# Patient Record
Sex: FEMALE | Race: BLACK OR AFRICAN AMERICAN | NOT HISPANIC OR LATINO | Employment: FULL TIME | URBAN - METROPOLITAN AREA
[De-identification: names, ages, dates, MRNs, and addresses within clinical notes are randomized per-mention and may not be internally consistent; named-entity substitution may affect disease eponyms.]

---
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
## 2019-01-04 ENCOUNTER — TELEPHONE (OUTPATIENT)
Dept: PSYCHIATRY | Facility: CLINIC | Age: 19
End: 2019-01-04

## 2019-01-04 NOTE — TELEPHONE ENCOUNTER
PSYCHIATRY CLINIC PHONE INTAKE     SERVICES REQUESTED / INTERESTED IN          Med Management    Presenting Problem and Brief History                              What would you like to be seen for? (brief description):  Pt was diagnosed with depression and anxiety since she was 13. She has had an eating disorder since being a young teenager, but was diagnosed with it at 16 and ASD was diagnosed a few years. Currently taking Gabapentin 900 twice a day and Lithium 450 regular twice a day and 450 XR twice a day. She doesn't feel like the medications are working. She's been on it since she was 15. Her psychiatrist in [place] is prescribing the meds up until this point. She has low energy and irritable. (Mom completed the intake from this point) She is also having black-out episodes that last 15-30 minutes. She can hear whats going on around her but can't move. She suppose to go back to college but she feels like she can't focus on her work. She is having panic attacks, but mom doesn't know how frequently, but believes its daily. She hasn't reported physical symptoms of anxiety to her mother. Sleep has always been a problem for her. She sleeps in late since she's not in school right now. Mom isn't aware of a history of trauma. Her eating disorder is related to a restrictive food intake. She won't eat unless she exercises because she wasnt her food intake to equal her exercise impact. She has had SI and was seeing a therapist over the winter break and she scored high on the SI scale with the therapist. Mom's not sure if she has an action plan to harm herself. She has a history of cutting sine she was in a teenager in highschool. Mom's not sure if she's still engaging in the cutting, when she asked the pt did not report it.     Have you received a mental health diagnosis? Yes   Which one (s): Depression, anxiety, eating disorder and ASD.   Is there any history of developmental delay?  No   Are you currently seeing a mental  health provider?  Yes            Who / month last seen:  [facility]  on [address] in [place], last seen on 12/31/18.  Do you have mental health records elsewhere?  Yes  Will you sign a release so we can obtain them?  Yes    Have you ever been hospitalized for psychiatric reasons?  Yes  Describe:  Suicide attempts and depression at  .     Do you have current thoughts of self-harm?  Yes  - See above  Do you currently have thoughts of harming others?  No       Substance Use History     Do you have any history of alcohol / illicit drug use?  ?  Describe:  ?  Have you ever received treatment for this?  No    Describe:  NA     Social History     Does the patient have a guardian?  No    Name / number: NA  Have you had an ACT team in last 12 months?  No  Describe: NA   Do you have any current or past legal issues?  No  Describe: NA   OK to leave a detailed voicemail?  Yes    Medical/ Surgical History                                   Patient Active Problem List   Diagnosis     Major depression     Generalized anxiety disorder     Social anxiety disorder     Disorder of thyroid     Self-injurious behavior     Deliberate self-cutting     Suicide attempt (H)     Mild intermittent asthma     Iron deficiency anemia     BAILEY (generalized anxiety disorder)     Borderline personality disorder (H)     Social phobia     Panic disorder with agoraphobia     Elevated TSH     OCD (obsessive compulsive disorder)     Nocturnal enuresis     Overdose          Medications             Current Outpatient Medications   Medication Sig Dispense Refill     albuterol (PROAIR HFA, PROVENTIL HFA, VENTOLIN HFA) 108 (90 BASE) MCG/ACT inhaler Inhale 2 puffs into the lungs every 4 hours as needed for shortness of breath / dyspnea or wheezing (Patient not taking: Reported on 6/1/2017) 1 Inhaler 0     DESMOPRESSIN ACETATE PO Take 0.6 mg by mouth At Bedtime Reported on 5/21/2017       diclofenac (VOLTAREN) 75 MG EC tablet Take 1 tablet (75 mg)  by mouth 2 times daily as needed for moderate pain 30 tablet 1     gabapentin (NEURONTIN) 300 MG capsule Take 2 capsules (600 mg) by mouth 2 times daily 120 capsule 0     Lactase (LACTAID PO) Take 9,000 Units by mouth 3 times daily as needed for indigestion (with meals)        LAMOTRIGINE PO Take 100 mg by mouth daily       lithium (ESKALITH) 450 MG CR tablet Take 1 tablet (450 mg) by mouth 2 times daily 60 tablet 0     lithium 150 MG capsule Take 1 capsule (150 mg) by mouth At Bedtime (Patient taking differently: Take 150 mg by mouth daily ) 30 capsule 0     Multiple Vitamins-Minerals (MULTIVITAMIN ADULT PO) Reported on 5/21/2017       polyethylene glycol (MIRALAX/GLYCOLAX) powder Take 1 capful by mouth daily as needed Reported on 5/21/2017       Vitamin D, Cholecalciferol, 1000 UNITS TABS Take 2,000 Units by mouth daily (Patient not taking: Reported on 6/20/2017) 60 tablet 0         DISPOSITION      1/4/19 Intake completed. Ok to schedule AGE. Scheduled evaristo/ Angie Gregory on 3/11/19 at 12pm.   Amanda Morales,

## 2019-01-07 ENCOUNTER — TELEPHONE (OUTPATIENT)
Dept: PEDIATRICS | Facility: CLINIC | Age: 19
End: 2019-01-07

## 2019-01-07 NOTE — TELEPHONE ENCOUNTER
Called mom and left message letting her know I am sending this to Dr. Rodríguez who is scheduled to be back in the clinic tomorrow.  Niki Rodgers RN

## 2019-01-07 NOTE — TELEPHONE ENCOUNTER
Reason for Call:  Other - Patient Request    Detailed comments: Mom called and stated the reason for patient's appointment on Wednesday is for reoccurring depression. Mom asked if it would be possible for her and Dr. Rodríguez to talk on the phone before the appointment so she can discuss this further.    Phone Number Patient can be reached at: Mom: [phone]    Best Time: Anytime    Can we leave a detailed message on this number? YES    Call taken on 1/7/2019 at 11:47 AM by Nisha Decker

## 2019-01-08 NOTE — TELEPHONE ENCOUNTER
I called mother who shared that Stephanie has been struggling with depression.  I will see them tomorrow.

## 2019-01-09 ENCOUNTER — OFFICE VISIT (OUTPATIENT)
Dept: PEDIATRICS | Facility: CLINIC | Age: 19
End: 2019-01-09
Payer: COMMERCIAL

## 2019-01-09 VITALS
WEIGHT: 135.25 LBS | TEMPERATURE: 97.7 F | SYSTOLIC BLOOD PRESSURE: 104 MMHG | HEIGHT: 71 IN | DIASTOLIC BLOOD PRESSURE: 59 MMHG | BODY MASS INDEX: 18.94 KG/M2 | HEART RATE: 70 BPM

## 2019-01-09 DIAGNOSIS — F32.2 CURRENT SEVERE EPISODE OF MAJOR DEPRESSIVE DISORDER WITHOUT PSYCHOTIC FEATURES WITHOUT PRIOR EPISODE (H): Primary | Chronic | ICD-10-CM

## 2019-01-09 PROCEDURE — 99213 OFFICE O/P EST LOW 20 MIN: CPT | Performed by: PEDIATRICS

## 2019-01-09 ASSESSMENT — ANXIETY QUESTIONNAIRES
GAD7 TOTAL SCORE: 21
IF YOU CHECKED OFF ANY PROBLEMS ON THIS QUESTIONNAIRE, HOW DIFFICULT HAVE THESE PROBLEMS MADE IT FOR YOU TO DO YOUR WORK, TAKE CARE OF THINGS AT HOME, OR GET ALONG WITH OTHER PEOPLE: EXTREMELY DIFFICULT
1. FEELING NERVOUS, ANXIOUS, OR ON EDGE: NEARLY EVERY DAY
2. NOT BEING ABLE TO STOP OR CONTROL WORRYING: NEARLY EVERY DAY
5. BEING SO RESTLESS THAT IT IS HARD TO SIT STILL: NEARLY EVERY DAY
3. WORRYING TOO MUCH ABOUT DIFFERENT THINGS: NEARLY EVERY DAY
7. FEELING AFRAID AS IF SOMETHING AWFUL MIGHT HAPPEN: NEARLY EVERY DAY
6. BECOMING EASILY ANNOYED OR IRRITABLE: NEARLY EVERY DAY

## 2019-01-09 ASSESSMENT — PATIENT HEALTH QUESTIONNAIRE - PHQ9
SUM OF ALL RESPONSES TO PHQ QUESTIONS 1-9: 25
5. POOR APPETITE OR OVEREATING: NEARLY EVERY DAY

## 2019-01-09 ASSESSMENT — MIFFLIN-ST. JEOR: SCORE: 1480

## 2019-01-09 NOTE — LETTER
[facility]  [address] [facility] [address]  Phone: [phone]    01/09/19    Stephanie Elate  [address] [facility] [address]      To whom it may concern:     Stephanie is my patient.  She is requiring a medical leave of absence from college this coming semester, as she is requiring treatment for a chronic medical condition.    If you have any questions or concerns, please do not hesitate to contact me.    Sincerely,            Jose Rodríguez MD

## 2019-01-09 NOTE — PROGRESS NOTES
SUBJECTIVE:   Stephanie Villanueva is a 18 year old female who presents to clinic today with mother because of:    Chief Complaint   Patient presents with     Depression        HPI  Concerns: follow up depression and anxiety. Here with mother.  Pt requesting medical leave letter.     Stephanie has an extensive psychiatric history including severe depression, self-injurious behavior, anxiety, OCD, who was nonetheless able to be very successful her first year away at [employer], getting top grade.  At the end this fall semester, however, she came home severely depressed and exhibiting psychomotor retardation, as described by her mother.    Mother has an appointment for her to see a psychiatric PA next week, and a therapist later today. Stephanie is requesting a medical leave of absence letter from me today.       ROS  GENERAL:  NEGATIVE for fever, poor appetite, and sleep disruption.  SKIN:  NEGATIVE for rash, hives, and eczema.  EYE:  NEGATIVE for pain, discharge, redness, itching and vision problems.  ENT:  NEGATIVE for ear pain, runny nose, congestion and sore throat.  RESP:  NEGATIVE for cough, wheezing, and difficulty breathing.  CARDIAC:  NEGATIVE for chest pain and cyanosis.   GI:  NEGATIVE for vomiting, diarrhea, abdominal pain and constipation.  :  NEGATIVE for urinary problems.  NEURO:  NEGATIVE for headache and weakness.  ALLERGY:  As in Allergy History  MSK:  NEGATIVE for muscle problems and joint problems.    PROBLEM LIST  Patient Active Problem List    Diagnosis Date Noted     Overdose 12/29/2016     Priority: Medium     OCD (obsessive compulsive disorder) 06/08/2016     Priority: Medium     Borderline personality disorder (H) 01/06/2016     Priority: Medium     Social phobia 01/06/2016     Priority: Medium     Panic disorder with agoraphobia 01/06/2016     Priority: Medium     Elevated TSH 01/06/2016     Priority: Medium     BAILEY (generalized anxiety disorder) 12/09/2015     Priority: Medium     Mild  intermittent asthma 05/08/2015     Priority: Medium     Exercise induced       Iron deficiency anemia 05/08/2015     Priority: Medium     Suicide attempt (H) 01/27/2015     Priority: Medium     If patient is admitted:  1. Must go to [facility] [place] only - she does not want teaching team (too overwhelming)  2. Needs to start on 1:1, in scrubs only, with no personal/home items until cleared by team  3. Patient has history of sneaking razors on the unit, including in books, stuffed animals, shoes - all items must be thoroughly searched  4. Becomes dysregulated in group settings and after contact with family  5. What works? Sarcasm, allowing her to have some control in who her assigned staff is, sensory room       Deliberate self-cutting 09/05/2014     Priority: Medium     Self-injurious behavior 09/04/2014     Priority: Medium     Disorder of thyroid 01/12/2014     Priority: Medium     Problem list name updated by automated process. Provider to review       Major depression 11/15/2013     Priority: Medium     Generalized anxiety disorder 11/15/2013     Priority: Medium     Social anxiety disorder 11/15/2013     Priority: Medium     Nocturnal enuresis 10/04/2007     Priority: Medium     Currently resolved.        MEDICATIONS  Current Outpatient Medications   Medication Sig Dispense Refill     albuterol (PROAIR HFA, PROVENTIL HFA, VENTOLIN HFA) 108 (90 BASE) MCG/ACT inhaler Inhale 2 puffs into the lungs every 4 hours as needed for shortness of breath / dyspnea or wheezing (Patient not taking: Reported on 6/1/2017) 1 Inhaler 0     DESMOPRESSIN ACETATE PO Take 0.6 mg by mouth At Bedtime Reported on 5/21/2017       diclofenac (VOLTAREN) 75 MG EC tablet Take 1 tablet (75 mg) by mouth 2 times daily as needed for moderate pain 30 tablet 1     gabapentin (NEURONTIN) 300 MG capsule Take 2 capsules (600 mg) by mouth 2 times daily 120 capsule 0     Lactase (LACTAID PO) Take 9,000 Units by mouth 3 times daily as needed  "for indigestion (with meals)        LAMOTRIGINE PO Take 100 mg by mouth daily       lithium (ESKALITH) 450 MG CR tablet Take 1 tablet (450 mg) by mouth 2 times daily 60 tablet 0     lithium 150 MG capsule Take 1 capsule (150 mg) by mouth At Bedtime (Patient taking differently: Take 150 mg by mouth daily ) 30 capsule 0     Multiple Vitamins-Minerals (MULTIVITAMIN ADULT PO) Reported on 5/21/2017       polyethylene glycol (MIRALAX/GLYCOLAX) powder Take 1 capful by mouth daily as needed Reported on 5/21/2017       Vitamin D, Cholecalciferol, 1000 UNITS TABS Take 2,000 Units by mouth daily (Patient not taking: Reported on 6/20/2017) 60 tablet 0      ALLERGIES  Allergies   Allergen Reactions     No Known Drug Allergies        Reviewed and updated as needed this visit by clinical staff         Reviewed and updated as needed this visit by Provider       OBJECTIVE:     /59 (BP Location: Right arm, Patient Position: Chair)   Pulse 70   Temp 97.7  F (36.5  C) (Oral)   Ht 5' 10.71\" (1.796 m)   Wt 135 lb 4 oz (61.3 kg)   BMI 19.02 kg/m      GENERAL: Alert, well-groomed, but making minimal eye contact, and speaking very sparingly.    DIAGNOSTICS: None    ASSESSMENT/PLAN:   1. Current severe episode of major depressive disorder without psychotic features without prior episode (H)  Letter written for medical leave from [employer].  Follow up with me in 3 weeks, after seeing psychiatrist for possible change in medication, and weekly therapy with new therapist.  Mother and Ndolo agree with plan.      Jose Rodríguez MD       "

## 2019-01-10 ASSESSMENT — ANXIETY QUESTIONNAIRES: GAD7 TOTAL SCORE: 21

## 2019-01-10 ASSESSMENT — ASTHMA QUESTIONNAIRES: ACT_TOTALSCORE: 24

## 2019-01-19 ENCOUNTER — HOSPITAL ENCOUNTER (INPATIENT)
Facility: CLINIC | Age: 19
LOS: 10 days | Discharge: HOME | DRG: 885 | End: 2019-01-29
Attending: EMERGENCY MEDICINE | Admitting: PSYCHIATRY & NEUROLOGY
Payer: COMMERCIAL

## 2019-01-19 DIAGNOSIS — T42.6X2A: ICD-10-CM

## 2019-01-19 DIAGNOSIS — F41.9 ANXIETY: ICD-10-CM

## 2019-01-19 DIAGNOSIS — F33.2 SEVERE RECURRENT MAJOR DEPRESSION WITHOUT PSYCHOTIC FEATURES (H): ICD-10-CM

## 2019-01-19 DIAGNOSIS — S51.811A LACERATION OF RIGHT FOREARM: ICD-10-CM

## 2019-01-19 DIAGNOSIS — R45.851 SUICIDAL IDEATION: Primary | ICD-10-CM

## 2019-01-19 PROBLEM — F32.A DEPRESSION: Status: ACTIVE | Noted: 2019-01-19

## 2019-01-19 LAB
ALBUMIN SERPL-MCNC: 4 G/DL (ref 3.4–5)
ALP SERPL-CCNC: 75 U/L (ref 40–150)
ALT SERPL W P-5'-P-CCNC: 17 U/L (ref 0–50)
AMPHETAMINES UR QL SCN: NEGATIVE
ANION GAP SERPL CALCULATED.3IONS-SCNC: 7 MMOL/L (ref 3–14)
APAP SERPL-MCNC: <2 MG/L (ref 10–20)
AST SERPL W P-5'-P-CCNC: 23 U/L (ref 0–35)
BARBITURATES UR QL: NEGATIVE
BASOPHILS # BLD AUTO: 0 10E9/L (ref 0–0.2)
BASOPHILS NFR BLD AUTO: 0.3 %
BENZODIAZ UR QL: NEGATIVE
BILIRUB SERPL-MCNC: 0.5 MG/DL (ref 0.2–1.3)
BUN SERPL-MCNC: 9 MG/DL (ref 7–30)
CALCIUM SERPL-MCNC: 8.8 MG/DL (ref 8.5–10.1)
CANNABINOIDS UR QL SCN: NEGATIVE
CHLORIDE SERPL-SCNC: 103 MMOL/L (ref 96–110)
CO2 SERPL-SCNC: 28 MMOL/L (ref 20–32)
COCAINE UR QL: NEGATIVE
CREAT SERPL-MCNC: 0.69 MG/DL (ref 0.5–1)
DIFFERENTIAL METHOD BLD: ABNORMAL
EOSINOPHIL # BLD AUTO: 0.1 10E9/L (ref 0–0.7)
EOSINOPHIL NFR BLD AUTO: 2.2 %
ERYTHROCYTE [DISTWIDTH] IN BLOOD BY AUTOMATED COUNT: 12.3 % (ref 10–15)
ETHANOL UR QL SCN: NEGATIVE
GFR SERPL CREATININE-BSD FRML MDRD: >90 ML/MIN/{1.73_M2}
GLUCOSE SERPL-MCNC: 70 MG/DL (ref 70–99)
HCG UR QL: NEGATIVE
HCT VFR BLD AUTO: 35.5 % (ref 35–47)
HGB BLD-MCNC: 11.4 G/DL (ref 11.7–15.7)
IMM GRANULOCYTES # BLD: 0 10E9/L (ref 0–0.4)
IMM GRANULOCYTES NFR BLD: 0.3 %
LITHIUM SERPL-SCNC: 2.29 MMOL/L (ref 0.6–1.2)
LYMPHOCYTES # BLD AUTO: 1.5 10E9/L (ref 0.8–5.3)
LYMPHOCYTES NFR BLD AUTO: 26.3 %
MCH RBC QN AUTO: 30.2 PG (ref 26.5–33)
MCHC RBC AUTO-ENTMCNC: 32.1 G/DL (ref 31.5–36.5)
MCV RBC AUTO: 94 FL (ref 78–100)
MONOCYTES # BLD AUTO: 0.5 10E9/L (ref 0–1.3)
MONOCYTES NFR BLD AUTO: 8.1 %
NEUTROPHILS # BLD AUTO: 3.6 10E9/L (ref 1.6–8.3)
NEUTROPHILS NFR BLD AUTO: 62.8 %
NRBC # BLD AUTO: 0 10*3/UL
NRBC BLD AUTO-RTO: 0 /100
OPIATES UR QL SCN: NEGATIVE
PLATELET # BLD AUTO: 293 10E9/L (ref 150–450)
POTASSIUM SERPL-SCNC: 3.7 MMOL/L (ref 3.4–5.3)
PROT SERPL-MCNC: 6.9 G/DL (ref 6.8–8.8)
RBC # BLD AUTO: 3.77 10E12/L (ref 3.8–5.2)
SALICYLATES SERPL-MCNC: <2 MG/DL
SODIUM SERPL-SCNC: 138 MMOL/L (ref 133–144)
T4 FREE SERPL-MCNC: 0.96 NG/DL (ref 0.76–1.46)
TSH SERPL DL<=0.005 MIU/L-ACNC: 11.97 MU/L (ref 0.4–4)
WBC # BLD AUTO: 5.8 10E9/L (ref 4–11)

## 2019-01-19 PROCEDURE — 25000132 ZZH RX MED GY IP 250 OP 250 PS 637: Performed by: FAMILY MEDICINE

## 2019-01-19 PROCEDURE — 27210282 ZZH ADHESIVE DERMABOND SKIN

## 2019-01-19 PROCEDURE — 80178 ASSAY OF LITHIUM: CPT | Performed by: EMERGENCY MEDICINE

## 2019-01-19 PROCEDURE — 25000131 ZZH RX MED GY IP 250 OP 636 PS 637: Performed by: PSYCHIATRY & NEUROLOGY

## 2019-01-19 PROCEDURE — 80307 DRUG TEST PRSMV CHEM ANLYZR: CPT | Performed by: FAMILY MEDICINE

## 2019-01-19 PROCEDURE — H2032 ACTIVITY THERAPY, PER 15 MIN: HCPCS

## 2019-01-19 PROCEDURE — 99285 EMERGENCY DEPT VISIT HI MDM: CPT | Mod: 25

## 2019-01-19 PROCEDURE — 25000132 ZZH RX MED GY IP 250 OP 250 PS 637: Performed by: PSYCHIATRY & NEUROLOGY

## 2019-01-19 PROCEDURE — 0HQDXZZ REPAIR RIGHT LOWER ARM SKIN, EXTERNAL APPROACH: ICD-10-PCS | Performed by: EMERGENCY MEDICINE

## 2019-01-19 PROCEDURE — 80320 DRUG SCREEN QUANTALCOHOLS: CPT | Performed by: FAMILY MEDICINE

## 2019-01-19 PROCEDURE — 25000125 ZZHC RX 250: Performed by: EMERGENCY MEDICINE

## 2019-01-19 PROCEDURE — 80329 ANALGESICS NON-OPIOID 1 OR 2: CPT | Performed by: EMERGENCY MEDICINE

## 2019-01-19 PROCEDURE — 80053 COMPREHEN METABOLIC PANEL: CPT | Performed by: EMERGENCY MEDICINE

## 2019-01-19 PROCEDURE — 12005 RPR S/N/A/GEN/TRK12.6-20.0CM: CPT

## 2019-01-19 PROCEDURE — 84439 ASSAY OF FREE THYROXINE: CPT | Performed by: EMERGENCY MEDICINE

## 2019-01-19 PROCEDURE — 12005 RPR S/N/A/GEN/TRK12.6-20.0CM: CPT | Mod: Z6 | Performed by: EMERGENCY MEDICINE

## 2019-01-19 PROCEDURE — 99285 EMERGENCY DEPT VISIT HI MDM: CPT | Mod: 25 | Performed by: EMERGENCY MEDICINE

## 2019-01-19 PROCEDURE — 93005 ELECTROCARDIOGRAM TRACING: CPT

## 2019-01-19 PROCEDURE — 25000131 ZZH RX MED GY IP 250 OP 636 PS 637: Performed by: FAMILY MEDICINE

## 2019-01-19 PROCEDURE — 81025 URINE PREGNANCY TEST: CPT | Performed by: FAMILY MEDICINE

## 2019-01-19 PROCEDURE — 84443 ASSAY THYROID STIM HORMONE: CPT | Performed by: EMERGENCY MEDICINE

## 2019-01-19 PROCEDURE — 85025 COMPLETE CBC W/AUTO DIFF WBC: CPT | Performed by: EMERGENCY MEDICINE

## 2019-01-19 PROCEDURE — 12400001 ZZH R&B MH UMMC

## 2019-01-19 RX ORDER — HYDROXYZINE HYDROCHLORIDE 25 MG/1
25 TABLET, FILM COATED ORAL EVERY 4 HOURS PRN
Status: DISCONTINUED | OUTPATIENT
Start: 2019-01-19 | End: 2019-01-29 | Stop reason: HOSPADM

## 2019-01-19 RX ORDER — OLANZAPINE 10 MG/1
10 TABLET ORAL
Status: DISCONTINUED | OUTPATIENT
Start: 2019-01-19 | End: 2019-01-29 | Stop reason: HOSPADM

## 2019-01-19 RX ORDER — DESMOPRESSIN ACETATE 0.2 MG/1
0.6 TABLET ORAL AT BEDTIME
COMMUNITY
End: 2019-09-16

## 2019-01-19 RX ORDER — LITHIUM CARBONATE 300 MG
300 TABLET ORAL 2 TIMES DAILY
Status: ON HOLD | COMMUNITY
End: 2019-01-29

## 2019-01-19 RX ORDER — OLANZAPINE 10 MG/2ML
10 INJECTION, POWDER, FOR SOLUTION INTRAMUSCULAR
Status: DISCONTINUED | OUTPATIENT
Start: 2019-01-19 | End: 2019-01-29 | Stop reason: HOSPADM

## 2019-01-19 RX ORDER — ONDANSETRON 8 MG/1
8 TABLET, ORALLY DISINTEGRATING ORAL ONCE
Status: COMPLETED | OUTPATIENT
Start: 2019-01-19 | End: 2019-01-19

## 2019-01-19 RX ORDER — IBUPROFEN 400 MG/1
400 TABLET, FILM COATED ORAL EVERY 6 HOURS PRN
Status: DISCONTINUED | OUTPATIENT
Start: 2019-01-19 | End: 2019-01-19

## 2019-01-19 RX ORDER — IBUPROFEN 600 MG/1
600 TABLET, FILM COATED ORAL ONCE
Status: COMPLETED | OUTPATIENT
Start: 2019-01-19 | End: 2019-01-19

## 2019-01-19 RX ORDER — LIDOCAINE HYDROCHLORIDE AND EPINEPHRINE 10; 10 MG/ML; UG/ML
1 INJECTION, SOLUTION INFILTRATION; PERINEURAL ONCE
Status: COMPLETED | OUTPATIENT
Start: 2019-01-19 | End: 2019-01-19

## 2019-01-19 RX ORDER — GABAPENTIN 300 MG/1
900 CAPSULE ORAL 2 TIMES DAILY
Status: ON HOLD | COMMUNITY
End: 2019-01-29

## 2019-01-19 RX ORDER — ONDANSETRON 4 MG/1
4 TABLET, ORALLY DISINTEGRATING ORAL 3 TIMES DAILY PRN
Status: DISCONTINUED | OUTPATIENT
Start: 2019-01-19 | End: 2019-01-29 | Stop reason: HOSPADM

## 2019-01-19 RX ORDER — ACETAMINOPHEN 325 MG/1
650 TABLET ORAL 3 TIMES DAILY PRN
Status: DISCONTINUED | OUTPATIENT
Start: 2019-01-19 | End: 2019-01-29 | Stop reason: HOSPADM

## 2019-01-19 RX ORDER — DESMOPRESSIN ACETATE 0.2 MG/1
600 TABLET ORAL AT BEDTIME
Status: DISCONTINUED | OUTPATIENT
Start: 2019-01-19 | End: 2019-01-29 | Stop reason: HOSPADM

## 2019-01-19 RX ORDER — TRAZODONE HYDROCHLORIDE 50 MG/1
50 TABLET, FILM COATED ORAL
Status: DISCONTINUED | OUTPATIENT
Start: 2019-01-19 | End: 2019-01-25

## 2019-01-19 RX ORDER — BISACODYL 10 MG
10 SUPPOSITORY, RECTAL RECTAL DAILY PRN
Status: DISCONTINUED | OUTPATIENT
Start: 2019-01-19 | End: 2019-01-29 | Stop reason: HOSPADM

## 2019-01-19 RX ORDER — ALUMINA, MAGNESIA, AND SIMETHICONE 2400; 2400; 240 MG/30ML; MG/30ML; MG/30ML
30 SUSPENSION ORAL EVERY 4 HOURS PRN
Status: DISCONTINUED | OUTPATIENT
Start: 2019-01-19 | End: 2019-01-29 | Stop reason: HOSPADM

## 2019-01-19 RX ADMIN — LIDOCAINE HYDROCHLORIDE AND EPINEPHRINE 1 ML: 10; 10 INJECTION, SOLUTION INFILTRATION; PERINEURAL at 05:22

## 2019-01-19 RX ADMIN — ONDANSETRON 4 MG: 4 TABLET, ORALLY DISINTEGRATING ORAL at 21:27

## 2019-01-19 RX ADMIN — IBUPROFEN 600 MG: 600 TABLET ORAL at 16:18

## 2019-01-19 RX ADMIN — DESMOPRESSIN ACETATE 600 MCG: 0.2 TABLET ORAL at 21:26

## 2019-01-19 RX ADMIN — ONDANSETRON 8 MG: 8 TABLET, ORALLY DISINTEGRATING ORAL at 10:50

## 2019-01-19 ASSESSMENT — ACTIVITIES OF DAILY LIVING (ADL)
FALL_HISTORY_WITHIN_LAST_SIX_MONTHS: NO
HYGIENE/GROOMING: INDEPENDENT
DRESS: 0-->INDEPENDENT
DRESS: INDEPENDENT
AMBULATION: 0-->INDEPENDENT
SWALLOWING: 0-->SWALLOWS FOODS/LIQUIDS WITHOUT DIFFICULTY
ORAL_HYGIENE: INDEPENDENT
RETIRED_EATING: 0-->INDEPENDENT
BATHING: 0-->INDEPENDENT
RETIRED_COMMUNICATION: 0-->UNDERSTANDS/COMMUNICATES WITHOUT DIFFICULTY
WHICH_OF_THE_ABOVE_FUNCTIONAL_RISKS_HAD_A_RECENT_ONSET_OR_CHANGE?: COGNITION
TRANSFERRING: 0-->INDEPENDENT
TOILETING: 0-->INDEPENDENT
COGNITION: 1 - ATTENTION OR MEMORY DEFICITS

## 2019-01-19 ASSESSMENT — ENCOUNTER SYMPTOMS
BACK PAIN: 0
NECK STIFFNESS: 0
VOMITING: 0
NECK PAIN: 0
CHILLS: 0
DIZZINESS: 0
LIGHT-HEADEDNESS: 0
SORE THROAT: 0
MYALGIAS: 0
COUGH: 0
ABDOMINAL PAIN: 0
SHORTNESS OF BREATH: 0
NAUSEA: 0
AGITATION: 0
HALLUCINATIONS: 0
APPETITE CHANGE: 0
RHINORRHEA: 0
BRUISES/BLEEDS EASILY: 0
CHEST TIGHTNESS: 0
DECREASED CONCENTRATION: 1
WEAKNESS: 0
FATIGUE: 1
WOUND: 1
NERVOUS/ANXIOUS: 1
FEVER: 0
ARTHRALGIAS: 0
DIARRHEA: 0
DYSPHORIC MOOD: 1

## 2019-01-19 ASSESSMENT — MIFFLIN-ST. JEOR: SCORE: 1460.35

## 2019-01-19 ASSESSMENT — COLUMBIA-SUICIDE SEVERITY RATING SCALE - C-SSRS
1. IN THE PAST MONTH, HAVE YOU WISHED YOU WERE DEAD OR WISHED YOU COULD GO TO SLEEP AND NOT WAKE UP?: YES
SUICIDAL/SLEF-INJURIOUS BEHAVIOR: ACTUAL SUICIDE ATTEMPT

## 2019-01-19 NOTE — ED PROVIDER NOTES
History     Chief Complaint   Patient presents with     Drug Overdose     pt c/o taking 3600mg of gabapentin around 0300     Laceration     Pt has multiple large lacerations on R forearm.         Stephanie Villanueva is a 18 year old female with history of depression and anxiety and history of self injuring by cutting who presents with suicidal ideation and attempt by intentional overdose of gabapentin 3600 mg. Denies other co-ingestants. She used a razor blade and cut her right forearm multiple times. Denies alcohol or drug use. Denies recent illness.    I have reviewed the Medications, Allergies, Past Medical and Surgical History, and Social History in the [employer] system.  Past Medical History:   Diagnosis Date     Anxiety october 2013     CONTUSION OF left iliac crest 9/3/2006     Major depression october 2013     Nocturnal enuresis 10/4/2007    Currently resolved.     Salter-Perdomo Type I fracture of distal fibula with routine healing 1/25/2013     Uncomplicated asthma     sports induced       Review of Systems   Constitutional: Positive for fatigue. Negative for appetite change, chills and fever.   HENT: Negative for congestion, rhinorrhea and sore throat.    Eyes: Negative for visual disturbance.   Respiratory: Negative for cough, chest tightness and shortness of breath.    Cardiovascular: Negative for chest pain.   Gastrointestinal: Negative for abdominal pain, diarrhea, nausea and vomiting.   Musculoskeletal: Negative for arthralgias, back pain, myalgias, neck pain and neck stiffness.   Skin: Positive for wound. Negative for rash.   Allergic/Immunologic: Negative for immunocompromised state.   Neurological: Negative for dizziness, syncope, weakness and light-headedness.   Hematological: Does not bruise/bleed easily.   Psychiatric/Behavioral: Positive for decreased concentration, dysphoric mood, self-injury and suicidal ideas. Negative for agitation and hallucinations. The patient is nervous/anxious.    All other  systems reviewed and are negative.      Physical Exam   BP: 142/88  Pulse: 94  Temp: 98.5  F (36.9  C)  Resp: 16  SpO2: 100 %     Physical Exam   Constitutional: She is oriented to person, place, and time. She appears well-developed and well-nourished. No distress.   HENT:   Head: Normocephalic and atraumatic.   Nose: Nose normal.   Mouth/Throat: Oropharynx is clear and moist. No oropharyngeal exudate.   Eyes: Conjunctivae and EOM are normal. Pupils are equal, round, and reactive to light. No scleral icterus.   Neck: Normal range of motion. Neck supple.   Cardiovascular: Normal rate, regular rhythm, normal heart sounds and intact distal pulses.   Pulmonary/Chest: Effort normal and breath sounds normal. No respiratory distress.   Abdominal: Soft. Bowel sounds are normal. There is no tenderness.   Musculoskeletal: She exhibits no edema or tenderness.   Neurological: She is alert and oriented to person, place, and time.   Skin: Skin is warm and dry. No rash noted. She is not diaphoretic.   Psychiatric: Her mood appears anxious. She is withdrawn. She is not agitated and not actively hallucinating. Thought content is not paranoid. She expresses inappropriate judgment. She expresses suicidal ideation. She expresses no homicidal ideation. She expresses suicidal plans. She is noncommunicative.   Nursing note and vitals reviewed.        ED Course        Procedures             Critical Care time:  [facility] Procedure Note        Laceration Repair:    Performed by: Tyler Mcgovern  Authorized by: Tyler Mcgovern  Consent given by: Patient who states understanding of the procedure being performed after discussing the risks, benefits and alternatives.    Preparation: Patient was prepped and draped in usual sterile fashion.  Irrigation solution: saline    Body area:right forearm  Laceration length: 5cm  Contamination: The wound is not contaminated.  Foreign bodies:none  Tendon involvement: none  Anesthesia:  Local  Local anesthetic: Lidocaine     1%, with epinephrine  Anesthetic total: 4ml    Debridement: none  Skin closure: Closed with 10 x 4.0 Ethilon  Technique: interrupted  Approximation: close  Approximation difficulty: simple    Patient tolerance: Patient tolerated the procedure well with no immediate complications.     [facility] Procedure Note        Laceration Repair:    Performed by: Tyler Mcgovern  Authorized by: Tyler Mcgovern  Consent given by: Patient who states understanding of the procedure being performed after discussing the risks, benefits and alternatives.    Preparation: Patient was prepped and draped in usual sterile fashion.  Irrigation solution: saline    Body area:right forearm  Laceration length: 2.5cm  Contamination: The wound is not contaminated.  Foreign bodies:none  Tendon involvement: none  Anesthesia: Local  Local anesthetic: Lidocaine     1%, with epinephrine  Anesthetic total: 3ml    Debridement: none  Skin closure: Closed with 5 x 4.0 Ethilon  Technique: interrupted  Approximation: close  Approximation difficulty: simple    Patient tolerance: Patient tolerated the procedure well with no immediate complications.    [facility] Procedure Note        Laceration Repair:    Performed by: Tyler Mcgovern  Authorized by: Tyler Mcgovern  Consent given by: Patient who states understanding of the procedure being performed after discussing the risks, benefits and alternatives.    Preparation: Patient was prepped and draped in usual sterile fashion.  Irrigation solution: saline    Body area:right forearm  Laceration length: 3.5cm  Contamination: The wound is not contaminated.  Foreign bodies:none  Tendon involvement: none  Anesthesia: Local  Local anesthetic: Lidocaine     1%, with epinephrine  Anesthetic total: 3ml    Debridement: none  Skin closure: Closed with 6 x 4.0 Ethilon  Technique: interrupted  Approximation: close  Approximation difficulty: simple    Patient tolerance:  Patient tolerated the procedure well with no immediate complications.  [facility] Procedure Note        Laceration Repair:    Performed by: Tyelr Mcgovern  Authorized by: Tyler Mcgovern  Consent given by: Patient who states understanding of the procedure being performed after discussing the risks, benefits and alternatives.    Preparation: Patient was prepped and draped in usual sterile fashion.  Irrigation solution: saline    Body area:right forearm  Laceration length: 3cm  Contamination: The wound is not contaminated.  Foreign bodies:none  Tendon involvement: none  Anesthesia: Local  Local anesthetic: Lidocaine     1%, with epinephrine  Anesthetic total: 3ml    Debridement: none  Skin closure: Closed with 4 x 4.0 Ethilon  Technique: interrupted  Approximation: close  Approximation difficulty: simple    Patient tolerance: Patient tolerated the procedure well with no immediate complications.  [facility] Procedure Note        Laceration Repair:    Performed by: Tyler Mcgovern  Authorized by: Tyler Mcgovern  Consent given by: Patient who states understanding of the procedure being performed after discussing the risks, benefits and alternatives.    Preparation: Patient was prepped and draped in usual sterile fashion.  Irrigation solution: saline    Body area:right forearm  Laceration length: 2.5cm  Contamination: The wound is not contaminated.  Foreign bodies:none  Tendon involvement: none  Anesthesia: Local  Local anesthetic: Lidocaine     1%, with epinephrine  Anesthetic total: 3ml    Debridement: none  Skin closure: Closed with 3 x 4.0 Ethilon  Technique: interrupted  Approximation: close  Approximation difficulty: simple    Patient tolerance: Patient tolerated the procedure well with no immediate complications.  Labs Ordered and Resulted from Time of ED Arrival Up to the Time of Departure from the ED   ACETAMINOPHEN LEVEL   SALICYLATE LEVEL     [facility] Procedure Note        Laceration  Repair:    Performed by: Tyler Han  Authorized by: Tyler Han  Consent given by: Patient who states understanding of the procedure being performed after discussing the risks, benefits and alternatives.    Preparation: Patient was prepped and draped in usual sterile fashion.  Irrigation solution: saline    Body area:right forearm  Laceration length: 2cm  Contamination: The wound is not contaminated.  Foreign bodies:none  Tendon involvement: none  Anesthesia: Local  Local anesthetic: Lidocaine     1%, with epinephrine  Anesthetic total: 2ml    Debridement: none  Skin closure: Closed with Wound adhesive  Technique: Wound adhesive  Approximation: close  Approximation difficulty: simple    Patient tolerance: Patient tolerated the procedure well with no immediate complications.         Assessments & Plan (with Medical Decision Making)   Wound repairs as noted. Admit to psychiatry on an emergency basis with suicidal intent. Discussed gabapentin overdose with poison center. No specific therapy needed.    I have reviewed the nursing notes.    I have reviewed the findings, diagnosis, plan and need for follow up with the patient.       Medication List      There are no discharge medications for this visit.         Final diagnoses:   Suicidal ideation   Self-inflicted injury       1/19/2019   [facility], [place], EMERGENCY DEPARTMENT     Tyler Han MD  01/19/19 0741

## 2019-01-19 NOTE — ED NOTES
Sign out Provider: Luke      Sign out Plan: Patient seen and evaluated on the prior shift.  She has been deemed in need of psychiatric admission.      Reassessment: No events on my shift.  Patient has mild nausea nurse requests an order for Zofran.      Disposition: P.o. Zofran.  Plan for psychiatric admission as passed to me at sign out.       Bradley Frazier MD  01/19/19 1042

## 2019-01-19 NOTE — PLAN OF CARE
ADMIT: This young lady arrived from the ED on 4a around 1414. Pt affect is very flat, behavior very stoic, sad slow to respond to questions. The ED stated she was slow to respond d/t the amount of neurontin she over dosed on. Pt states she has been on sta 7 adol psychiatry nine times? Pt has multiple keloid scars on each arm from years of SIB and also has stitches in her right arm from the suicide attempt. Pt is very quiet and has agreed to remain safe? No drug use.

## 2019-01-19 NOTE — PROGRESS NOTES
Attended afternoon music therapy group. Was quiet and minimally interacted with peers. Soft spoken. Flat affect. Did not contribute ideas to group discussion.

## 2019-01-19 NOTE — PROGRESS NOTES
01/19/19 1413   Patient Belongings   Did you bring any home meds/supplements to the hospital?  No   Patient Belongings locker   Patient Belongings Remaining with Patient clothing;cell phone/electronics;necklace;other (see comments);glasses   Patient Belongings Put in Hospital Secure Location (Security or Locker, etc.) body jewelry;earrings;other (see comments)   Belongings Search Yes   Clothing Search Yes   Second Staff Yanal MICHAEL     Pt belonging in Bin: Purple sweatshirt, library book, phone with black case, noise cancelling headphones, necklace  Pt is wearing her personal eyeglasses and had body jewelry and earrings in.    Added on 1/20/19- black long sleeve shirt, adidas track pants, three curology facial products (locked in hygiene bin)     Items brought for patient on 01/20/19:  14 pieces of sanitary pad, 1 pair of  Sport Bra, 1 pair of black trouser, 2 pair of  socks, 5 pairs of underwear,and  1 pair of long sleeves shirt.    Brought 1/22/19:  book    A               Admission:  I am responsible for any personal items that are not sent to the safe or pharmacy.  [facility] is not responsible for loss, theft or damage of any property in my possession.    Signature:  _________________________________ Date: _______  Time: _____                                              Staff Signature:  ____________________________ Date: ________  Time: _____      2nd Staff person, if patient is unable/unwilling to sign:    Signature: ________________________________ Date: ________  Time: _____     Discharge:  [facility] has returned all of my personal belongings:    Signature: _________________________________ Date: ________  Time: _____                                          Staff Signature:  ____________________________ Date: ________  Time: _____

## 2019-01-19 NOTE — ED NOTES
I have performed an in person assessment of the patient. Based on this assessment the patient no longer requires a one on one attendant at this point in time.    Tyler Han MD  4:04 AM  January 19, 2019           Tyler Han MD  01/19/19 0400

## 2019-01-19 NOTE — ED NOTES
Spoke with mom, who will call floor to sign patient in. Float nurse placing dressing on right inner forearm at this time, at request of patient.

## 2019-01-19 NOTE — PHARMACY-ADMISSION MEDICATION HISTORY
Admission Medication History status for the 1/19/2019 admission is complete.  See EPIC admission navigator for Prior to Admission medications.    Medication history sources:  Patient, [facility], [facility] fill history    Medication history source reliability: Good - patient report matches [facility] fill history, she reports only filling at [facility]    Medication adherence:  Moderate - patient reports missing occasional doses     Changes made to PTA medication list (reason)  Added:   Lithium 300 mg capsule BID   Per patient report and [facility] fill history  Deleted:   Albuterol Inhaler: Inhale 2 puffs into the lungs every 6 hours as needed   Patient has never used  Diclofenac 75 mg EC tablet: Take 1 tablet by mouth twice daily PRN pain   Old prescription (May 2018)  Lactase: Take 9,000 units by mouth three times daily with meals as needed for indigestion   Patient not taking  Lamotrigine: 100 mg daily   Patient reports this was stopped by her provider ~1 year prior  Multivitamin   Vitamin D 2000 unit(s) daily   Patient not taking either supplement  Miralax: 1 capful by mouth daily as needed   Patient reports not usint  Changed:   Gabapentin 300 mg capsule (s): Take twice daily   PREVIOUSLY: 600 mg   CURRENTLY: 900 mg   Per [facility] fill history and patient report     Additional medication history information (including reliability of information, actions taken by pharmacist):   - Patient was a good historian, she knew her medications and denied any other Rx/OTC meds    Time spent in this activity: 25 min    Medication history completed by: Becca Stevenson    Prior to Admission medications    Medication Sig Last Dose Taking? Auth Provider   desmopressin (DDAVP) 0.2 MG tablet Take 0.6 mg by mouth At Bedtime  1/18/2019 at PM Yes Unknown, Entered By History   gabapentin (NEURONTIN) 300 MG capsule Take 900 mg by mouth 2 times daily 1/19/2019 at Unknown time Yes Unknown, Entered By  History   lithium (ESKALITH) 300 MG tablet Take 300 mg by mouth 2 times daily 1/18/2019 at PM Yes Unknown, Entered By History   lithium (ESKALITH) 450 MG CR tablet Take 1 tablet (450 mg) by mouth 2 times daily 1/18/2019 at PM Yes Samantha Varner, DO

## 2019-01-20 LAB
ANION GAP SERPL CALCULATED.3IONS-SCNC: 4 MMOL/L (ref 3–14)
BUN SERPL-MCNC: 7 MG/DL (ref 7–30)
CALCIUM SERPL-MCNC: 9.2 MG/DL (ref 8.5–10.1)
CHLORIDE SERPL-SCNC: 107 MMOL/L (ref 96–110)
CHOLEST SERPL-MCNC: 121 MG/DL
CO2 SERPL-SCNC: 28 MMOL/L (ref 20–32)
CREAT SERPL-MCNC: 0.82 MG/DL (ref 0.5–1)
GFR SERPL CREATININE-BSD FRML MDRD: >90 ML/MIN/{1.73_M2}
GLUCOSE SERPL-MCNC: 88 MG/DL (ref 70–99)
HDLC SERPL-MCNC: 47 MG/DL
LDLC SERPL CALC-MCNC: 61 MG/DL
LITHIUM SERPL-SCNC: 2.05 MMOL/L (ref 0.6–1.2)
NONHDLC SERPL-MCNC: 74 MG/DL
POTASSIUM SERPL-SCNC: 4 MMOL/L (ref 3.4–5.3)
SODIUM SERPL-SCNC: 139 MMOL/L (ref 133–144)
TRIGL SERPL-MCNC: 64 MG/DL
TSH SERPL DL<=0.005 MIU/L-ACNC: 3.45 MU/L (ref 0.4–4)

## 2019-01-20 PROCEDURE — 36415 COLL VENOUS BLD VENIPUNCTURE: CPT | Performed by: PSYCHIATRY & NEUROLOGY

## 2019-01-20 PROCEDURE — 12400001 ZZH R&B MH UMMC

## 2019-01-20 PROCEDURE — 99222 1ST HOSP IP/OBS MODERATE 55: CPT | Performed by: PHYSICIAN ASSISTANT

## 2019-01-20 PROCEDURE — 99207 ZZC CONSULT E&M CHANGED TO INITIAL LEVEL: CPT | Performed by: PHYSICIAN ASSISTANT

## 2019-01-20 PROCEDURE — H2032 ACTIVITY THERAPY, PER 15 MIN: HCPCS

## 2019-01-20 PROCEDURE — 80061 LIPID PANEL: CPT | Performed by: PSYCHIATRY & NEUROLOGY

## 2019-01-20 PROCEDURE — 25000132 ZZH RX MED GY IP 250 OP 250 PS 637: Performed by: PSYCHIATRY & NEUROLOGY

## 2019-01-20 PROCEDURE — 80178 ASSAY OF LITHIUM: CPT | Performed by: PSYCHIATRY & NEUROLOGY

## 2019-01-20 PROCEDURE — 84443 ASSAY THYROID STIM HORMONE: CPT | Performed by: PSYCHIATRY & NEUROLOGY

## 2019-01-20 PROCEDURE — 80048 BASIC METABOLIC PNL TOTAL CA: CPT | Performed by: PSYCHIATRY & NEUROLOGY

## 2019-01-20 PROCEDURE — 99223 1ST HOSP IP/OBS HIGH 75: CPT | Mod: AI | Performed by: PSYCHIATRY & NEUROLOGY

## 2019-01-20 RX ORDER — LORAZEPAM 0.5 MG/1
0.5 TABLET ORAL EVERY 4 HOURS PRN
Status: DISCONTINUED | OUTPATIENT
Start: 2019-01-20 | End: 2019-01-29 | Stop reason: HOSPADM

## 2019-01-20 RX ADMIN — ACETAMINOPHEN 650 MG: 325 TABLET, FILM COATED ORAL at 19:59

## 2019-01-20 RX ADMIN — DESMOPRESSIN ACETATE 600 MCG: 0.2 TABLET ORAL at 22:52

## 2019-01-20 ASSESSMENT — ACTIVITIES OF DAILY LIVING (ADL)
HYGIENE/GROOMING: INDEPENDENT
HYGIENE/GROOMING: INDEPENDENT
DRESS: INDEPENDENT
ORAL_HYGIENE: INDEPENDENT
ORAL_HYGIENE: INDEPENDENT
DRESS: INDEPENDENT

## 2019-01-20 ASSESSMENT — MIFFLIN-ST. JEOR: SCORE: 1471.25

## 2019-01-20 NOTE — CONSULTS
- Called for elevated TSH  - freeT4 NORMAL  -repeating TSH today  -likely subclinical hypothyroidism and also due to elevated lithium  -recommend that we try to get lithium levels in normal range and if TSH still >10 might be a candidate for thyroid replacement to reduce CV mortality  -no signs and symptoms of hypothyroidism- prior TSH normal except one reading in 6's    Marcello Cai MD  Internal Medicine  [phone]

## 2019-01-20 NOTE — PLAN OF CARE
Spoke with Dr Springer re: elevated lithium and TSH levels. EKG ordered. Pt ate most of her supper. Not ataxic and no tremulousness noted. Pt subdued but responds appropriately. EKG being done currently. I spoke with house Dr earlier and did notify her of pt's situation. House Dr asked to check EKG and she said it was normal. Pt not very communicative. Note: Lithium level was an add on and done from blood drawn in the morning.

## 2019-01-20 NOTE — H&P
"[facility]   Psychiatric History & Physical  Admission date: 1/19/2019        Chief Complaint:   \"Not doing well....\"         HPI:     Stephanie is a 18 year old female with history of Major Depressive Disorder (vs. Bipolar Disorder), Generalized Anxiety Disorder, Obsessive Compulsive Disorder who is admitted on a voluntary basis for worsening depression, with recent suicidal and self injurious behavior involving Gabapentin overdose and also lacerating her forearm. The patient has had multiple past suicide attempts (through overdose), many past hospitalizations, along with multiple medications trials in the past. She seems to have pervasive depressive symptoms, with a low threshold for self harm/suicidal attempts. During interview, she is very blunted, guarded, and disengaged from the conversation. She mentioned that she felt the Lithium and Gabapentin had overall helped her depression and suicidal thoughts in the past. She mentions that her mood has been worsening in the past month or so, with heavy depressive thinking/recurernt suicidal thinking, along with poor self care, dysfunction in daily routine. She still harbors some continued suicidal ideation. She mentioned she took a large dose of Gabapentin (around 3600 mg all at once) not as a suicide attempt, but \"to escape my mind and thoughts.\" She denies taking excessive Lithium (but seems to be on a rather large dose of 1500 mg), or any illicit substance (utox was negative). She presents as rather isolative, withdrawn, sullen, blunted in affect, distracted and disengaged. She answers in brief, slow one-two word responses and does not elaborate. Nevertheless, she is open to getting help while she is hospitalized.         Past Psychiatric History:   Past inpatient treatment: Multiple past hospitalizations. >6 at [facility] since 2013, usually for SI, intent, plan or suicide attempt by overdose and/or cutting.   Past Medication: " "multiple -- see forthcoming pharmacy med rec, but in \"brief,\" fluoxetine, bupropion, duloxetine, venlefaxine, clomipramine, aripiprazole, ziprasidone, quetiapine, amphetamine salts, hydroxyzine, clonazepam  Current outpatient psychiatrist/therapist: Patient has an outpatient psychiatrist and therapist.   Other (ACT team etc): None   Past suicide attempts: Multiple past SA (>6 times), usually by drug overdose.   History of commitments: None   History of ECT: None        Substance Use and History:     Has had some cannabis use in the past, but denies habitual use. Utox was negative on admission.         Past Medical History:   PAST MEDICAL HISTORY:   Past Medical History:   Diagnosis Date     Anxiety october 2013     CONTUSION OF left iliac crest 9/3/2006     Major depression october 2013     Nocturnal enuresis 10/4/2007    Currently resolved.     Salter-Perdomo Type I fracture of distal fibula with routine healing 1/25/2013     Uncomplicated asthma     sports induced       PAST SURGICAL HISTORY: History reviewed. No pertinent surgical history.          Family History:   FAMILY HISTORY:   Family History   Problem Relation Age of Onset     Substance Abuse Maternal Grandfather         alcoholism     Depression Maternal Aunt      Substance Abuse Maternal Aunt         alcoholism     Hypothyroidism Maternal Aunt      Anxiety Disorder Brother      Hypothyroidism Maternal Grandmother            Social History:   SOCIAL HISTORY:   Social History     Tobacco Use     Smoking status: Never Smoker     Smokeless tobacco: Never Used   Substance Use Topics     Alcohol use: Yes     Comment: sometimes            Physical ROS:   The patient endorsed some nausea. The remainder of 10-point review of systems was negative except as noted in HPI.         PTA Medications:     Medications Prior to Admission   Medication Sig Dispense Refill Last Dose     desmopressin (DDAVP) 0.2 MG tablet Take 0.6 mg by mouth At Bedtime    1/18/2019 at PM     " gabapentin (NEURONTIN) 300 MG capsule Take 900 mg by mouth 2 times daily   1/19/2019 at Unknown time     lithium (ESKALITH) 300 MG tablet Take 300 mg by mouth 2 times daily   1/18/2019 at PM     lithium (ESKALITH) 450 MG CR tablet Take 1 tablet (450 mg) by mouth 2 times daily 60 tablet 0 1/18/2019 at PM          Allergies:     Allergies   Allergen Reactions     No Known Drug Allergies           Labs:     Recent Results (from the past 48 hour(s))   Acetaminophen level    Collection Time: 01/19/19  4:39 AM   Result Value Ref Range    Acetaminophen Level <2 mg/L   Salicylate level    Collection Time: 01/19/19  4:39 AM   Result Value Ref Range    Salicylate Level <2 mg/dL   CBC with platelets differential    Collection Time: 01/19/19  4:39 AM   Result Value Ref Range    WBC 5.8 4.0 - 11.0 10e9/L    RBC Count 3.77 (L) 3.8 - 5.2 10e12/L    Hemoglobin 11.4 (L) 11.7 - 15.7 g/dL    Hematocrit 35.5 35.0 - 47.0 %    MCV 94 78 - 100 fl    MCH 30.2 26.5 - 33.0 pg    MCHC 32.1 31.5 - 36.5 g/dL    RDW 12.3 10.0 - 15.0 %    Platelet Count 293 150 - 450 10e9/L    Diff Method Automated Method     % Neutrophils 62.8 %    % Lymphocytes 26.3 %    % Monocytes 8.1 %    % Eosinophils 2.2 %    % Basophils 0.3 %    % Immature Granulocytes 0.3 %    Nucleated RBCs 0 0 /100    Absolute Neutrophil 3.6 1.6 - 8.3 10e9/L    Absolute Lymphocytes 1.5 0.8 - 5.3 10e9/L    Absolute Monocytes 0.5 0.0 - 1.3 10e9/L    Absolute Eosinophils 0.1 0.0 - 0.7 10e9/L    Absolute Basophils 0.0 0.0 - 0.2 10e9/L    Abs Immature Granulocytes 0.0 0 - 0.4 10e9/L    Absolute Nucleated RBC 0.0    Comprehensive metabolic panel    Collection Time: 01/19/19  4:39 AM   Result Value Ref Range    Sodium 138 133 - 144 mmol/L    Potassium 3.7 3.4 - 5.3 mmol/L    Chloride 103 96 - 110 mmol/L    Carbon Dioxide 28 20 - 32 mmol/L    Anion Gap 7 3 - 14 mmol/L    Glucose 70 70 - 99 mg/dL    Urea Nitrogen 9 7 - 30 mg/dL    Creatinine 0.69 0.50 - 1.00 mg/dL    GFR Estimate >90 >60  mL/min/[1.73_m2]    GFR Estimate If Black >90 >60 mL/min/[1.73_m2]    Calcium 8.8 8.5 - 10.1 mg/dL    Bilirubin Total 0.5 0.2 - 1.3 mg/dL    Albumin 4.0 3.4 - 5.0 g/dL    Protein Total 6.9 6.8 - 8.8 g/dL    Alkaline Phosphatase 75 40 - 150 U/L    ALT 17 0 - 50 U/L    AST 23 0 - 35 U/L   Lithium level    Collection Time: 01/19/19  4:39 AM   Result Value Ref Range    Lithium Level 2.29 (HH) 0.60 - 1.20 mmol/L   TSH with free T4 reflex    Collection Time: 01/19/19  4:39 AM   Result Value Ref Range    TSH 11.97 (H) 0.40 - 4.00 mU/L   T4 free    Collection Time: 01/19/19  4:39 AM   Result Value Ref Range    T4 Free 0.96 0.76 - 1.46 ng/dL   Drug abuse screen 6 urine (tox)    Collection Time: 01/19/19  6:53 AM   Result Value Ref Range    Amphetamine Qual Urine Negative NEG^Negative    Barbiturates Qual Urine Negative NEG^Negative    Benzodiazepine Qual Urine Negative NEG^Negative    Cannabinoids Qual Urine Negative NEG^Negative    Cocaine Qual Urine Negative NEG^Negative    Ethanol Qual Urine Negative NEG^Negative    Opiates Qualitative Urine Negative NEG^Negative   HCG qualitative urine    Collection Time: 01/19/19  6:53 AM   Result Value Ref Range    HCG Qual Urine Negative NEG^Negative   EKG 12-lead, complete    Collection Time: 01/19/19  6:50 PM   Result Value Ref Range    Interpretation ECG Click View Image link to view waveform and result    Lipid panel    Collection Time: 01/20/19  8:24 AM   Result Value Ref Range    Cholesterol 121 <170 mg/dL    Triglycerides 64 <90 mg/dL    HDL Cholesterol 47 >45 mg/dL    LDL Cholesterol Calculated 61 <110 mg/dL    Non HDL Cholesterol 74 <120 mg/dL   Lithium level    Collection Time: 01/20/19  8:24 AM   Result Value Ref Range    Lithium Level 2.05 (HH) 0.60 - 1.20 mmol/L   TSH with free T4 reflex    Collection Time: 01/20/19  8:24 AM   Result Value Ref Range    TSH 3.45 0.40 - 4.00 mU/L   Basic metabolic panel    Collection Time: 01/20/19  8:24 AM   Result Value Ref Range     "Sodium 139 133 - 144 mmol/L    Potassium 4.0 3.4 - 5.3 mmol/L    Chloride 107 96 - 110 mmol/L    Carbon Dioxide 28 20 - 32 mmol/L    Anion Gap 4 3 - 14 mmol/L    Glucose 88 70 - 99 mg/dL    Urea Nitrogen 7 7 - 30 mg/dL    Creatinine 0.82 0.50 - 1.00 mg/dL    GFR Estimate >90 >60 mL/min/[1.73_m2]    GFR Estimate If Black >90 >60 mL/min/[1.73_m2]    Calcium 9.2 8.5 - 10.1 mg/dL          Physical and Psychiatric Examination:     BP 95/46   Pulse 77   Temp 96.5  F (35.8  C) (Oral)   Resp 16   Ht 1.778 m (5' 10\")   Wt 61.6 kg (135 lb 12.9 oz)   SpO2 100%   BMI 19.49 kg/m    Weight is 135 lbs 12.85 oz  Body mass index is 19.49 kg/m .    Physical Exam:  I have reviewed the physical exam as documented by ED provider and agree with findings and assessment and have no additional findings to add at this time.    Mental Status Exam:  Appearance: Tall AA female. Mild self care/grooming. Wears sweat pants and a hoodie with the sherman pulled to her head. Wears glasses. Sits in the corner, in a rather withdrawn way.   Attitude:  Guarded, tense, disengaged with interview. Somewhat dismissive.   Eye Contact:  Poor   Mood:  Depressive, tense, sullen  Affect:  intensity is flat  Speech:  increased speech latency, decreased prosody and paucity of speech  Language: fluent and intact in English  Psychomotor, Gait, Musculoskeletal:  no evidence of tardive dyskinesia, dystonia, or tics  Throught Process:  Unable to assess properly, was simplistic for the time being  Associations:  Unable to assess   Thought Content:  Suicidal ideations present. Unclear if intent or plan, but denied any clear thoughts to harm herself at the moment. Seems distracted, possibly with internal stimuli. No apparent HI.   Insight:  Poor-Fair   Judgement:  Poor-Fair   Oriented to:  time, person, and place  Attention Span and Concentration:  fair  Recent and Remote Memory:  limited  Fund of Knowledge:  appropriate         Admission Diagnoses:      Major " Depressive Disorder, recurrent, severe, with psychotic symptoms   Traits of Borderline Personality Disorder  Generalized Anxiety Disorder  Obsessive Compulsive Disorder   Hx of Cannabis Use Disorder          Assessment & Plan:     Assessment:  The patient appears to have a recurrent history of depressive moods, self injurious behavior and suicidal behavior that has lead to multiple hospitalizations and medication trials in the past. She appears to be quite withdrawn, isolative, distracted and exhibits rather poor eye contact and flattened affect. Prior notes had pondered if she had possible thought disorder/oddities of thinking and behavior that were in line with psychosis. This could be the possibility, given the large functional decline, poor speech prosody/with extended latency along with seemingly distracted thoughts (possible for internal stimuli?) could point to psychosis, but further continued evaluation will help. Importantly, she arrived with a high Li level (2.29) and high TSH (11.97); the hypothyroid conditions resulting from high Li levels (of note, she was on a rather high dose at 1500 mg total, but unclear if she took an overdose of this). Lithium level was stopped, and repeat Li showed trending downward to 2.05. Her TSH had normalized. Her BUN/Cr remains normal range. Her Gabapentin was stopped due it being cleared by the kidney, and given her high Li level, I don't anything interfearing with its clearance at this time, but will consider adding it in the future. Her EKG was unremarkable. I had a discussion about ECT, which she was halfheartedly receptive to, in that she was willing to hear more about it, and possibly start it.     Plan:  1.) Medication Plan:  - ECT IP Consult  - Possible IM medicine follow up for ECT clearance tomorrow  - PRN Ativan and Hydroxyzine, and Trazodone    --Will repeat Li level tomorrow or day after.     2.) Psychosocial Plan:  - Discuss any more outpatient services that  may benefit her, including partial/day program.    Legal:  Voluntary     Disposition:Unclear, pending stabilization        Yamil Springer MD  [facility]

## 2019-01-20 NOTE — PROGRESS NOTES
Attended 1.5 of 3 music therapy groups. Interventions focused on improving insight and mood. Completed checklist of healthy and unhealthy listening habits, but did not share with group. Was withdrawn and kept to self. Briefly interacted with peer when asked if she was cold. Flat affect.     Pt joined last group assisted through. She quiet but attentive during discussion about stigma and mental health. Minimal interactions with peers. Appeared irritable when peer was needing redirection.

## 2019-01-20 NOTE — PROVIDER NOTIFICATION
Dr Alegria called for order for antinausea med. Pt feeling queezy and would like zofran that she had in ER. Order obtained. No emesis or diarrhea. Pt ate most of her supper. No ataxia noted, no tremor noted.

## 2019-01-20 NOTE — PROGRESS NOTES
LAB called with critical lab lithium now at 2.05 but the prior day was 2.29. This has been discussed with Rachel MCCAULEY from medicine. TSH is also high, 11.97, also discussed.

## 2019-01-20 NOTE — PROGRESS NOTES
01/20/19 1400   Behavioral Health   Hallucinations denies / not responding to hallucinations   Thinking distractable   Orientation person: oriented;place: oriented   Memory baseline memory   Insight admits / accepts   Judgement impaired   Eye Contact at examiner   Affect blunted, flat   Mood mood is calm   Physical Appearance/Attire attire appropriate to age and situation   Hygiene well groomed   Suicidality other (see comments)  (denies)   1. Wish to be Dead No   2. Non-Specific Active Suicidal Thoughts  No   Self Injury other (see comment)  (denies)   Elopement (none stated or observed)   Activity other (see comment)  (present)   Speech clear;coherent   Medication Sensitivity no stated side effects;no observed side effects   Psychomotor / Gait balanced;steady   Activities of Daily Living   Hygiene/Grooming independent   Oral Hygiene independent   Dress independent   Room Organization independent     Pt was calm and cooperative. Pt appeared reserved, quiet, soft spoken. Pt appeared to have a flat affect throughout the shift. Pt brightened during visiting hours when friend came. Pt attended and participated in groups. Pt completed ADL's including shower and room organization. Pt responded with few words, 2-4 at a time. Pt denied SI and SIB. Pt denied hallucinations/psychotic symptoms.

## 2019-01-20 NOTE — PROGRESS NOTES
Initial Psychosocial Assessment    I have reviewed the chart, met with the patient, and developed Care Plan.  Information for assessment was obtained from:     Chart Review and Patient Interview.    Presenting Problem: Voluntary  Pt is admitted to [facility] under the direct care of Debra Naegele, APRN after presenting to the ED following an attempted overdose with Gabapentin and SIBs which needed medical attention.    Per ED Note:  Stephanie Villanueva is a 18 year old female with history of depression and anxiety and history of self injuring by cutting who presents with suicidal ideation and attempt by intentional overdose of gabapentin 3600 mg. Denies other co-ingestants. She used a razor blade and cut her right forearm multiple times. Denies alcohol or drug use. Denies recent illness.        History of Mental Health and Chemical Dependency:  Pt has had numerous previous psychiatric admission to [facility] going back to 2013.  Her most recent admission wa December 2016.    Previous diagnoses are:  BPD, BAILEY, MDD, and OCD      Family Description (Constellation, Family Psychiatric History):  Pt lives with her parents and a 17 year ols brother and a 12 year old sister.  She reports no family history of substance abuse problems or mental health problems.    Significant Life Events (Illness, Abuse, Trauma, Death):  Pt reports no history of trauma, losses, or abuse.    Living Situation:  Pt lives with her Parents and 2 siblings.    Educational Background:  High School Graduate, [employer].  Pt started college at [employer] in Fall 2018.  She has recently come home and is not sure if she will be returning.    Occupational History:  Pt is presently not employed, last Summer she had a job as a .    Financial Status:  Parents support.    Legal Issues:  None    Ethnic/Cultural Issues:      Spiritual Orientation:  None     Service History:  No    Social Functioning (organization,  interests):  Likes rock climbing and Running.    Current Treatment Providers are:  Therapist- Ranjana Sun- [phone]  PCP- Marcos Thompson    Social Service Assessment/Plan:  Pt is in need of psychiatric evaluation and stabilization.  Her medicine will be reviewed and adjusted if indicated.  Pt will participate in therapeuitic milieu, attend group therapy, and join in other unit activities.  [facility] will work with Pt, Team, and family if indicated to establish an appropriate aftercare plan.  Pt reports she has no psychiatrist and will need a referral for medication management.

## 2019-01-20 NOTE — CONSULTS
[facility]  Internal Medicine Consult     Stephanie Villanueva MRN# [id number]   Age: 18 year old YOB: 2000     Date of Admission: 1/19/2019  Date of Consult:  1/20/2019    Primary Care Provider: Jose Rodríguez    Requesting Service: Psychiatry  Reason for Consult: elevated TSH, lithium toxicity      SUBJECTIVE   CC:   Elevated TSH   HPI:   Stephanie Villanueva is a 17yo female with a signifcant psychiatric history of anxiety and major depression with self injurious behavior who was admitted to [facility] for psych evaluation due to SA by right eye on gabapentin and cutting forearm. Medicine was consulted as patients lithium and TSH returned elevated. Met with patient today. She denies any chest pain, sob, or dyspnea. She reports she is a little dizzy with standing but eating well without nausea or emesis. She is voiding without issue. Denies abdominal pain. Has no diarrhea. Has no headache. She denies      Past Medical History:     Past Medical History:   Diagnosis Date     Anxiety october 2013     CONTUSION OF left iliac crest 9/3/2006     Major depression october 2013     Nocturnal enuresis 10/4/2007    Currently resolved.     Salter-Perdomo Type I fracture of distal fibula with routine healing 1/25/2013     Uncomplicated asthma     sports induced         Past Surgical History:    History reviewed. No pertinent surgical history.      Social History:     Social History     Tobacco Use     Smoking status: Never Smoker     Smokeless tobacco: Never Used   Substance Use Topics     Alcohol use: Yes     Comment: sometimes         Family History:     Family History   Problem Relation Age of Onset     Substance Abuse Maternal Grandfather         alcoholism     Depression Maternal Aunt      Substance Abuse Maternal Aunt         alcoholism     Hypothyroidism Maternal Aunt      Anxiety Disorder Brother      Hypothyroidism Maternal Grandmother          Allergies:     Allergies   Allergen Reactions     No Known  "Drug Allergies          Medications:   Reviewed. Please see MAR     Review of Systems:   10 point ROS of systems including Constitutional, Eyes, Respiratory, Cardiovascular, Gastroenterology, Genitourinary, Integumentary, Muscularskeletal, Psychiatric were all negative except for pertinent positives noted in my HPI.      OBJECTIVE   Physical Exam:   Vitals were reviewed  Blood pressure 95/46, pulse 77, temperature 96.5  F (35.8  C), temperature source Oral, resp. rate 16, height 1.778 m (5' 10\"), weight 61.6 kg (135 lb 12.9 oz), SpO2 100 %, unknown if currently breastfeeding.  General:alert, NAD, flat affect, walking around unit in NAD, responds appropriately to questions  HEENT: MMM  Cardiovascular: RRR, S1S2, no m/r/g  Lungs:CTAB throughout  Abdomen: + BS, soft with no distention and no tenderness   Vascular: no peripheral edema, distal pulses palpable  Neurologic: AAO X 3, no focal deficits, gait normal, strength 5/5 in upper and lower extremities  Skin: R forearm with 5 lacerations with sutures intact. No warmth, redness, tenderness.         Data:        Lab Results   Component Value Date     01/20/2019    Lab Results   Component Value Date    CHLORIDE 107 01/20/2019    Lab Results   Component Value Date    BUN 7 01/20/2019      Lab Results   Component Value Date    POTASSIUM 4.0 01/20/2019    Lab Results   Component Value Date    CO2 28 01/20/2019    Lab Results   Component Value Date    CR 0.82 01/20/2019        Lab Results   Component Value Date    WBC 5.8 01/19/2019    HGB 11.4 (L) 01/19/2019    HCT 35.5 01/19/2019    MCV 94 01/19/2019     01/19/2019     Lab Results   Component Value Date    WBC 5.8 01/19/2019         Assessment and Plan/Recommendations:   Stephanie Villanueva is a 19yo female with a signifcant psychiatric history of anxiety and major depression with self injurious behavior who was admitted to [facility] for psych evaluation due to SA by right eye on gabapentin and cutting forearm. " Medicine was consulted as patients lithium and TSH returned elevated.    Lithium toxicity. Lithium level 2.29 on admission, improved to 2.05 today. Pt has no GI symptoms (n/v/d), no cardiac symptoms (denies cp/palpitatoins, HR 6070s, RRR on exam), no neuro symptoms (sluggishness, ataxia, tremors, confusion or agitation).  Cont to hold off on lithium and trend levels at discretion of psych. If patient develops any of the above symptoms, please contact medicine. Monitor BMP daily again tomorrow.     Subclinical hypothyroidism. TSH upto 11.97 on 1/19 with normal T4. In setting of elevated lithium levels. All likely 2/2 lithium toxicity. Repeat TSH 3.45 today. Trend TSH again tomorrow to ensure stability.     Self inflicted lacerations to R forearm. Repaired with sutures in the ED. Well approximated and without s/sx of infection on exam. Last received tetanus in 2016. Order to remove sutures in 7d (1/25).     Kaila Ramirez [place]  Internal Medicine [facility] Hospitalist  [phone]  January 20, 2019

## 2019-01-20 NOTE — PROGRESS NOTES
Patient participated in group activities, and reported she was doing very well at school before she began to have some mental health challenges. With encouragement, patient took part in Focus Group, and other activities during the shift. She appears calm, pleasant, related well with other patients, displays blunted affect, visited with her parents, reported she was not feeling well, and went to bed early. Will continue to monitor patient.     01/19/19 2038   Behavioral Health   Hallucinations denies / not responding to hallucinations   Thinking distractable   Orientation person: oriented;place: oriented   Memory baseline memory   Insight admits / accepts   Judgement (Good)   Eye Contact at examiner   Affect blunted, flat   Mood mood is calm   Physical Appearance/Attire appears stated age;attire appropriate to age and situation   Hygiene well groomed   Suicidality other (see comments)  (None reported by Pt)   1. Wish to be Dead No   2. Non-Specific Active Suicidal Thoughts  No   Self Injury other (see comment)  (None reported  or observed)   Elopement (No concerning behaviors)   Activity other (see comment)  (Participative)   Speech clear;coherent   Medication Sensitivity no stated side effects;no observed side effects   Psychomotor / Gait steady;balanced   Coping/Psychosocial   Verbalized Emotional State acceptance;depression   Safety   Suicidality Status 15   Elopement status 15   Activities of Daily Living   Hygiene/Grooming independent   Oral Hygiene independent   Dress independent   Room Organization independent   Activity   Activity Assistance Provided independent   Hygiene Care Assistance   Hygiene Assistance independent

## 2019-01-21 PROBLEM — F33.2 SEVERE RECURRENT MAJOR DEPRESSION WITHOUT PSYCHOTIC FEATURES (H): Status: ACTIVE | Noted: 2019-01-21

## 2019-01-21 LAB
ANION GAP SERPL CALCULATED.3IONS-SCNC: 5 MMOL/L (ref 3–14)
BUN SERPL-MCNC: 9 MG/DL (ref 7–30)
CALCIUM SERPL-MCNC: 8.9 MG/DL (ref 8.5–10.1)
CHLORIDE SERPL-SCNC: 105 MMOL/L (ref 96–110)
CO2 SERPL-SCNC: 27 MMOL/L (ref 20–32)
CREAT SERPL-MCNC: 0.7 MG/DL (ref 0.5–1)
GFR SERPL CREATININE-BSD FRML MDRD: >90 ML/MIN/{1.73_M2}
GLUCOSE SERPL-MCNC: 89 MG/DL (ref 70–99)
LITHIUM SERPL-SCNC: 1.19 MMOL/L (ref 0.6–1.2)
POTASSIUM SERPL-SCNC: 3.7 MMOL/L (ref 3.4–5.3)
SODIUM SERPL-SCNC: 137 MMOL/L (ref 133–144)
T4 FREE SERPL-MCNC: 0.83 NG/DL (ref 0.76–1.46)
TSH SERPL DL<=0.005 MIU/L-ACNC: 5.57 MU/L (ref 0.4–4)

## 2019-01-21 PROCEDURE — 84443 ASSAY THYROID STIM HORMONE: CPT | Performed by: PHYSICIAN ASSISTANT

## 2019-01-21 PROCEDURE — 80048 BASIC METABOLIC PNL TOTAL CA: CPT | Performed by: PHYSICIAN ASSISTANT

## 2019-01-21 PROCEDURE — 99232 SBSQ HOSP IP/OBS MODERATE 35: CPT | Performed by: CLINICAL NURSE SPECIALIST

## 2019-01-21 PROCEDURE — 80178 ASSAY OF LITHIUM: CPT | Performed by: CLINICAL NURSE SPECIALIST

## 2019-01-21 PROCEDURE — 36415 COLL VENOUS BLD VENIPUNCTURE: CPT | Performed by: PHYSICIAN ASSISTANT

## 2019-01-21 PROCEDURE — 84439 ASSAY OF FREE THYROXINE: CPT | Performed by: PHYSICIAN ASSISTANT

## 2019-01-21 PROCEDURE — 12400001 ZZH R&B MH UMMC

## 2019-01-21 PROCEDURE — 25000132 ZZH RX MED GY IP 250 OP 250 PS 637: Performed by: PSYCHIATRY & NEUROLOGY

## 2019-01-21 RX ADMIN — DESMOPRESSIN ACETATE 600 MCG: 0.2 TABLET ORAL at 22:42

## 2019-01-21 ASSESSMENT — ACTIVITIES OF DAILY LIVING (ADL)
ORAL_HYGIENE: INDEPENDENT
DRESS: INDEPENDENT
HYGIENE/GROOMING: INDEPENDENT
ORAL_HYGIENE: INDEPENDENT
HYGIENE/GROOMING: INDEPENDENT
DRESS: INDEPENDENT

## 2019-01-21 NOTE — PROGRESS NOTES
Pt denies nausea, diarrhea. No tremors noted. Ambulating without ataxia. Pt states she feels okay. VSS. Pt is minimally conversant so difficult to assess mentally. Pt visited with parents which went well. Parents stated pt has had paucity of speech for several weeks. She has been having trouble with her memory recently in school which continues. Mom also stated pt doesn't talk much to strangers. Pt filled out MICHAEL for parents.

## 2019-01-21 NOTE — PROGRESS NOTES
"[facility], [place]   Psychiatric Progress Note        Interim History:   The patient's care was discussed with the treatment team during the daily team meeting and/or staff's chart notes were reviewed.  Staff report patient has been in bed sleeping most of the day.     Psychiatric symptoms and interventions:   Will not start medications due to high lithium level and possible initiation of ECT. Patient reports poor response to antidepressant medication. Increasing depression when on lithium and gabapentin. Patient has scarring on both forearms.     Patient presented with a blunted affect, paucity of speech, reports both long and short term memory problems. Patient was very guarded with her answers. Patient's gait was normal, no issues with speaking, vitals stable, right hand tremor.     Medical issues; Right hand termor, lacerations on right forearm bandage covering, Lithium level as of 1/20 2.05 trending down.  Vital are stable 112/73 and pulse is 60 on 1/21/2019  lithium level for 1/21 is 1.19.  1/21 Will monitor TSH 5.57 (down from 1/19- 11.97), free T4 is 0.83. Creatinine 0.70    Behavioral/psychology/social:   Encouraged patient to attend therapeutic programming as tolerated  Patient has not required restraint or seclusion within the last 24 hours.        Medications:       desmopressin  600 mcg Oral At Bedtime          Allergies:     Allergies   Allergen Reactions     No Known Drug Allergies           Labs:   No results found for this or any previous visit (from the past 24 hour(s)).       Psychiatric Examination:     /61   Pulse 70   Temp 99.2  F (37.3  C) (Tympanic)   Resp 16   Ht 1.778 m (5' 10\")   Wt 61.6 kg (135 lb 12.9 oz)   SpO2 100%   BMI 19.49 kg/m    Weight is 135 lbs 12.85 oz  Body mass index is 19.49 kg/m .  Orthostatic Vitals       Most Recent      Sitting Orthostatic /61 01/20 2100    Sitting Orthostatic Pulse (bpm) 70 01/20 2100    Standing " Orthostatic /76 01/20 0900    Standing Orthostatic Pulse (bpm) 84 01/20 0900            Appearance: awake, alert, adequately groomed and dressed in hospital scrubs  Attitude:  evasive and guarded  Eye Contact:  blank stare  Mood:  depressed  Affect:  intensity is blunted  Speech:  increased speech latency and paucity of speech  Psychomotor Behavior:  no evidence of tardive dyskinesia, dystonia, or tics and tremor observed in right hand.   Throught Process:  logical, linear and goal oriented  Associations:  no loose associations  Thought Content:  passive suicidal ideation present  Insight:  limited  Judgement:  limited  Oriented to:  time, person, and place  Attention Span and Concentration:  fair  Recent and Remote Memory:  limited per patient report    Clinical Global Impressions  First:     Most recent:            Precautions:     Behavioral Orders   Procedures     Code 1 - Restrict to Unit     Routine Programming     As clinically indicated     Self Injury Precaution     Status 15     Every 15 minutes.     Suicide precautions     Patients on Suicide Precautions should have a Combination Diet ordered that includes a Diet selection(s) AND a Behavioral Tray selection for Safe Tray - with utensils, or Safe Tray - NO utensils            DIagnoses:   Major Depressive Disorder, recurrent, severe, with psychotic symptoms   Traits of Borderline Personality Disorder  Generalized Anxiety Disorder  Obsessive Compulsive Disorder   Hx of Cannabis Use Disorder          Plan:     Legal status: Voluntary     Medication management: Holding medications at this time. ECT consult , high lithium level as of 1/20 2.05 , 1/21 Li level 1.19, creatinine 0.70    Disposition plan: Stabilize with ECT. Patient states she is in agreement with ECT.

## 2019-01-21 NOTE — PROGRESS NOTES
"   01/20/19 2200   Behavioral Health   Hallucinations denies / not responding to hallucinations   Thinking intact   Orientation person: oriented;place: oriented;date: oriented;time: oriented   Memory baseline memory   Insight poor   Judgement impaired   Eye Contact at examiner   Affect blunted, flat;tense;irritable   Mood mood is calm;irritable;depressed   Physical Appearance/Attire attire appropriate to age and situation   Hygiene well groomed   Suicidality other (see comments)  (pt denied SI)   1. Wish to be Dead No   2. Non-Specific Active Suicidal Thoughts  No   Self Injury other (see comment)  (pt denied SIB)   Elopement (no concerns)   Activity other (see comment)  (pt was social with peers)   Speech coherent;clear   Medication Sensitivity no stated side effects;no observed side effects   Psychomotor / Gait balanced;steady   Activities of Daily Living   Hygiene/Grooming independent   Oral Hygiene independent   Dress independent   Room Organization independent     Pt reported she feels \"fine.\" Pt reported anxiety at 6/10 and 7/10 with 10 being the worst. Pt reported these scores are the same as when she was admitted. Pt denied SI, HI, SIB and AH. Pt reported a history of AH. Pt affect appeared blunted, flat and irritable. When asked if staff could do anything to help lower her depression/anxiety scores, pt reported \"discharge the patient with the nut allergy,\" put a clock in her room, and \"tell them (other patients) to be quiet.\" Pt reported her sleep was terrible and she kept waking up during the night. Pt was asked if staff could get her any thing to improve her bedtime routine such as tea or weighted blankets and pt reported \"no.\" Pt stated she would not come to staff if she needed anything. Pt was social with peers later in the evening. Earlier in the evening the pt appeared withdrawn in the milieu.  "

## 2019-01-21 NOTE — PROGRESS NOTES
Pt attended groups today and was social with peers. Pt declined a check in this shift. Pt reported no concerns. Pt reported no hallucinations, SI or SIB.        01/21/19 1400   Behavioral Health   Hallucinations denies / not responding to hallucinations   Thinking distractable;poor concentration   Orientation time: oriented;date: oriented;place: oriented;person: oriented   Memory baseline memory   Insight poor   Judgement impaired   Eye Contact at examiner   Affect blunted, flat;irritable;tense   Mood irritable;mood is calm   Physical Appearance/Attire attire appropriate to age and situation   Hygiene (adequate)   Suicidality (none stated)   1. Wish to be Dead No   2. Non-Specific Active Suicidal Thoughts  No   Self Injury (none stated)   Elopement (none observed)   Activity (attended groups, social with peers)   Speech pressured  (quiet)   Medication Sensitivity no stated side effects   Psychomotor / Gait steady;balanced   Activities of Daily Living   Hygiene/Grooming independent   Oral Hygiene independent   Dress independent   Room Organization independent

## 2019-01-21 NOTE — PLAN OF CARE
BEHAVIORAL TEAM DISCUSSION    Participants: 4A Provider: Debra Naegele, APRN, CNS; 4A RN's: Miranda Hansen, RN; 4A CTC's: Josias Velasquez (CTC).  Progress: No Change.  Continued Stay Criteria/Rationale: suicide attempt    Medical/Physical: Deferred (see medical notes).  Precautions:    Behavioral Orders   Procedures     Code 1 - Restrict to Unit     Routine Programming     As clinically indicated     Self Injury Precaution     Status 15     Every 15 minutes.     Suicide precautions     Patients on Suicide Precautions should have a Combination Diet ordered that includes a Diet selection(s) AND a Behavioral Tray selection for Safe Tray - with utensils, or Safe Tray - NO utensils       Plan:  The following services will be provided to the patient; psychiatric assessment, medication management, therapeutic milieu, individual and group support, art therapy, and skills/OT groups.   Rationale for change in precautions or plan: N/A

## 2019-01-22 LAB
INTERPRETATION ECG - MUSE: NORMAL
T3FREE SERPL-MCNC: 2.6 PG/ML (ref 2.3–4.2)
THYROPEROXIDASE AB SERPL-ACNC: <10 IU/ML

## 2019-01-22 PROCEDURE — G0177 OPPS/PHP; TRAIN & EDUC SERV: HCPCS

## 2019-01-22 PROCEDURE — 25000132 ZZH RX MED GY IP 250 OP 250 PS 637: Performed by: PSYCHIATRY & NEUROLOGY

## 2019-01-22 PROCEDURE — 36415 COLL VENOUS BLD VENIPUNCTURE: CPT | Performed by: NURSE PRACTITIONER

## 2019-01-22 PROCEDURE — 12400001 ZZH R&B MH UMMC

## 2019-01-22 PROCEDURE — 86376 MICROSOMAL ANTIBODY EACH: CPT | Performed by: NURSE PRACTITIONER

## 2019-01-22 PROCEDURE — 84481 FREE ASSAY (FT-3): CPT | Performed by: NURSE PRACTITIONER

## 2019-01-22 PROCEDURE — 99232 SBSQ HOSP IP/OBS MODERATE 35: CPT | Performed by: CLINICAL NURSE SPECIALIST

## 2019-01-22 PROCEDURE — H2032 ACTIVITY THERAPY, PER 15 MIN: HCPCS

## 2019-01-22 RX ADMIN — DESMOPRESSIN ACETATE 600 MCG: 0.2 TABLET ORAL at 21:57

## 2019-01-22 ASSESSMENT — ACTIVITIES OF DAILY LIVING (ADL)
DRESS: STREET CLOTHES
LAUNDRY: UNABLE TO COMPLETE
ORAL_HYGIENE: INDEPENDENT
HYGIENE/GROOMING: INDEPENDENT
DRESS: INDEPENDENT
ORAL_HYGIENE: INDEPENDENT
HYGIENE/GROOMING: INDEPENDENT
LAUNDRY: UNABLE TO COMPLETE

## 2019-01-22 ASSESSMENT — MIFFLIN-ST. JEOR: SCORE: 1482.25

## 2019-01-22 NOTE — PROGRESS NOTES
"Pt appeared upset and dysregulated due to her having a roommate.  Pt also endorsed thoughts of SI/SIB but contracted for safety.  After 10 minutes pt became dysregulated again and stated \"I want to go to seclusion because I want peace and quiet\".  Pt's new roommate was moved from her room and pt appeared to be accepting of this.  Pt reports \"I do not really want people in my space\".  New orders obtained for \"no roommate\".  Will continue to monitor and offer support as needed.  "

## 2019-01-22 NOTE — CONSULTS
"  [facility]  Internal Medicine Consult    Stephanie Villanueva MRN# [id number]   Age: 18 year old YOB: 2000     Date of Admission: 1/19/2019  Date of Consult:  1/22/2019    Requesting Service: Behavioral Health - Yamil Springer MD  Reason for Consult: Pre ECT Medicine Evaluation   Indication for ECT: Major depressive disorder, refractory to lithium     Chief Complaint: Depression   HPI: Stephanie Villanueva is a 18 year old female with a signifcant psychiatric history of anxiety and major depression with self injurious behavior who was admitted to [facility] for psych evaluation due to SA with gabapentin overdose and SIB with cutting forearm.    Review of Systems:   Cardiovascular: negative  Pulmonary: No shortness of breath, dyspnea on exertion, cough, or hemoptysis  Neurological: negative and memory problems    Past Medical History:   Prior Anesthesia: Yes  If yes, any complications: No    Prior ECT: No    Cardiovascular: CAD No, MI No, HTN No, CHF No, pacemaker or ICD No  Pulmonary: Asthma/COPD Yes (exercise induced, no longer on medication), Prior respiratory failure or need for emergent intubation No, On theophylline No (note that theophylline use is a contraindication to proceeding with ECT, needs to be tapered off prior)  Neurological: Brain tumor No, TIA/CVA No, Dementia No, Neuromuscular Disease (including post polio syndrome) No, Seizures and/or Epilepsy No, Head Injury No, Intracranial Hemorrhage No    Past Surgical History:   History reviewed. No pertinent surgical history.    Allergies:      Allergies   Allergen Reactions     No Known Drug Allergies        Medication list reviewed.    Physical Exam:  /53   Pulse 79   Temp 97  F (36.1  C) (Tympanic)   Resp 16   Ht 1.778 m (5' 10\")   Wt 61.6 kg (135 lb 12.9 oz)   SpO2 100%   BMI 19.49 kg/m     Cardiovascular: RRR. S1, S2. No murmurs, rubs, gallops.   Pulmonary: Effort normal. Lungs CTAB with no wheezing, rales, rhonchi. "   Neurological: A&Ox3. No focal deficits. PERRLA.    Data:  EKG: NSR, HR 71    Head Imaging: None recent     Recent Labs   Lab Test 01/19/19  0439   WBC 5.8   RBC 3.77*   HGB 11.4*   HCT 35.5   MCV 94   MCH 30.2   MCHC 32.1   RDW 12.3        CMP:  Recent Labs   Lab Test 01/21/19  0850  01/19/19  0439      < > 138   POTASSIUM 3.7   < > 3.7   CHLORIDE 105   < > 103   BRITTANIE 8.9   < > 8.8   CO2 27   < > 28   BUN 9   < > 9   CR 0.70   < > 0.69   GLC 89   < > 70   AST  --   --  23   ALT  --   --  17   BILITOTAL  --   --  0.5   ALBUMIN  --   --  4.0   PROTTOTAL  --   --  6.9   ALKPHOS  --   --  75    < > = values in this interval not displayed.     HCG:   Negative     Recommendations:   Diuretics and oral hypoglycemics should be held the morning of ECT.   All other antihypertensive medications can be continued.     Patient does not have absolute medical contraindications to proceeding with ECT at this time. Treatment plan per psychiatry.       BAILEY Ceja, CNP   [place] [facility]  Pager: [phone]

## 2019-01-22 NOTE — PROGRESS NOTES
This note is strictly observational. Pt was seen out in the milieu and participating in groups. Pt showered this morning and appears clean/tidy/organized. Pt continues to display latent reactions and response times in addition to her unapproachable and blunted/flat affect. Pt does not appear to be responding to internal stimuli, nor does she display any SI or SIB at this time.      01/22/19 1454   Behavioral Health   Hallucinations denies / not responding to hallucinations   Thinking poor concentration   Orientation person: oriented;place: oriented;date: oriented;time: oriented   Memory baseline memory   Insight poor   Judgement (Cannot assess)   Eye Contact into space;at examiner   Affect blunted, flat   Mood mood is calm;irritable   Physical Appearance/Attire attire appropriate to age and situation   Hygiene well groomed   Suicidality other (see comments)  (None stated or observed)   Self Injury other (see comment)  (None stated or observed)   Activity withdrawn;other (see comment)  (Pt participates in groups)   Speech clear;coherent   Medication Sensitivity no stated side effects;no observed side effects   Psychomotor / Gait balanced;steady   Activities of Daily Living   Hygiene/Grooming independent   Oral Hygiene independent   Dress independent   Laundry unable to complete   Room Organization independent

## 2019-01-22 NOTE — PROGRESS NOTES
Behavioral Health  Note    Behavioral Health  Spirituality Group Note    UNIT [address]    Name: Stephanie Villanueva YOB: 2000   MRN: [id number] Age: 18 year old      Patient attended -led group, which included discussion of spirituality, coping with illness and building resilience.    Patient attended group for 1.0 hrs.    patient minimally participated, but was respectful to the group process.    Carolina Roe  Chaplain Resident  Pager [phone]

## 2019-01-22 NOTE — PROGRESS NOTES
01/21/19 2000   Art Therapy   Type of Intervention structured groups   Response participates, initiates socially appropriate   Hours 1   Treatment Detail Trauma Containment   AT directive is to create a safe place drawing and to identify five items within the safe place to represent each of the five senses. Pt was a positive participant, focused on task for the full duration of group. Pt micheal an underwater scene and shared a personal experience of participating in underwater photography at a past treatment center. Pt described the five senses within safe place. Pts mood was calm.

## 2019-01-22 NOTE — PROGRESS NOTES
Writer met with pt and introduced myself. Pt didn't speak much and answered with one word answers.

## 2019-01-22 NOTE — PROGRESS NOTES
01/22/19 1500   Art Therapy   Type of Intervention structured groups   Response participates with encouragement   Hours 2   Treatment Detail  coloring/ mindfulness/ DBT please schedule   Problem-Major Depressive Disorder, recurrent, severe, with psychotic symptoms   Traits of Borderline Personality Disorder  Generalized Anxiety Disorder  Obsessive Compulsive Disorder   Hx of Cannabis Use Disorder     Goal-Orient, regulate and express through Art Therapy  Outcome- Pt attended two of three groups today.She chose not to do yoga. She enjoys coloring mandalas and advocated for herself for more materials with writer. Pt shows expression on her face when she is displeased with interruptions by other pts.

## 2019-01-22 NOTE — PROGRESS NOTES
"[facility], [place]   Psychiatric Progress Note        Interim History:   The patient's care was discussed with the treatment team during the daily team meeting and/or staff's chart notes were reviewed.  Staff report patient has been visible in the mileu. She has attended some groups.     Psychiatric symptoms and interventions:   Will not start medications due to high lithium level and possible initiation of ECT. Patient reports poor response to antidepressant medication. Increasing depression when on lithium and gabapentin. Patient has scarring on both forearms.      Patient presented with a blunted affect, paucity of speech, reports both long and short term memory problems. Patient was very guarded with her answers. Patient's gait was normal, no issues with speaking, vitals stable, right hand tremor.     1/22 Patient stated she was miserable. \"Why can't I go home and be miserable.\" What are you guys doing for me. Nothing works. I have been to everything and nothing works. Explained to patient she would be evaluated for ECT. Patient made sarcastic statements about completing suicide. Patient started to yell  And was very irritable. Patient said. \"I hate it when I get this way\".      Medical issues; Right hand termor, lacerations on right forearm bandage covering, Lithium level as of 1/20 2.05 trending down.  Vital are stable 112/73 and pulse is 60 on 1/21/2019  lithium level for 1/21 is 1.19.  1/21 Will monitor TSH 5.57 (down from 1/19- 11.97), free T4 is 0.83. Creatinine 0.70     Behavioral/psychology/social:   Encouraged patient to attend therapeutic programming as tolerated  Patient has not required restraint or seclusion within the last 24 hours.   No room mate order. Patient reported room mates triggered her and she felt unsafe.          Medications:       desmopressin  600 mcg Oral At Bedtime          Allergies:     Allergies   Allergen Reactions     No Known Drug Allergies        " "   Labs:     Recent Results (from the past 24 hour(s))   T3 Free    Collection Time: 01/22/19  9:28 AM   Result Value Ref Range    Free T3 2.6 2.3 - 4.2 pg/mL   Thyroid peroxidase antibody    Collection Time: 01/22/19  9:28 AM   Result Value Ref Range    Thyroid Peroxidase Antibody <10 <35 IU/mL          Psychiatric Examination:     /70   Pulse 62   Temp 97.6  F (36.4  C) (Tympanic)   Resp 16   Ht 1.778 m (5' 10\")   Wt 62.7 kg (138 lb 3.7 oz)   SpO2 100%   BMI 19.83 kg/m    Weight is 138 lbs 3.65 oz  Body mass index is 19.83 kg/m .  Orthostatic Vitals       Most Recent      Sitting Orthostatic /70 01/22 0924    Sitting Orthostatic Pulse (bpm) 62 01/22 0924    Standing Orthostatic /70 01/22 0924    Standing Orthostatic Pulse (bpm) 90 01/22 0924        Appearance: awake, alert, adequately groomed and dressed in hospital scrubs  Attitude:  evasive and guarded  Eye Contact:  blank stare  Mood:  depressed  Affect:  intensity is blunted  Speech:  increased speech latency and paucity of speech  Psychomotor Behavior:  no evidence of tardive dyskinesia, dystonia, or tics and tremor observed in right hand.   Throught Process:  logical, linear and goal oriented  Associations:  no loose associations  Thought Content:  passive suicidal ideation present  Insight:  limited  Judgement:  limited  Oriented to:  time, person, and place  Attention Span and Concentration:  fair  Recent and Remote Memory:  limited per patient report   Clinical Global Impressions  First:  Considering your total clinical experience with this particular patient population, how severe are the patient's symptoms at this time?: 6 (01/21/19 1256)  Compared to the patient's condition at the START of treatment, this patient's condition is:: 6 (01/21/19 1256)  Most recent:  Considering your total clinical experience with this particular patient population, how severe are the patient's symptoms at this time?: 6 (01/21/19 1256)  Compared to " the patient's condition at the START of treatment, this patient's condition is:: 6 (01/21/19 1256)         Precautions:     Behavioral Orders   Procedures     Code 1 - Restrict to Unit     Electroconvulsive therapy     ECT every Monday, Wednesday, and Friday     Routine Programming     As clinically indicated     Self Injury Precaution     Status 15     Every 15 minutes.     Suicide precautions     Patients on Suicide Precautions should have a Combination Diet ordered that includes a Diet selection(s) AND a Behavioral Tray selection for Safe Tray - with utensils, or Safe Tray - NO utensils            DIagnoses:   Major Depressive Disorder, recurrent, severe, with psychotic symptoms   Traits of Borderline Personality Disorder  Generalized Anxiety Disorder  Obsessive Compulsive Disorder   Hx of Cannabis Use Disorder          Plan:     Legal status: Voluntary      Medication management: Holding medications at this time. ECT consult , high lithium level as of 1/20 2.05 , 1/21 Li level 1.19, creatinine 0.70     Disposition plan: Stabilize with ECT. Patient states she is in agreement with ECT.

## 2019-01-22 NOTE — PLAN OF CARE
"48 hour: Pt continues to be assessed. She has been out in the milieu much of the shift.  Participated in art group this evening. Had visit from parents that went well. She has a blunted affect. Pt wanted the dressing off her wound. Incisions seem to be healing well without redness or drainage. It is well approximated.  During check in pt seems somewhat reluctant to answer questions. States having suicidal and self injury thoughts. Became emotionally disregulated when she was going to get a roommate so order obtained for her not to have a roommate. Pt contracted for safety when she found out she would not end up getting a roommate. Pt nodded when I asked if she could be safe on the unit then said \" no thanks to you guys\". Stated did not eat a lot of dinner but was snacking on chips during the check in. Has some issues sleeping but refuses any medication other than the DDAVP. She seemed irritated that ECT would not start for a couple days. I informed her the medical Dr needs to clear her first medically before it can start. Pt wants to know when her gabapentin will be ordered.    "

## 2019-01-23 ENCOUNTER — ANESTHESIA (OUTPATIENT)
Dept: BEHAVIORAL HEALTH | Facility: CLINIC | Age: 19
End: 2019-01-23

## 2019-01-23 ENCOUNTER — ANESTHESIA EVENT (OUTPATIENT)
Dept: BEHAVIORAL HEALTH | Facility: CLINIC | Age: 19
End: 2019-01-23

## 2019-01-23 PROCEDURE — GZB2ZZZ ELECTROCONVULSIVE THERAPY, BILATERAL-SINGLE SEIZURE: ICD-10-PCS | Performed by: PSYCHIATRY & NEUROLOGY

## 2019-01-23 PROCEDURE — 25000128 H RX IP 250 OP 636: Performed by: ANESTHESIOLOGY

## 2019-01-23 PROCEDURE — 25000132 ZZH RX MED GY IP 250 OP 250 PS 637: Performed by: PSYCHIATRY & NEUROLOGY

## 2019-01-23 PROCEDURE — 99232 SBSQ HOSP IP/OBS MODERATE 35: CPT | Mod: 25 | Performed by: CLINICAL NURSE SPECIALIST

## 2019-01-23 PROCEDURE — 25000125 ZZHC RX 250: Performed by: ANESTHESIOLOGY

## 2019-01-23 PROCEDURE — 25000128 H RX IP 250 OP 636: Performed by: PSYCHIATRY & NEUROLOGY

## 2019-01-23 PROCEDURE — 25000125 ZZHC RX 250: Performed by: PSYCHIATRY & NEUROLOGY

## 2019-01-23 PROCEDURE — 25000131 ZZH RX MED GY IP 250 OP 636 PS 637: Performed by: PSYCHIATRY & NEUROLOGY

## 2019-01-23 PROCEDURE — 90870 ELECTROCONVULSIVE THERAPY: CPT

## 2019-01-23 PROCEDURE — 12400001 ZZH R&B MH UMMC

## 2019-01-23 RX ORDER — ETOMIDATE 2 MG/ML
INJECTION INTRAVENOUS PRN
Status: DISCONTINUED | OUTPATIENT
Start: 2019-01-23 | End: 2019-01-23

## 2019-01-23 RX ORDER — LIDOCAINE HYDROCHLORIDE 20 MG/ML
40 INJECTION, SOLUTION EPIDURAL; INFILTRATION; INTRACAUDAL; PERINEURAL
Status: COMPLETED | OUTPATIENT
Start: 2019-01-23 | End: 2019-01-23

## 2019-01-23 RX ORDER — LIDOCAINE HYDROCHLORIDE 20 MG/ML
40 INJECTION, SOLUTION EPIDURAL; INFILTRATION; INTRACAUDAL; PERINEURAL
Status: CANCELLED
Start: 2019-01-23 | End: 2019-01-23

## 2019-01-23 RX ORDER — KETOROLAC TROMETHAMINE 30 MG/ML
30 INJECTION, SOLUTION INTRAMUSCULAR; INTRAVENOUS EVERY 6 HOURS PRN
Status: ACTIVE | OUTPATIENT
Start: 2019-01-23 | End: 2019-01-28

## 2019-01-23 RX ADMIN — TRAZODONE HYDROCHLORIDE 50 MG: 50 TABLET ORAL at 02:16

## 2019-01-23 RX ADMIN — LIDOCAINE HYDROCHLORIDE 40 MG: 20 INJECTION, SOLUTION EPIDURAL; INFILTRATION; INTRACAUDAL at 11:46

## 2019-01-23 RX ADMIN — LORAZEPAM 0.5 MG: 0.5 TABLET ORAL at 16:42

## 2019-01-23 RX ADMIN — Medication 80 MG: at 11:45

## 2019-01-23 RX ADMIN — TRAZODONE HYDROCHLORIDE 50 MG: 50 TABLET ORAL at 23:30

## 2019-01-23 RX ADMIN — ETOMIDATE 14 MG: 2 INJECTION INTRAVENOUS at 11:45

## 2019-01-23 RX ADMIN — KETOROLAC TROMETHAMINE 30 MG: 30 INJECTION, SOLUTION INTRAMUSCULAR at 12:32

## 2019-01-23 RX ADMIN — SODIUM CHLORIDE, POTASSIUM CHLORIDE, SODIUM LACTATE AND CALCIUM CHLORIDE 500 ML: 600; 310; 30; 20 INJECTION, SOLUTION INTRAVENOUS at 12:40

## 2019-01-23 RX ADMIN — ACETAMINOPHEN 650 MG: 325 TABLET, FILM COATED ORAL at 16:42

## 2019-01-23 RX ADMIN — DESMOPRESSIN ACETATE 600 MCG: 0.2 TABLET ORAL at 21:04

## 2019-01-23 RX ADMIN — TRAZODONE HYDROCHLORIDE 50 MG: 50 TABLET ORAL at 22:30

## 2019-01-23 RX ADMIN — ONDANSETRON 4 MG: 4 TABLET, ORALLY DISINTEGRATING ORAL at 13:07

## 2019-01-23 RX ADMIN — MIDAZOLAM HYDROCHLORIDE 1 MG: 1 INJECTION, SOLUTION INTRAMUSCULAR; INTRAVENOUS at 11:51

## 2019-01-23 RX ADMIN — ACETAMINOPHEN 650 MG: 325 TABLET, FILM COATED ORAL at 12:49

## 2019-01-23 ASSESSMENT — ACTIVITIES OF DAILY LIVING (ADL)
HYGIENE/GROOMING: INDEPENDENT
DRESS: INDEPENDENT
DRESS: STREET CLOTHES;INDEPENDENT
ORAL_HYGIENE: INDEPENDENT
LAUNDRY: UNABLE TO COMPLETE
LAUNDRY: WITH SUPERVISION
HYGIENE/GROOMING: INDEPENDENT
ORAL_HYGIENE: INDEPENDENT

## 2019-01-23 NOTE — PROGRESS NOTES
"02:15 -- Pt requested lavender patch, the returned patch stating \"I hate lavender\"; Pt requested trazodone stating she has been lying awake for 4 hours; trazodone administered  "

## 2019-01-23 NOTE — PROGRESS NOTES
"HEADACHE: Pt has c/o a HA since ECT. She received tylenol 650 at 1230 and again at 1645 along with an ativan for her anxiety. Her tylenol is ordered TID   She stated her body \"ached\". We discussed the affects of ECT. This RN gave pt a RITU printout on ECT and we discussed it thoroughly.  "

## 2019-01-23 NOTE — PROGRESS NOTES
Patients parents visited during visiting hours and mom requested an update from writer.   She expressed concern that she has not been contacted during the pt's admission. Mom reports patient is autistic and had a hard time opening up to others. Noise cancelling headphones and wave sounds work well for the patient. Mom would like a call from the provider tomorrow.

## 2019-01-23 NOTE — ANESTHESIA PREPROCEDURE EVALUATION
Anesthesia Pre-Procedure Evaluation    Patient: Stephanie Villanueva   MRN:     [id number] Gender:   female   Age:    18 year old :      2000        Preoperative Diagnosis: * No pre-op diagnosis entered *   * No procedures listed *     Past Medical History:   Diagnosis Date     Anxiety 2013     CONTUSION OF left iliac crest 9/3/2006     Major depression 2013     Nocturnal enuresis 10/4/2007    Currently resolved.     Salter-Perdomo Type I fracture of distal fibula with routine healing 2013     Uncomplicated asthma     sports induced      History reviewed. No pertinent surgical history.       No current facility-administered medications on file prior to encounter.   Current Outpatient Medications on File Prior to Encounter:  desmopressin (DDAVP) 0.2 MG tablet Take 0.6 mg by mouth At Bedtime    gabapentin (NEURONTIN) 300 MG capsule Take 900 mg by mouth 2 times daily   lithium (ESKALITH) 300 MG tablet Take 300 mg by mouth 2 times daily   lithium (ESKALITH) 450 MG CR tablet Take 1 tablet (450 mg) by mouth 2 times daily       Anesthesia Evaluation     . Pt has had prior anesthetic.     No history of anesthetic complications          ROS/MED HX    ENT/Pulmonary:     (+)Intermittent asthma , . .    Neurologic:       Cardiovascular:  - neg cardiovascular ROS   (+) ----. : . . . :. . No previous cardiac testing       METS/Exercise Tolerance:  4 - Raking leaves, gardening   Hematologic:  - neg hematologic  ROS       Musculoskeletal:  - neg musculoskeletal ROS       GI/Hepatic:  - neg GI/hepatic ROS       Renal/Genitourinary:  - ROS Renal section negative       Endo:     (+) thyroid problem hypothyroidism, .      Psychiatric:     (+) psychiatric history anxiety and depression      Infectious Disease:  - neg infectious disease ROS       Malignancy:         Other:                         PHYSICAL EXAM:   Mental Status/Neuro: A/A/O   Airway: Facies: Feasible  Mallampati: II  Mouth/Opening: Full  TM distance: >  "6 cm  Neck ROM: Full   Respiratory:    CV: Rhythm: Regular  Rate: Age appropriate  Heart: Normal Sounds   Comments:      Dental: Normal                  Lab Results   Component Value Date    WBC 5.8 01/19/2019    HGB 11.4 (L) 01/19/2019    HCT 35.5 01/19/2019     01/19/2019    SED 10 11/05/2008     01/21/2019    POTASSIUM 3.7 01/21/2019    CHLORIDE 105 01/21/2019    CO2 27 01/21/2019    BUN 9 01/21/2019    CR 0.70 01/21/2019    GLC 89 01/21/2019    BRITTANIE 8.9 01/21/2019    ALBUMIN 4.0 01/19/2019    PROTTOTAL 6.9 01/19/2019    ALT 17 01/19/2019    AST 23 01/19/2019    ALKPHOS 75 01/19/2019    BILITOTAL 0.5 01/19/2019    TSH 5.57 (H) 01/21/2019    T4 0.83 01/21/2019    HCG Negative 01/19/2019       Preop Vitals  BP Readings from Last 3 Encounters:   01/23/19 104/68 (7 %/ 56 %)*   01/09/19 104/59 (7 %/ 10 %)*   06/07/18 106/67 (17 %/ 53 %)*     *BP percentiles are based on the August 2017 AAP Clinical Practice Guideline for girls    Pulse Readings from Last 3 Encounters:   01/23/19 65   01/09/19 70   06/07/18 61      Resp Readings from Last 3 Encounters:   01/23/19 16   12/29/16 14   08/24/16 16    SpO2 Readings from Last 3 Encounters:   01/23/19 99%   06/01/17 97%   05/21/17 99%      Temp Readings from Last 1 Encounters:   01/23/19 36.7  C (98.1  F) (Tympanic)    Ht Readings from Last 1 Encounters:   01/19/19 1.778 m (5' 10\") (99 %)*     * Growth percentiles are based on CDC (Girls, 2-20 Years) data.      Wt Readings from Last 1 Encounters:   01/22/19 62.7 kg (138 lb 3.7 oz) (66 %)*     * Growth percentiles are based on CDC (Girls, 2-20 Years) data.    Estimated body mass index is 19.83 kg/m  as calculated from the following:    Height as of this encounter: 1.778 m (5' 10\").    Weight as of this encounter: 62.7 kg (138 lb 3.7 oz).     LDA:  Peripheral IV 01/23/19 Left Upper arm (Active)   Number of days: 0            Assessment:   ASA SCORE: 2       Documentation: H&P complete; Preop Testing complete; " Consents complete   Proceeding: Proceed without further delay  Tobacco Use:  Active user of Tobacco     Plan:   Anes. Type:  General   Pre-Induction: Midazolam IV; Acetaminophen PO   Induction:  IV (Standard)   Airway: Native Airway   Access/Monitoring: PIV   Maintenance: IV   Emergence: Procedure Site   Logistics: Same Day Surgery     PONV Management:Adult Risk Factors: Female     CONSENT: Direct conversation   Plan and risks discussed with: Patient                            Marilee Hernandez MD

## 2019-01-23 NOTE — ANESTHESIA CARE TRANSFER NOTE
Patient: Stephanie Villanueva    * No procedures listed *    Diagnosis: * No pre-op diagnosis entered *  Diagnosis Additional Information: No value filed.    Anesthesia Type:   No value filed.     Note:  Airway :Room Air  Patient transferred to:PACU  Comments: .Anesthesia Care Transfer Note    Patient: Stephanie Villanueva    Transferred to: PACU    Patient vital signs: stable    Airway: none    Monitors applied, VSS.  Patient awake and comfortable, breathing spontaneously.  Report given to RN with transfer of care.        Padmini Shaver CRNA  1/23/2019  12:00 PM  Handoff Report: Identifed the Patient, Identified the Reponsible Provider, Reviewed the pertinent medical history, Discussed the surgical course, Reviewed Intra-OP anesthesia mangement and issues during anesthesia, Set expectations for post-procedure period and Allowed opportunity for questions and acknowledgement of understanding      Vitals: (Last set prior to Anesthesia Care Transfer)    CRNA VITALS  1/23/2019 1130 - 1/23/2019 1200      1/23/2019             Resp Rate (set):  8                Electronically Signed By: BAILEY Ross CRNA  January 23, 2019  12:00 PM

## 2019-01-23 NOTE — PROCEDURES
Procedure/Surgery Information   [facility], [place]    Bedside Procedure Note  Date of Service (when I performed the procedure): 01/23/2019    Stephanie Villanueva is a 18 year old female patient.  1. Suicidal ideation    2. Self-inflicted injury    3. Intentional chlormethiazole overdose (H)    4. Laceration of right forearm      Past Medical History:   Diagnosis Date     Anxiety october 2013     CONTUSION OF left iliac crest 9/3/2006     Major depression october 2013     Nocturnal enuresis 10/4/2007    Currently resolved.     Salter-Perdomo Type I fracture of distal fibula with routine healing 1/25/2013     Uncomplicated asthma     sports induced     Temp: 97.6  F (36.4  C) Temp src: Tympanic BP: 111/70 Pulse: 62   Resp: 16 SpO2: 100 % O2 Device: None (Room air)      Procedures     Yaron El     [facility], [place]   ECT Procedure Note  01/23/2019    Stephanie Villanueva [id number]   18 year old 2000     Patient Status: Inpatient    Is this the first in a series of 12 treatments?  Yes    History and Physical: Reviewed in medical record    Consent: Informed consent was signed by: patient    Date Consent Signed: 1/23/19      Allergies   Allergen Reactions     No Known Drug Allergies        Weight:  138 lbs 3.65 oz              Indications for ECT:   Medications ineffective         Clinical Narrative:   Patient was admitted with worsening depression and suicidal ideation.  She has treatment resistant depression and is starting ECT for depression.  NPO after 2400.  Alert and Oriented x 3.  Mood remains depressed.            Diagnosis:   Major depression         Pause for the Cause:     Right patient Yes   Right procedure/laterality settings: Yes          Intra-Procedure Documentation:     ECT #: 1   Treatment number this series: 1   Total treatment number: 1     Type of ECT:  Right, unilateral ultrabrief    ECT Medications:    Lidocaine:  40mg  Etomidate: 14mg  Succinyl  Choline: 80mg   Versed:  1mg given at 60 seconds    ECT Strip Summary:   Energy Level: 30 percent  Motor Seizure Duration: 104 seconds  EEG Seizure Duration: 104 seconds    Complications: No    Plan:   Next ECT 1/25/19    Yaron El MD

## 2019-01-23 NOTE — PROGRESS NOTES
Pt discharged from the recovery room via wheelchair back to station 4AW with staff at 1320.  Report given to station 4AW RN,  Rigo.  Pt reported nausea and a headache post ECT.  Pt was given 30 mg IV of toradol and 650 mg of tylenol for headache pain.  Pt also had complaints of nausea.  4 mg of ODT of Zofran given.  All medications were given in the recovery room post ECT tx.                  .

## 2019-01-23 NOTE — PROGRESS NOTES
"When this writer asked to check in with pt before her ECT appointment, pt said \"I really don't want to talk with you, I'm very irritable\". This writer took pt down to ECT around 1115 and pt returned to the unit around 1315. Pt spent the majority of this shift in her room resting/sleeping. When pt returned to the unit from ECT pt went directly to bed after taking vitals. Pt stated she is experiencing a headache and nausea due to ECT. This writer did not observe pt eat any meals today nor did pt shower this shift. Pt presents with a flat/blunted affect and pt stated that she is irritable today. Pt does not appear to be responding to internal stimuli, nor does pt display any indication of SI or SIB. Pt was withdrawn and isolative this shift and she was rude to staff.        01/23/19 1400   Behavioral Health   Hallucinations denies / not responding to hallucinations   Thinking poor concentration   Orientation person: oriented;place: oriented;date: oriented;time: oriented   Memory baseline memory   Insight poor   Judgement impaired   Eye Contact at examiner   Affect blunted, flat;irritable   Mood mood is calm;irritable   Physical Appearance/Attire attire appropriate to age and situation   Hygiene neglected grooming - unclean body, hair, teeth   Suicidality other (see comments)  (No SI stated or observed)   1. Wish to be Dead No   Self Injury other (see comment)  (No SIB stated or observed)   Activity isolative;withdrawn   Speech clear;coherent   Medication Sensitivity no stated side effects;no observed side effects   Psychomotor / Gait balanced;steady   Activities of Daily Living   Hygiene/Grooming independent   Oral Hygiene independent   Dress street clothes;independent   Laundry unable to complete   Room Organization independent     "

## 2019-01-23 NOTE — PLAN OF CARE
"48 HOUR ASSESSMENT   Patient's mood, anxiety, thoughts and behavior continue to be assessed.   Pt is progressing toward goals.   Encourage participation in groups and developing healthy coping skills. She denies concerns regarding sleep, appetite, and medication side effects.    Patient was present in the milieu this evening. She played a car game with a peer. Her parents came to visit and they had a good visit. Patient's mom expressed concern about lack of information about patients treatment plan. (see writers prior note)   Patient was short and guarded during check-in. She stated she is frustrated she has to stay here and asked about the process of \"checking out\"   She endorses SIB thoughts and depression 8/10. Anxiety 6/10.  She is hoping to start ECT treatment tomorrow.     SI/HI: denies    Auditory or visual hallucinations: denies   Patient continues to be medication compliant. Will continue to monitor.     "

## 2019-01-23 NOTE — ANESTHESIA POSTPROCEDURE EVALUATION
Anesthesia POST Procedure Evaluation    Patient: Stephanie Villanueva   MRN:     [id number] Gender:   female   Age:    18 year old :      2000        Preoperative Diagnosis: * No pre-op diagnosis entered *   * No procedures listed *   Postop Comments: No value filed.       Anesthesia Type:  General    Reportable Event: NO     PAIN: Uncomplicated   Sign Out status: Comfortable, Well controlled pain     PONV: No PONV   Sign Out status:  No Nausea or Vomiting     Neuro/Psych: Uneventful perioperative course   Sign Out Status: Preoperative baseline; Age appropriate mentation     Airway/Resp.: Uneventful perioperative course   Sign Out Status: Non labored breathing, age appropriate RR; Resp. Status within EXPECTED Parameters     CV: Uneventful perioperative course   Sign Out status: Appropriate BP and perfusion indices; Appropriate HR/Rhythm     Disposition:   Sign Out in:  PACU  Disposition:  Phase II; Home  Recovery Course: Uneventful  Follow-Up: Not required           Last Anesthesia Record Vitals:  CRNA VITALS  2019 1130 - 2019 1230      2019             Resp Rate (set):  8          Last PACU/Preop Vitals:  Vitals:    19 1158 19 1210 19 1223   BP: 126/81 106/78 110/75   Pulse: 114 113 96   Resp: 16 21 18   Temp: 37.5  C (99.5  F)     SpO2: 98% 96% 96%         Electronically Signed By: Marilee Hernandez MD, 2019, 12:38 PM

## 2019-01-23 NOTE — PROGRESS NOTES
"[facility], [place]   Psychiatric Progress Note        Interim History:   The patient's care was discussed with the treatment team during the daily team meeting and/or staff's chart notes were reviewed.  Staff report patient was isolative to her room.     Psychiatric symptoms and interventions:   Will not start medications due to high lithium level and possible initiation of ECT. Patient reports poor response to antidepressant medication. Increasing depression when on lithium and gabapentin. Patient has scarring on both forearms.      Patient presented with a blunted affect, paucity of speech, reports both long and short term memory problems. Patient was very guarded with her answers. Patient's gait was normal, no issues with speaking, vitals stable, right hand tremor.      1/22 Patient stated she was miserable. \"Why can't I go home and be miserable.\" What are you guys doing for me. Nothing works. I have been to everything and nothing works. Explained to patient she would be evaluated for ECT. Patient made sarcastic statements about completing suicide. Patient started to yell  And was very irritable. Patient said. \"I hate it when I get this way\".      1/23 Patient had first ECT treatment . Patient was isolative to her room. She would only nod her head to question. She did not speak. Patient had been irritable prior to her treatment.  Patient nodded to having a headache and nausea. She did not appear to be in any discomfort. When I asked if she wanted her nurse to bring her medication for a headache she nodded \"no\". Patient has isolated to her room all day.     Medical issues; Right hand termor, lacerations on right forearm bandage covering, Lithium level as of 1/20 2.05 trending down.  Vital are stable 112/73 and pulse is 60 on 1/21/2019  lithium level for 1/21 is 1.19.  1/21 Will monitor TSH 5.57 (down from 1/19- 11.97), free T4 is 0.83. Creatinine 0.70     Behavioral/psychology/social: " "  Encouraged patient to attend therapeutic programming as tolerated  Patient has not required restraint or seclusion within the last 24 hours.   No room mate order. Patient reported room mates triggered her and she felt unsafe.          Medications:       desmopressin  600 mcg Oral At Bedtime          Allergies:     Allergies   Allergen Reactions     No Known Drug Allergies           Labs:   No results found for this or any previous visit (from the past 24 hour(s)).       Psychiatric Examination:     /70 (BP Location: Left arm)   Pulse 71   Temp 96.3  F (35.7  C) (Tympanic)   Resp 18   Ht 1.778 m (5' 10\")   Wt 62.7 kg (138 lb 3.7 oz)   SpO2 99%   BMI 19.83 kg/m    Weight is 138 lbs 3.65 oz  Body mass index is 19.83 kg/m .  Orthostatic Vitals       Most Recent      Sitting Orthostatic /68 01/23 0700    Sitting Orthostatic Pulse (bpm) 65 01/23 0700    Standing Orthostatic /69 01/23 0700    Standing Orthostatic Pulse (bpm) 102 01/23 0700          Appearance: awake, alert, adequately groomed and dressed in hospital scrubs  Attitude:  evasive and guarded  Eye Contact:  blank stare  Mood:  depressed  Affect:  intensity is blunted  Speech:  increased speech latency and paucity of speech  Psychomotor Behavior:  no evidence of tardive dyskinesia, dystonia, or tics and tremor observed in right hand.   Throught Process:  logical, linear and goal oriented  Associations:  no loose associations  Thought Content:  passive suicidal ideation present  Insight:  limited  Judgement:  limited  Oriented to:  time, person, and place  Attention Span and Concentration:  fair  Recent and Remote Memory:  limited per patient report    Clinical Global Impressions  First:  Considering your total clinical experience with this particular patient population, how severe are the patient's symptoms at this time?: 6 (01/21/19 6873)  Compared to the patient's condition at the START of treatment, this patient's condition is:: 6 " (01/21/19 1256)  Most recent:  Considering your total clinical experience with this particular patient population, how severe are the patient's symptoms at this time?: 6 (01/21/19 1256)  Compared to the patient's condition at the START of treatment, this patient's condition is:: 6 (01/21/19 1256)         Precautions:     Behavioral Orders   Procedures     Code 2     Electroconvulsive therapy     ECT every Monday, Wednesday, and Friday     Electroconvulsive therapy     Series of up to 12 treatments. Begin Date: 1/23/19     Treating Psychiatrist providing ECT:  Nikko     Notified on:  1/21/19     Routine Programming     As clinically indicated     Self Injury Precaution     Status 15     Every 15 minutes.     Suicide precautions     Patients on Suicide Precautions should have a Combination Diet ordered that includes a Diet selection(s) AND a Behavioral Tray selection for Safe Tray - with utensils, or Safe Tray - NO utensils            DIagnoses:   Major Depressive Disorder, recurrent, severe, with psychotic symptoms   Traits of Borderline Personality Disorder  Generalized Anxiety Disorder  Obsessive Compulsive Disorder   Hx of Cannabis Use Disorder          Plan:     Legal status: Voluntary      Medication management: Holding medications at this time. ECT consult , high lithium level as of 1/20 2.05 , 1/21 Li level 1.19, creatinine 0.70     Disposition plan: Stabilize with ECT. Patient states she is in agreement with ECT. 1/23 Alanna ECT treatment #1

## 2019-01-23 NOTE — PROGRESS NOTES
Pt's mother called (adolfo) this morning and states nobody is calling her but this RN has talked to her numerous times (MICHAEL signed). This RN explained the ECT procedure and how pt's are prepared for ECT. We discussed lab levels and mom seemed happy so will pass this on to the provider.

## 2019-01-23 NOTE — PROGRESS NOTES
"[facility], [place]   Psychiatric Progress Note        Interim History:   The patient's care was discussed with the treatment team during the daily team meeting and/or staff's chart notes were reviewed.  Staff report patient  has been in bed sleeping most of the day.     Psychiatric symptoms and interventions:   Will not start medications due to high lithium level and possible initiation of ECT. Patient reports poor response to antidepressant medication. Increasing depression when on lithium and gabapentin. Patient has scarring on both forearms.      Patient presented with a blunted affect, paucity of speech, reports both long and short term memory problems. Patient was very guarded with her answers. Patient's gait was normal, no issues with speaking, vitals stable, right hand tremor.     1/23 Patient stayed in bed most of the day after ECT #1 treatment. Patient refused to speak and only would nod her head yes and no to questions. Patient nodded yes to headache. Nurse brought her medication. Patient's eye contact was poor.      Medical issues; Right hand termor, lacerations on right forearm bandage covering, Lithium level as of 1/20 2.05 trending down.  Vital are stable 112/73 and pulse is 60 on 1/21/2019  lithium level for 1/21 is 1.19.  1/21 Will monitor TSH 5.57 (down from 1/19- 11.97), free T4 is 0.83. Creatinine 0.70     Behavioral/psychology/social:   Encouraged patient to attend therapeutic programming as tolerated  Patient has not required restraint or seclusion within the last 24 hours.         Medications:       desmopressin  600 mcg Oral At Bedtime          Allergies:     Allergies   Allergen Reactions     No Known Drug Allergies           Labs:   No results found for this or any previous visit (from the past 24 hour(s)).       Psychiatric Examination:     /70 (BP Location: Left arm)   Pulse 71   Temp 96.3  F (35.7  C) (Tympanic)   Resp 18   Ht 1.778 m (5' 10\")   Wt " 62.7 kg (138 lb 3.7 oz)   SpO2 99%   BMI 19.83 kg/m    Weight is 138 lbs 3.65 oz  Body mass index is 19.83 kg/m .  Orthostatic Vitals       Most Recent      Sitting Orthostatic /68 01/23 0700    Sitting Orthostatic Pulse (bpm) 65 01/23 0700    Standing Orthostatic /69 01/23 0700    Standing Orthostatic Pulse (bpm) 102 01/23 0700          Appearance: awake, alert, adequately groomed and dressed in hospital scrubs  Attitude:  evasive and guarded  Eye Contact:  blank stare  Mood:  depressed  Affect:  intensity is blunted  Speech:  increased speech latency and paucity of speech  Psychomotor Behavior:  no evidence of tardive dyskinesia, dystonia, or tics and tremor observed in right hand.   Throught Process:  logical, linear and goal oriented  Associations:  no loose associations  Thought Content:  passive suicidal ideation present  Insight:  limited  Judgement:  limited  Oriented to:  time, person, and place  Attention Span and Concentration:  fair  Recent and Remote Memory:  limited per patient report    Clinical Global Impressions  First:  Considering your total clinical experience with this particular patient population, how severe are the patient's symptoms at this time?: 6 (01/21/19 1256)  Compared to the patient's condition at the START of treatment, this patient's condition is:: 6 (01/21/19 1256)  Most recent:  Considering your total clinical experience with this particular patient population, how severe are the patient's symptoms at this time?: 6 (01/21/19 1256)  Compared to the patient's condition at the START of treatment, this patient's condition is:: 6 (01/21/19 1256)         Precautions:     Behavioral Orders   Procedures     Code 2     Electroconvulsive therapy     ECT every Monday, Wednesday, and Friday     Electroconvulsive therapy     Series of up to 12 treatments. Begin Date: 1/23/19     Treating Psychiatrist providing ECT:  Nikko     Notified on:  1/21/19     Routine Programming     As  clinically indicated     Self Injury Precaution     Status 15     Every 15 minutes.     Suicide precautions     Patients on Suicide Precautions should have a Combination Diet ordered that includes a Diet selection(s) AND a Behavioral Tray selection for Safe Tray - with utensils, or Safe Tray - NO utensils            DIagnoses:   Major Depressive Disorder, recurrent, severe, with psychotic symptoms   Traits of Borderline Personality Disorder  Generalized Anxiety Disorder  Obsessive Compulsive Disorder   Hx of Cannabis Use Disorder          Plan:     Legal status: Voluntary      Medication management: Holding medications at this time. ECT consult , high lithium level as of 1/20 2.05 , 1/21 Li level 1.19, creatinine 0.70     Disposition plan: Stabilize with ECT. Patient had ECT #1 treatment on 1/23. Tolerated treatment well.

## 2019-01-24 PROCEDURE — 25000132 ZZH RX MED GY IP 250 OP 250 PS 637: Performed by: PSYCHIATRY & NEUROLOGY

## 2019-01-24 PROCEDURE — 12400001 ZZH R&B MH UMMC

## 2019-01-24 PROCEDURE — H2032 ACTIVITY THERAPY, PER 15 MIN: HCPCS

## 2019-01-24 PROCEDURE — 99232 SBSQ HOSP IP/OBS MODERATE 35: CPT | Performed by: CLINICAL NURSE SPECIALIST

## 2019-01-24 RX ADMIN — DESMOPRESSIN ACETATE 600 MCG: 0.2 TABLET ORAL at 22:03

## 2019-01-24 RX ADMIN — ACETAMINOPHEN 650 MG: 325 TABLET, FILM COATED ORAL at 22:03

## 2019-01-24 RX ADMIN — ACETAMINOPHEN 650 MG: 325 TABLET, FILM COATED ORAL at 16:07

## 2019-01-24 RX ADMIN — TRAZODONE HYDROCHLORIDE 50 MG: 50 TABLET ORAL at 23:31

## 2019-01-24 RX ADMIN — TRAZODONE HYDROCHLORIDE 50 MG: 50 TABLET ORAL at 22:03

## 2019-01-24 RX ADMIN — ACETAMINOPHEN 650 MG: 325 TABLET, FILM COATED ORAL at 11:21

## 2019-01-24 ASSESSMENT — ACTIVITIES OF DAILY LIVING (ADL)
DRESS: INDEPENDENT
HYGIENE/GROOMING: INDEPENDENT
DRESS: INDEPENDENT
ORAL_HYGIENE: INDEPENDENT
HYGIENE/GROOMING: INDEPENDENT
ORAL_HYGIENE: INDEPENDENT

## 2019-01-24 ASSESSMENT — MIFFLIN-ST. JEOR: SCORE: 1496.25

## 2019-01-24 NOTE — PROGRESS NOTES
" 01/24/19 1500   Art Therapy   Type of Intervention structured groups   Response participates with encouragement   Hours 3   Treatment Detail DBT house/ Juani and gratitude/ collage   Problem-Major Depressive Disorder, recurrent, severe, with psychotic symptoms   Traits of Borderline Personality Disorder  Generalized Anxiety Disorder  Obsessive Compulsive Disorder   Hx of Cannabis Use Disorder      Goal-Roach, regulate and express through Art Therapy  Outcome- Pt attended groups. She hesitated before completing the DBT house, she stated \" I have done a lot of DBT. She then did complete the activity after a slight delay. She declined to share. She enjoyed working on a collage. She was a bit more social and engaged today. She is still reserved and quiet, but seemed to be an improved mood from yesterday.  "

## 2019-01-24 NOTE — PROGRESS NOTES
"Pt refused check in. She informed staff she was having body aches from ECT. Pt states she is interested in getting more information about how ECT works \"and not just the [employer] Fact Sheets\". Pt stated she was worried she would not get to meet with her provider today because they had not met yet. Pt gives short answers to questions and appears irritated often.   Pt did not fill out her menu yesterday and received a default lunch tray, which had very little on it. Pt only had carrots for lunch. Writer requested a new menu for her to choose her own dinner and dietary allowed it.      01/24/19 1400   Behavioral Health   Hallucinations denies / not responding to hallucinations   Thinking poor concentration   Orientation person: oriented;place: oriented;date: oriented;time: oriented   Memory baseline memory   Insight poor   Judgement impaired   Eye Contact at examiner   Affect blunted, flat;irritable   Mood mood is calm;irritable   Physical Appearance/Attire attire appropriate to age and situation;neat   Hygiene well groomed   Suicidality (refused to answer)   Wish to be Dead Description (refused to answer)   Non-Specific Active Suicidal Thought Description (refused to answer)   Elopement (no concerning statements or behaviors)   Activity withdrawn   Speech coherent;clear   Medication Sensitivity other (see comment)  (soreness)   Psychomotor / Gait steady;balanced   Psycho Education   Type of Intervention 1:1 intervention   Response refuses   Activities of Daily Living   Hygiene/Grooming independent   Oral Hygiene independent   Dress independent   Room Organization independent   Activity   Activity Assistance Provided independent     "

## 2019-01-24 NOTE — PROGRESS NOTES
[facility], [place]   Psychiatric Progress Note        Interim History:   The patient's care was discussed with the treatment team during the daily team meeting and/or staff's chart notes were reviewed.  Staff report patient has been visible in the milieu and attended groups. Patient continues to be selective with whom she will interact with     Psychiatric symptoms and interventions:   Will not start medications due to high lithium level and possible initiation of ECT. Patient reports poor response to antidepressant medication. Increasing depression when on lithium and gabapentin. Patient has scarring on both forearms.      Patient presented with a blunted affect, paucity of speech, reports both long and short term memory problems. Patient was very guarded with her answers. Patient's gait was normal, no issues with speaking, vitals stable, right hand tremor.      1/23 Patient stayed in bed most of the day after ECT #1 treatment. Patient refused to speak and only would nod her head yes and no to questions. Patient nodded yes to headache. Nurse brought her medication. Patient's eye contact was poor.     1/24 Patient is attending groups but is very guarded. Patient maintains poor eye contact. She remains irritable. She agitates quickly with questions.      Medical issues; Right hand termor, lacerations on right forearm bandage covering, Lithium level as of 1/20 2.05 trending down.  Vital are stable 112/73 and pulse is 60 on 1/21/2019  lithium level for 1/21 is 1.19.  1/21 Will monitor TSH 5.57 (down from 1/19- 11.97), free T4 is 0.83. Creatinine 0.70     Behavioral/psychology/social:   Encouraged patient to attend therapeutic programming as tolerated  Patient has not required restraint or seclusion within the last 24 hours.         Medications:       desmopressin  600 mcg Oral At Bedtime          Allergies:     Allergies   Allergen Reactions     No Known Drug Allergies           Labs:   No  "results found for this or any previous visit (from the past 24 hour(s)).       Psychiatric Examination:     /70 (BP Location: Left arm)   Pulse 71   Temp 98.1  F (36.7  C) (Tympanic)   Resp 18   Ht 1.778 m (5' 10\")   Wt 64.1 kg (141 lb 5 oz)   SpO2 99%   BMI 20.28 kg/m    Weight is 141 lbs 5.04 oz  Body mass index is 20.28 kg/m .  Orthostatic Vitals       Most Recent      Sitting Orthostatic BP 99/63 01/24 0900    Sitting Orthostatic Pulse (bpm) 80 01/24 0900    Standing Orthostatic /59 01/24 0900    Standing Orthostatic Pulse (bpm) 113 01/24 0900          Appearance: awake, alert, adequately groomed and dressed in hospital scrubs  Attitude:  evasive and guarded  Eye Contact:  blank stare  Mood:  depressed  Affect:  intensity is blunted  Speech:  increased speech latency and paucity of speech  Psychomotor Behavior:  no evidence of tardive dyskinesia, dystonia, or tics and tremor observed in right hand.   Throught Process:  logical, linear and goal oriented  Associations:  no loose associations  Thought Content:  passive suicidal ideation present  Insight:  limited  Judgement:  limited  Oriented to:  time, person, and place  Attention Span and Concentration:  fair  Recent and Remote Memory:  limited per patient report    Clinical Global Impressions  First:  Considering your total clinical experience with this particular patient population, how severe are the patient's symptoms at this time?: 6 (01/21/19 1256)  Compared to the patient's condition at the START of treatment, this patient's condition is:: 6 (01/21/19 1256)  Most recent:  Considering your total clinical experience with this particular patient population, how severe are the patient's symptoms at this time?: 6 (01/21/19 1256)  Compared to the patient's condition at the START of treatment, this patient's condition is:: 6 (01/21/19 1256)         Precautions:     Behavioral Orders   Procedures     Code 2     Electroconvulsive therapy     ECT " every Monday, Wednesday, and Friday     Electroconvulsive therapy     Series of up to 12 treatments. Begin Date: 1/23/19     Treating Psychiatrist providing ECT:  Nikko     Notified on:  1/21/19     Routine Programming     As clinically indicated     Self Injury Precaution     Status 15     Every 15 minutes.     Suicide precautions     Patients on Suicide Precautions should have a Combination Diet ordered that includes a Diet selection(s) AND a Behavioral Tray selection for Safe Tray - with utensils, or Safe Tray - NO utensils            DIagnoses:   Major Depressive Disorder, recurrent, severe, with psychotic symptoms   Traits of Borderline Personality Disorder  Generalized Anxiety Disorder  Obsessive Compulsive Disorder   Hx of Cannabis Use Disorder  ASD         Plan:     Legal status: Voluntary      Medication management: Holding medications at this time. ECT consult , high lithium level as of 1/20 2.05 , 1/21 Li level 1.19, creatinine 0.70     Disposition plan: Stabilize with ECT. Patient had ECT #1 treatment on 1/23. Tolerated treatment

## 2019-01-24 NOTE — CONSULTS
PSYCHIATRY ECT 2ND OPINION CONSULTATION     REQUESTING SOURCE: Yamil Springer MD, and Deborah Naegele, APRN, CNS.      REASON FOR CONSULTATION:  Please evaluate second opinion for ECT.      IDENTIFICATION:  Ms. Villanueva is a 18-year-old woman (father is from [place]) living with her parents in [place].  She has a long history of mental illness with diagnoses including major depressive disorder, recurrent, with psychotic features, borderline personality disorder, generalized anxiety disorder, obsessive-compulsive disorder, and history of cannabis use disorder.  She was admitted to [facility] Psychiatry station 4A, young adult unit after presenting to the [facility] Emergency Room 01/19/2019.  She had multiple large lacerations on her right forearm and had overdosed on 3600 mg of gabapentin.  Her lacerations were closed with wound  adhesive, and she was admitted to Psychiatry on 01/19/2019 on a voluntary basis.  She reports a long history of depression and anxiety since middle school.  She estimates that she has had over 10 psychiatric admissions at [facility].  She has been on many psychotropic medications with the longest time on lithium.  She has a long history of cutting and last cut the day of admission.  She has been through DBT, the [facility] for eating disorder, and [facility].  She has never had ECT.  She has a long history of various suicide attempts, mostly through overdose.  She has had many medication trials.  Her depression is quite pervasive.  She has a low threshold for self-injury and suicide attempts.  She was quite blunted and guarded and disengaged on admission.  She felt lithium and gabapentin had overall helped her depression and suicidal thoughts in the past, but her mood had been worsening over the past month or so.  She was more depressed and thinking a lot about suicide.  She was not taking care of  "herself and was not functioning.  She still had suicidal thoughts.  She reported that her overdose on Neurontin was to escape her mind and thoughts, and not to kill herself.  Urine toxicology screen was negative.  She appeared isolative, withdrawn, sullen and blunted.  She had slow 1 or 2 word responses.  She was open to hospitalization.  She has some hand tremor.  She continues to complain of feeling miserable.  She was hopeless and felt that nothing works.  She has been irritable at times.  She agreed to ECT.      Ms. Villanueva tells Dr. El that her depression has been going on since middle school and she has had no periods of remission in her depression.  She feels mood has been getting worse for the past month and a half.  Stressors include school and relationships.  She had been going to [facility].  She dropped out of school about 2 weeks ago.  Mood is depressed and irritable.  Sleep is \"terrible.\"  Appetite is poor.  She is not sure if her weight has changed.  She is anhedonic.  Energy level is low.  It is hard for her to get out of bed and go to school.  Her grades were okay.  Concentration is poor.  She feels hopeless, helpless and worthless.  She has had suicidal thoughts off and on since middle school and was admitted after an overdose on Neurontin and self-induced lacerations.  She denies homicidal thoughts.      HISTORY OF PRESENT ILLNESS:  She denied psychotic symptoms, although on admission, she seemed distracted, possibly responding to internal stimuli.  She is rather irritable and not particularly forthcoming with the interview.  Answers are brief or not at all.  She says she had an eating disorder in high school with restricting and excessive exercise.      PAST PSYCHIATRIC HISTORY:  Ms. Villanueva says she has had depression and anxiety since middle school.  She has had about 10 psychiatric admissions per her guestimate at [facility].  She has " had many suicide attempts by overdosing and cutting.  She last had an overdose the day of this admission.  She has never had ECT.  She has been to [facility] and the [facility].  She has a long history of self-injurious behavior with cutting.  She denies an abuse history.  She has been on numerous psychotropic medications over the years including, but not limited to, Prozac, Wellbutrin, Cymbalta, Effexor, Anafranil, Abilify, Geodon, Seroquel, Adderall, Vistaril, Klonopin and likely many others.  She has never had ECT.  She has never been committed.  She has a history of hitting herself but no violence toward others.  She apparently was in [facility] treatment in the past and participated in some [facility] treatment.  Records indicate a past history of command persecutory auditory hallucinations.  Records indicate a history of OCD and she has a history of chronic mood instability with maladaptive coping skills.      CHEMICAL DEPENDENCY HISTORY:  Ms. Villanueva used some cannabis in the past but denies habitual use.  Urine toxicology screen was negative on admission.  She says she has a few drinks with friends occasionally.  She does not smoke cigarettes.      PAST MEDICAL HISTORY:  See Internal Medicine consultation.      MEDICATIONS:     Current Facility-Administered Medications:      acetaminophen (TYLENOL) tablet 650 mg, 650 mg, Oral, TID PRN, Ricardo Alegria MD, 650 mg at 01/23/19 1642     alum & mag hydroxide-simethicone (MYLANTA ES/MAALOX  ES) suspension 30 mL, 30 mL, Oral, Q4H PRN, Yamil Springer MD     bisacodyl (DULCOLAX) Suppository 10 mg, 10 mg, Rectal, Daily PRN, Yamil Springer MD     desmopressin (DDAVP) tablet 600 mcg, 600 mcg, Oral, At Bedtime, Yamil Springer MD, 600 mcg at 01/23/19 2104     hydrOXYzine (ATARAX) tablet 25 mg, 25 mg, Oral, Q4H PRN, Ymail Springer MD     ketorolac (TORADOL) injection 30 mg, 30 mg, Intravenous, Q6H PRN, Marilee Hernandez  MD Mishel, 30 mg at 01/23/19 1232     LORazepam (ATIVAN) tablet 0.5 mg, 0.5 mg, Oral, Q4H PRN, Yamil Springer MD, 0.5 mg at 01/23/19 1642     magnesium hydroxide (MILK OF MAGNESIA) suspension 30 mL, 30 mL, Oral, At Bedtime PRN, Yamil Springer MD     OLANZapine (zyPREXA) tablet 10 mg, 10 mg, Oral, Q2H PRN **OR** OLANZapine (zyPREXA) injection 10 mg, 10 mg, Intramuscular, Q2H PRN, Yamil Springer MD     ondansetron (ZOFRAN-ODT) ODT tab 4 mg, 4 mg, Oral, TID PRN, Ricardo Alegria MD, 4 mg at 01/23/19 1307     traZODone (DESYREL) tablet 50 mg, 50 mg, Oral, At Bedtime PRN, Yamil Springer MD, 50 mg at 01/23/19 2330     ALLERGIES:  NO KNOWN DRUG ALLERGIES.      FAMILY HISTORY:  There is a history of chemical dependence in maternal grandfather, maternal aunt had depression.  Maternal aunt abused alcohol.  Brother has anxiety.      SOCIAL HISTORY:  Ms. Villanueva was born in [place].  She was raised by her parents.  She has 1 brother and 1 sister.  She attends [employer] in [place] and is a sophomore, majoring in psychology.  She dropped out of school 2 weeks ago.  She is not in a relationship.  She never .  She has no children.  She has no job.  She has no legal problems.  She was never in the .  Father is from [facility].      MENTAL STATUS EXAMINATION:  Ms. Villanueva is an adequately groomed 18-year-old woman looking roughly her stated age.  She was calm during the interview.  She is not particularly cooperative.  She is completely on the other side of the room from Dr. El curled up in a ball.  Eye contact was poor.  Gait was steady.  Psychomotor activity is unremarkable.  Speech is fluent, although answers are brief with 1 or 2 word responses.  Language is normal.  Mood is depressed.  Affect is sad and irritable.  Attention and concentration appear adequate.  Thought process is normal.  Associations are normal.  She denied any psychotic symptoms.  She denies wanting to kill  herself right now, although she says she has had suicidal thoughts that since middle school and is admitted after an overdose on Neurontin and cutting herself.  She denied homicidal thoughts.  Fund of knowledge is adequate.  Remote and recent memory are adequate.  Insight and judgment are limited by her depression and personality disorder.  She was alert and oriented x 3.  There was no evidence of movement disorder.      ASSESSMENT:  Ms. Gupta is a 18-year-old woman with a long history of depression and mood instability.  She has had many psychiatric admissions and been through various outpatient treatment programs.  Her depression appears quite treatment resistant and has severely affected her function.  At this point, she is interested in ECT.  ECT seems a reasonable treatment option in light of the treatment refractory nature of her depression.  Risks and benefits of ECT were discussed.  She appears to have the capacity to give informed consent for ECT.      DIAGNOSES:   Axis I:  Major depressive disorder, recurrent, generalized anxiety disorder by history, obsessive-compulsive disorder by history.  History of cannabis use disorder.   Axis II:  Borderline personality disorder.        PLAN:   1.  Ms. Gupta was medically cleared for ECT.   2.  Begin series of right-sided unilateral ultra-brief ECT treatment.   3.  Expect stabilization and discharge to home.      Added continuation [id number] D:  19 T:  19 NTS/lg         ALEX CHARLES MD             D: 2019   T: 2019   MT: GILLES      Name:     ROBIN GUPTA   MRN:      [id number]        Account:       [id number]   :      2000           Consult Date:  2019      Document: [id number]       cc: Debra Naegele APRN, CNS       Yamil Springer MD

## 2019-01-24 NOTE — PLAN OF CARE
"Pt again approached this RN and we discussed ECT and the side affects of it, ie; headaches and body aches. [facility] also printed out info on ECT as did this RN thru [facility]. We discussed medications and she has requested her neurontin back. \"why, I only right eye on it 3 times\"! Pt then changed the subject and stated was this RN going to put a roommate with her. This RN asked her and she angrily stated, NO!\"  Pt stated she can become very angry and \"cut\" if a roommate was to be placed with her. \"You know how I can get Chilo!\"  \"Just tell Bouchra I want the gabapentin back!\"  Pt remains mildly labile tonight but did state she can work with this RN. \"Tell Bouchra I need to see her in the morning and don't wake me up to early for ECT, I get grumpy or to late because I get hungry\"!!  Will continue to assess.  "

## 2019-01-24 NOTE — PROGRESS NOTES
Stephanie stayed in her room most of the evening, when told supper trays arrived she stared at staff and did not respond. She has been less irritable than reported during day shift. She asked for info on ECT which she was given. She denies SI/SIB/HI. She had a visit from her parents. Her parents had many questions and were directed to call a nurse for that info. They had requested to see a doctor and were upset their daughter had ECT without them knowing. They were told this is an adult unit and different from the child/adol units.      01/23/19 2100   Behavioral Health   Hallucinations denies / not responding to hallucinations   Thinking poor concentration   Orientation person: oriented;place: oriented;date: oriented;time: oriented   Memory baseline memory   Insight poor   Judgement impaired   Eye Contact at examiner   Affect blunted, flat;irritable   Mood mood is calm;irritable   Physical Appearance/Attire attire appropriate to age and situation   Hygiene other (see comment)  (did not shower today)   Suicidality other (see comments)  (denies)   1. Wish to be Dead No   2. Non-Specific Active Suicidal Thoughts  No   Self Injury other (see comment)  (denies)   Elopement (no concern)   Activity isolative;withdrawn   Speech clear;coherent   Medication Sensitivity other (see comment)  (headache, body aches, tired)   Psychomotor / Gait balanced;steady   Coping/Psychosocial   Verbalized Emotional State frustration   Activities of Daily Living   Hygiene/Grooming independent   Oral Hygiene independent   Dress independent   Laundry with supervision   Room Organization independent   Activity   Activity Assistance Provided independent

## 2019-01-25 ENCOUNTER — ANESTHESIA EVENT (OUTPATIENT)
Dept: BEHAVIORAL HEALTH | Facility: CLINIC | Age: 19
End: 2019-01-25

## 2019-01-25 ENCOUNTER — ANESTHESIA (OUTPATIENT)
Dept: BEHAVIORAL HEALTH | Facility: CLINIC | Age: 19
End: 2019-01-25

## 2019-01-25 PROCEDURE — 25000128 H RX IP 250 OP 636: Performed by: PSYCHIATRY & NEUROLOGY

## 2019-01-25 PROCEDURE — 25000125 ZZHC RX 250: Performed by: ANESTHESIOLOGY

## 2019-01-25 PROCEDURE — 25000132 ZZH RX MED GY IP 250 OP 250 PS 637: Performed by: PSYCHIATRY & NEUROLOGY

## 2019-01-25 PROCEDURE — H2032 ACTIVITY THERAPY, PER 15 MIN: HCPCS

## 2019-01-25 PROCEDURE — 99232 SBSQ HOSP IP/OBS MODERATE 35: CPT | Mod: 25 | Performed by: CLINICAL NURSE SPECIALIST

## 2019-01-25 PROCEDURE — GZB2ZZZ ELECTROCONVULSIVE THERAPY, BILATERAL-SINGLE SEIZURE: ICD-10-PCS | Performed by: PSYCHIATRY & NEUROLOGY

## 2019-01-25 PROCEDURE — 90870 ELECTROCONVULSIVE THERAPY: CPT

## 2019-01-25 PROCEDURE — 25000125 ZZHC RX 250: Performed by: PSYCHIATRY & NEUROLOGY

## 2019-01-25 PROCEDURE — 25000132 ZZH RX MED GY IP 250 OP 250 PS 637: Performed by: CLINICAL NURSE SPECIALIST

## 2019-01-25 PROCEDURE — 25000128 H RX IP 250 OP 636: Performed by: ANESTHESIOLOGY

## 2019-01-25 PROCEDURE — 12400001 ZZH R&B MH UMMC

## 2019-01-25 RX ORDER — MIRTAZAPINE 7.5 MG/1
7.5 TABLET, FILM COATED ORAL
Status: DISCONTINUED | OUTPATIENT
Start: 2019-01-25 | End: 2019-01-28

## 2019-01-25 RX ORDER — KETOROLAC TROMETHAMINE 30 MG/ML
30 INJECTION, SOLUTION INTRAMUSCULAR; INTRAVENOUS
Status: COMPLETED | OUTPATIENT
Start: 2019-01-25 | End: 2019-01-25

## 2019-01-25 RX ORDER — GRANISETRON HYDROCHLORIDE 1 MG/ML
1 INJECTION INTRAVENOUS ONCE
Status: COMPLETED | OUTPATIENT
Start: 2019-01-25 | End: 2019-01-25

## 2019-01-25 RX ORDER — LIDOCAINE HYDROCHLORIDE 20 MG/ML
40 INJECTION, SOLUTION EPIDURAL; INFILTRATION; INTRACAUDAL; PERINEURAL
Status: COMPLETED | OUTPATIENT
Start: 2019-01-25 | End: 2019-01-25

## 2019-01-25 RX ORDER — GABAPENTIN 300 MG/1
300 CAPSULE ORAL 3 TIMES DAILY
Status: DISCONTINUED | OUTPATIENT
Start: 2019-01-25 | End: 2019-01-29 | Stop reason: HOSPADM

## 2019-01-25 RX ORDER — ETOMIDATE 2 MG/ML
INJECTION INTRAVENOUS PRN
Status: DISCONTINUED | OUTPATIENT
Start: 2019-01-25 | End: 2019-01-25

## 2019-01-25 RX ADMIN — GABAPENTIN 300 MG: 300 CAPSULE ORAL at 14:09

## 2019-01-25 RX ADMIN — GRANISETRON HYDROCHLORIDE 1 MG: 1 INJECTION INTRAVENOUS at 10:05

## 2019-01-25 RX ADMIN — GABAPENTIN 300 MG: 300 CAPSULE ORAL at 22:22

## 2019-01-25 RX ADMIN — ETOMIDATE 14 MG: 2 INJECTION INTRAVENOUS at 09:57

## 2019-01-25 RX ADMIN — LIDOCAINE HYDROCHLORIDE 40 MG: 20 INJECTION, SOLUTION EPIDURAL; INFILTRATION; INTRACAUDAL; PERINEURAL at 09:56

## 2019-01-25 RX ADMIN — ACETAMINOPHEN 650 MG: 325 TABLET, FILM COATED ORAL at 09:00

## 2019-01-25 RX ADMIN — ACETAMINOPHEN 650 MG: 325 TABLET, FILM COATED ORAL at 14:02

## 2019-01-25 RX ADMIN — KETOROLAC TROMETHAMINE 30 MG: 30 INJECTION, SOLUTION INTRAMUSCULAR; INTRAVENOUS at 10:03

## 2019-01-25 RX ADMIN — Medication 80 MG: at 09:57

## 2019-01-25 RX ADMIN — SODIUM CHLORIDE, POTASSIUM CHLORIDE, SODIUM LACTATE AND CALCIUM CHLORIDE 1000 ML: 600; 310; 30; 20 INJECTION, SOLUTION INTRAVENOUS at 09:56

## 2019-01-25 RX ADMIN — MIRTAZAPINE 7.5 MG: 7.5 TABLET ORAL at 22:22

## 2019-01-25 RX ADMIN — DESMOPRESSIN ACETATE 600 MCG: 0.2 TABLET ORAL at 22:21

## 2019-01-25 RX ADMIN — ACETAMINOPHEN 650 MG: 325 TABLET, FILM COATED ORAL at 18:01

## 2019-01-25 ASSESSMENT — ACTIVITIES OF DAILY LIVING (ADL)
DRESS: INDEPENDENT
DRESS: INDEPENDENT
ORAL_HYGIENE: INDEPENDENT
HYGIENE/GROOMING: INDEPENDENT
HYGIENE/GROOMING: INDEPENDENT
ORAL_HYGIENE: INDEPENDENT

## 2019-01-25 NOTE — PROGRESS NOTES
Pt states she cannot remember meeting with her provider most days. Pt appears irritable, stating she needs to get to meet with her provider. Pt attending some groups. Pt had an ECT treatment today and shows more affect today than she did yesterday. Pt selectively social with peers, but does eat meals in the lounge.      01/25/19 1300   Behavioral Health   Hallucinations denies / not responding to hallucinations   Thinking poor concentration   Orientation person: oriented;place: oriented   Memory short term;new learning, recall loss   Insight poor   Eye Contact at examiner   Affect blunted, flat   Mood irritable;mood is calm   Physical Appearance/Attire attire appropriate to age and situation;neat   Hygiene well groomed   Suicidality other (see comments)  (denies)   1. Wish to be Dead No   2. Non-Specific Active Suicidal Thoughts  No   Self Injury other (see comment)  (no concerning statements or behaviors)   Elopement (no concerning statements or behaviors)   Activity withdrawn;other (see comment)  (attending more groups)   Speech coherent;clear   Psychomotor / Gait balanced;steady   Psycho Education   Type of Intervention 1:1 intervention   Response participates, initiates socially appropriate   Hours 0.5   Treatment Detail (Check in)   Activities of Daily Living   Hygiene/Grooming independent   Oral Hygiene independent   Dress independent   Room Organization independent   Activity   Activity Assistance Provided independent

## 2019-01-25 NOTE — PROGRESS NOTES
1125--Pt presents at the nurse's station and requested for another trazodone for sleep. Pt had received an initial 50 mg dose at 2203. Per PRN order (May repeat x 1), pt received an additional 50 mg. Pt swallowed medication whole and returned to her room. Staff will continue to monitor sleep pattern through the night.

## 2019-01-25 NOTE — PROGRESS NOTES
[facility], [place]   Psychiatric Progress Note        Interim History:   The patient's care was discussed with the treatment team during the daily team meeting and/or staff's chart notes were reviewed.  Staff report patient has been visible in the milieu and attended groups.     Psychiatric symptoms and interventions:   Will not start medications due to high lithium level and possible initiation of ECT. Patient reports poor response to antidepressant medication. Increasing depression when on lithium and gabapentin. Patient has scarring on both forearms.      Patient presented with a blunted affect, paucity of speech, reports both long and short term memory problems. Patient was very guarded with her answers. Patient's gait was normal, no issues with speaking, vitals stable, right hand tremor.      1/23 Patient stayed in bed most of the day after ECT #1 treatment. Patient refused to speak and only would nod her head yes and no to questions. Patient nodded yes to headache. Nurse brought her medication. Patient's eye contact was poor.      1/24 Patient is attending groups but is very guarded. Patient maintains poor eye contact. She remains irritable. She agitates quickly with questions.     1/25 Patient states she is less irritable and has less suicidal thinking.Patietn report her suicidal thoughts are still in the background. Patient reports anxiety is high. Re-started gabapentin 300 mg TID. Patient is reporting poor sleep and does not feel trazodone is effective. Starting Remeron 7.5 mg. Discussed possible discharge on Monday if she continues to improve. Patient reports her mother wants her to go to [facility] (day treatment). Explained to patient people typically don't attend treatment while they are in the acute course of ECT. Due to memory issues.      Medical issues; Right hand termor, lacerations on right forearm bandage covering, Lithium level as of 1/20 2.05 trending down.  Vital  "are stable 112/73 and pulse is 60 on 1/21/2019  lithium level for 1/21 is 1.19.  1/21 Will monitor TSH 5.57 (down from 1/19- 11.97), free T4 is 0.83. Creatinine 0.70     Behavioral/psychology/social:   Encouraged patient to attend therapeutic programming as tolerated  Patient has not required restraint or seclusion within the last 24 hours.            Medications:       desmopressin  600 mcg Oral At Bedtime     gabapentin  300 mg Oral TID          Allergies:     Allergies   Allergen Reactions     No Known Drug Allergies           Labs:   No results found for this or any previous visit (from the past 24 hour(s)).       Psychiatric Examination:     /75   Pulse 77   Temp 96.7  F (35.9  C)   Resp 16   Ht 1.778 m (5' 10\")   Wt 64.1 kg (141 lb 5 oz)   SpO2 100%   BMI 20.28 kg/m    Weight is 141 lbs 5.04 oz  Body mass index is 20.28 kg/m .  Orthostatic Vitals       Most Recent      Sitting Orthostatic /67 01/25 0855    Sitting Orthostatic Pulse (bpm) 66 01/25 0855    Standing Orthostatic /61 01/25 0855    Standing Orthostatic Pulse (bpm) 92 01/25 0855      Appearance: awake, alert, adequately groomed   Attitude:   guarded, A little more engaged  Eye Contact:  stared out the window  Mood:  improving  Affect:  intensity is blunted  Speech:  more engaged in conversation  Psychomotor Behavior:  no evidence of tardive dyskinesia, dystonia, or tics and tremor observed in right hand.   Throught Process:  logical, linear and goal oriented  Associations:  no loose associations  Thought Content:  passive suicidal ideation present but lessening  Insight:  limited  Judgement:  limited  Oriented to:  time, person, and place  Attention Span and Concentration:  fair  Recent and Remote Memory:  limited per patient report    Clinical Global Impressions  First:  Considering your total clinical experience with this particular patient population, how severe are the patient's symptoms at this time?: 6 (01/21/19 " 1256)  Compared to the patient's condition at the START of treatment, this patient's condition is:: 6 (01/21/19 1256)  Most recent:  Considering your total clinical experience with this particular patient population, how severe are the patient's symptoms at this time?: 6 (01/21/19 1256)  Compared to the patient's condition at the START of treatment, this patient's condition is:: 6 (01/21/19 1256)         Precautions:     Behavioral Orders   Procedures     Code 2     Electroconvulsive therapy     ECT every Monday, Wednesday, and Friday     Electroconvulsive therapy     Series of up to 12 treatments. Begin Date: 1/23/19     Treating Psychiatrist providing ECT:  Nikko     Notified on:  1/21/19     Fall precautions     Routine Programming     As clinically indicated     Self Injury Precaution     Status 15     Every 15 minutes.     Suicide precautions     Patients on Suicide Precautions should have a Combination Diet ordered that includes a Diet selection(s) AND a Behavioral Tray selection for Safe Tray - with utensils, or Safe Tray - NO utensils            DIagnoses:   Major Depressive Disorder, recurrent, severe, with psychotic symptoms   Traits of Borderline Personality Disorder  Generalized Anxiety Disorder  Obsessive Compulsive Disorder   Hx of Cannabis Use Disorder  ASD         Plan:   Legal status: Voluntary      Medication management: Holding medications at this time. ECT consult , high lithium level as of 1/20 2.05 , 1/21 Li level 1.19, creatinine 0.70     Disposition plan: Stabilize with ECT. Patient had ECT #2 treatment on 1/23. Tolerated treatment

## 2019-01-25 NOTE — PROGRESS NOTES
"CLINICAL NUTRITION SERVICES - ASSESSMENT NOTE     Nutrition Prescription    RECOMMENDATIONS FOR MDs/PROVIDERS TO ORDER:  Given vegan diet order, initiate daily MVI with minerals (Thera-Vit-M) to help meet micronutrient needs. In addition, recommend testing Vitamin B12 and Vitamin D given vegan diet. Initiate supplement if low.     Malnutrition Status:    Unable to determine due to inability to complete nutrition history and physical findings d/t pt unavailable at time of visit.    Recommendations already ordered by Registered Dietitian (RD):  Unable to complete nutr ed at this time - plan to follow-up later today on phone with patient to discuss  Addendum: Allowed write-ins for tofu, pasta w/marinara, and PB&J sandwiches. RD offered scheduled snacks, but pt declined.    Future/Additional Recommendations:  Monitor po intakes and wt trends. Consider need to add snack/write-in options. Adjust order pending trends and pt preferences.       REASON FOR ASSESSMENT  Stephanie Villanueva is a/an 18 year old female assessed by the dietitian for Provider Order - pt frustrated with choices for vegan diet    NUTRITION HISTORY  Unable to obtain at this time. Patient in shower; RD unable to come back to unit today.     CURRENT NUTRITION ORDERS  Diet: Vegan  Intake/Tolerance: Per discussion with RN, patient is frustrated with the vegan diet menu choices and feels it is limited. Would appreciate knowing what else nutrition services can do to help.    LABS   Calcium 8.9 (WNL)    MEDICATIONS reviewed    ANTHROPOMETRICS  Height: 177.8 cm (5' 10\")  Most Recent Weight: 64.1 kg (141 lb 5 oz)    IBW: 68.2 kg (94% IBW)  BMI: Normal BMI  Weight History: Per wt readings below, no wt loss noted.  Wt Readings from Last 10 Encounters:   01/24/19 64.1 kg (141 lb 5 oz) (71 %)*   01/09/19 61.3 kg (135 lb 4 oz) (62 %)*   07/10/18 59.9 kg (132 lb) (59 %)*   06/07/18 60.1 kg (132 lb 6.4 oz) (60 %)*   05/31/18 59 kg (130 lb) (56 %)*   07/26/17 59 kg (130 lb) " (59 %)*   06/20/17 59.3 kg (130 lb 12.8 oz) (61 %)*   06/01/17 59.4 kg (131 lb) (62 %)*   05/21/17 60.8 kg (134 lb) (67 %)*   12/30/16 59.9 kg (132 lb) (65 %)*     * Growth percentiles are based on CDC (Girls, 2-20 Years) data.     Dosing Weight: 64 kg (actual)    ASSESSED NUTRITION NEEDS  Estimated Energy Needs: 7031-8701 kcals/day (25 - 30 kcals/kg)  Justification: Maintenance  Estimated Protein Needs: 0.8-1 grams protein/day (0.8 - 1 grams of pro/kg)  Justification: Maintenance  Estimated Fluid Needs: 1 mL/kcal, or per provider   Justification: Maintenance    PHYSICAL FINDINGS  See malnutrition section below.    MALNUTRITION  % Intake: Unable to assess  % Weight Loss: None noted  Subcutaneous Fat Loss: Unable to assess  Muscle Loss: Unable to assess  Fluid Accumulation/Edema: Unable to assess  Malnutrition Diagnosis: Unable to determine due to inability to complete nutrition history and physical findings d/t pt unavailable at time of visit.    NUTRITION DIAGNOSIS  Predicted inadequate nutrient intake (protein-energy) related to variable appetite and dietary preferences as evidenced by reliance on po intakes to meet estimated needs with potential for decline.       INTERVENTIONS  Implementation  Nutrition Education: Unable to complete - plan to follow-up later today on phone with patient to discuss possibility of scheduled snacks and write-in options.   - Addendum: Allowed write-ins for tofu, pasta w/marinara, and PB&J sandwiches. RD offered scheduled snacks, but pt declined.  Discussion with RN     Goals  Patient to consume % of nutritionally adequate meal trays TID, or the equivalent with supplements/snacks.     Monitoring/Evaluation  Progress toward goals will be monitored and evaluated per protocol.    Phylicia Cody RD, LD  Unit pgr: [phone]

## 2019-01-25 NOTE — PROCEDURES
Procedure/Surgery Information   [facility], [place]    Bedside Procedure Note  Date of Service (when I performed the procedure): 01/25/2019    Stephanie Villanueva is a 18 year old female patient.  1. Suicidal ideation    2. Self-inflicted injury    3. Intentional chlormethiazole overdose (H)    4. Laceration of right forearm    5. 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     Salter-Perdomo Type I fracture of distal fibula with routine healing 1/25/2013     Uncomplicated asthma     sports induced     Temp: 97.9  F (36.6  C) Temp src: Tympanic BP: 115/67 Pulse: 66   Resp: 16 SpO2: 100 %        Procedures     Yaron El     [facility], [place]   ECT Procedure Note  01/25/2019    Stephanie Villanueva [id number]   18 year old 2000     Patient Status: Inpatient    Is this the first in a series of 12 treatments?  No    History and Physical: Reviewed in medical record    Consent: Informed consent was signed by: patient    Date Consent Signed: 1/23/19      Allergies   Allergen Reactions     No Known Drug Allergies        Weight:  141 lbs 5.04 oz              Indications for ECT:   Medications ineffective         Clinical Narrative:   Patient was admitted with worsening depression and suicidal ideation.  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  NPO after 2400.  Alert and Oriented x 3.  Mood might be a little better.  She says her parents tell her she looks beter.    She had HA and Nausea with last ECT.   She isn't sure Toradol and Zofran did anything.            Diagnosis:   Major depression         Pause for the Cause:     Right patient Yes   Right procedure/laterality settings: Yes          Intra-Procedure  Documentation:     ECT #: 2   Treatment number this series: 2   Total treatment number: 2     Type of ECT:  Right, unilateral ultrabrief    ECT Medications:    Tylenol:  650mg  Today:  Lactated Ringers:  1 liter  Today:  Granisetron: 1mg  Today:  Toradol:  30mg  Lidocaine:  40mg  Etomidate: 14mg  Succinyl Choline: 80mg     ECT Strip Summary:   Energy Level: 18 percent (decrease to 15 percentnext Tx)  Motor Seizure Duration:  56 seconds  EEG Seizure Duration:   84 seconds    Complications: No    Plan:   Next ECT 1/28/19    Yaron El MD

## 2019-01-25 NOTE — PROGRESS NOTES
Attended 2 of 3 music therapy groups. Interventions focused on improving insight, mood, and self-expression. Was quiet throughout both groups. Participated in discussions when prompted by writer. Focused on coloring. Minimal interactions with peers. Quiet. Flat affect.

## 2019-01-25 NOTE — ANESTHESIA PREPROCEDURE EVALUATION
Anesthesia Pre-Procedure Evaluation    Patient: Stephanie Villanueva   MRN:     [id number] Gender:   female   Age:    18 year old :      2000        Preoperative Diagnosis: * No pre-op diagnosis entered *   * No procedures listed *     Past Medical History:   Diagnosis Date     Anxiety 2013     CONTUSION OF left iliac crest 9/3/2006     Major depression 2013     Nocturnal enuresis 10/4/2007    Currently resolved.     Salter-Perdomo Type I fracture of distal fibula with routine healing 2013     Uncomplicated asthma     sports induced      No past surgical history on file.       Current Facility-Administered Medications on File Prior to Visit:  acetaminophen (TYLENOL) tablet 650 mg   alum & mag hydroxide-simethicone (MYLANTA ES/MAALOX  ES) suspension 30 mL   bisacodyl (DULCOLAX) Suppository 10 mg   desmopressin (DDAVP) tablet 600 mcg   granisetron (KYTRIL) injection 1 mg   hydrOXYzine (ATARAX) tablet 25 mg   ketorolac (TORADOL) injection 30 mg   ketorolac (TORADOL) injection 30 mg   lactated ringers BOLUS 1,000 mL   lidocaine (PF) (XYLOCAINE) 2 % injection 40 mg   LORazepam (ATIVAN) tablet 0.5 mg   magnesium hydroxide (MILK OF MAGNESIA) suspension 30 mL   OLANZapine (zyPREXA) tablet 10 mg   Or   OLANZapine (zyPREXA) injection 10 mg   ondansetron (ZOFRAN-ODT) ODT tab 4 mg   traZODone (DESYREL) tablet 50 mg     Current Outpatient Medications on File Prior to Visit:  desmopressin (DDAVP) 0.2 MG tablet Take 0.6 mg by mouth At Bedtime    gabapentin (NEURONTIN) 300 MG capsule Take 900 mg by mouth 2 times daily   lithium (ESKALITH) 300 MG tablet Take 300 mg by mouth 2 times daily   lithium (ESKALITH) 450 MG CR tablet Take 1 tablet (450 mg) by mouth 2 times daily       Anesthesia Evaluation     . Pt has had prior anesthetic.     No history of anesthetic complications          ROS/MED HX    ENT/Pulmonary:     (+)Intermittent asthma , . .    Neurologic:       Cardiovascular:  - neg cardiovascular ROS   (+)  ----. : . . . :. . No previous cardiac testing       METS/Exercise Tolerance:  4 - Raking leaves, gardening   Hematologic:  - neg hematologic  ROS       Musculoskeletal:  - neg musculoskeletal ROS       GI/Hepatic:  - neg GI/hepatic ROS       Renal/Genitourinary:  - ROS Renal section negative       Endo:     (+) thyroid problem hypothyroidism, .      Psychiatric:     (+) psychiatric history anxiety and depression      Infectious Disease:  - neg infectious disease ROS       Malignancy:         Other:                         PHYSICAL EXAM:   Mental Status/Neuro: A/A/O   Airway: Facies: Feasible  Mallampati: II  Mouth/Opening: Full  TM distance: > 6 cm  Neck ROM: Full   Respiratory:    CV: Rhythm: Regular  Rate: Age appropriate  Heart: Normal Sounds   Comments:      Dental: Normal                  Lab Results   Component Value Date    WBC 5.8 01/19/2019    HGB 11.4 (L) 01/19/2019    HCT 35.5 01/19/2019     01/19/2019    SED 10 11/05/2008     01/21/2019    POTASSIUM 3.7 01/21/2019    CHLORIDE 105 01/21/2019    CO2 27 01/21/2019    BUN 9 01/21/2019    CR 0.70 01/21/2019    GLC 89 01/21/2019    BRITTANIE 8.9 01/21/2019    ALBUMIN 4.0 01/19/2019    PROTTOTAL 6.9 01/19/2019    ALT 17 01/19/2019    AST 23 01/19/2019    ALKPHOS 75 01/19/2019    BILITOTAL 0.5 01/19/2019    TSH 5.57 (H) 01/21/2019    T4 0.83 01/21/2019    HCG Negative 01/19/2019       Preop Vitals  BP Readings from Last 3 Encounters:   01/25/19 115/67 (55 %/ 54 %)*   01/09/19 104/59 (7 %/ 10 %)*   06/07/18 106/67 (17 %/ 53 %)*     *BP percentiles are based on the August 2017 AAP Clinical Practice Guideline for girls    Pulse Readings from Last 3 Encounters:   01/25/19 66   01/09/19 70   06/07/18 61      Resp Readings from Last 3 Encounters:   01/25/19 16   12/29/16 14   08/24/16 16    SpO2 Readings from Last 3 Encounters:   01/25/19 100%   06/01/17 97%   05/21/17 99%      Temp Readings from Last 1 Encounters:   01/25/19 36.6  C (97.9  F) (Tympanic)    Ht  "Readings from Last 1 Encounters:   01/19/19 1.778 m (5' 10\") (99 %)*     * Growth percentiles are based on CDC (Girls, 2-20 Years) data.      Wt Readings from Last 1 Encounters:   01/24/19 64.1 kg (141 lb 5 oz) (71 %)*     * Growth percentiles are based on CDC (Girls, 2-20 Years) data.    Estimated body mass index is 20.28 kg/m  as calculated from the following:    Height as of 1/19/19: 1.778 m (5' 10\").    Weight as of 1/24/19: 64.1 kg (141 lb 5 oz).     LDA:  Peripheral IV 01/25/19 Left Upper arm (Active)   Number of days: 0            Assessment:   ASA SCORE: 2       Documentation: H&P complete; Preop Testing complete; Consents complete   Proceeding: Proceed without further delay  Tobacco Use:  Active user of Tobacco     Plan:   Anes. Type:  General   Pre-Induction: Midazolam IV; Acetaminophen PO   Induction:  IV (Standard)   Airway: Native Airway   Access/Monitoring: PIV   Maintenance: IV   Emergence: Procedure Site   Logistics: Same Day Surgery     PONV Management:Adult Risk Factors: Female     CONSENT: Direct conversation   Plan and risks discussed with: Patient                                Bradley Francis MD  "

## 2019-01-25 NOTE — ANESTHESIA POSTPROCEDURE EVALUATION
Anesthesia POST Procedure Evaluation    Patient: Stephanie Villanueva   MRN:     [id number] Gender:   female   Age:    18 year old :      2000        Preoperative Diagnosis: * No pre-op diagnosis entered *   * No procedures listed *   Postop Comments: No value filed.       Anesthesia Type:  General    Reportable Event: NO     PAIN: Uncomplicated   Sign Out status: Comfortable, Well controlled pain     PONV: No PONV   Sign Out status:  No Nausea or Vomiting     Neuro/Psych: Uneventful perioperative course   Sign Out Status: Preoperative baseline; Age appropriate mentation     Airway/Resp.: Uneventful perioperative course   Sign Out Status: Non labored breathing, age appropriate RR; Resp. Status within EXPECTED Parameters     CV: Uneventful perioperative course   Sign Out status: Appropriate BP and perfusion indices; Appropriate HR/Rhythm     Disposition:   Sign Out in:  PACU  Disposition:  Phase II; Home  Recovery Course: Uneventful  Follow-Up: Not required     Comments/Narrative:  Patient doing well post-operatively.  No significant issues.  Hemodynamically stable, pain well controlled, nausea well controlled.  Stable for discharge from the PACU             Last Anesthesia Record Vitals:  CRNA VITALS  2019 0934 - 2019 1023      2019             Pulse:  94    Ht Rate:  90    SpO2:  100 %    Resp Rate (set):  8          Last PACU/Preop Vitals:  Vitals:    19 1700 19 0855 19 1010   BP: 109/67 115/67 (!) 121/95   Pulse: 64 66 113   Resp:  16 20   Temp: 36.1  C (96.9  F) 36.6  C (97.9  F) 37.2  C (98.9  F)   SpO2:  100% 92%         Electronically Signed By: Bradley Francis MD, 2019, 10:23 AM

## 2019-01-25 NOTE — PROGRESS NOTES
Patient has been engaged in milieu activities since return from ECT--including attending and participating in groups.  Oriented to person, place and year.  Denies pain thus far.

## 2019-01-25 NOTE — PROGRESS NOTES
"   01/24/19 1800   Behavioral Health   Hallucinations denies / not responding to hallucinations   Thinking poor concentration   Orientation person: oriented;place: oriented;date: oriented;time: oriented   Memory baseline memory   Insight poor   Judgement impaired   Eye Contact at examiner;out of corner of eyes   Affect irritable;blunted, flat   Mood irritable;mood is calm   Physical Appearance/Attire attire appropriate to age and situation   Hygiene well groomed   Suicidality other (see comments)  (none reported or observed)   1. Wish to be Dead No   2. Non-Specific Active Suicidal Thoughts  No   Self Injury other (see comment)  (none reported or observed)   Elopement (no concerns)   Activity withdrawn   Speech coherent;clear   Medication Sensitivity other (see comment)  (soreness)   Psychomotor / Gait steady;balanced   Activities of Daily Living   Hygiene/Grooming independent   Oral Hygiene independent   Dress independent   Room Organization independent   Activity   Activity Assistance Provided independent     Pt reported to staff \"I'm hostile, don't put me with a roommate.\" Pt stated \"I don't want high expectations on me.\" Pt had a visit with her family and her little sister that appeared to go well. Pt ate dinner. Pt is polite to staff and gives delayed responses. Pt answers are sometimes inaudible. Pt appears to have blunted, flat affect. Pt is withdrawn in the milieu. Pt was observed coloring.  "

## 2019-01-26 PROCEDURE — 25000132 ZZH RX MED GY IP 250 OP 250 PS 637: Performed by: CLINICAL NURSE SPECIALIST

## 2019-01-26 PROCEDURE — 12400001 ZZH R&B MH UMMC

## 2019-01-26 PROCEDURE — H2032 ACTIVITY THERAPY, PER 15 MIN: HCPCS

## 2019-01-26 PROCEDURE — 25000132 ZZH RX MED GY IP 250 OP 250 PS 637: Performed by: PSYCHIATRY & NEUROLOGY

## 2019-01-26 RX ADMIN — DESMOPRESSIN ACETATE 600 MCG: 0.2 TABLET ORAL at 22:32

## 2019-01-26 RX ADMIN — GABAPENTIN 300 MG: 300 CAPSULE ORAL at 22:32

## 2019-01-26 RX ADMIN — GABAPENTIN 300 MG: 300 CAPSULE ORAL at 12:04

## 2019-01-26 RX ADMIN — GABAPENTIN 300 MG: 300 CAPSULE ORAL at 17:30

## 2019-01-26 ASSESSMENT — ACTIVITIES OF DAILY LIVING (ADL)
DRESS: STREET CLOTHES;INDEPENDENT
ORAL_HYGIENE: INDEPENDENT
DRESS: STREET CLOTHES;INDEPENDENT
HYGIENE/GROOMING: INDEPENDENT
LAUNDRY: UNABLE TO COMPLETE
LAUNDRY: UNABLE TO COMPLETE
HYGIENE/GROOMING: INDEPENDENT
ORAL_HYGIENE: INDEPENDENT

## 2019-01-26 NOTE — PROGRESS NOTES
Pt was unable to participate in a verbal check-in thus this charting is done observationally. Pt slept until 1145 today and missed breakfast. Pt ate some of her lunch and some snacks this afternoon and then slept for a while after lunch as well. Pt was visible on the unit while she was awake, however she did not participate in groups this shift. Pt continues to appear with a blunted/flat affect, however she has been compliant with staff and unit expectations. Pt does not appear to be responding to any internal stimuli nor does she appear to be experiencing any SI or SIB at this time.    01/26/19 1426   Behavioral Health   Hallucinations denies / not responding to hallucinations   Thinking intact   Orientation person: oriented;place: oriented;date: oriented;time: oriented   Memory baseline memory   Insight insight appropriate to situation;insight appropriate to events   Judgement impaired   Eye Contact at examiner   Affect blunted, flat   Mood mood is calm   Physical Appearance/Attire attire appropriate to age and situation   Hygiene neglected grooming - unclean body, hair, teeth   Suicidality other (see comments)  (None stated or observed)   Self Injury other (see comment)  (None stated or observed)   Activity withdrawn   Speech clear;coherent   Medication Sensitivity no stated side effects;no observed side effects   Psychomotor / Gait balanced;steady   Activities of Daily Living   Hygiene/Grooming independent   Oral Hygiene independent   Dress street clothes;independent   Laundry unable to complete   Room Organization independent

## 2019-01-26 NOTE — PROGRESS NOTES
Stephanie reports feeling better this evening. She says that she is more talkative, which is usually a sign that she is feeling better. Her mood was mostly calm, but irritable. Her affect was flat/blunted. She was visible in the milieu and selectively social with others. Denies SI/SIB. Denies HI.    Appetite: Good  SEs: Headache  ADLs: Independent           01/25/19 2100   Behavioral Health   Hallucinations denies / not responding to hallucinations   Thinking intact   Orientation place: oriented;person: oriented;date: oriented   Memory long term   Insight poor   Judgement impaired   Eye Contact at examiner   Affect blunted, flat   Mood mood is calm;irritable   Physical Appearance/Attire attire appropriate to age and situation   Hygiene well groomed   Suicidality other (see comments)  (denies)   1. Wish to be Dead No   2. Non-Specific Active Suicidal Thoughts  No   Self Injury other (see comment)  (denies)   Elopement Statements about wanting to leave   Activity other (see comment)  (visible)   Speech clear;coherent   Medication Sensitivity no stated side effects;no observed side effects   Psychomotor / Gait balanced;steady   Safety   Suicidality Status 15   Activities of Daily Living   Hygiene/Grooming independent   Oral Hygiene independent   Dress independent   Room Organization independent   Activity   Activity Assistance Provided independent

## 2019-01-27 PROCEDURE — 25000132 ZZH RX MED GY IP 250 OP 250 PS 637: Performed by: PSYCHIATRY & NEUROLOGY

## 2019-01-27 PROCEDURE — H2032 ACTIVITY THERAPY, PER 15 MIN: HCPCS

## 2019-01-27 PROCEDURE — 25000132 ZZH RX MED GY IP 250 OP 250 PS 637: Performed by: CLINICAL NURSE SPECIALIST

## 2019-01-27 PROCEDURE — 12400001 ZZH R&B MH UMMC

## 2019-01-27 RX ADMIN — GABAPENTIN 300 MG: 300 CAPSULE ORAL at 14:19

## 2019-01-27 RX ADMIN — DESMOPRESSIN ACETATE 600 MCG: 0.2 TABLET ORAL at 19:57

## 2019-01-27 RX ADMIN — GABAPENTIN 300 MG: 300 CAPSULE ORAL at 09:43

## 2019-01-27 ASSESSMENT — MIFFLIN-ST. JEOR: SCORE: 1490.29

## 2019-01-27 NOTE — PROGRESS NOTES
"Per report of psych associate, patient reports what she describes as \"numbness\" in her face.  Declined exam by this writer.    Patient was noted most of the shift to be visible in the milieu.  Smiling symetrical.  Ate meals without problems.    Continues to show symetrical facial expression-but again, she declined further assessment.   Also complaint of being \"tired\".  Spoke with Dr. Agrawal.  Will continue to monitor.  Gabapentin to be held tonight prior to ECT.   "

## 2019-01-27 NOTE — PROGRESS NOTES
01/27/19 1500   Art Therapy   Type of Intervention structured groups   Response participates with encouragement   Hours 1   Treatment Detail Mindfulness game   Problem-Major Depressive Disorder, recurrent, severe, with psychotic symptoms   Traits of Borderline Personality Disorder  Generalized Anxiety Disorder  Obsessive Compulsive Disorder   Hx of Cannabis Use Disorder      Goal-[place], regulate and express through Art Therapy  Outcome- Pt did not attend morning or mid afternoon group.

## 2019-01-27 NOTE — PLAN OF CARE
"  Depressive Symptoms  Depressive Symptoms  Description  Signs and symptoms of listed problems will be absent or manageable.  1/26/2019 2157 - No Change by Mary Bejarano RN   Pt was calm and cooperative this shift.  Pt was visible in the milieu.  Pt participated in the first art therapy but declined the second one.  Pt complained of being bored on the unit and hopes to leave on Monday.  Pt  admitted to staff \"I want to die, but quickly changed her statement \"OH, I shouldn't have said that\".  Pt then proceeded to deny SI/SIB.  Pt watched a movie and socialized with peers in the dining room.  Pt reports feeling more positive before going to bed.  Pt c/o facial numbness which she felt was a side effect from Remeron.  Updated the provider via sticky notes.  Will continue to monitor and offer support as needed.  "

## 2019-01-27 NOTE — PROGRESS NOTES
"Brief Internal Medicine Note, 1/27/19:    IM following for abnormal TFTs in setting of lithium toxicity.    # Subclinical hypothyroidism - TSH elevated to 11.97 on 1/19 with normal T4.  Felt to be 2/2 lithium toxicity.  Has been on lithium since 2015.  Repeat TSH on 1/20 normalized at 3.45, but appears to have been rechecked again on 1/21 (\"to ensure stability from IM note on 1/20) with slight elevation to 5.57.  Free T4 has remained wnl.  Given that long term lithium use can affect thyroid function overtime, further thyroid studies obtained to evaluate for possibly underlying hypothyroidism (as this should be corrected w/ thyroid replacement).  Free T3 wnl at 2.6, TPO < 10.  Given that lithium levels are wnl as of 1/21/19, expect that TSH will normalize.  Recommend rechecking TFTs (free T4, TSH) in 4 weeks.  Currently VSS.         Medicine will sign off, no further recommendations at this time.  Please feel free to reconsult if patient's symptoms worsen or if new problems arise.  Thank you for the opportunity to care for this patient.         Maria Esther Kenyon, [facility]  Hospitalist Service   Pager: [phone]    "

## 2019-01-27 NOTE — PROGRESS NOTES
"Pt states she is experiencing numbness in her face, but is unsure what is causing it. She thought it might be her sleeping medications, but after refusing it last night, she is still experiencing the numbness. Pt states she is also feeling like she is sleeping all the time and very tired. Pt states she cannot identify how she is doing emotionally because she is so tired. Pt did eat lunch, but did not eat much at breakfast. She makes her needs known. She showered this shift.      01/27/19 1300   Behavioral Health   Hallucinations denies / not responding to hallucinations   Thinking intact   Orientation person: oriented;place: oriented;date: oriented;time: oriented   Memory new learning, recall loss;short term   Insight other (see comment)  (fair)   Judgement impaired   Eye Contact at examiner   Affect blunted, flat   Mood mood is calm   Physical Appearance/Attire attire appropriate to age and situation   Hygiene well groomed   Suicidality other (see comments)  (denies)   1. Wish to be Dead No   2. Non-Specific Active Suicidal Thoughts  No   Self Injury other (see comment)  (no concerning statements or behaviors)   Elopement (no concerning statements or behavoirs)   Activity isolative;withdrawn   Speech coherent;clear   Medication Sensitivity sedation;other (see comment)  (\"numbness in face\")   Psychomotor / Gait steady;balanced   Safety   Suicidality Status 15   Activities of Daily Living   Hygiene/Grooming independent   Oral Hygiene independent   Dress independent   Room Organization independent   Activity   Activity Assistance Provided independent     "

## 2019-01-27 NOTE — PROGRESS NOTES
01/26/19 1500   Art Therapy   Type of Intervention structured groups   Response participates with encouragement   Hours 1   Treatment Detail Mindfulness game   Problem-Major Depressive Disorder, recurrent, severe, with psychotic symptoms   Traits of Borderline Personality Disorder  Generalized Anxiety Disorder  Obsessive Compulsive Disorder   Hx of Cannabis Use Disorder      Goal-[place], regulate and express through Art Therapy  Outcome- Pt attended late afternoon group only. She was quietly engaged. Her affect was more calm and less irritable today.

## 2019-01-28 ENCOUNTER — ANESTHESIA (OUTPATIENT)
Dept: BEHAVIORAL HEALTH | Facility: CLINIC | Age: 19
End: 2019-01-28

## 2019-01-28 ENCOUNTER — ANESTHESIA EVENT (OUTPATIENT)
Dept: BEHAVIORAL HEALTH | Facility: CLINIC | Age: 19
End: 2019-01-28

## 2019-01-28 PROCEDURE — 12400001 ZZH R&B MH UMMC

## 2019-01-28 PROCEDURE — H2032 ACTIVITY THERAPY, PER 15 MIN: HCPCS

## 2019-01-28 PROCEDURE — GZB2ZZZ ELECTROCONVULSIVE THERAPY, BILATERAL-SINGLE SEIZURE: ICD-10-PCS | Performed by: PSYCHIATRY & NEUROLOGY

## 2019-01-28 PROCEDURE — 25000128 H RX IP 250 OP 636: Performed by: PSYCHIATRY & NEUROLOGY

## 2019-01-28 PROCEDURE — 90870 ELECTROCONVULSIVE THERAPY: CPT

## 2019-01-28 PROCEDURE — 99232 SBSQ HOSP IP/OBS MODERATE 35: CPT | Mod: 25 | Performed by: CLINICAL NURSE SPECIALIST

## 2019-01-28 PROCEDURE — 25000125 ZZHC RX 250: Performed by: ANESTHESIOLOGY

## 2019-01-28 PROCEDURE — 25000128 H RX IP 250 OP 636: Performed by: ANESTHESIOLOGY

## 2019-01-28 PROCEDURE — 25000132 ZZH RX MED GY IP 250 OP 250 PS 637: Performed by: CLINICAL NURSE SPECIALIST

## 2019-01-28 PROCEDURE — 25000125 ZZHC RX 250: Performed by: PSYCHIATRY & NEUROLOGY

## 2019-01-28 PROCEDURE — 25000132 ZZH RX MED GY IP 250 OP 250 PS 637: Performed by: PSYCHIATRY & NEUROLOGY

## 2019-01-28 RX ORDER — LIDOCAINE HYDROCHLORIDE 20 MG/ML
40 INJECTION, SOLUTION EPIDURAL; INFILTRATION; INTRACAUDAL; PERINEURAL
Status: CANCELLED
Start: 2019-01-28 | End: 2019-01-28

## 2019-01-28 RX ORDER — GRANISETRON HYDROCHLORIDE 1 MG/ML
1 INJECTION INTRAVENOUS ONCE
Status: COMPLETED | OUTPATIENT
Start: 2019-01-28 | End: 2019-01-28

## 2019-01-28 RX ORDER — TRAZODONE HYDROCHLORIDE 50 MG/1
50 TABLET, FILM COATED ORAL AT BEDTIME
Status: DISCONTINUED | OUTPATIENT
Start: 2019-01-28 | End: 2019-01-29 | Stop reason: HOSPADM

## 2019-01-28 RX ORDER — KETOROLAC TROMETHAMINE 30 MG/ML
30 INJECTION, SOLUTION INTRAMUSCULAR; INTRAVENOUS
Status: COMPLETED | OUTPATIENT
Start: 2019-01-28 | End: 2019-01-28

## 2019-01-28 RX ORDER — GRANISETRON HYDROCHLORIDE 1 MG/ML
1 INJECTION INTRAVENOUS ONCE
Status: CANCELLED
Start: 2019-01-28 | End: 2019-01-28

## 2019-01-28 RX ORDER — KETOROLAC TROMETHAMINE 30 MG/ML
30 INJECTION, SOLUTION INTRAMUSCULAR; INTRAVENOUS
Status: CANCELLED
Start: 2019-01-28 | End: 2019-01-28

## 2019-01-28 RX ORDER — LIDOCAINE HYDROCHLORIDE 20 MG/ML
40 INJECTION, SOLUTION EPIDURAL; INFILTRATION; INTRACAUDAL; PERINEURAL
Status: COMPLETED | OUTPATIENT
Start: 2019-01-28 | End: 2019-01-28

## 2019-01-28 RX ORDER — ETOMIDATE 2 MG/ML
INJECTION INTRAVENOUS PRN
Status: DISCONTINUED | OUTPATIENT
Start: 2019-01-28 | End: 2019-01-28

## 2019-01-28 RX ADMIN — LIDOCAINE HYDROCHLORIDE 40 MG: 20 INJECTION, SOLUTION EPIDURAL; INFILTRATION; INTRACAUDAL; PERINEURAL at 08:41

## 2019-01-28 RX ADMIN — GABAPENTIN 300 MG: 300 CAPSULE ORAL at 22:05

## 2019-01-28 RX ADMIN — GABAPENTIN 300 MG: 300 CAPSULE ORAL at 12:33

## 2019-01-28 RX ADMIN — GRANISETRON HYDROCHLORIDE 1 MG: 1 INJECTION INTRAVENOUS at 08:29

## 2019-01-28 RX ADMIN — DESMOPRESSIN ACETATE 600 MCG: 0.2 TABLET ORAL at 22:05

## 2019-01-28 RX ADMIN — Medication 80 MG: at 08:35

## 2019-01-28 RX ADMIN — ETOMIDATE 14 MG: 2 INJECTION INTRAVENOUS at 08:35

## 2019-01-28 RX ADMIN — ACETAMINOPHEN 650 MG: 325 TABLET, FILM COATED ORAL at 07:25

## 2019-01-28 RX ADMIN — TRAZODONE HYDROCHLORIDE 50 MG: 50 TABLET ORAL at 22:38

## 2019-01-28 RX ADMIN — KETOROLAC TROMETHAMINE 30 MG: 30 INJECTION, SOLUTION INTRAMUSCULAR; INTRAVENOUS at 08:29

## 2019-01-28 RX ADMIN — SODIUM CHLORIDE, POTASSIUM CHLORIDE, SODIUM LACTATE AND CALCIUM CHLORIDE 1000 ML: 600; 310; 30; 20 INJECTION, SOLUTION INTRAVENOUS at 08:28

## 2019-01-28 RX ADMIN — TRAZODONE HYDROCHLORIDE 50 MG: 50 TABLET ORAL at 22:05

## 2019-01-28 ASSESSMENT — ACTIVITIES OF DAILY LIVING (ADL)
DRESS: INDEPENDENT
DRESS: INDEPENDENT
LAUNDRY: UNABLE TO COMPLETE
ORAL_HYGIENE: INDEPENDENT
ORAL_HYGIENE: INDEPENDENT
HYGIENE/GROOMING: INDEPENDENT
HYGIENE/GROOMING: INDEPENDENT

## 2019-01-28 NOTE — PROGRESS NOTES
01/28/19 1300   Behavioral Health   Hallucinations denies / not responding to hallucinations   Thinking intact   Orientation person: oriented;place: oriented   Memory baseline memory   Insight poor   Judgement impaired   Eye Contact at examiner;at floor   Affect blunted, flat   Mood mood is calm   Physical Appearance/Attire attire appropriate to age and situation   Hygiene well groomed   Suicidality (JUDI)   1. Wish to be Dead (JUDI)   2. Non-Specific Active Suicidal Thoughts  (JUDI)   Self Injury other (see comment)  (JUDI)   Elopement (none stated or observed)   Activity isolative;withdrawn   Speech clear   Medication Sensitivity no stated side effects;no observed side effects   Psychomotor / Gait balanced;steady   Activities of Daily Living   Hygiene/Grooming independent   Oral Hygiene independent   Dress independent   Room Organization independent     Pt was calm and cooperative, yet unapproachable at times. Pt appeared to have a flat/blunted affect. Pt did not care to speak to writer at different points of the shift. Pt used 2-3 words for responses. Pt was isolative and withdraw - Pt observed sleeping in bed until woken for meals or important messages. Pt was not available for a check-in as they were sleeping. Unable to assess SI and SIB. Unable to assess hallucinations/psychotic symptoms. No behavioral concerns or verbal comments.

## 2019-01-28 NOTE — PROGRESS NOTES
Writer attempted to schedule therapy appointment with Ranjana Sun- [phone]. There was no answer writer left vm.

## 2019-01-28 NOTE — PROGRESS NOTES
01/28/19 1500   Art Therapy   Type of Intervention structured groups   Response participates with encouragement   Hours 1   Treatment Detail Mindfulness / coloring   Problem-Major Depressive Disorder, recurrent, severe, with psychotic symptoms   Traits of Borderline Personality Disorder  Generalized Anxiety Disorder  Obsessive Compulsive Disorder   Hx of Cannabis Use Disorder      Goal-[place], regulate and express through Art Therapy  Outcome- Pt attended afternoon group only.She had a brighter affect. She said she was sleeping a lot because of ECT but was feeling better and enthusiastic to be discharging tomorrow. She participated in coloring, mindfulness meditation and group discussions.

## 2019-01-28 NOTE — ANESTHESIA POSTPROCEDURE EVALUATION
Anesthesia POST Procedure Evaluation    Patient: Stephanie Villanueva   MRN:     [id number] Gender:   female   Age:    18 year old :      2000        Preoperative Diagnosis: * No pre-op diagnosis entered *   * No procedures listed *   Postop Comments: No value filed.       Anesthesia Type:  General    Reportable Event: NO     PAIN: Uncomplicated   Sign Out status: Comfortable, Well controlled pain     PONV: No PONV   Sign Out status:  No Nausea or Vomiting     Neuro/Psych: Uneventful perioperative course   Sign Out Status: Preoperative baseline; Age appropriate mentation     Airway/Resp.: Uneventful perioperative course   Sign Out Status: Non labored breathing, age appropriate RR; Resp. Status within EXPECTED Parameters     CV: Uneventful perioperative course   Sign Out status: Appropriate BP and perfusion indices; Appropriate HR/Rhythm     Disposition:   Sign Out in:  PACU  Disposition:  Phase II; Home  Recovery Course: Uneventful  Follow-Up: Not required           Last Anesthesia Record Vitals:  CRNA VITALS  2019 0817 - 2019 0907      2019             SpO2:  100 %    Resp Rate (set):  8          Last PACU/Preop Vitals:  Vitals:    19 0730 19 0845 19 0900   BP: 124/79 (!) 125/101 118/81   Pulse: 99 107 84   Resp: 16 16 16   Temp: 35.6  C (96.1  F) 37.1  C (98.8  F)    SpO2: 99% 99% 95%         Electronically Signed By: Marah Lugo MD, 2019, 9:07 AM

## 2019-01-28 NOTE — PROCEDURES
Procedure/Surgery Information   [facility], [place]    Bedside Procedure Note  Date of Service (when I performed the procedure): 01/28/2019    Stephanie Villanueva is a 18 year old female patient.  1. Suicidal ideation    2. Self-inflicted injury    3. Intentional chlormethiazole overdose (H)    4. Laceration of right forearm    5. 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     Salter-Perdomo Type I fracture of distal fibula with routine healing 1/25/2013     Uncomplicated asthma     sports induced     Temp: 96.1  F (35.6  C) Temp src: Tympanic BP: 124/79 Pulse: 99   Resp: 16 SpO2: 99 % O2 Device: None (Room air)      Procedures     Yaron El     [facility], [place]   ECT Procedure Note  01/28/2019    Stephanie Villanueva [id number]   18 year old 2000     Patient Status: Inpatient    Is this the first in a series of 12 treatments?  No    History and Physical: Reviewed in medical record    Consent: Informed consent was signed by: patient    Date Consent Signed: 1/23/19      Allergies   Allergen Reactions     No Known Drug Allergies        Weight:  140 lbs 0 oz              Indications for ECT:   Medications ineffective         Clinical Narrative:   Patient was admitted with worsening depression and suicidal ideation.  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  NPO after 2400.  Alert and Oriented x 3.  Mood might be a little better.  She says her parents tell her she looks beter.    She had HA and Nausea with last ECT.   She isn't sure Toradol and Zofran did anything.            Diagnosis:   Major depression         Pause for the Cause:     Right patient Yes   Right procedure/laterality settings: Yes           Intra-Procedure Documentation:     ECT #: 3   Treatment number this series: 3   Total treatment number: 3     Type of ECT:  Right, unilateral ultrabrief    ECT Medications:    Tylenol:  650mg  Lactated Ringers:  1 liter  Granisetron: 1mg  Toradol:  30mg  Lidocaine:  40mg  Etomidate: 14mg  Succinyl Choline: 80mg     ECT Strip Summary:   Energy Level: 15 percent   Motor Seizure Duration:  41 seconds  EEG Seizure Duration:   41 seconds    Complications: No    Plan:   Next ECT 1/30/19    Yaron El MD

## 2019-01-28 NOTE — PROGRESS NOTES
"Stephanie endorsed SI and SIB. She refused to specify whether or not she had a plan. Her goal was to check-in with staff and she met her goal. She rated her depression and anxiety as moderate. Coping skills verbalized include exercise and sleep. Patient states that she feels ready to discharge. Staff asked why she feels she is ready to discharge and patient responded, \"Because I want to go home.\" She was unable or unwilling to verbalize any kind of safety plan or supports in place. Endorsed racing thoughts.    Appetite: Increased  Sleep: Increased  SEs: Sedation  ADLs: Independent          01/27/19 2100   Behavioral Health   Hallucinations denies / not responding to hallucinations   Thinking intact   Orientation person: oriented;place: oriented;date: oriented   Memory baseline memory   Insight poor   Judgement impaired   Eye Contact at examiner   Affect blunted, flat   Mood mood is calm   Physical Appearance/Attire attire appropriate to age and situation   Hygiene well groomed   Suicidality other (see comments)  (admits thoughts, unwilling to discuss presence of plan)   1. Wish to be Dead Yes   2. Non-Specific Active Suicidal Thoughts  Yes   Self Injury other (see comment)  (admits thoughts, unwilling to discuss presence of plan)   Elopement Statements about wanting to leave   Activity isolative;withdrawn   Speech clear;coherent   Medication Sensitivity sedation   Psychomotor / Gait balanced;steady   Safety   Suicidality Status 15;Unpredictable frequency of checking on patient   Activities of Daily Living   Hygiene/Grooming independent   Oral Hygiene independent   Dress independent   Room Organization independent   Activity   Activity Assistance Provided independent     "

## 2019-01-28 NOTE — PROGRESS NOTES
"[facility], [place]   Psychiatric Progress Note        Interim History:   The patient's care was discussed with the treatment team during the daily team meeting and/or staff's chart notes were reviewed.  Staff report patient in bed most of the day.     Psychiatric symptoms and interventions:  Patient in bed all day after ECT treatment. Romaeinkaylah reports feeling more tired lately. Disucssed with patient it could be a combination of anthesia , ECT and depressive symptoms. Pateint was less irriable but continues to have paucity of speech. She presents with a flat affect. Patient denies suicidal thinking.     Gabapentin was re-started on 1/25 for anxiety.     Medical issues; Right hand termor, lacerations on right forearm bandage covering, Lithium level as of 1/20 2.05 trending down.  Vital are stable 112/73 and pulse is 60 on 1/21/2019  lithium level for 1/21 is 1.19.  1/21 Will monitor TSH 5.57 (down from 1/19- 11.97), free T4 is 0.83. Creatinine 0.70    1/27 Internal medicine recommendations: Free T3 wnl at 2.6, TPO < 10.  Given that lithium levels are wnl as of 1/21/19, expect that TSH will normalize.  Recommend rechecking TFTs (free T4, TSH) in 4 weeks.  Currently VSS.          Behavioral/psychology/social:   Encouraged patient to attend therapeutic programming as tolerated  Patient has not required restraint or seclusion within the last 24 hours.            Medications:       desmopressin  600 mcg Oral At Bedtime     gabapentin  300 mg Oral TID     traZODone  50 mg Oral At Bedtime          Allergies:     Allergies   Allergen Reactions     No Known Drug Allergies           Labs:   No results found for this or any previous visit (from the past 24 hour(s)).       Psychiatric Examination:     /70 (BP Location: Right arm)   Pulse 64   Temp 97.4  F (36.3  C) (Tympanic)   Resp 16   Ht 1.778 m (5' 10\")   Wt 63.5 kg (140 lb)   SpO2 (!) 80%   BMI 20.09 kg/m    Weight is 140 lbs 0 oz  " Body mass index is 20.09 kg/m .  Orthostatic Vitals       Most Recent      Sitting Orthostatic /70 01/28 0935    Sitting Orthostatic Pulse (bpm) 64 01/28 0935    Standing Orthostatic /80 01/28 0935    Standing Orthostatic Pulse (bpm) 115 01/28 0935          Appearance: awake, alert, adequately groomed   Attitude:   guarded, A little more engaged  Eye Contact:  stared at provider  Mood:  improving  Affect:  intensity is blunted  Speech:  more engaged in conversation  Psychomotor Behavior:  no evidence of tardive dyskinesia, dystonia, or tics and tremor observed in right hand.   Throught Process:  logical, linear and goal oriented  Associations:  no loose associations  Thought Content:  denies passive suicidal ideation   Insight:  limited  Judgement:  limited  Oriented to:  time, person, and place  Attention Span and Concentration:  fair  Recent and Remote Memory:  limited per patient report     Clinical Global Impressions  First:  Considering your total clinical experience with this particular patient population, how severe are the patient's symptoms at this time?: 6 (01/21/19 1256)  Compared to the patient's condition at the START of treatment, this patient's condition is:: 6 (01/21/19 1256)  Most recent:  Considering your total clinical experience with this particular patient population, how severe are the patient's symptoms at this time?: 6 (01/21/19 1256)  Compared to the patient's condition at the START of treatment, this patient's condition is:: 6 (01/21/19 1256)         Precautions:     Behavioral Orders   Procedures     Code 2     Electroconvulsive therapy     ECT every Monday, Wednesday, and Friday     Electroconvulsive therapy     Series of up to 12 treatments. Begin Date: 1/23/19     Treating Psychiatrist providing ECT:  Nikko     Notified on:  1/21/19     Fall precautions     Routine Programming     As clinically indicated     Self Injury Precaution     Status 15     Every 15 minutes.      Suicide precautions     Patients on Suicide Precautions should have a Combination Diet ordered that includes a Diet selection(s) AND a Behavioral Tray selection for Safe Tray - with utensils, or Safe Tray - NO utensils            DIagnoses:   Major Depressive Disorder, recurrent, severe, with psychotic symptoms   Traits of Borderline Personality Disorder  Generalized Anxiety Disorder  Obsessive Compulsive Disorder   Hx of Cannabis Use Disorder  ASD         Plan:   Legal status: Voluntary      Medication management: Holding medications at this time. ECT consult , high lithium level as of 1/20 2.05 , 1/21 Li level 1.19, creatinine 0.70     Disposition plan: Stabilize with ECT. Patient had ECT #3 treatment on 1/28 Tolerated treatment  Pateint reports feeling tired.

## 2019-01-28 NOTE — PROGRESS NOTES
"Pt c/o facial numbness especially around the mouth.  Pt face is symmetrical.  Pt is alert and oriented per baseline.  Pt continues to eat, socialize and function without any problems.  Denied headache or slurred speech.  Pt says \"the numbness comes and goes\".  Pt's vitals are stable.  Pt's provider notified via sticky notes and TUBE basic messaging.  Will continue to monitor.  "

## 2019-01-28 NOTE — ANESTHESIA PREPROCEDURE EVALUATION
Anesthesia Pre-Procedure Evaluation    Patient: Stephanie Villanueva   MRN:     [id number] Gender:   female   Age:    18 year old :      2000        Preoperative Diagnosis: * No pre-op diagnosis entered *   * No procedures listed *     Past Medical History:   Diagnosis Date     Anxiety 2013     CONTUSION OF left iliac crest 9/3/2006     Major depression 2013     Nocturnal enuresis 10/4/2007    Currently resolved.     Salter-Perdomo Type I fracture of distal fibula with routine healing 2013     Uncomplicated asthma     sports induced      No past surgical history on file.       Current Facility-Administered Medications on File Prior to Visit:  acetaminophen (TYLENOL) tablet 650 mg   alum & mag hydroxide-simethicone (MYLANTA ES/MAALOX  ES) suspension 30 mL   bisacodyl (DULCOLAX) Suppository 10 mg   desmopressin (DDAVP) tablet 600 mcg   gabapentin (NEURONTIN) capsule 300 mg   hydrOXYzine (ATARAX) tablet 25 mg   ketorolac (TORADOL) injection 30 mg   lidocaine (PF) (XYLOCAINE) 2 % injection 40 mg   LORazepam (ATIVAN) tablet 0.5 mg   magnesium hydroxide (MILK OF MAGNESIA) suspension 30 mL   mirtazapine (REMERON) tablet TABS 7.5 mg   OLANZapine (zyPREXA) tablet 10 mg   Or   OLANZapine (zyPREXA) injection 10 mg   ondansetron (ZOFRAN-ODT) ODT tab 4 mg     Current Outpatient Medications on File Prior to Visit:  desmopressin (DDAVP) 0.2 MG tablet Take 0.6 mg by mouth At Bedtime    gabapentin (NEURONTIN) 300 MG capsule Take 900 mg by mouth 2 times daily   lithium (ESKALITH) 300 MG tablet Take 300 mg by mouth 2 times daily   lithium (ESKALITH) 450 MG CR tablet Take 1 tablet (450 mg) by mouth 2 times daily       Anesthesia Evaluation     . Pt has had prior anesthetic.     No history of anesthetic complications          ROS/MED HX    ENT/Pulmonary:     (+)Intermittent asthma , . .    Neurologic:       Cardiovascular:  - neg cardiovascular ROS   (+) ----. : . . . :. . No previous cardiac testing        METS/Exercise Tolerance:  4 - Raking leaves, gardening   Hematologic:  - neg hematologic  ROS       Musculoskeletal:  - neg musculoskeletal ROS       GI/Hepatic:  - neg GI/hepatic ROS       Renal/Genitourinary:  - ROS Renal section negative       Endo:     (+) thyroid problem hypothyroidism, .      Psychiatric:     (+) psychiatric history anxiety and depression      Infectious Disease:  - neg infectious disease ROS       Malignancy:         Other:                         PHYSICAL EXAM:   Mental Status/Neuro: A/A/O   Airway: Facies: Feasible  Mallampati: I  Mouth/Opening: Full  TM distance: > 6 cm  Neck ROM: Full   Respiratory: Auscultation: CTAB     Resp. Rate: Normal     Resp. Effort: Normal      CV: Rhythm: Regular  Rate: Age appropriate  Heart: Normal Sounds   Comments:      Dental: Normal                  Lab Results   Component Value Date    WBC 5.8 01/19/2019    HGB 11.4 (L) 01/19/2019    HCT 35.5 01/19/2019     01/19/2019    SED 10 11/05/2008     01/21/2019    POTASSIUM 3.7 01/21/2019    CHLORIDE 105 01/21/2019    CO2 27 01/21/2019    BUN 9 01/21/2019    CR 0.70 01/21/2019    GLC 89 01/21/2019    BRITTANIE 8.9 01/21/2019    ALBUMIN 4.0 01/19/2019    PROTTOTAL 6.9 01/19/2019    ALT 17 01/19/2019    AST 23 01/19/2019    ALKPHOS 75 01/19/2019    BILITOTAL 0.5 01/19/2019    TSH 5.57 (H) 01/21/2019    T4 0.83 01/21/2019    HCG Negative 01/19/2019       Preop Vitals  BP Readings from Last 3 Encounters:   01/28/19 124/79 (82 %/ 91 %)*   01/09/19 104/59 (7 %/ 10 %)*   06/07/18 106/67 (17 %/ 53 %)*     *BP percentiles are based on the August 2017 AAP Clinical Practice Guideline for girls    Pulse Readings from Last 3 Encounters:   01/28/19 99   01/09/19 70   06/07/18 61      Resp Readings from Last 3 Encounters:   01/28/19 16   12/29/16 14   08/24/16 16    SpO2 Readings from Last 3 Encounters:   01/28/19 99%   06/01/17 97%   05/21/17 99%      Temp Readings from Last 1 Encounters:   01/28/19 35.6  C (96.1  F)  "(Tympanic)    Ht Readings from Last 1 Encounters:   01/19/19 1.778 m (5' 10\") (99 %)*     * Growth percentiles are based on CDC (Girls, 2-20 Years) data.      Wt Readings from Last 1 Encounters:   01/27/19 63.5 kg (140 lb) (69 %)*     * Growth percentiles are based on CDC (Girls, 2-20 Years) data.    Estimated body mass index is 20.09 kg/m  as calculated from the following:    Height as of 1/19/19: 1.778 m (5' 10\").    Weight as of 1/27/19: 63.5 kg (140 lb).     LDA:            Assessment:   ASA SCORE: 2       Documentation: H&P complete; Preop Testing complete; Consents complete   Proceeding: Proceed without further delay  Tobacco Use:  Active user of Tobacco     Plan:   Anes. Type:  General   Pre-Induction: Midazolam IV; Acetaminophen PO   Induction:  IV (Standard)   Airway: Native Airway   Access/Monitoring: PIV   Maintenance: IV   Emergence: Procedure Site   Logistics: Same Day Surgery     PONV Management:Adult Risk Factors: Female     CONSENT: Direct conversation   Plan and risks discussed with: Patient                                Marah Lugo MD  "

## 2019-01-29 VITALS
OXYGEN SATURATION: 80 % | DIASTOLIC BLOOD PRESSURE: 66 MMHG | HEIGHT: 70 IN | HEART RATE: 86 BPM | RESPIRATION RATE: 16 BRPM | WEIGHT: 142.86 LBS | TEMPERATURE: 98.2 F | BODY MASS INDEX: 20.45 KG/M2 | SYSTOLIC BLOOD PRESSURE: 108 MMHG

## 2019-01-29 PROCEDURE — 99239 HOSP IP/OBS DSCHRG MGMT >30: CPT | Performed by: CLINICAL NURSE SPECIALIST

## 2019-01-29 PROCEDURE — H2032 ACTIVITY THERAPY, PER 15 MIN: HCPCS

## 2019-01-29 PROCEDURE — 25000132 ZZH RX MED GY IP 250 OP 250 PS 637: Performed by: CLINICAL NURSE SPECIALIST

## 2019-01-29 RX ORDER — GABAPENTIN 300 MG/1
300 CAPSULE ORAL 3 TIMES DAILY
Qty: 90 CAPSULE | Refills: 0 | Status: ON HOLD | OUTPATIENT
Start: 2019-01-29 | End: 2019-03-25

## 2019-01-29 RX ADMIN — GABAPENTIN 300 MG: 300 CAPSULE ORAL at 10:32

## 2019-01-29 RX ADMIN — GABAPENTIN 300 MG: 300 CAPSULE ORAL at 13:14

## 2019-01-29 ASSESSMENT — MIFFLIN-ST. JEOR: SCORE: 1503.25

## 2019-01-29 NOTE — DISCHARGE INSTRUCTIONS
Behavioral Discharge Planning and Instructions      Summary: You were admitted on 1/19/2019 to [facility] [address] [place] [address] due to suicidal ideation.  You were treated by Debra Naegele, APRN, CNS and discharged on 01/28/2019 to Home    Principal Diagnosis:   Major Depressive Disorder, recurrent, severe, with psychotic symptoms   Generalized Anxiety Disorder  Obsessive Compulsive Disorder     Health Care Follow-up Appointments:   Primary care Medication Management Appointment   Date: 02/14/2019  Time: 10:40am      Provider: Dr. Jose Rodríguez   Address:[address] [address]  Phone:[phone]   [facility] has faxed the Discharge Summary and AVS to this provider at Fax: [phone]     Therapy Appointment   Date: 01/30/2019  Time: 3:00pm      Provider: [facility] Jeremie [phone]  Address: [address] [address]  Phone:[phone]    [facility] has faxed the Discharge Summary and AVS to this provider at Fax: [email or web address]        Attend all scheduled appointments with your outpatient providers. Call at least 24 hours in advance if you need to reschedule an appointment to ensure continued access to your outpatient providers.   Major Treatments, Procedures and Findings: You were provided with: a psychiatric assessment, assessed for medical stability, medication evaluation and/or management, group therapy, art therapy, milieu management, medical interventions and skills/OT groups.    Symptoms to Report: If you experience any of the following symptoms please report them right away to your provider or to family/friends; feeling more aggressive, increased confusion, losing more sleep, mood getting worse or thoughts of suicide.    Early warning signs can include: Early warning signs that could signal a potential relapse could include but not limited to the following; increased depression or anxiety sleep disturbances increased thoughts  "or behaviors of suicide or self-harm increased unusual thinking, such as paranoia or hearing voices.     Safety and Wellness:  Take all medicines as directed.  Make no changes unless your doctor suggests them. Follow treatment recommendations.  Refrain from alcohol and non-prescribed drugs. If there is a concern for safety, call 141.    Resources: Mental health crisis response for your [facility] is offered 24 hours a day, 7 days a week. A trained counselor will assess your current situation, offer support and counseling and connect you with local resources. Please call  [facility] Crisis (COPE) Response - Adult [phone]    Crisis Intervention: 519.830.6045 or 514-107-8932 (TTY: 369.538.3828).  Call anytime for help.  National [place] on Mental Illness (www.[place].jennifer.org): 993.241.4405 or 132-871-1017.  Suicide Awareness Voices of Education (SAVE) (www.save.org): 444-390-FZVT (9631)  National Suicide Prevention Line (www.mentalhealthmn.org): 176-456-RATU (3431)  Mental Health Consumer/Survivor Network of [place] (www.mhcsn.net): 456.901.7797 or 787-574-9486  Mental Health Association of [place] (www.mentalhealth.org): 464.662.3903 or 040-961-1830  Self- Management and Recovery Training., SMART-- Toll free: 171.733.5701  www.Teach Me To Be.SlideRocket  Text 4 Life: txt \"LIFE\" to 43135 for immediate support and crisis intervention  Crisis text line: Text \"[place]\" to 032101. Free, confidential, 24/7.    The treatment team has appreciated the opportunity to work with you. Ndolo, please take care and make your recovery a daily recovery. If you have any questions or concerns our unit number is [phone].  You will be receiving a follow-up phone call within the next three days from a representative from behavioral health.  You have identified the best phone number to reach you as [phone] (home)       "

## 2019-01-29 NOTE — DISCHARGE SUMMARY
"Psychiatric Discharge Summary    Stephanie Villanueva MRN# [id number]   Age: 18 year old YOB: 2000     Date of Admission:  1/19/2019  Date of Discharge:  1/29/2019  Admitting Physician:  Yamil Springer MD  Discharge Physician:  Debra A. Naegele, APRN CNS (Contact: [phone])         Event Leading to Hospitalization:   Stephanie is a 18 year old female with history of Major Depressive Disorder (vs. Bipolar Disorder), Generalized Anxiety Disorder, Obsessive Compulsive Disorder who is admitted on a voluntary basis for worsening depression, with recent suicidal and self injurious behavior involving Gabapentin overdose and also lacerating her forearm. The patient has had multiple past suicide attempts (through overdose), many past hospitalizations, along with multiple medications trials in the past. She seems to have pervasive depressive symptoms, with a low threshold for self harm/suicidal attempts. During interview, she is very blunted, guarded, and disengaged from the conversation. She mentioned that she felt the Lithium and Gabapentin had overall helped her depression and suicidal thoughts in the past. She mentions that her mood has been worsening in the past month or so, with heavy depressive thinking/recurernt suicidal thinking, along with poor self care, dysfunction in daily routine. She still harbors some continued suicidal ideation. She mentioned she took a large dose of Gabapentin (around 3600 mg all at once) not as a suicide attempt, but \"to escape my mind and thoughts.\" She denies taking excessive Lithium (but seems to be on a rather large dose of 1500 mg), or any illicit substance (utox was negative). She presents as rather isolative, withdrawn, sullen, blunted in affect, distracted and disengaged. She answers in brief, slow one-two word responses and does not elaborate. Nevertheless, she is open to getting help while she is hospitalized.        See Admission note by Yamil Springer MD on " 1/20/2019 for additional details.          DIagnoses:   Major Depressive Disorder, recurrent, severe, with psychotic symptoms   Traits of Borderline Personality Disorder  Generalized Anxiety Disorder  Obsessive Compulsive Disorder   Hx of Cannabis Use Disorder  ASD         Labs:     Results for orders placed or performed during the hospital encounter of 01/19/19   Acetaminophen level   Result Value Ref Range    Acetaminophen Level <2 mg/L   Salicylate level   Result Value Ref Range    Salicylate Level <2 mg/dL   Drug abuse screen 6 urine (tox)   Result Value Ref Range    Amphetamine Qual Urine Negative NEG^Negative    Barbiturates Qual Urine Negative NEG^Negative    Benzodiazepine Qual Urine Negative NEG^Negative    Cannabinoids Qual Urine Negative NEG^Negative    Cocaine Qual Urine Negative NEG^Negative    Ethanol Qual Urine Negative NEG^Negative    Opiates Qualitative Urine Negative NEG^Negative   HCG qualitative urine   Result Value Ref Range    HCG Qual Urine Negative NEG^Negative   CBC with platelets differential   Result Value Ref Range    WBC 5.8 4.0 - 11.0 10e9/L    RBC Count 3.77 (L) 3.8 - 5.2 10e12/L    Hemoglobin 11.4 (L) 11.7 - 15.7 g/dL    Hematocrit 35.5 35.0 - 47.0 %    MCV 94 78 - 100 fl    MCH 30.2 26.5 - 33.0 pg    MCHC 32.1 31.5 - 36.5 g/dL    RDW 12.3 10.0 - 15.0 %    Platelet Count 293 150 - 450 10e9/L    Diff Method Automated Method     % Neutrophils 62.8 %    % Lymphocytes 26.3 %    % Monocytes 8.1 %    % Eosinophils 2.2 %    % Basophils 0.3 %    % Immature Granulocytes 0.3 %    Nucleated RBCs 0 0 /100    Absolute Neutrophil 3.6 1.6 - 8.3 10e9/L    Absolute Lymphocytes 1.5 0.8 - 5.3 10e9/L    Absolute Monocytes 0.5 0.0 - 1.3 10e9/L    Absolute Eosinophils 0.1 0.0 - 0.7 10e9/L    Absolute Basophils 0.0 0.0 - 0.2 10e9/L    Abs Immature Granulocytes 0.0 0 - 0.4 10e9/L    Absolute Nucleated RBC 0.0    Comprehensive metabolic panel   Result Value Ref Range    Sodium 138 133 - 144 mmol/L     Potassium 3.7 3.4 - 5.3 mmol/L    Chloride 103 96 - 110 mmol/L    Carbon Dioxide 28 20 - 32 mmol/L    Anion Gap 7 3 - 14 mmol/L    Glucose 70 70 - 99 mg/dL    Urea Nitrogen 9 7 - 30 mg/dL    Creatinine 0.69 0.50 - 1.00 mg/dL    GFR Estimate >90 >60 mL/min/[1.73_m2]    GFR Estimate If Black >90 >60 mL/min/[1.73_m2]    Calcium 8.8 8.5 - 10.1 mg/dL    Bilirubin Total 0.5 0.2 - 1.3 mg/dL    Albumin 4.0 3.4 - 5.0 g/dL    Protein Total 6.9 6.8 - 8.8 g/dL    Alkaline Phosphatase 75 40 - 150 U/L    ALT 17 0 - 50 U/L    AST 23 0 - 35 U/L   Lithium level   Result Value Ref Range    Lithium Level 2.29 (HH) 0.60 - 1.20 mmol/L   TSH with free T4 reflex   Result Value Ref Range    TSH 11.97 (H) 0.40 - 4.00 mU/L   T4 free   Result Value Ref Range    T4 Free 0.96 0.76 - 1.46 ng/dL   Lipid panel   Result Value Ref Range    Cholesterol 121 <170 mg/dL    Triglycerides 64 <90 mg/dL    HDL Cholesterol 47 >45 mg/dL    LDL Cholesterol Calculated 61 <110 mg/dL    Non HDL Cholesterol 74 <120 mg/dL   Lithium level   Result Value Ref Range    Lithium Level 2.05 (HH) 0.60 - 1.20 mmol/L   TSH with free T4 reflex   Result Value Ref Range    TSH 3.45 0.40 - 4.00 mU/L   Basic metabolic panel   Result Value Ref Range    Sodium 139 133 - 144 mmol/L    Potassium 4.0 3.4 - 5.3 mmol/L    Chloride 107 96 - 110 mmol/L    Carbon Dioxide 28 20 - 32 mmol/L    Anion Gap 4 3 - 14 mmol/L    Glucose 88 70 - 99 mg/dL    Urea Nitrogen 7 7 - 30 mg/dL    Creatinine 0.82 0.50 - 1.00 mg/dL    GFR Estimate >90 >60 mL/min/[1.73_m2]    GFR Estimate If Black >90 >60 mL/min/[1.73_m2]    Calcium 9.2 8.5 - 10.1 mg/dL   Basic metabolic panel   Result Value Ref Range    Sodium 137 133 - 144 mmol/L    Potassium 3.7 3.4 - 5.3 mmol/L    Chloride 105 96 - 110 mmol/L    Carbon Dioxide 27 20 - 32 mmol/L    Anion Gap 5 3 - 14 mmol/L    Glucose 89 70 - 99 mg/dL    Urea Nitrogen 9 7 - 30 mg/dL    Creatinine 0.70 0.50 - 1.00 mg/dL    GFR Estimate >90 >60 mL/min/[1.73_m2]    GFR  Estimate If Black >90 >60 mL/min/[1.73_m2]    Calcium 8.9 8.5 - 10.1 mg/dL   TSH with free T4 reflex   Result Value Ref Range    TSH 5.57 (H) 0.40 - 4.00 mU/L   Lithium level   Result Value Ref Range    Lithium Level 1.19 0.60 - 1.20 mmol/L   T4 free   Result Value Ref Range    T4 Free 0.83 0.76 - 1.46 ng/dL   T3 Free   Result Value Ref Range    Free T3 2.6 2.3 - 4.2 pg/mL   Thyroid peroxidase antibody   Result Value Ref Range    Thyroid Peroxidase Antibody <10 <35 IU/mL   EKG 12-lead, complete   Result Value Ref Range    Interpretation ECG Click View Image link to view waveform and result    Internal Medicine Adult IP Consult for [facility] Young Adult on 4A: Patient to be seen: Routine within 24 hrs; Call back #: [phone]; Patient has elevated Li Levels (2.29), and elevated TSH levels  (11.97). Lithium dose was stopped, EKG was ordered....    Narrative    Marcello Cai MD     1/20/2019  7:00 AM  - Called for elevated TSH  - freeT4 NORMAL  -repeating TSH today  -likely subclinical hypothyroidism and also due to elevated   lithium  -recommend that we try to get lithium levels in normal range and   if TSH still >10 might be a candidate for thyroid replacement to   reduce CV mortality  -no signs and symptoms of hypothyroidism- prior TSH normal except   one reading in 6's    Marcello Cai MD  Internal Medicine  [phone]       Internal Medicine Adult IP Consult for [facility] ECT Clearance: Patient to be seen: Routine within 24 hrs; Call back #: Dr. El [phone]; clear for ECT; Consultant may enter orders: No    Narrative    Maria Esther Kenyon, BAILEY CNP     1/22/2019 12:28 PM    [facility]  Internal Medicine Consult    Stephanie Villanueva MRN# [id number]   Age: 18 year old YOB: 2000     Date of Admission: 1/19/2019  Date of Consult:  1/22/2019    Requesting Service: Behavioral Health - Yamil Springer MD  Reason for Consult: Pre ECT Medicine Evaluation   Indication for ECT: Major  "depressive disorder, refractory to   lithium     Chief Complaint: Depression   HPI: Stephanie Villanueva is a 18 year old female with a signifcant   psychiatric history of anxiety and major depression with self   injurious behavior who was admitted to [facility] for psych   evaluation due to SA with gabapentin overdose and SIB with   cutting forearm.    Review of Systems:   Cardiovascular: negative  Pulmonary: No shortness of breath, dyspnea on exertion, cough, or   hemoptysis  Neurological: negative and memory problems    Past Medical History:   Prior Anesthesia: Yes  If yes, any complications: No    Prior ECT: No    Cardiovascular: CAD No, MI No, HTN No, CHF No, pacemaker or ICD   No  Pulmonary: Asthma/COPD Yes (exercise induced, no longer on   medication), Prior respiratory failure or need for emergent   intubation No, On theophylline No (note that theophylline use is   a contraindication to proceeding with ECT, needs to be tapered   off prior)  Neurological: Brain tumor No, TIA/CVA No, Dementia No,   Neuromuscular Disease (including post polio syndrome) No,   Seizures and/or Epilepsy No, Head Injury No, Intracranial   Hemorrhage No    Past Surgical History:   History reviewed. No pertinent surgical history.    Allergies:      Allergies   Allergen Reactions     No Known Drug Allergies        Medication list reviewed.    Physical Exam:  /53   Pulse 79   Temp 97  F (36.1  C) (Tympanic)   Resp   16   Ht 1.778 m (5' 10\")   Wt 61.6 kg (135 lb 12.9 oz)   SpO2   100%   BMI 19.49 kg/m     Cardiovascular: RRR. S1, S2. No murmurs, rubs, gallops.   Pulmonary: Effort normal. Lungs CTAB with no wheezing, rales,   rhonchi.   Neurological: A&Ox3. No focal deficits. PERRLA.    Data:  EKG: NSR, HR 71    Head Imaging: None recent     Recent Labs   Lab Test 01/19/19  0439   WBC 5.8   RBC 3.77*   HGB 11.4*   HCT 35.5   MCV 94   MCH 30.2   MCHC 32.1   RDW 12.3        CMP:  Recent Labs   Lab Test 01/21/19  0850  " 01/19/19  0439      < > 138   POTASSIUM 3.7   < > 3.7   CHLORIDE 105   < > 103   BIRTTANIE 8.9   < > 8.8   CO2 27   < > 28   BUN 9   < > 9   CR 0.70   < > 0.69   GLC 89   < > 70   AST  --   --  23   ALT  --   --  17   BILITOTAL  --   --  0.5   ALBUMIN  --   --  4.0   PROTTOTAL  --   --  6.9   ALKPHOS  --   --  75    < > = values in this interval not displayed.     HCG:   Negative     Recommendations:   Diuretics and oral hypoglycemics should be held the morning of   ECT.   All other antihypertensive medications can be continued.     Patient does not have absolute medical contraindications to   proceeding with ECT at this time. Treatment plan per psychiatry.       BAILEY Ceja, CNP   [facility]  Hospitalist Service  Pager: [phone]                Consults:   Consultation during this admission received from psychiatry for ECT evaluation.          Hospital Course:   Stephanie Villanueva was admitted to [facility] with attending Isac Parker MD as a voluntary patient. The patient was placed under status 15 (15 minute checks) to ensure patient safety.     Patient was admitted status post overdose attempt and cutting on on right forearm which required sutures. The sutures were removed on 1/25. No signs of infection. Healed well. Patient was monitored due to elevated lithium levels and TSH levels. Patient was not restarted on lithium because she was started on ECT. She received 3 treatments inpatient and will continue her acute course outpatient. Patient was res-started on gabapentin for anxiety. Hold morning dose of gabapentin prior to ECT. Discussed risks, benefits and side effects of gabapentin to patient and why lithium was not started during ECT.     Discussed treatment with patient's mother. Explained that she would not be mira to start day treatment during the acute  course of ECT. She could go to her therapist for individual therapy. Patient complained of some memory loess.      Internal medicine recommendations: Free T3 wnl at 2.6, TPO < 10.  Given that lithium levels are wnl as of 1/21/19, expect that TSH will normalize.  Recommend rechecking TFTs (free T4, TSH) in 4 weeks.  Currently VSS.     Stephanie Villanueva did participate in groups and was visible in the milieu. The patient's symptoms of suicidal ideation improved. Patient reports improved mood and denies suicidal ideation. Patient has been cooperative with her treatment. Patient has protective factors of supportive parents and seeking out treatment.     Patient no longer meets criteria for hospital level of care. She is at moderate risk of relapse due to psychiatric history.     Stephanie Villanueva was released to home. At the time of discharge Stephanie Villanueva was determined to not be a danger to herself or others.          Discharge Medications:     Current Discharge Medication List      CONTINUE these medications which have CHANGED    Details   gabapentin (NEURONTIN) 300 MG capsule Take 1 capsule (300 mg) by mouth 3 times daily  Qty: 90 capsule, Refills: 0    Associated Diagnoses: Anxiety         CONTINUE these medications which have NOT CHANGED    Details   desmopressin (DDAVP) 0.2 MG tablet Take 0.6 mg by mouth At Bedtime          STOP taking these medications       lithium (ESKALITH) 300 MG tablet Comments:   Reason for Stopping:         lithium (ESKALITH) 450 MG CR tablet Comments:   Reason for Stopping:                    Psychiatric Examination:   Appearance:  awake, alert and adequately groomed  Attitude:  cooperative and guarded  Eye Contact:  fair  Mood:  better  Affect:  intensity is blunted  Speech:  normal prosody  Psychomotor Behavior:  no evidence of tardive dyskinesia, dystonia, or tics  Thought Process:  logical, linear and goal oriented  Associations:  no loose associations  Thought Content:  no evidence of suicidal ideation or homicidal ideation  Insight:  fair  Judgment:  fair  Oriented to:  time, person, and place  Attention  Span and Concentration:  intact  Recent and Remote Memory:  intact  Language: Able to name objects, Able to repeat phrases and Able to read and write  Fund of Knowledge: appropriate  Muscle Strength and Tone: normal  Gait and Station: Normal         Discharge Plan:     Behavioral Discharge Planning and Instructions        Summary: You were admitted on 1/19/2019 to Station [address] [place] [address] due to suicidal ideation.  You were treated by Debra Naegele, APRN, CNS and discharged on 01/28/2019 to Home     Principal Diagnosis:   Major Depressive Disorder, recurrent, severe, with psychotic symptoms   Generalized Anxiety Disorder  Obsessive Compulsive Disorder      Health Care Follow-up Appointments:   Primary care Medication Management Appointment   Date: 02/14/2019  Time: 10:40am      Provider: Dr. Jose Rodríguez   Address:[address] [address]  Phone:[phone]   [facility] has faxed the Discharge Summary and AVS to this provider at Fax: [phone]      Therapy Appointment   Date: 01/30/2019  Time: 3:00pm      Provider: [phone]  Address: [address] [address]  Phone:[phone]    [facility] has faxed the Discharge Summary and AVS to this provider at Fax: [email or web address]          Attend all scheduled appointments with your outpatient providers. Call at least 24 hours in advance if you need to reschedule an appointment to ensure continued access to your outpatient providers.   Major Treatments, Procedures and Findings: You were provided with: a psychiatric assessment, assessed for medical stability, medication evaluation and/or management, group therapy, art therapy, milieu management, medical interventions and skills/OT groups.     Symptoms to Report: If you experience any of the following symptoms please report them right away to your provider or to family/friends; feeling more aggressive, increased  "confusion, losing more sleep, mood getting worse or thoughts of suicide.     Early warning signs can include: Early warning signs that could signal a potential relapse could include but not limited to the following; increased depression or anxiety sleep disturbances increased thoughts or behaviors of suicide or self-harm increased unusual thinking, such as paranoia or hearing voices.      Safety and Wellness:  Take all medicines as directed.  Make no changes unless your doctor suggests them. Follow treatment recommendations.  Refrain from alcohol and non-prescribed drugs. If there is a concern for safety, call 911.     Resources: Mental health crisis response for your [facility] is offered 24 hours a day, 7 days a week. A trained counselor will assess your current situation, offer support and counseling and connect you with local resources. Please call  [facility] Crisis (COPE) Response - Adult [phone]     Crisis Intervention: 787.681.9365 or 950-170-1668 (TTY: 560.144.7685).  Call anytime for help.  National [place] on Mental Illness (www.mn.jennifer.org): 185.459.8877 or 584-891-6544.  Suicide Awareness Voices of Education (SAVE) (www.save.org): 105-816-LNTP (4437)  National Suicide Prevention Line (www.mentalhealthmn.org): 724-494-QFMB (1636)  Mental Health Consumer/Survivor Network of [place] (www.mhcsn.net): 907.641.8355 or 974-880-6786  Mental Health Association of [place] (www.mentalhealth.org): 562.875.7313 or 276-836-6883  Self- Management and Recovery Training., SMART-- Toll free: 838.259.6178  www.Personal On Demand.HelpMeNow  Text 4 Life: txt \"LIFE\" to 87167 for immediate support and crisis intervention  Crisis text line: Text \"[place]\" to 658974. Free, confidential, 24/7.     The treatment team has appreciated the opportunity to work with you. Stephanie, please take care and make your recovery a daily recovery. If you have any questions or concerns our unit number is [phone].  You will be receiving a follow-up phone call " within the next three days from a representative from behavioral health.  You have identified the best phone number to reach you as [phone] (home)      Attestation:  The patient has been seen and evaluated by me,  Debra A. Naegele, APRN CNS on 1/29/2019  Discharge summary time > 30 minutes

## 2019-01-29 NOTE — PROGRESS NOTES
DISCHARGE:  This RN and pt have reviewed all meds and aftercare plan. Pt was quite slow to respond to this RN's questions upon discharge but seems to follow directives regarding appointments. All belongings returned. Pt denies SI , anxiety or depression at this time altho the depression still lingers. Pt brighter and smiling upon DC.

## 2019-01-29 NOTE — PLAN OF CARE
"Nursing Assessment:  Pt was up ad patricia, rested in her room until supper arrived. Pt was visited by her parents and during visit Pt's mother summoned writer and indicated that Stephanie told her she was discharging tomorrow. Writer met with Pt and her parents, Pt said \"I spoke with Gita today, she said I was going to discharge tomorrow, we were going to discuss it tomorrow in the morning\", \"I don't want to stay here, why can't I go home and do the ECT from there\"    Pt's mother is requesting a call tomorrow to clarify if Pt report is accurate.    Stephanie refused to check in with writer when asked.   "

## 2019-01-30 ENCOUNTER — HOSPITAL ENCOUNTER (OUTPATIENT)
Dept: BEHAVIORAL HEALTH | Facility: CLINIC | Age: 19
End: 2019-01-30
Attending: PSYCHIATRY & NEUROLOGY
Payer: COMMERCIAL

## 2019-01-30 ENCOUNTER — ANESTHESIA EVENT (OUTPATIENT)
Dept: BEHAVIORAL HEALTH | Facility: CLINIC | Age: 19
End: 2019-01-30

## 2019-01-30 ENCOUNTER — ANESTHESIA (OUTPATIENT)
Dept: BEHAVIORAL HEALTH | Facility: CLINIC | Age: 19
End: 2019-01-30

## 2019-01-30 VITALS
HEART RATE: 73 BPM | SYSTOLIC BLOOD PRESSURE: 117 MMHG | RESPIRATION RATE: 16 BRPM | DIASTOLIC BLOOD PRESSURE: 73 MMHG | OXYGEN SATURATION: 94 % | TEMPERATURE: 98.2 F

## 2019-01-30 DIAGNOSIS — F33.2 SEVERE RECURRENT MAJOR DEPRESSION WITHOUT PSYCHOTIC FEATURES (H): Primary | ICD-10-CM

## 2019-01-30 PROCEDURE — 40000671 ZZH STATISTIC ANESTHESIA CASE

## 2019-01-30 PROCEDURE — 25000128 H RX IP 250 OP 636: Performed by: PSYCHIATRY & NEUROLOGY

## 2019-01-30 PROCEDURE — 25000125 ZZHC RX 250: Performed by: PSYCHIATRY & NEUROLOGY

## 2019-01-30 PROCEDURE — 25000125 ZZHC RX 250: Performed by: ANESTHESIOLOGY

## 2019-01-30 PROCEDURE — 25000128 H RX IP 250 OP 636: Performed by: ANESTHESIOLOGY

## 2019-01-30 PROCEDURE — 90870 ELECTROCONVULSIVE THERAPY: CPT

## 2019-01-30 RX ORDER — KETOROLAC TROMETHAMINE 30 MG/ML
30 INJECTION, SOLUTION INTRAMUSCULAR; INTRAVENOUS
Status: COMPLETED | OUTPATIENT
Start: 2019-01-30 | End: 2019-01-30

## 2019-01-30 RX ORDER — KETOROLAC TROMETHAMINE 30 MG/ML
30 INJECTION, SOLUTION INTRAMUSCULAR; INTRAVENOUS
Status: CANCELLED
Start: 2019-01-30 | End: 2019-01-30

## 2019-01-30 RX ORDER — GRANISETRON HYDROCHLORIDE 1 MG/ML
1 INJECTION INTRAVENOUS ONCE
Status: CANCELLED
Start: 2019-01-30 | End: 2019-01-30

## 2019-01-30 RX ORDER — GRANISETRON HYDROCHLORIDE 1 MG/ML
1 INJECTION INTRAVENOUS ONCE
Status: COMPLETED | OUTPATIENT
Start: 2019-01-30 | End: 2019-01-30

## 2019-01-30 RX ORDER — ETOMIDATE 2 MG/ML
INJECTION INTRAVENOUS PRN
Status: DISCONTINUED | OUTPATIENT
Start: 2019-01-30 | End: 2019-01-30

## 2019-01-30 RX ORDER — ONDANSETRON 2 MG/ML
4 INJECTION INTRAMUSCULAR; INTRAVENOUS EVERY 30 MIN PRN
Status: CANCELLED | OUTPATIENT
Start: 2019-01-30

## 2019-01-30 RX ORDER — MEPERIDINE HYDROCHLORIDE 50 MG/ML
12.5 INJECTION INTRAMUSCULAR; INTRAVENOUS; SUBCUTANEOUS
Status: CANCELLED | OUTPATIENT
Start: 2019-01-30

## 2019-01-30 RX ORDER — LIDOCAINE HYDROCHLORIDE 20 MG/ML
40 INJECTION, SOLUTION EPIDURAL; INFILTRATION; INTRACAUDAL; PERINEURAL
Status: COMPLETED | OUTPATIENT
Start: 2019-01-30 | End: 2019-01-30

## 2019-01-30 RX ORDER — ONDANSETRON 4 MG/1
4 TABLET, ORALLY DISINTEGRATING ORAL EVERY 30 MIN PRN
Status: CANCELLED | OUTPATIENT
Start: 2019-01-30

## 2019-01-30 RX ORDER — LIDOCAINE HYDROCHLORIDE 20 MG/ML
40 INJECTION, SOLUTION EPIDURAL; INFILTRATION; INTRACAUDAL; PERINEURAL
Status: CANCELLED
Start: 2019-01-30 | End: 2019-01-30

## 2019-01-30 RX ORDER — SODIUM CHLORIDE, SODIUM LACTATE, POTASSIUM CHLORIDE, CALCIUM CHLORIDE 600; 310; 30; 20 MG/100ML; MG/100ML; MG/100ML; MG/100ML
INJECTION, SOLUTION INTRAVENOUS CONTINUOUS
Status: CANCELLED | OUTPATIENT
Start: 2019-01-30

## 2019-01-30 RX ORDER — NALOXONE HYDROCHLORIDE 0.4 MG/ML
.1-.4 INJECTION, SOLUTION INTRAMUSCULAR; INTRAVENOUS; SUBCUTANEOUS
Status: CANCELLED | OUTPATIENT
Start: 2019-01-30 | End: 2019-01-31

## 2019-01-30 RX ADMIN — Medication 80 MG: at 08:44

## 2019-01-30 RX ADMIN — LIDOCAINE HYDROCHLORIDE 40 MG: 20 INJECTION, SOLUTION EPIDURAL; INFILTRATION; INTRACAUDAL; PERINEURAL at 08:45

## 2019-01-30 RX ADMIN — SODIUM CHLORIDE, POTASSIUM CHLORIDE, SODIUM LACTATE AND CALCIUM CHLORIDE 1000 ML: 600; 310; 30; 20 INJECTION, SOLUTION INTRAVENOUS at 08:36

## 2019-01-30 RX ADMIN — KETOROLAC TROMETHAMINE 30 MG: 30 INJECTION, SOLUTION INTRAMUSCULAR; INTRAVENOUS at 08:37

## 2019-01-30 RX ADMIN — ETOMIDATE 14 MG: 2 INJECTION INTRAVENOUS at 08:44

## 2019-01-30 RX ADMIN — GRANISETRON HYDROCHLORIDE 1 MG: 1 INJECTION INTRAVENOUS at 08:36

## 2019-01-30 NOTE — ANESTHESIA PREPROCEDURE EVALUATION
"Anesthesia Pre-Procedure Evaluation    Patient: Stephanie Villanueva   MRN:     [id number] Gender:   female   Age:    18 year old :      2000        Preoperative Diagnosis: * No pre-op diagnosis entered *   * No procedures listed *     Past Medical History:   Diagnosis Date     Anxiety 2013     CONTUSION OF left iliac crest 9/3/2006     Major depression 2013     Nocturnal enuresis 10/4/2007    Currently resolved.     Salter-Perdomo Type I fracture of distal fibula with routine healing 2013     Uncomplicated asthma     sports induced      No past surgical history on file.            [facility] AN PHYSICAL EXAM    Lab Results   Component Value Date    WBC 5.8 2019    HGB 11.4 (L) 2019    HCT 35.5 2019     2019    SED 10 2008     2019    POTASSIUM 3.7 2019    CHLORIDE 105 2019    CO2 27 2019    BUN 9 2019    CR 0.70 2019    GLC 89 2019    BRITTANIE 8.9 2019    ALBUMIN 4.0 2019    PROTTOTAL 6.9 2019    ALT 17 2019    AST 23 2019    ALKPHOS 75 2019    BILITOTAL 0.5 2019    TSH 5.57 (H) 2019    T4 0.83 2019    HCG Negative 2019       Preop Vitals  BP Readings from Last 3 Encounters:   19 117/69 (61 %/ 60 %)*   19 108/66 (21 %/ 50 %)*   19 104/59 (7 %/ 10 %)*     *BP percentiles are based on the 2017 AAP Clinical Practice Guideline for girls    Pulse Readings from Last 3 Encounters:   19 79   19 86   19 70      Resp Readings from Last 3 Encounters:   19 16   16 14   16 16    SpO2 Readings from Last 3 Encounters:   19 98%   19 (!) 80%   17 97%      Temp Readings from Last 1 Encounters:   19 36.7  C (98  F) (Tympanic)    Ht Readings from Last 1 Encounters:   19 1.778 m (5' 10\") (99 %)*     * Growth percentiles are based on CDC (Girls, 2-20 Years) data.      Wt Readings from Last  " "Encounters:   01/29/19 64.8 kg (142 lb 13.7 oz) (72 %)*     * Growth percentiles are based on CDC (Girls, 2-20 Years) data.    Estimated body mass index is 20.5 kg/m  as calculated from the following:    Height as of 1/19/19: 1.778 m (5' 10\").    Weight as of 1/29/19: 64.8 kg (142 lb 13.7 oz).     LDA:            Assessment:   ASA SCORE: 2    NPO Status: > 6 hours since completed Solid Foods   Documentation: H&P complete; Preop Testing complete; Consents complete   Proceeding: Proceed without further delay  Tobacco Use:  NO Active use of Tobacco/UNKNOWN Tobacco use status     Plan:   Anes. Type:  General      Induction:  IV (Standard)   Airway: Native Airway   Access/Monitoring: PIV   Maintenance: Balanced   Emergence: Procedure Site   Logistics: Same Day Surgery     Postop Pain/Sedation Strategy:  Standard-Options: Opioids PRN     PONV Management:  Adult Risk Factors: Female, Non-Smoker, Postop Opioids     CONSENT: Direct conversation   Plan and risks discussed with: Patient   Blood Products: Consent Deferred (Minimal Blood Loss)                         Kwame Christensen MD  "

## 2019-01-30 NOTE — ANESTHESIA POSTPROCEDURE EVALUATION
Anesthesia POST Procedure Evaluation    Patient: Stephanie Villanueva   MRN:     [id number] Gender:   female   Age:    18 year old :      2000        Preoperative Diagnosis: * No pre-op diagnosis entered *   * No procedures listed *   Postop Comments: No value filed.       Anesthesia Type:  Not documented    Reportable Event: NO     PAIN: Uncomplicated   Sign Out status: Comfortable, Well controlled pain     PONV: No PONV   Sign Out status:  No Nausea or Vomiting     Neuro/Psych: Uneventful perioperative course   Sign Out Status: Preoperative baseline; Age appropriate mentation     Airway/Resp.: Uneventful perioperative course   Sign Out Status: Non labored breathing, age appropriate RR; Resp. Status within EXPECTED Parameters     CV: Uneventful perioperative course   Sign Out status: Appropriate BP and perfusion indices; Appropriate HR/Rhythm     Disposition:   Sign Out in:  PACU  Disposition:  Phase II; Home  Recovery Course: Uneventful  Follow-Up: Not required           Last Anesthesia Record Vitals:  CRNA VITALS  2019 0815 - 2019 0845      2019             Ht Rate:  118    Resp Rate (set):  8          Last PACU/Preop Vitals:  Vitals:    19 0757 19 0850 19 0905   BP: 117/69 127/77 112/73   Pulse: 79 99 89   Resp:  16 16   Temp: 36.7  C (98  F) 36.7  C (98.1  F)    SpO2: 98% 97% 100%         Electronically Signed By: Kwame Christensen MD, 2019, 9:11 AM

## 2019-01-30 NOTE — PROCEDURES
"Procedure/Surgery Information   [facility], [place]    Bedside Procedure Note  Date of Service (when I performed the procedure): 01/30/2019    Stephanie Villanueva is a 18 year old female patient.  1. 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     Salter-Perdomo Type I fracture of distal fibula with routine healing 1/25/2013     Uncomplicated asthma     sports induced     Temp: 98  F (36.7  C) Temp src: Tympanic BP: 117/69 Pulse: 79     SpO2: 98 %        Procedures     Yaron El     [facility], [place]   ECT Procedure Note  01/30/2019    Stephanie Villanueva [id number]   18 year old 2000     Patient Status: Inpatient    Is this the first in a series of 12 treatments?  No    History and Physical: Reviewed in medical record    Consent: Informed consent was signed by: patient    Date Consent Signed: 1/23/19      Allergies   Allergen Reactions     No Known Drug Allergies        Weight:  0 lbs 0 oz              Indications for ECT:   Medications ineffective         Clinical Narrative:   Patient was admitted with worsening depression and suicidal ideation.  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  NPO after 2400.  Alert and Oriented x 3.  Mood might be a little better.  No HA or nausea last ECT.  She feels \"slow\" and is worried being permanently \"slow\" after her series of ECT.             Diagnosis:   Major depression         Pause for the Cause:     Right patient Yes   Right procedure/laterality settings: Yes          Intra-Procedure Documentation:     ECT #: 4   Treatment number this series: 4   Total treatment number: 4     Type of ECT:  Right, unilateral ultrabrief    ECT Medications:    Tylenol:  650mg  Lactated " Ringers:  1 liter  Granisetron: 1mg  Toradol:  30mg  Lidocaine:  40mg  Etomidate: 14mg  Succinyl Choline: 80mg     ECT Strip Summary:   Energy Level: 25 percent (can go back down to 15 percent next Tx if she has held her AM neurontin)  Motor Seizure Duration:  35 seconds  EEG Seizure Duration:   39 seconds    Complications: No    Plan:   Don't take Neurontin morning of ECT until after ECT is done.   Take last does of Neurontin nights before ECT at about 5 pm but not later.     Next ECT 2/1/19    Yaron El MD

## 2019-01-30 NOTE — IP AVS SNAPSHOT
[facility]  [facility] [address] [address]  Phone:  [phone]                                    After Visit Summary   1/30/2019    Stephanie Villanueva    MRN: [id number]           After Visit Summary Signature Page    I have received my discharge instructions, and my questions have been answered. I have discussed any challenges I see with this plan with the nurse or doctor.    ..........................................................................................................................................  Patient/Patient Representative Signature      ..........................................................................................................................................  Patient Representative Print Name and Relationship to Patient    ..................................................               ................................................  Date                                   Time    ..........................................................................................................................................  Reviewed by Signature/Title    ...................................................              ..............................................  Date                                               Time          22EPIC Rev 08/18

## 2019-01-30 NOTE — DISCHARGE INSTRUCTIONS
ECT Discharge Instructions      During your ECT series:      Do not drive or work heavy equipment until 7 days after your last treatment.    Do not drink alcohol or use street drugs (illicit drugs) while you are having treatments.    Do not make important decisions, including legal decisions.    After each treatment:      Get plenty of rest. A responsible adult must stay with your for at least 6 hours.    Avoid heavy or risky activities for 24 hours.    If you feel light-headed, sit for a few minutes before standing. Have someone help you get up.    If you have nausea (feel sick to your stomach): Drink only clear liquids such as apple juice, ginger ale, broth or 7UP, Be sure to drink plenty of liquids. Move to a normal diet as you feel able.     If you received Toradol, wait 6 hours before taking ibuprofen.    Call your doctor if:     You have a fever over 100F (37.7 C) (taken under the tongue), or a fever that last more than 24 hours.    Your IV site is red, swollen, very painful or is getting more tender.    You have nausea that gets very bad or does not improve.      If you have any symptoms after ECT, tell our staff before your next treatment.    The ECT Department can be reached at [phone].  The ECT Department is open Mondays, Wednesdays and Fridays from 7:00 AM to 2:00 PM.    To speak to a doctor, call: Dr. El's clinic [phone], Office [phone], Fax [phone]    Other: Today you have received IV Toradol 30 at 8:37 AM for headache/pain.  Toradol is an NSAID. Do no take any NSAIDs  including ibuprofen, Naproxen Sodium, Asprin, Advil, Motrin, Aleve, Jaqueline, Excedrin with Aspirin, etc, Do not take any of these medications until 6 hours after above time (2:37 PM). You also received Kytril 1 mg IV for nausea at 8:37 AM. If you have any questions please contact your physician or pharmacist.    Plan:   Don't take Neurontin morning of ECT until after ECT is done. Take last does of Neurontin nights  before ECT at about 5 pm but not later.     Next ECT 2/1/19     Yaron El MD      Transported by: Mother Olivas

## 2019-01-30 NOTE — OR NURSING
Pt discharged from recovery room via wheelchair to discharge room at this time.  IV fluids continue to infuse in discharge room, discharge RN to remove PIV when finished.

## 2019-01-31 ENCOUNTER — TELEPHONE (OUTPATIENT)
Dept: PEDIATRICS | Facility: CLINIC | Age: 19
End: 2019-01-31

## 2019-01-31 NOTE — TELEPHONE ENCOUNTER
This patient was discharged from 1/28/2019 on Major Depressive Disorder, recurrent, severe, with psychotic symptoms   Generalized Anxiety Disorder  Obsessive Compulsive Disorder .    Discharge Diagnosis: Major Depressive Disorder, recurrent, severe, with psychotic symptoms   Generalized Anxiety Disorder  Obsessive Compulsive Disorder     Follow-up instructions: Attend all scheduled appointments with your outpatient providers  Health Care Follow-up Appointments:   Primary care Medication Management Appointment   Date: 02/14/2019  Time: 10:40am      Provider: Dr. Jose Rodríguez     A follow-up visit has been scheduled in our clinic.     Health Care Follow-up Appointments:   Primary care Medication Management Appointment   Date: 02/14/2019  Time: 10:40am      Provider: Dr. Jose Schmidt

## 2019-01-31 NOTE — TELEPHONE ENCOUNTER
SW reviewed and will outreach tomorrow given call with message left today by clinic RN.     TAMARA Whatley, [facility]  , Care Coordination   [facility]  [phone]  [facility] [email or web address]

## 2019-01-31 NOTE — TELEPHONE ENCOUNTER
Outreach attempt #1    Patient/family was instructed to return call to [facility] RN directly on the RN Call Back Line at [phone].    Niki Rodgers RN

## 2019-02-01 ENCOUNTER — PATIENT OUTREACH (OUTPATIENT)
Dept: CARE COORDINATION | Facility: CLINIC | Age: 19
End: 2019-02-01

## 2019-02-01 ENCOUNTER — ANESTHESIA EVENT (OUTPATIENT)
Dept: BEHAVIORAL HEALTH | Facility: CLINIC | Age: 19
End: 2019-02-01

## 2019-02-01 ENCOUNTER — ANESTHESIA (OUTPATIENT)
Dept: BEHAVIORAL HEALTH | Facility: CLINIC | Age: 19
End: 2019-02-01

## 2019-02-01 ENCOUNTER — HOSPITAL ENCOUNTER (OUTPATIENT)
Dept: BEHAVIORAL HEALTH | Facility: CLINIC | Age: 19
End: 2019-02-01
Attending: PSYCHIATRY & NEUROLOGY
Payer: COMMERCIAL

## 2019-02-01 VITALS
TEMPERATURE: 98.2 F | HEART RATE: 71 BPM | DIASTOLIC BLOOD PRESSURE: 70 MMHG | SYSTOLIC BLOOD PRESSURE: 113 MMHG | OXYGEN SATURATION: 99 % | RESPIRATION RATE: 16 BRPM

## 2019-02-01 DIAGNOSIS — F33.2 SEVERE RECURRENT MAJOR DEPRESSION WITHOUT PSYCHOTIC FEATURES (H): Primary | ICD-10-CM

## 2019-02-01 PROCEDURE — 25000128 H RX IP 250 OP 636: Performed by: PSYCHIATRY & NEUROLOGY

## 2019-02-01 PROCEDURE — 40000671 ZZH STATISTIC ANESTHESIA CASE

## 2019-02-01 PROCEDURE — 90870 ELECTROCONVULSIVE THERAPY: CPT

## 2019-02-01 PROCEDURE — 25000128 H RX IP 250 OP 636: Performed by: ANESTHESIOLOGY

## 2019-02-01 PROCEDURE — 25000125 ZZHC RX 250: Performed by: ANESTHESIOLOGY

## 2019-02-01 PROCEDURE — 25000125 ZZHC RX 250: Performed by: PSYCHIATRY & NEUROLOGY

## 2019-02-01 RX ORDER — KETOROLAC TROMETHAMINE 30 MG/ML
30 INJECTION, SOLUTION INTRAMUSCULAR; INTRAVENOUS
Status: COMPLETED | OUTPATIENT
Start: 2019-02-01 | End: 2019-02-01

## 2019-02-01 RX ORDER — LIDOCAINE HYDROCHLORIDE 20 MG/ML
40 INJECTION, SOLUTION EPIDURAL; INFILTRATION; INTRACAUDAL; PERINEURAL
Status: CANCELLED
Start: 2019-02-01 | End: 2019-02-01

## 2019-02-01 RX ORDER — GRANISETRON HYDROCHLORIDE 1 MG/ML
1 INJECTION INTRAVENOUS ONCE
Status: COMPLETED | OUTPATIENT
Start: 2019-02-01 | End: 2019-02-01

## 2019-02-01 RX ORDER — KETOROLAC TROMETHAMINE 30 MG/ML
30 INJECTION, SOLUTION INTRAMUSCULAR; INTRAVENOUS
Status: CANCELLED
Start: 2019-02-01 | End: 2019-02-01

## 2019-02-01 RX ORDER — LIDOCAINE HYDROCHLORIDE 20 MG/ML
40 INJECTION, SOLUTION EPIDURAL; INFILTRATION; INTRACAUDAL; PERINEURAL
Status: COMPLETED | OUTPATIENT
Start: 2019-02-01 | End: 2019-02-01

## 2019-02-01 RX ORDER — ACETAMINOPHEN 325 MG/1
650 TABLET ORAL
Status: ACTIVE | OUTPATIENT
Start: 2019-02-01 | End: 2019-02-01

## 2019-02-01 RX ORDER — ETOMIDATE 2 MG/ML
INJECTION INTRAVENOUS PRN
Status: DISCONTINUED | OUTPATIENT
Start: 2019-02-01 | End: 2019-02-01

## 2019-02-01 RX ORDER — GRANISETRON HYDROCHLORIDE 1 MG/ML
1 INJECTION INTRAVENOUS ONCE
Status: CANCELLED
Start: 2019-02-01 | End: 2019-02-01

## 2019-02-01 RX ADMIN — Medication 80 MG: at 11:43

## 2019-02-01 RX ADMIN — KETOROLAC TROMETHAMINE 30 MG: 30 INJECTION, SOLUTION INTRAMUSCULAR; INTRAVENOUS at 11:36

## 2019-02-01 RX ADMIN — SODIUM CHLORIDE, POTASSIUM CHLORIDE, SODIUM LACTATE AND CALCIUM CHLORIDE 1000 ML: 600; 310; 30; 20 INJECTION, SOLUTION INTRAVENOUS at 11:36

## 2019-02-01 RX ADMIN — ETOMIDATE 14 MG: 2 INJECTION INTRAVENOUS at 11:43

## 2019-02-01 RX ADMIN — GRANISETRON HYDROCHLORIDE 1 MG: 1 INJECTION INTRAVENOUS at 11:36

## 2019-02-01 RX ADMIN — LIDOCAINE HYDROCHLORIDE 40 MG: 20 INJECTION, SOLUTION EPIDURAL; INFILTRATION; INTRACAUDAL; PERINEURAL at 11:48

## 2019-02-01 NOTE — DISCHARGE INSTRUCTIONS
ECT Discharge Instructions      During your ECT series:      Do not drive for 24 hours after treatment. If you will have more than one treatment within a week, no driving until 7 days after your last ECT treatment.     Do not drink alcohol or use street drugs (illicit drugs) while you are having treatments.    Do not make important decisions, including legal decisions.    After each treatment:      Get plenty of rest. A responsible adult must stay with your for at least 6 hours.    Avoid heavy or risky activities for 24 hours.    If you feel light-headed, sit for a few minutes before standing. Have someone help you get up.    If you have nausea (feel sick to your stomach): Drink only clear liquids such as apple juice, ginger ale, broth or 7UP, Be sure to drink plenty of liquids. Move to a normal diet as you feel able.     If you received Toradol, wait 6 hours before taking ibuprofen.    Call your doctor if:     You have a fever over 100F (37.7 C) (taken under the tongue), or a fever that last more than 24 hours.    Your IV site is red, swollen, very painful or is getting more tender.    You have nausea that gets very bad or does not improve.      If you have any symptoms after ECT, tell our staff before your next treatment.    The ECT Department can be reached at [phone].  The ECT Department is open Mondays, Wednesdays and Fridays from 7:00 AM to 2:00 PM.      To speak to a doctor, call: Dr. El at [phone]      Other: You had Toradol at 11:36 AM. Which is an NSAID medication. Do not take any Ibuprofen, Excedrin, Motrin, Aleve, Advil, Asprin, or any other NSAID medication until after 5:36 PM. Questions, check with your physician or pharmacist     take Tylenol 650mg po at home before coming to ECT.    Don't take Neurontin morning of ECT until after ECT is done.   Take last does of Neurontin nights before ECT at about 5 pm but not later.     Next ECT 2/4/19

## 2019-02-01 NOTE — IP AVS SNAPSHOT
[facility]  [facility] [address] [address]  Phone:  [phone]                                    After Visit Summary   2/1/2019    Stephanie Villanueva    MRN: [id number]           After Visit Summary Signature Page    I have received my discharge instructions, and my questions have been answered. I have discussed any challenges I see with this plan with the nurse or doctor.    ..........................................................................................................................................  Patient/Patient Representative Signature      ..........................................................................................................................................  Patient Representative Print Name and Relationship to Patient    ..................................................               ................................................  Date                                   Time    ..........................................................................................................................................  Reviewed by Signature/Title    ...................................................              ..............................................  Date                                               Time          22EPIC Rev 08/18

## 2019-02-01 NOTE — PROGRESS NOTES
Clinic Care Coordination Contact    Situation: Patient chart reviewed by .    Background: Pt was discharged from inpatient psych on 1/28. Pt had been admitted on a voluntary basis for worsening depression with recent suicidal and self-injurious behavior involving overdose of gabapentin and lacerating her forearm. Pt was discharged with follow-up scheduled with PCP on 2/14 at 10:40am. Pt was also scheduled with a therapist- Ranjana Sun at the [facility] on 1/30 at 3pm.     Assessment: Clinic RN outreached and left a message with Pt on 1/31, routed message to SW for additional follow-up. SW reviewed Pt chart and will plan to outreach next Monday given Pt is scheduled for ECT today.     Plan/Recommendations: SW will attempt outreach to Pt on Monday to discharge follow-up.     TAMARA Whatley, [facility]  , Care Coordination   [facility]  [phone]  [email or web address]

## 2019-02-01 NOTE — ANESTHESIA POSTPROCEDURE EVALUATION
Anesthesia POST Procedure Evaluation    Patient: Stephanie Villanueva   MRN:     [id number] Gender:   female   Age:    18 year old :      2000        Preoperative Diagnosis: * No pre-op diagnosis entered *   * No procedures listed *   Postop Comments: No value filed.       Anesthesia Type:  General    Reportable Event: NO     PAIN: Uncomplicated   Sign Out status: Comfortable, Well controlled pain     PONV: No PONV   Sign Out status:  No Nausea or Vomiting     Neuro/Psych: Uneventful perioperative course   Sign Out Status: Preoperative baseline; Age appropriate mentation     Airway/Resp.: Uneventful perioperative course   Sign Out Status: Non labored breathing, age appropriate RR; Resp. Status within EXPECTED Parameters     CV: Uneventful perioperative course   Sign Out status: Appropriate BP and perfusion indices; Appropriate HR/Rhythm     Disposition:   Sign Out in:  PACU  Disposition:  Phase II; Home  Recovery Course: Uneventful  Follow-Up: Not required           Last Anesthesia Record Vitals:  CRNA VITALS  2019 1128 - 2019 1225      2019             Pulse:  85    Ht Rate:  87    SpO2:  85 %  (Abnormal)     Resp Rate (set):  8          Last PACU/Preop Vitals:  Vitals:    19 1007 19 1153 19 1208   BP: 114/67 113/70 102/69   Pulse: 79 87 72   Resp: 16 17 17   Temp: 35.3  C (95.5  F) 36.4  C (97.6  F)    SpO2: 100% 100% 99%         Electronically Signed By: Palma Nolan MD, 2019, 12:25 PM

## 2019-02-01 NOTE — TELEPHONE ENCOUNTER
BECKY aware Pt is scheduled this morning at 10:15am for ECT. SW will push follow-up call to Monday to review check-in. Pt is scheduled to see PCP on 2/14 and had been scheduled to see her therapist on 1/30.     TAMARA Whatley, [facility]  , Care Coordination   [facility]  [phone]  [email or web address]

## 2019-02-01 NOTE — ANESTHESIA PREPROCEDURE EVALUATION
"Anesthesia Pre-Procedure Evaluation    Patient: Stephanie Villanueva   MRN:     [id number] Gender:   female   Age:    18 year old :      2000        Preoperative Diagnosis: * No pre-op diagnosis entered *   * No procedures listed *     Past Medical History:   Diagnosis Date     Anxiety 2013     CONTUSION OF left iliac crest 9/3/2006     Major depression 2013     Nocturnal enuresis 10/4/2007    Currently resolved.     Salter-Perdomo Type I fracture of distal fibula with routine healing 2013     Uncomplicated asthma     sports induced      No past surgical history on file.              JBETTYG [facility] AN PHYSICAL EXAM      Lab Results   Component Value Date    WBC 5.8 2019    HGB 11.4 (L) 2019    HCT 35.5 2019     2019    SED 10 2008     2019    POTASSIUM 3.7 2019    CHLORIDE 105 2019    CO2 27 2019    BUN 9 2019    CR 0.70 2019    GLC 89 2019    BRITTANIE 8.9 2019    ALBUMIN 4.0 2019    PROTTOTAL 6.9 2019    ALT 17 2019    AST 23 2019    ALKPHOS 75 2019    BILITOTAL 0.5 2019    TSH 5.57 (H) 2019    T4 0.83 2019    HCG Negative 2019       Preop Vitals  BP Readings from Last 3 Encounters:   19 114/67 (51 %/ 54 %)*   19 117/73 (61 %/ 74 %)*   19 108/66 (21 %/ 50 %)*     *BP percentiles are based on the 2017 AAP Clinical Practice Guideline for girls    Pulse Readings from Last 3 Encounters:   19 79   19 73   19 86      Resp Readings from Last 3 Encounters:   19 16   19 16   19 16    SpO2 Readings from Last 3 Encounters:   19 100%   19 94%   19 (!) 80%      Temp Readings from Last 1 Encounters:   19 35.3  C (95.5  F) (Tympanic)    Ht Readings from Last 1 Encounters:   19 1.778 m (5' 10\") (99 %)*     * Growth percentiles are based on CDC (Girls, 2-20 Years) data.      Wt Readings from " "Last 1 Encounters:   19 64.8 kg (142 lb 13.7 oz) (72 %)*     * Growth percentiles are based on CDC (Girls, 2-20 Years) data.    Estimated body mass index is 20.5 kg/m  as calculated from the following:    Height as of 19: 1.778 m (5' 10\").    Weight as of 19: 64.8 kg (142 lb 13.7 oz).     LDA:            Assessment:   ASA SCORE: 2    NPO Status: > 6 hours since completed Solid [employer]   Documentation: H&P complete; Preop Testing complete; Consents complete   Proceeding: Proceed without further delay  Tobacco Use:  Active user of Tobacco     Plan:   Anes. Type:  General      Induction:  IV (Standard)   Airway: Native Airway   Access/Monitoring: PIV   Maintenance: Balanced   Emergence: Procedure Site   Logistics: Same Day Surgery     Postop Pain/Sedation Strategy:  Standard-Options: Opioids PRN     PONV Management:  Adult Risk Factors: Female, Postop Opioids     CONSENT: Direct conversation   Plan and risks discussed with: Patient   Blood Products: Consent Deferred (Minimal Blood Loss)           Anesthesia Pre-Procedure Evaluation    Patient: Stephanie Villanueva   MRN:     [id number] Gender:   female   Age:    18 year old :      2000        Preoperative Diagnosis: * No pre-op diagnosis entered *   * No procedures listed *     Past Medical History:   Diagnosis Date     Anxiety 2013     CONTUSION OF left iliac crest 9/3/2006     Major depression 2013     Nocturnal enuresis 10/4/2007    Currently resolved.     Salter-Perdomo Type I fracture of distal fibula with routine healing 2013     Uncomplicated asthma     sports induced      No past surgical history on file.       Current Outpatient Medications on File Prior to Visit:  desmopressin (DDAVP) 0.2 MG tablet Take 0.6 mg by mouth At Bedtime    gabapentin (NEURONTIN) 300 MG capsule Take 1 capsule (300 mg) by mouth 3 times daily     Current Facility-Administered Medications on File Prior to Visit:  granisetron (KYTRIL) injection 1 mg "   ketorolac (TORADOL) injection 30 mg   lactated ringers BOLUS 1,000 mL   lidocaine (PF) (XYLOCAINE) 2 % injection 40 mg       Anesthesia Evaluation     . Pt has had prior anesthetic.     No history of anesthetic complications          ROS/MED HX    ENT/Pulmonary:     (+)Intermittent asthma , . .    Neurologic:       Cardiovascular:  - neg cardiovascular ROS   (+) ----. : . . . :. . No previous cardiac testing       METS/Exercise Tolerance:  4 - Raking leaves, gardening   Hematologic:  - neg hematologic  ROS       Musculoskeletal:  - neg musculoskeletal ROS       GI/Hepatic:  - neg GI/hepatic ROS       Renal/Genitourinary:  - ROS Renal section negative       Endo:     (+) thyroid problem hypothyroidism, .      Psychiatric:     (+) psychiatric history anxiety and depression      Infectious Disease:  - neg infectious disease ROS       Malignancy:         Other:                         PHYSICAL EXAM:   Mental Status/Neuro: A/A/O   Airway: Facies: Feasible  Mallampati: II  Mouth/Opening: Full  TM distance: > 6 cm  Neck ROM: Full   Respiratory:    CV: Rhythm: Regular  Rate: Age appropriate  Heart: Normal Sounds   Comments:      Dental: Normal                  Lab Results   Component Value Date    WBC 5.8 01/19/2019    HGB 11.4 (L) 01/19/2019    HCT 35.5 01/19/2019     01/19/2019    SED 10 11/05/2008     01/21/2019    POTASSIUM 3.7 01/21/2019    CHLORIDE 105 01/21/2019    CO2 27 01/21/2019    BUN 9 01/21/2019    CR 0.70 01/21/2019    GLC 89 01/21/2019    BRITTANIE 8.9 01/21/2019    ALBUMIN 4.0 01/19/2019    PROTTOTAL 6.9 01/19/2019    ALT 17 01/19/2019    AST 23 01/19/2019    ALKPHOS 75 01/19/2019    BILITOTAL 0.5 01/19/2019    TSH 5.57 (H) 01/21/2019    T4 0.83 01/21/2019    HCG Negative 01/19/2019       Preop Vitals  BP Readings from Last 3 Encounters:   02/01/19 114/67 (51 %/ 54 %)*   01/30/19 117/73 (61 %/ 74 %)*   01/29/19 108/66 (21 %/ 50 %)*     *BP percentiles are based on the August 2017 AAP Clinical  "Practice Guideline for girls    Pulse Readings from Last 3 Encounters:   02/01/19 79   01/30/19 73   01/29/19 86      Resp Readings from Last 3 Encounters:   02/01/19 16   01/30/19 16   01/28/19 16    SpO2 Readings from Last 3 Encounters:   02/01/19 100%   01/30/19 94%   01/28/19 (!) 80%      Temp Readings from Last 1 Encounters:   02/01/19 35.3  C (95.5  F) (Tympanic)    Ht Readings from Last 1 Encounters:   01/19/19 1.778 m (5' 10\") (99 %)*     * Growth percentiles are based on [facility] (Girls, 2-20 Years) data.      Wt Readings from Last 1 Encounters:   01/29/19 64.8 kg (142 lb 13.7 oz) (72 %)*     * Growth percentiles are based on [facility] (Girls, 2-20 Years) data.    Estimated body mass index is 20.5 kg/m  as calculated from the following:    Height as of 1/19/19: 1.778 m (5' 10\").    Weight as of 1/29/19: 64.8 kg (142 lb 13.7 oz).     LDA:            Assessment:   ASA SCORE: 2       Documentation: H&P complete; Preop Testing complete; Consents complete   Proceeding: Proceed without further delay  Tobacco Use:  Active user of Tobacco     Plan:   Anes. Type:  General   Pre-Induction: Midazolam IV; Acetaminophen PO   Induction:  IV (Standard)   Airway: Native Airway   Access/Monitoring: PIV   Maintenance: IV   Emergence: Procedure Site   Logistics: Same Day Surgery     PONV Management:Adult Risk Factors: Female     CONSENT: Direct conversation   Plan and risks discussed with: Patient                                MD Palma Orr MD  "

## 2019-02-01 NOTE — PROCEDURES
Procedure/Surgery Information   [facility], [place]    Bedside Procedure Note  Date of Service (when I performed the procedure): 02/01/2019    Stephanie Villanueva is a 18 year old female patient.  1. 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     Salter-Perdomo Type I fracture of distal fibula with routine healing 1/25/2013     Uncomplicated asthma     sports induced     Temp: 95.5  F (35.3  C) Temp src: Tympanic BP: 114/67 Pulse: 79   Resp: 16 SpO2: 100 %        Procedures     Yaron El     [facility], [place]   ECT Procedure Note  02/01/2019    Stephanie Villanueva [id number]   18 year old 2000     Patient Status: Inpatient    Is this the first in a series of 12 treatments?  No    History and Physical: Reviewed in medical record    Consent: Informed consent was signed by: patient    Date Consent Signed: 1/23/19      Allergies   Allergen Reactions     No Known Drug Allergies        Weight:  0 lbs 0 oz              Indications for ECT:   Medications ineffective         Clinical Narrative:   Patient was admitted with worsening depression and suicidal ideation.  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  NPO after 2400.  Alert and Oriented x 3.  Mood is getting better.   Forgetfulness is not too bad.  She had a little muscle aches last ECT.            Diagnosis:   Major depression         Pause for the Cause:     Right patient Yes   Right procedure/laterality settings: Yes          Intra-Procedure Documentation:     ECT #: 5   Treatment number this series: 5   Total treatment number: 5     Type of ECT:  Right, unilateral ultrabrief    ECT Medications:    Tylenol:  650mg (didn't take today but can have in  recovery)  Lactated Ringers:  1 liter  Granisetron: 1mg  Toradol:  30mg  Lidocaine:  40mg  Etomidate: 14mg  Succinyl Choline: 80mg     ECT Strip Summary:   Energy Level: 15 percent   Motor Seizure Duration:  52 seconds  EEG Seizure Duration:   52 seconds    Complications: No    Plan:   Have her take Tylenol 650mg po at home before coming to ECT.    Don't take Neurontin morning of ECT until after ECT is done.   Take last does of Neurontin nights before ECT at about 5 pm but not later.     Next ECT 2/4/19    Yaron El MD

## 2019-02-01 NOTE — PROGRESS NOTES
Patient discharged from recovery room via wheelchair to discharge room at  1240.                 .

## 2019-02-04 ENCOUNTER — HOSPITAL ENCOUNTER (OUTPATIENT)
Dept: BEHAVIORAL HEALTH | Facility: CLINIC | Age: 19
End: 2019-02-04
Attending: PSYCHIATRY & NEUROLOGY
Payer: COMMERCIAL

## 2019-02-04 ENCOUNTER — ANESTHESIA EVENT (OUTPATIENT)
Dept: BEHAVIORAL HEALTH | Facility: CLINIC | Age: 19
End: 2019-02-04

## 2019-02-04 ENCOUNTER — ANESTHESIA (OUTPATIENT)
Dept: BEHAVIORAL HEALTH | Facility: CLINIC | Age: 19
End: 2019-02-04

## 2019-02-04 VITALS
OXYGEN SATURATION: 97 % | RESPIRATION RATE: 16 BRPM | SYSTOLIC BLOOD PRESSURE: 121 MMHG | DIASTOLIC BLOOD PRESSURE: 92 MMHG | TEMPERATURE: 97.5 F | HEART RATE: 77 BPM

## 2019-02-04 DIAGNOSIS — F33.2 SEVERE RECURRENT MAJOR DEPRESSION WITHOUT PSYCHOTIC FEATURES (H): Primary | ICD-10-CM

## 2019-02-04 PROCEDURE — 25000125 ZZHC RX 250: Performed by: PSYCHIATRY & NEUROLOGY

## 2019-02-04 PROCEDURE — 90870 ELECTROCONVULSIVE THERAPY: CPT

## 2019-02-04 PROCEDURE — 40000671 ZZH STATISTIC ANESTHESIA CASE

## 2019-02-04 PROCEDURE — 25000125 ZZHC RX 250: Performed by: ANESTHESIOLOGY

## 2019-02-04 PROCEDURE — 25000128 H RX IP 250 OP 636: Performed by: ANESTHESIOLOGY

## 2019-02-04 PROCEDURE — 25000128 H RX IP 250 OP 636: Performed by: PSYCHIATRY & NEUROLOGY

## 2019-02-04 RX ORDER — LIDOCAINE HYDROCHLORIDE 20 MG/ML
40 INJECTION, SOLUTION EPIDURAL; INFILTRATION; INTRACAUDAL; PERINEURAL
Status: COMPLETED | OUTPATIENT
Start: 2019-02-04 | End: 2019-02-04

## 2019-02-04 RX ORDER — KETOROLAC TROMETHAMINE 30 MG/ML
30 INJECTION, SOLUTION INTRAMUSCULAR; INTRAVENOUS
Status: CANCELLED
Start: 2019-02-04 | End: 2019-02-04

## 2019-02-04 RX ORDER — GRANISETRON HYDROCHLORIDE 1 MG/ML
1 INJECTION INTRAVENOUS ONCE
Status: CANCELLED
Start: 2019-02-04 | End: 2019-02-04

## 2019-02-04 RX ORDER — GRANISETRON HYDROCHLORIDE 1 MG/ML
1 INJECTION INTRAVENOUS ONCE
Status: COMPLETED | OUTPATIENT
Start: 2019-02-04 | End: 2019-02-04

## 2019-02-04 RX ORDER — LIDOCAINE HYDROCHLORIDE 20 MG/ML
40 INJECTION, SOLUTION EPIDURAL; INFILTRATION; INTRACAUDAL; PERINEURAL
Status: CANCELLED
Start: 2019-02-04 | End: 2019-02-04

## 2019-02-04 RX ORDER — KETOROLAC TROMETHAMINE 30 MG/ML
30 INJECTION, SOLUTION INTRAMUSCULAR; INTRAVENOUS
Status: COMPLETED | OUTPATIENT
Start: 2019-02-04 | End: 2019-02-04

## 2019-02-04 RX ORDER — ETOMIDATE 2 MG/ML
INJECTION INTRAVENOUS PRN
Status: DISCONTINUED | OUTPATIENT
Start: 2019-02-04 | End: 2019-02-04

## 2019-02-04 RX ADMIN — ETOMIDATE 14 MG: 2 INJECTION INTRAVENOUS at 10:07

## 2019-02-04 RX ADMIN — SODIUM CHLORIDE, POTASSIUM CHLORIDE, SODIUM LACTATE AND CALCIUM CHLORIDE 1000 ML: 600; 310; 30; 20 INJECTION, SOLUTION INTRAVENOUS at 10:02

## 2019-02-04 RX ADMIN — LIDOCAINE HYDROCHLORIDE 40 MG: 20 INJECTION, SOLUTION EPIDURAL; INFILTRATION; INTRACAUDAL; PERINEURAL at 10:09

## 2019-02-04 RX ADMIN — GRANISETRON HYDROCHLORIDE 1 MG: 1 INJECTION INTRAVENOUS at 10:02

## 2019-02-04 RX ADMIN — KETOROLAC TROMETHAMINE 30 MG: 30 INJECTION, SOLUTION INTRAMUSCULAR; INTRAVENOUS at 10:02

## 2019-02-04 RX ADMIN — Medication 80 MG: at 10:07

## 2019-02-04 NOTE — ANESTHESIA PREPROCEDURE EVALUATION
"Anesthesia Pre-Procedure Evaluation    Patient: Stephanie Villanueva   MRN:     [id number] Gender:   female   Age:    18 year old :      2000        Preoperative Diagnosis: * No pre-op diagnosis entered *   * No procedures listed *     Past Medical History:   Diagnosis Date     Anxiety 2013     CONTUSION OF left iliac crest 9/3/2006     Major depression 2013     Nocturnal enuresis 10/4/2007    Currently resolved.     Salter-Perdomo Type I fracture of distal fibula with routine healing 2013     Uncomplicated asthma     sports induced      No past surgical history on file.              [facility] AN PHYSICAL EXAM      Lab Results   Component Value Date    WBC 5.8 2019    HGB 11.4 (L) 2019    HCT 35.5 2019     2019    SED 10 2008     2019    POTASSIUM 3.7 2019    CHLORIDE 105 2019    CO2 27 2019    BUN 9 2019    CR 0.70 2019    GLC 89 2019    BRITTANIE 8.9 2019    ALBUMIN 4.0 2019    PROTTOTAL 6.9 2019    ALT 17 2019    AST 23 2019    ALKPHOS 75 2019    BILITOTAL 0.5 2019    TSH 5.57 (H) 2019    T4 0.83 2019    HCG Negative 2019       Preop Vitals  BP Readings from Last 3 Encounters:   19 114/73 (51 %/ 74 %)*   19 113/70 (36 %/ 63 %)*   19 117/73 (61 %/ 74 %)*     *BP percentiles are based on the 2017 AAP Clinical Practice Guideline for girls    Pulse Readings from Last 3 Encounters:   19 86   19 71   19 73      Resp Readings from Last 3 Encounters:   19 16   19 16   19 16    SpO2 Readings from Last 3 Encounters:   19 98%   19 99%   19 94%      Temp Readings from Last 1 Encounters:   19 36.7  C (98  F)    Ht Readings from Last 1 Encounters:   01/19/19 1.778 m (5' 10\") (99 %)*     * Growth percentiles are based on CDC (Girls, 2-20 Years) data.      Wt Readings from Last 1 Encounters: " "  19 64.8 kg (142 lb 13.7 oz) (72 %)*     * Growth percentiles are based on CDC (Girls, 2-20 Years) data.    Estimated body mass index is 20.5 kg/m  as calculated from the following:    Height as of 19: 1.778 m (5' 10\").    Weight as of 19: 64.8 kg (142 lb 13.7 oz).     LDA:            Assessment:   ASA SCORE: 2    NPO Status: > 6 hours since completed Solid [employer]   Documentation: H&P complete; Preop Testing complete; Consents complete   Proceeding: Proceed without further delay  Tobacco Use:  Active user of Tobacco     Plan:   Anes. Type:  General      Induction:  IV (Standard)   Airway: Native Airway   Access/Monitoring: PIV   Maintenance: Balanced   Emergence: Procedure Site   Logistics: Same Day Surgery     Postop Pain/Sedation Strategy:  Standard-Options: Opioids PRN     PONV Management:  Adult Risk Factors: Female, Postop Opioids     CONSENT: Direct conversation   Plan and risks discussed with: Patient   Blood Products: Consent Deferred (Minimal Blood Loss)           Anesthesia Pre-Procedure Evaluation    Patient: Stephanie Villanueva   MRN:     [id number] Gender:   female   Age:    18 year old :      2000        Preoperative Diagnosis: * No pre-op diagnosis entered *   * No procedures listed *     Past Medical History:   Diagnosis Date     Anxiety 2013     CONTUSION OF left iliac crest 9/3/2006     Major depression 2013     Nocturnal enuresis 10/4/2007    Currently resolved.     Salter-Perdomo Type I fracture of distal fibula with routine healing 2013     Uncomplicated asthma     sports induced      No past surgical history on file.       Current Outpatient Medications on File Prior to Visit:  desmopressin (DDAVP) 0.2 MG tablet Take 0.6 mg by mouth At Bedtime    gabapentin (NEURONTIN) 300 MG capsule Take 1 capsule (300 mg) by mouth 3 times daily     Current Facility-Administered Medications on File Prior to Visit:  [COMPLETED] granisetron (KYTRIL) injection 1 mg "   [COMPLETED] ketorolac (TORADOL) injection 30 mg   [COMPLETED] lactated ringers BOLUS 1,000 mL   lidocaine (PF) (XYLOCAINE) 2 % injection 40 mg       Anesthesia Evaluation     . Pt has had prior anesthetic.     No history of anesthetic complications          ROS/MED HX    ENT/Pulmonary:     (+)Intermittent asthma , . .    Neurologic:       Cardiovascular:  - neg cardiovascular ROS   (+) ----. : . . . :. . No previous cardiac testing       METS/Exercise Tolerance:  4 - Raking leaves, gardening   Hematologic:  - neg hematologic  ROS       Musculoskeletal:  - neg musculoskeletal ROS       GI/Hepatic:  - neg GI/hepatic ROS       Renal/Genitourinary:  - ROS Renal section negative       Endo:     (+) thyroid problem hypothyroidism, .      Psychiatric:     (+) psychiatric history anxiety and depression      Infectious Disease:  - neg infectious disease ROS       Malignancy:         Other:                         PHYSICAL EXAM:   Mental Status/Neuro: A/A/O   Airway: Facies: Feasible  Mallampati: II  Mouth/Opening: Full  TM distance: > 6 cm  Neck ROM: Full   Respiratory:    CV: Rhythm: Regular  Rate: Age appropriate  Heart: Normal Sounds   Comments:      Dental: Normal                  Lab Results   Component Value Date    WBC 5.8 01/19/2019    HGB 11.4 (L) 01/19/2019    HCT 35.5 01/19/2019     01/19/2019    SED 10 11/05/2008     01/21/2019    POTASSIUM 3.7 01/21/2019    CHLORIDE 105 01/21/2019    CO2 27 01/21/2019    BUN 9 01/21/2019    CR 0.70 01/21/2019    GLC 89 01/21/2019    BRITTANIE 8.9 01/21/2019    ALBUMIN 4.0 01/19/2019    PROTTOTAL 6.9 01/19/2019    ALT 17 01/19/2019    AST 23 01/19/2019    ALKPHOS 75 01/19/2019    BILITOTAL 0.5 01/19/2019    TSH 5.57 (H) 01/21/2019    T4 0.83 01/21/2019    HCG Negative 01/19/2019       Preop Vitals  BP Readings from Last 3 Encounters:   02/04/19 114/73 (51 %/ 74 %)*   02/01/19 113/70 (36 %/ 63 %)*   01/30/19 117/73 (61 %/ 74 %)*     *BP percentiles are based on the August  "2017 AAP Clinical Practice Guideline for girls    Pulse Readings from Last 3 Encounters:   02/04/19 86   02/01/19 71   01/30/19 73      Resp Readings from Last 3 Encounters:   02/04/19 16   02/01/19 16   01/30/19 16    SpO2 Readings from Last 3 Encounters:   02/04/19 98%   02/01/19 99%   01/30/19 94%      Temp Readings from Last 1 Encounters:   02/04/19 36.7  C (98  F)    Ht Readings from Last 1 Encounters:   01/19/19 1.778 m (5' 10\") (99 %)*     * Growth percentiles are based on CDC (Girls, 2-20 Years) data.      Wt Readings from Last 1 Encounters:   01/29/19 64.8 kg (142 lb 13.7 oz) (72 %)*     * Growth percentiles are based on CDC (Girls, 2-20 Years) data.    Estimated body mass index is 20.5 kg/m  as calculated from the following:    Height as of 1/19/19: 1.778 m (5' 10\").    Weight as of 1/29/19: 64.8 kg (142 lb 13.7 oz).     LDA:            Assessment:   ASA SCORE: 2       Documentation: H&P complete; Preop Testing complete; Consents complete   Proceeding: Proceed without further delay  Tobacco Use:  Active user of Tobacco     Plan:   Anes. Type:  General   Pre-Induction: Midazolam IV; Acetaminophen PO   Induction:  IV (Standard)   Airway: Native Airway   Access/Monitoring: PIV   Maintenance: IV   Emergence: Procedure Site   Logistics: Same Day Surgery     PONV Management:Adult Risk Factors: Female     CONSENT: Direct conversation   Plan and risks discussed with: Patient                                MD John Mcmahan MD  "

## 2019-02-04 NOTE — IP AVS SNAPSHOT
[facility]  [facility] [address] [address]  Phone:  [phone]                                    After Visit Summary   2/4/2019    Stephanie Villanueva    MRN: [id number]           After Visit Summary Signature Page    I have received my discharge instructions, and my questions have been answered. I have discussed any challenges I see with this plan with the nurse or doctor.    ..........................................................................................................................................  Patient/Patient Representative Signature      ..........................................................................................................................................  Patient Representative Print Name and Relationship to Patient    ..................................................               ................................................  Date                                   Time    ..........................................................................................................................................  Reviewed by Signature/Title    ...................................................              ..............................................  Date                                               Time          22EPIC Rev 08/18

## 2019-02-04 NOTE — DISCHARGE INSTRUCTIONS
ECT Discharge Instructions      During your ECT series:      Do not drive or work heavy equipment until 7 days after your last treatment.    Do not drink alcohol or use street drugs (illicit drugs) while you are having treatments.    Do not make important decisions, including legal decisions.    After each treatment:      Get plenty of rest. A responsible adult must stay with your for at least 6 hours.    Avoid heavy or risky activities for 24 hours.    If you feel light-headed, sit for a few minutes before standing. Have someone help you get up.    If you have nausea (feel sick to your stomach): Drink only clear liquids such as apple juice, ginger ale, broth or 7UP, Be sure to drink plenty of liquids. Move to a normal diet as you feel able.     If you received Toradol, wait 6 hours before taking ibuprofen.    Call your doctor if:     You have a fever over 100F (37.7 C) (taken under the tongue), or a fever that last more than 24 hours.    Your IV site is red, swollen, very painful or is getting more tender.    You have nausea that gets very bad or does not improve.      If you have any symptoms after ECT, tell our staff before your next treatment.    The ECT Department can be reached at [phone].  The ECT Department is open Mondays, Wednesdays and Fridays from 7:00 AM to 2:00 PM.    To speak to a doctor, call: Dr. El's clinic [phone], Office [phone], Fax [phone]    Other: Today you have received IV Toradol 30 mg at 10:00 AM for headache/pain.  Toradol is an NSAID. Do no take any NSAIDs  including ibuprofen, Naproxen Sodium, Asprin, Advil, Motrin, Aleve, Jaqueline, Excedrin with Aspirin, etc, Do not take any of these medications until 6 hours after above time (4:00 PM). You also received Kytril 1 mg IV for nausea. If you have any questions please contact your physician, or pharmacist.    Plan: Take Tylenol 650mg with sip of water (only) at home before coming to ECT.    Don't take Neurontin morning of  ECT until after ECT is done. Take last does of Neurontin nights before ECT at about 5 pm but not later.     Next ECT 2/6/19  Yaron El MD    Transported by: Alin Cook

## 2019-02-04 NOTE — PROCEDURES
Procedure/Surgery Information   [facility], [place]    Bedside Procedure Note  Date of Service (when I performed the procedure): 02/04/2019    Stephanie Villanueva is a 18 year old female patient.  1. 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     Salter-Perdomo Type I fracture of distal fibula with routine healing 1/25/2013     Uncomplicated asthma     sports induced     Temp: 98  F (36.7  C)   BP: 114/73 Pulse: 86   Resp: 16 SpO2: 98 %        Procedures     Yaron El     [facility], [place]   ECT Procedure Note  02/04/2019    Stephanie Villanueva [id number]   18 year old 2000     Patient Status: Inpatient    Is this the first in a series of 12 treatments?  No    History and Physical: Reviewed in medical record    Consent: Informed consent was signed by: patient    Date Consent Signed: 1/23/19      Allergies   Allergen Reactions     No Known Drug Allergies        Weight:  0 lbs 0 oz              Indications for ECT:   Medications ineffective         Clinical Narrative:   Patient was admitted with worsening depression and suicidal ideation.  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  NPO after 2400.  Alert and Oriented x 3.  Mood is 40% better.  The SI is not gone.  No plan or intent to hurt self.  Able to contract for safety.            Diagnosis:   Major depression         Pause for the Cause:     Right patient Yes   Right procedure/laterality settings: Yes          Intra-Procedure Documentation:     ECT #: 6   Treatment number this series: 6   Total treatment number: 6     Type of ECT:  Right, unilateral ultrabrief    ECT Medications:    Ibuprofen:  600mg po  Lactated Ringers:  1 liter  Granisetron:  1mg  Toradol:  30mg  Lidocaine:  40mg  Etomidate: 14mg  Succinyl Choline: 80mg     ECT Strip Summary:   Energy Level: 15 percent   Motor Seizure Duration:  62 seconds  EEG Seizure Duration:   123 seconds    Complications: No    Plan:   Have her take Tylenol 650mg po at home before coming to ECT.    Don't take Neurontin morning of ECT until after ECT is done.   Take last does of Neurontin nights before ECT at about 5 pm but not later.     Next ECT 2/6/19    Yaron El MD

## 2019-02-04 NOTE — ANESTHESIA POSTPROCEDURE EVALUATION
Anesthesia POST Procedure Evaluation    Patient: Stephanie Villanueva   MRN:     [id number] Gender:   female   Age:    18 year old :      2000        Preoperative Diagnosis: * No pre-op diagnosis entered *   * No procedures listed *   Postop Comments: No value filed.       Anesthesia Type:  General    Reportable Event: NO     PAIN: Uncomplicated   Sign Out status: Comfortable, Well controlled pain     PONV: No PONV   Sign Out status:  No Nausea or Vomiting     Neuro/Psych: Uneventful perioperative course   Sign Out Status: Preoperative baseline; Age appropriate mentation     Airway/Resp.: Uneventful perioperative course   Sign Out Status: Non labored breathing, age appropriate RR; Resp. Status within EXPECTED Parameters     CV: Uneventful perioperative course   Sign Out status: Appropriate BP and perfusion indices; Appropriate HR/Rhythm     Disposition:   Sign Out in:  PACU  Disposition:  Phase II; Home  Recovery Course: Uneventful  Follow-Up: Not required           Last Anesthesia Record Vitals:  CRNA VITALS  2019 0951 - 2019 1039      2019             SpO2:  93 %  (Abnormal)     Resp Rate (set):  8          Last PACU/Preop Vitals:  Vitals:    19 0944 19 1019 19 1037   BP: 114/73 (!) 116/101 115/89   Pulse: 86 91 92   Resp: 16 14 14   Temp: 36.7  C (98  F) 36.9  C (98.5  F)    SpO2: 98% 93% 93%         Electronically Signed By: John Bender MD, 2019, 10:39 AM

## 2019-02-05 NOTE — PROGRESS NOTES
Clinic Care Coordination Contact  [facility]/Voicemail    Referral Source: Care Team  Clinical Data:  Outreach- SW reviewed Pt's inpatient discharge summary and initial attempt from clinic RN to contact the pt/family to check-in post-discharge. SW noted plan to try and reach Pt/family to check-in.  Outreach attempted x 1. SW left general message on Mom's voicemail with call back information and requested return call.  Plan: SW will make an additional attempt in 2-3 business days.     TAMARA Whatley, [facility]  , Care Coordination   [facility]  [phone]  [facility] [email or web address]

## 2019-02-06 ENCOUNTER — ANESTHESIA EVENT (OUTPATIENT)
Dept: BEHAVIORAL HEALTH | Facility: CLINIC | Age: 19
End: 2019-02-06

## 2019-02-06 ENCOUNTER — HOSPITAL ENCOUNTER (OUTPATIENT)
Dept: BEHAVIORAL HEALTH | Facility: CLINIC | Age: 19
End: 2019-02-06
Attending: PSYCHIATRY & NEUROLOGY
Payer: COMMERCIAL

## 2019-02-06 ENCOUNTER — ANESTHESIA (OUTPATIENT)
Dept: BEHAVIORAL HEALTH | Facility: CLINIC | Age: 19
End: 2019-02-06

## 2019-02-06 VITALS
HEART RATE: 77 BPM | TEMPERATURE: 98 F | SYSTOLIC BLOOD PRESSURE: 120 MMHG | OXYGEN SATURATION: 99 % | DIASTOLIC BLOOD PRESSURE: 85 MMHG | RESPIRATION RATE: 16 BRPM

## 2019-02-06 DIAGNOSIS — F33.2 SEVERE RECURRENT MAJOR DEPRESSION WITHOUT PSYCHOTIC FEATURES (H): Primary | ICD-10-CM

## 2019-02-06 PROCEDURE — 25000125 ZZHC RX 250: Performed by: PSYCHIATRY & NEUROLOGY

## 2019-02-06 PROCEDURE — 25000125 ZZHC RX 250: Performed by: ANESTHESIOLOGY

## 2019-02-06 PROCEDURE — 40000671 ZZH STATISTIC ANESTHESIA CASE

## 2019-02-06 PROCEDURE — 25000128 H RX IP 250 OP 636: Performed by: ANESTHESIOLOGY

## 2019-02-06 PROCEDURE — 90870 ELECTROCONVULSIVE THERAPY: CPT

## 2019-02-06 PROCEDURE — 25000128 H RX IP 250 OP 636: Performed by: PSYCHIATRY & NEUROLOGY

## 2019-02-06 RX ORDER — KETOROLAC TROMETHAMINE 30 MG/ML
30 INJECTION, SOLUTION INTRAMUSCULAR; INTRAVENOUS
Status: CANCELLED
Start: 2019-02-06 | End: 2019-02-06

## 2019-02-06 RX ORDER — ETOMIDATE 2 MG/ML
INJECTION INTRAVENOUS PRN
Status: DISCONTINUED | OUTPATIENT
Start: 2019-02-06 | End: 2019-02-06

## 2019-02-06 RX ORDER — GRANISETRON HYDROCHLORIDE 1 MG/ML
1 INJECTION INTRAVENOUS ONCE
Status: CANCELLED
Start: 2019-02-06 | End: 2019-02-06

## 2019-02-06 RX ORDER — LIDOCAINE HYDROCHLORIDE 20 MG/ML
40 INJECTION, SOLUTION EPIDURAL; INFILTRATION; INTRACAUDAL; PERINEURAL
Status: CANCELLED
Start: 2019-02-06 | End: 2019-02-06

## 2019-02-06 RX ORDER — LIDOCAINE HYDROCHLORIDE 20 MG/ML
40 INJECTION, SOLUTION EPIDURAL; INFILTRATION; INTRACAUDAL; PERINEURAL
Status: COMPLETED | OUTPATIENT
Start: 2019-02-06 | End: 2019-02-06

## 2019-02-06 RX ORDER — GRANISETRON HYDROCHLORIDE 1 MG/ML
1 INJECTION INTRAVENOUS ONCE
Status: COMPLETED | OUTPATIENT
Start: 2019-02-06 | End: 2019-02-06

## 2019-02-06 RX ORDER — KETOROLAC TROMETHAMINE 30 MG/ML
30 INJECTION, SOLUTION INTRAMUSCULAR; INTRAVENOUS
Status: COMPLETED | OUTPATIENT
Start: 2019-02-06 | End: 2019-02-06

## 2019-02-06 RX ADMIN — Medication 80 MG: at 10:06

## 2019-02-06 RX ADMIN — GRANISETRON HYDROCHLORIDE 1 MG: 1 INJECTION INTRAVENOUS at 09:54

## 2019-02-06 RX ADMIN — KETOROLAC TROMETHAMINE 30 MG: 30 INJECTION, SOLUTION INTRAMUSCULAR; INTRAVENOUS at 09:54

## 2019-02-06 RX ADMIN — SODIUM CHLORIDE, POTASSIUM CHLORIDE, SODIUM LACTATE AND CALCIUM CHLORIDE 1000 ML: 600; 310; 30; 20 INJECTION, SOLUTION INTRAVENOUS at 09:54

## 2019-02-06 RX ADMIN — LIDOCAINE HYDROCHLORIDE 40 MG: 20 INJECTION, SOLUTION EPIDURAL; INFILTRATION; INTRACAUDAL; PERINEURAL at 10:08

## 2019-02-06 RX ADMIN — ETOMIDATE 14 MG: 2 INJECTION INTRAVENOUS at 10:05

## 2019-02-06 NOTE — DISCHARGE INSTRUCTIONS
ECT Discharge Instructions      During your ECT series:      Do not drive or work heavy equipment until 7 days after your last treatment.    Do not drink alcohol or use street drugs (illicit drugs) while you are having treatments.    Do not make important decisions, including legal decisions.    After each treatment:      Get plenty of rest. A responsible adult must stay with your for at least 6 hours.    Avoid heavy or risky activities for 24 hours.    If you feel light-headed, sit for a few minutes before standing. Have someone help you get up.    If you have nausea (feel sick to your stomach): Drink only clear liquids such as apple juice, ginger ale, broth or 7UP, Be sure to drink plenty of liquids. Move to a normal diet as you feel able.     If you received Toradol, wait 6 hours before taking ibuprofen.    Call your doctor if:     You have a fever over 100F (37.7 C) (taken under the tongue), or a fever that last more than 24 hours.    Your IV site is red, swollen, very painful or is getting more tender.    You have nausea that gets very bad or does not improve.      If you have any symptoms after ECT, tell our staff before your next treatment.    The ECT Department can be reached at [phone].  The ECT Department is open Mondays, Wednesdays and Fridays from 7:00 AM to 2:00 PM.    To speak to a doctor, call: Dr. El's clinic [phone], Office [phone], Fax [phone]    Other: Today you have received IV Toradol 30 mg at 9:54am for headache/pain.  Toradol is an NSAID. Do no take any NSAIDs  including ibuprofen, Naproxen Sodium, Asprin, Advil, Motrin, Aleve, Jaqueline, Excedrin with Aspirin, etc, Do not take any of these medications until 6 hours after above time (3:54pm).  You also received Kytril 1 mg IV for nausea at 9:54am. NSAID.  If you have any questions please contact your physician, or pharmacist.     Plan: Next ECT 2/8/19   You are considering IOP at [facility]. You can schedule the intake at  [phone] and find it on the website:  [email or web address]  Take Tylenol 650mg with sip of water at home before coming to ECT.    Don't take Neurontin morning of ECT until after ECT is done. Take last does of Neurontin nights before ECT at about 5 pm but not later.        Transported by: Deisy Song

## 2019-02-06 NOTE — ANESTHESIA POSTPROCEDURE EVALUATION
Anesthesia POST Procedure Evaluation    Patient: Stephanie Villanueva   MRN:     [id number] Gender:   female   Age:    18 year old :      2000        Preoperative Diagnosis: * No pre-op diagnosis entered *   * No procedures listed *   Postop Comments: No value filed.       Anesthesia Type:  General    Reportable Event: NO     PAIN: Uncomplicated   Sign Out status: Comfortable, Well controlled pain     PONV: No PONV   Sign Out status:  No Nausea or Vomiting     Neuro/Psych: Uneventful perioperative course   Sign Out Status: Preoperative baseline; Age appropriate mentation     Airway/Resp.: Uneventful perioperative course   Sign Out Status: Non labored breathing, age appropriate RR; Resp. Status within EXPECTED Parameters     CV: Uneventful perioperative course   Sign Out status: Appropriate BP and perfusion indices; Appropriate HR/Rhythm     Disposition:   Sign Out in:  PACU  Disposition:  Phase II; Home  Recovery Course: Uneventful  Follow-Up: Not required           Last Anesthesia Record Vitals:  CRNA VITALS  2019 0944 - 2019 1036      2019             Pulse:  132    Ht Rate:  141    SpO2:  100 %    Resp Rate (set):  8          Last PACU/Preop Vitals:  Vitals:    19 1020 19 1032 19 1035   BP: (!) 153/139 122/82 120/82   Pulse: 116 96 98   Resp: 16  16   Temp: 36.7  C (98.1  F)     SpO2: 93%  98%         Electronically Signed By: Dangelo Fitch MD, 2019, 10:36 AM

## 2019-02-06 NOTE — ANESTHESIA PREPROCEDURE EVALUATION
"Anesthesia Pre-Procedure Evaluation    Patient: Stephanie Villanueva   MRN:     [id number] Gender:   female   Age:    18 year old :      2000        Preoperative Diagnosis: * No pre-op diagnosis entered *   * No procedures listed *     Past Medical History:   Diagnosis Date     Anxiety 2013     CONTUSION OF left iliac crest 9/3/2006     Major depression 2013     Nocturnal enuresis 10/4/2007    Currently resolved.     Salter-Perdomo Type I fracture of distal fibula with routine healing 2013     Uncomplicated asthma     sports induced      No past surgical history on file.              [facility] AN PHYSICAL EXAM      Lab Results   Component Value Date    WBC 5.8 2019    HGB 11.4 (L) 2019    HCT 35.5 2019     2019    SED 10 2008     2019    POTASSIUM 3.7 2019    CHLORIDE 105 2019    CO2 27 2019    BUN 9 2019    CR 0.70 2019    GLC 89 2019    BRITTANIE 8.9 2019    ALBUMIN 4.0 2019    PROTTOTAL 6.9 2019    ALT 17 2019    AST 23 2019    ALKPHOS 75 2019    BILITOTAL 0.5 2019    TSH 5.57 (H) 2019    T4 0.83 2019    HCG Negative 2019       Preop Vitals  BP Readings from Last 3 Encounters:   19 118/73 (64 %/ 74 %)*   19 (!) 121/92 (74 %/ >99 %)*   19 113/70 (36 %/ 63 %)*     *BP percentiles are based on the 2017 AAP Clinical Practice Guideline for girls    Pulse Readings from Last 3 Encounters:   19 92   19 77   19 71      Resp Readings from Last 3 Encounters:   19 16   19 16   19 16    SpO2 Readings from Last 3 Encounters:   19 100%   19 97%   19 99%      Temp Readings from Last 1 Encounters:   19 36.1  C (97  F)    Ht Readings from Last 1 Encounters:   19 1.778 m (5' 10\") (99 %)*     * Growth percentiles are based on CDC (Girls, 2-20 Years) data.      Wt Readings from Last  " "Encounters:   01/29/19 64.8 kg (142 lb 13.7 oz) (72 %)*     * Growth percentiles are based on CDC (Girls, 2-20 Years) data.    Estimated body mass index is 20.5 kg/m  as calculated from the following:    Height as of 1/19/19: 1.778 m (5' 10\").    Weight as of 1/29/19: 64.8 kg (142 lb 13.7 oz).     LDA:            Assessment:   ASA SCORE: 2    NPO Status: > 6 hours since completed Solid Foods; > 2 hours since completed Clear Liquids   Documentation: H&P complete   Proceeding: Proceed without further delay     Plan:   Anes. Type:  General      Induction:  IV (Standard)   Airway: Mask   Access/Monitoring: PIV   Maintenance: Balanced   Emergence: Procedure Site   Logistics: Same Day Surgery     PONV Management:Adult Risk Factors: Female     CONSENT: Direct conversation   Plan and risks discussed with: Patient   Blood Products: Consent Deferred (Minimal Blood Loss)            . Pt has had prior anesthetic.     No history of anesthetic complications          ROS/MED HX    ENT/Pulmonary:     (+)Intermittent asthma , . .    Neurologic:       Cardiovascular:  - neg cardiovascular ROS   (+) ----. : . . . :. . No previous cardiac testing       METS/Exercise Tolerance:  4 - Raking leaves, gardening   Hematologic:  - neg hematologic  ROS       Musculoskeletal:  - neg musculoskeletal ROS       GI/Hepatic:  - neg GI/hepatic ROS       Renal/Genitourinary:  - ROS Renal section negative       Endo:     (+) thyroid problem hypothyroidism, .      Psychiatric:     (+) psychiatric history anxiety and depression      Infectious Disease:  - neg infectious disease ROS       Malignancy:         Other:                         PHYSICAL EXAM:   Mental Status/Neuro: A/A/O   Airway: Facies: Feasible  Mallampati: II  Mouth/Opening: Full  TM distance: > 6 cm  Neck ROM: Full   Respiratory:    CV: Rhythm: Regular  Rate: Age appropriate  Heart: Normal Sounds   Comments:      Dental: Normal                  Lab Results   Component Value Date    WBC 5.8 " "01/19/2019    HGB 11.4 (L) 01/19/2019    HCT 35.5 01/19/2019     01/19/2019    SED 10 11/05/2008     01/21/2019    POTASSIUM 3.7 01/21/2019    CHLORIDE 105 01/21/2019    CO2 27 01/21/2019    BUN 9 01/21/2019    CR 0.70 01/21/2019    GLC 89 01/21/2019    BRITTANIE 8.9 01/21/2019    ALBUMIN 4.0 01/19/2019    PROTTOTAL 6.9 01/19/2019    ALT 17 01/19/2019    AST 23 01/19/2019    ALKPHOS 75 01/19/2019    BILITOTAL 0.5 01/19/2019    TSH 5.57 (H) 01/21/2019    T4 0.83 01/21/2019    HCG Negative 01/19/2019       Preop Vitals  BP Readings from Last 3 Encounters:   02/06/19 118/73 (64 %/ 74 %)*   02/04/19 (!) 121/92 (74 %/ >99 %)*   02/01/19 113/70 (36 %/ 63 %)*     *BP percentiles are based on the August 2017 AAP Clinical Practice Guideline for girls    Pulse Readings from Last 3 Encounters:   02/06/19 92   02/04/19 77   02/01/19 71      Resp Readings from Last 3 Encounters:   02/06/19 16   02/04/19 16   02/01/19 16    SpO2 Readings from Last 3 Encounters:   02/06/19 100%   02/04/19 97%   02/01/19 99%      Temp Readings from Last 1 Encounters:   02/06/19 36.1  C (97  F)    Ht Readings from Last 1 Encounters:   01/19/19 1.778 m (5' 10\") (99 %)*     * Growth percentiles are based on CDC (Girls, 2-20 Years) data.      Wt Readings from Last 1 Encounters:   01/29/19 64.8 kg (142 lb 13.7 oz) (72 %)*     * Growth percentiles are based on CDC (Girls, 2-20 Years) data.    Estimated body mass index is 20.5 kg/m  as calculated from the following:    Height as of 1/19/19: 1.778 m (5' 10\").    Weight as of 1/29/19: 64.8 kg (142 lb 13.7 oz).     LDA:                           MD Dangelo Bone MD  "

## 2019-02-06 NOTE — PROCEDURES
Procedure/Surgery Information   [facility], [place]    Bedside Procedure Note  Date of Service (when I performed the procedure): 02/06/2019    Stephanie Villanueva is a 18 year old female patient.  1. 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     Salter-Perdomo Type I fracture of distal fibula with routine healing 1/25/2013     Uncomplicated asthma     sports induced     Temp: 97  F (36.1  C)   BP: 118/73 Pulse: 92   Resp: 16 SpO2: 100 %        Procedures     Yaron El     [facility], [place]   ECT Procedure Note  02/06/2019    Stehpanie Villanueva [id number]   18 year old 2000     Patient Status: Inpatient    Is this the first in a series of 12 treatments?  No    History and Physical: Reviewed in medical record    Consent: Informed consent was signed by: patient    Date Consent Signed: 1/23/19      Allergies   Allergen Reactions     No Known Drug Allergies        Weight:  0 lbs 0 oz              Indications for ECT:   Medications ineffective         Clinical Narrative:   Patient was admitted with worsening depression and suicidal ideation.  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  NPO after 2400.  Alert and Oriented x 3.  Mood is 70% better.  The SI is not gone.  No plan or intent to hurt self.  Able to contract for safety.            Diagnosis:   Major depression         Pause for the Cause:     Right patient Yes   Right procedure/laterality settings: Yes          Intra-Procedure Documentation:     ECT #: 7   Treatment number this series: 7   Total treatment number: 7     Type of ECT:  Right, unilateral ultrabrief    ECT Medications:    Ibuprofen:  600mg po  Lactated Ringers:  1 liter  Granisetron:  1mg  Toradol:  30mg  Lidocaine:  40mg  Etomidate: 14mg  Succinyl Choline: 80mg     ECT Strip Summary:   Energy Level: 10 percent   Motor Seizure Duration:  51 seconds  EEG Seizure Duration:   65 seconds    Complications: No    Plan:   Next ECT 2/8/19  She's considering IOP at [facility].   Tell them they can schedule the intake at [phone] and find it on the website:  [email or web address]  Have her take Tylenol 650mg po at home before coming to ECT.    Don't take Neurontin morning of ECT until after ECT is done.   Take last does of Neurontin nights before ECT at about 5 pm but not later.         Yaron El MD

## 2019-02-06 NOTE — IP AVS SNAPSHOT
[facility]  [facility] [address] [address]  Phone:  [phone]                                    After Visit Summary   2/6/2019    Stephanie Villanueva    MRN: [id number]           After Visit Summary Signature Page    I have received my discharge instructions, and my questions have been answered. I have discussed any challenges I see with this plan with the nurse or doctor.    ..........................................................................................................................................  Patient/Patient Representative Signature      ..........................................................................................................................................  Patient Representative Print Name and Relationship to Patient    ..................................................               ................................................  Date                                   Time    ..........................................................................................................................................  Reviewed by Signature/Title    ...................................................              ..............................................  Date                                               Time          22EPIC Rev 08/18

## 2019-02-08 ENCOUNTER — ANESTHESIA EVENT (OUTPATIENT)
Dept: BEHAVIORAL HEALTH | Facility: CLINIC | Age: 19
End: 2019-02-08

## 2019-02-08 ENCOUNTER — HOSPITAL ENCOUNTER (OUTPATIENT)
Dept: BEHAVIORAL HEALTH | Facility: CLINIC | Age: 19
End: 2019-02-08
Attending: PSYCHIATRY & NEUROLOGY
Payer: COMMERCIAL

## 2019-02-08 ENCOUNTER — ANESTHESIA (OUTPATIENT)
Dept: BEHAVIORAL HEALTH | Facility: CLINIC | Age: 19
End: 2019-02-08

## 2019-02-08 VITALS
HEART RATE: 75 BPM | RESPIRATION RATE: 16 BRPM | DIASTOLIC BLOOD PRESSURE: 89 MMHG | OXYGEN SATURATION: 100 % | TEMPERATURE: 98.1 F | SYSTOLIC BLOOD PRESSURE: 127 MMHG

## 2019-02-08 DIAGNOSIS — F33.2 SEVERE RECURRENT MAJOR DEPRESSION WITHOUT PSYCHOTIC FEATURES (H): Primary | ICD-10-CM

## 2019-02-08 PROCEDURE — 25000125 ZZHC RX 250: Performed by: PSYCHIATRY & NEUROLOGY

## 2019-02-08 PROCEDURE — 25000128 H RX IP 250 OP 636: Performed by: ANESTHESIOLOGY

## 2019-02-08 PROCEDURE — 25000128 H RX IP 250 OP 636: Performed by: PSYCHIATRY & NEUROLOGY

## 2019-02-08 PROCEDURE — 25000125 ZZHC RX 250: Performed by: ANESTHESIOLOGY

## 2019-02-08 PROCEDURE — 40000671 ZZH STATISTIC ANESTHESIA CASE

## 2019-02-08 PROCEDURE — 90870 ELECTROCONVULSIVE THERAPY: CPT

## 2019-02-08 RX ORDER — ETOMIDATE 2 MG/ML
INJECTION INTRAVENOUS PRN
Status: DISCONTINUED | OUTPATIENT
Start: 2019-02-08 | End: 2019-02-08

## 2019-02-08 RX ORDER — ONDANSETRON 4 MG/1
4 TABLET, ORALLY DISINTEGRATING ORAL EVERY 30 MIN PRN
Status: CANCELLED | OUTPATIENT
Start: 2019-02-08

## 2019-02-08 RX ORDER — KETOROLAC TROMETHAMINE 30 MG/ML
30 INJECTION, SOLUTION INTRAMUSCULAR; INTRAVENOUS
Status: CANCELLED
Start: 2019-02-08 | End: 2019-02-08

## 2019-02-08 RX ORDER — LIDOCAINE HYDROCHLORIDE 20 MG/ML
40 INJECTION, SOLUTION EPIDURAL; INFILTRATION; INTRACAUDAL; PERINEURAL
Status: COMPLETED | OUTPATIENT
Start: 2019-02-08 | End: 2019-02-08

## 2019-02-08 RX ORDER — GRANISETRON HYDROCHLORIDE 1 MG/ML
1 INJECTION INTRAVENOUS ONCE
Status: COMPLETED | OUTPATIENT
Start: 2019-02-08 | End: 2019-02-08

## 2019-02-08 RX ORDER — MEPERIDINE HYDROCHLORIDE 50 MG/ML
12.5 INJECTION INTRAMUSCULAR; INTRAVENOUS; SUBCUTANEOUS
Status: CANCELLED | OUTPATIENT
Start: 2019-02-08

## 2019-02-08 RX ORDER — GRANISETRON HYDROCHLORIDE 1 MG/ML
1 INJECTION INTRAVENOUS ONCE
Status: CANCELLED
Start: 2019-02-08 | End: 2019-02-08

## 2019-02-08 RX ORDER — ONDANSETRON 2 MG/ML
4 INJECTION INTRAMUSCULAR; INTRAVENOUS EVERY 30 MIN PRN
Status: CANCELLED | OUTPATIENT
Start: 2019-02-08

## 2019-02-08 RX ORDER — SODIUM CHLORIDE, SODIUM LACTATE, POTASSIUM CHLORIDE, CALCIUM CHLORIDE 600; 310; 30; 20 MG/100ML; MG/100ML; MG/100ML; MG/100ML
INJECTION, SOLUTION INTRAVENOUS CONTINUOUS
Status: CANCELLED | OUTPATIENT
Start: 2019-02-08

## 2019-02-08 RX ORDER — NALOXONE HYDROCHLORIDE 0.4 MG/ML
.1-.4 INJECTION, SOLUTION INTRAMUSCULAR; INTRAVENOUS; SUBCUTANEOUS
Status: CANCELLED | OUTPATIENT
Start: 2019-02-08 | End: 2019-02-09

## 2019-02-08 RX ORDER — LIDOCAINE HYDROCHLORIDE 20 MG/ML
40 INJECTION, SOLUTION EPIDURAL; INFILTRATION; INTRACAUDAL; PERINEURAL
Status: CANCELLED
Start: 2019-02-08 | End: 2019-02-08

## 2019-02-08 RX ORDER — KETOROLAC TROMETHAMINE 30 MG/ML
30 INJECTION, SOLUTION INTRAMUSCULAR; INTRAVENOUS
Status: COMPLETED | OUTPATIENT
Start: 2019-02-08 | End: 2019-02-08

## 2019-02-08 RX ADMIN — ETOMIDATE 14 MG: 2 INJECTION INTRAVENOUS at 10:00

## 2019-02-08 RX ADMIN — Medication 80 MG: at 10:00

## 2019-02-08 RX ADMIN — GRANISETRON HYDROCHLORIDE 1 MG: 1 INJECTION INTRAVENOUS at 09:55

## 2019-02-08 RX ADMIN — LIDOCAINE HYDROCHLORIDE 40 MG: 20 INJECTION, SOLUTION EPIDURAL; INFILTRATION; INTRACAUDAL; PERINEURAL at 10:01

## 2019-02-08 RX ADMIN — KETOROLAC TROMETHAMINE 30 MG: 30 INJECTION, SOLUTION INTRAMUSCULAR; INTRAVENOUS at 09:56

## 2019-02-08 RX ADMIN — SODIUM CHLORIDE, POTASSIUM CHLORIDE, SODIUM LACTATE AND CALCIUM CHLORIDE 1000 ML: 600; 310; 30; 20 INJECTION, SOLUTION INTRAVENOUS at 09:55

## 2019-02-08 NOTE — IP AVS SNAPSHOT
[facility]  [facility] [address] [address]  Phone:  [phone]                                    After Visit Summary   2/8/2019    Stephanie Villanueva    MRN: [id number]           After Visit Summary Signature Page    I have received my discharge instructions, and my questions have been answered. I have discussed any challenges I see with this plan with the nurse or doctor.    ..........................................................................................................................................  Patient/Patient Representative Signature      ..........................................................................................................................................  Patient Representative Print Name and Relationship to Patient    ..................................................               ................................................  Date                                   Time    ..........................................................................................................................................  Reviewed by Signature/Title    ...................................................              ..............................................  Date                                               Time          22EPIC Rev 08/18

## 2019-02-08 NOTE — ANESTHESIA PREPROCEDURE EVALUATION
"Anesthesia Pre-Procedure Evaluation    Patient: Stephanie Villanueva   MRN:     [id number] Gender:   female   Age:    18 year old :      2000        Preoperative Diagnosis: * No pre-op diagnosis entered *   * No procedures listed *     Past Medical History:   Diagnosis Date     Anxiety 2013     CONTUSION OF left iliac crest 9/3/2006     Major depression 2013     Nocturnal enuresis 10/4/2007    Currently resolved.     Salter-Perdomo Type I fracture of distal fibula with routine healing 2013     Uncomplicated asthma     sports induced      No past surgical history on file.            [facility] AN PHYSICAL EXAM    Lab Results   Component Value Date    WBC 5.8 2019    HGB 11.4 (L) 2019    HCT 35.5 2019     2019    SED 10 2008     2019    POTASSIUM 3.7 2019    CHLORIDE 105 2019    CO2 27 2019    BUN 9 2019    CR 0.70 2019    GLC 89 2019    BRITTANIE 8.9 2019    ALBUMIN 4.0 2019    PROTTOTAL 6.9 2019    ALT 17 2019    AST 23 2019    ALKPHOS 75 2019    BILITOTAL 0.5 2019    TSH 5.57 (H) 2019    T4 0.83 2019    HCG Negative 2019       Preop Vitals  BP Readings from Last 3 Encounters:   19 127/71 (86 %/ 67 %)*   19 120/85 (71 %/ >99 %)*   19 (!) 121/92 (74 %/ >99 %)*     *BP percentiles are based on the 2017 AAP Clinical Practice Guideline for girls    Pulse Readings from Last 3 Encounters:   19 86   19 77   19 77      Resp Readings from Last 3 Encounters:   19 16   19 16   19 16    SpO2 Readings from Last 3 Encounters:   19 100%   19 99%   19 97%      Temp Readings from Last 1 Encounters:   19 36.7  C (98  F)    Ht Readings from Last 1 Encounters:   19 1.778 m (5' 10\") (99 %)*     * Growth percentiles are based on CDC (Girls, 2-20 Years) data.      Wt Readings from Last  " Ssc sent referral to :      [facility] ( [phone]) ( [phone])        [facility] ( [phone]) ( f [phone]).         Antonia/neeraj   "Encounters:   01/29/19 64.8 kg (142 lb 13.7 oz) (72 %)*     * Growth percentiles are based on CDC (Girls, 2-20 Years) data.    Estimated body mass index is 20.5 kg/m  as calculated from the following:    Height as of 1/19/19: 1.778 m (5' 10\").    Weight as of 1/29/19: 64.8 kg (142 lb 13.7 oz).     LDA:            Assessment:   ASA SCORE: 2    NPO Status: > 6 hours since completed Solid Foods   Documentation: H&P complete; Preop Testing complete; Consents complete   Proceeding: Proceed without further delay  Tobacco Use:  NO Active use of Tobacco/UNKNOWN Tobacco use status     Plan:   Anes. Type:  General      Induction:  IV (Standard)   Airway: Native Airway   Access/Monitoring: PIV   Maintenance: Balanced   Emergence: Procedure Site   Logistics: Same Day Surgery     Postop Pain/Sedation Strategy:  Standard-Options: Opioids PRN     PONV Management:  Adult Risk Factors: Female, Non-Smoker, Postop Opioids     CONSENT: Direct conversation   Plan and risks discussed with: Patient   Blood Products: Consent Deferred (Minimal Blood Loss)                         Kwame Christensen MD  "

## 2019-02-08 NOTE — PROCEDURES
Procedure/Surgery Information   [facility], [place]    Bedside Procedure Note  Date of Service (when I performed the procedure): 02/08/2019    Stephanie Villanueva is a 18 year old female patient.  1. 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     Salter-Perdomo Type I fracture of distal fibula with routine healing 1/25/2013     Uncomplicated asthma     sports induced     Temp: 98  F (36.7  C)   BP: 127/71 Pulse: 86   Resp: 16 SpO2: 100 %        Procedures     Yaron El     [facility], [place]   ECT Procedure Note  02/08/2019    Stephanie Villanueva [id number]   18 year old 2000     Patient Status: Inpatient    Is this the first in a series of 12 treatments?  No    History and Physical: Reviewed in medical record    Consent: Informed consent was signed by: patient    Date Consent Signed: 1/23/19      Allergies   Allergen Reactions     No Known Drug Allergies        Weight:  0 lbs 0 oz              Indications for ECT:   Medications ineffective         Clinical Narrative:   Patient was admitted with worsening depression and suicidal ideation.  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  NPO after 2400.  Alert and Oriented x 3.  Mood is back to normal.  The SI is not gone.  No plan or intent to hurt self.  Able to contract for safety.            Diagnosis:   Major depression         Pause for the Cause:     Right patient Yes   Right procedure/laterality settings: Yes          Intra-Procedure Documentation:     ECT #: 8   Treatment number this series: 8   Total treatment number: 8     Type of ECT:  Right, unilateral ultrabrief    ECT Medications:    Ibuprofen:  600mg po  Lactated Ringers:  1 liter  Granisetron:  1mg  Toradol:  30mg  Lidocaine:  40mg  Etomidate: 14mg  Succinyl Choline: 80mg     ECT Strip Summary:   Energy Level: 10 percent   Motor Seizure Duration:  50 seconds  EEG Seizure Duration:   58 seconds    Complications: No    Plan:   Next ECT 2/11/19.  That will be her final treatment.      She's planning on attending IOP at [facility].  If she didn't already,  schedule the intake at [phone] and find it on the website:  [email or web address]  Have her take Tylenol 650mg po at home before coming to ECT.    Don't take Neurontin morning of ECT until after ECT is done.   Take last does of Neurontin nights before ECT at about 5 pm but not later.         Yaron El MD

## 2019-02-08 NOTE — ANESTHESIA POSTPROCEDURE EVALUATION
Anesthesia POST Procedure Evaluation    Patient: Stephanie Villanueva   MRN:     [id number] Gender:   female   Age:    18 year old :      2000        Preoperative Diagnosis: * No pre-op diagnosis entered *   * No procedures listed *   Postop Comments: No value filed.       Anesthesia Type:  General    Reportable Event: NO     PAIN: Uncomplicated   Sign Out status: Comfortable, Well controlled pain     PONV: No PONV   Sign Out status:  No Nausea or Vomiting     Neuro/Psych: Uneventful perioperative course   Sign Out Status: Preoperative baseline; Age appropriate mentation     Airway/Resp.: Uneventful perioperative course   Sign Out Status: Non labored breathing, age appropriate RR; Resp. Status within EXPECTED Parameters     CV: Uneventful perioperative course   Sign Out status: Appropriate BP and perfusion indices; Appropriate HR/Rhythm     Disposition:   Sign Out in:  PACU  Disposition:  Phase II; Home  Recovery Course: Uneventful  Follow-Up: Not required           Last Anesthesia Record Vitals:      Last PACU/Preop Vitals:  Vitals:    19 0930 19 1010   BP: 127/71 (!) 145/92   Pulse: 86 100   Resp: 16 16   Temp: 36.7  C (98  F) 36.7  C (98.1  F)   SpO2: 100% 98%         Electronically Signed By: Kwame Christensen MD, 2019, 10:21 AM

## 2019-02-08 NOTE — OR NURSING
Pt discharged from recovery room via wheelchair to discharge room at this time.  IV fluids still infusing in discharge room, discharge RN Josephine to remove PIV when IV fluids finish infusing.

## 2019-02-08 NOTE — IP AVS SNAPSHOT
MRN:[id number]                      After Visit Summary   2/8/2019    Stephanie Villanueva    MRN: [id number]           Visit Information        Provider Department      2/8/2019  9:30 AM Yaron El MD [facility]           Review of your medicines      CONTINUE these medicines which have NOT CHANGED       Dose / Directions   desmopressin 0.2 MG tablet  Commonly known as:  DDAVP      Dose:  0.6 mg  Take 0.6 mg by mouth At Bedtime  Refills:  0     gabapentin 300 MG capsule  Commonly known as:  NEURONTIN  Used for:  Anxiety      Dose:  300 mg  Take 1 capsule (300 mg) by mouth 3 times daily  Quantity:  90 capsule  Refills:  0              Protect others around you: Learn how to safely use, store and throw away your medicines at www.disposemymeds.org.       Follow-ups after your visit       Your next 10 appointments already scheduled    Feb 11, 2019  9:00 AM CST  Electroconvulsive Therapy with Yaron El MD  [facility] ([facility]) [address] [facility] [address]  [phone]   Feb 14, 2019 10:40 AM CST  SHORT with Jose Rodríguez MD  [facility] ([facility]) [address] [facility], [place] [id number] [phone]      Care Instructions       Further instructions from your care team       ECT Discharge Instructions      During your ECT series:      Do not drive or work heavy equipment until 7 days after your last treatment.    Do not drink alcohol or use street drugs (illicit drugs) while you are having treatments.    Do not make important decisions, including legal decisions.    After each treatment:      Get plenty of rest. A responsible adult must stay with your for at least 6 hours.    Avoid heavy or risky activities for 24 hours.     Do not drive for at least 24 hours after your treatment.     If you have more than one treatment within one  week, do not drive for 7 days after your last treatment.     If you feel light-headed, sit for a few minutes before standing. Have someone help you get up.    If you have nausea (feel sick to your stomach): Drink only clear liquids such as apple juice, ginger ale, broth or 7UP, Be sure to drink plenty of liquids. Move to a normal diet as you feel able.     If you received Toradol, wait 6 hours before taking ibuprofen.    Call your doctor if:     You have a fever over 100F (37.7 C) (taken under the tongue), or a fever that last more than 24 hours.    Your IV site is red, swollen, very painful or is getting more tender.    You have nausea that gets very bad or does not improve.      If you have any symptoms after ECT, tell our staff before your next treatment.    The ECT Department can be reached at [phone].  The ECT Department is open Mondays, Wednesdays and Fridays from 7:00 AM to 2:00 PM.    To speak to a doctor, call:  Your primary care provider or Dr. El [phone]    New instructions:    Next ECT 2/11/19 at 9:00 am.  That will be your final treatment.        If you have not yet scheduled the intake for IOP at [facility], please call [phone] and find it on the website:  [email or web address]    Take Tylenol 650mg po at home before coming to ECT.      Don't take Neurontin morning of ECT until after ECT is done.   Take last does of Neurontin nights before ECT at about 5 pm but not later.       Other:   You have received Toradol today at 9:56 am. Toradol is an NSAID.  Do not take any NSAID's including: Ibuprofen, Naproxen Sodium, Asprin, Advil, Motrin, Aleve, Jaqueline, etc., until six hours after the above time which would be 3:56 pm. If you have any questions check back with your Physician, or pharmacist.     You also received Kytril 1 mg at 9:56 am to prevent nausea.     Transported by:     Father Clyde          Additional Information About Your Visit       [employer] Information    [employer] lets you send  "messages to your doctor, view your test results, renew your prescriptions, schedule appointments and more. To sign up, go to [email or web address] . Click on \"Log in\" on the left side of the screen, which will take you to the Welcome page. Then click on \"Sign up Now\" on the right side of the page.     You will be asked to enter the access code listed below, as well as some personal information. Please follow the directions to create your username and password.     Your access code is: [email or web address]  Expires: 3/5/2019  1:51 PM     Your access code will  in 90 days. If you need help or a new code, please call your [facility] clinic or [phone].       Care EveryWhere ID    This is your Care EveryWhere ID. This could be used by other organizations to access your [facility] medical records  [id number]       Your Vitals Were     Blood Pressure   145/92      Pulse   100    Temperature   98.1  F (36.7  C) (Temporal)    Respirations   16    Pulse Oximetry   98%          Primary Care Provider Office Phone # Fax #    Jose MD Marcos [phone]      Equal Access to Services    TE[facility]IN [facility] Hospital[facility]ABIOLA : Hadii antony Turner, waaxda luqadaha, qaybta kaalmada adesantayada, mathew brian . So [facility] [phone].    ATENCIÓN: Si habla español, tiene a austin disposición servicios gratuitos de asistencia lingüística. Sharmila al [phone].    We comply with applicable federal civil rights laws and [place] laws. We do not discriminate on the basis of race, color, national origin, age, disability, sex, sexual orientation, or gender identity.           Thank you!    Thank you for choosing [facility] for your care. Our goal is always to provide you with excellent care. Hearing back from our patients is one way we can continue to improve our services. Please take a few minutes to complete the written survey that you may receive in the mail after you visit with us. Thank you!          "   Medication List      Medications       Morning Afternoon Evening Bedtime As Needed   desmopressin 0.2 MG tablet  Also known as:  DDAVP  INSTRUCTIONS:  Take 0.6 mg by mouth At Bedtime                    gabapentin 300 MG capsule  Also known as:  NEURONTIN  INSTRUCTIONS:  Take 1 capsule (300 mg) by mouth 3 times daily

## 2019-02-08 NOTE — DISCHARGE INSTRUCTIONS
ECT Discharge Instructions      During your ECT series:      Do not drive or work heavy equipment until 7 days after your last treatment.    Do not drink alcohol or use street drugs (illicit drugs) while you are having treatments.    Do not make important decisions, including legal decisions.    After each treatment:      Get plenty of rest. A responsible adult must stay with your for at least 6 hours.    Avoid heavy or risky activities for 24 hours.     Do not drive for at least 24 hours after your treatment.     If you have more than one treatment within one week, do not drive for 7 days after your last treatment.     If you feel light-headed, sit for a few minutes before standing. Have someone help you get up.    If you have nausea (feel sick to your stomach): Drink only clear liquids such as apple juice, ginger ale, broth or 7UP, Be sure to drink plenty of liquids. Move to a normal diet as you feel able.     If you received Toradol, wait 6 hours before taking ibuprofen.    Call your doctor if:     You have a fever over 100F (37.7 C) (taken under the tongue), or a fever that last more than 24 hours.    Your IV site is red, swollen, very painful or is getting more tender.    You have nausea that gets very bad or does not improve.      If you have any symptoms after ECT, tell our staff before your next treatment.    The ECT Department can be reached at [phone].  The ECT Department is open Mondays, Wednesdays and Fridays from 7:00 AM to 2:00 PM.    To speak to a doctor, call:  Your primary care provider or Dr. El [phone]    New instructions:    Next ECT 2/11/19 at 9:00 am.  That will be your final treatment.        If you have not yet scheduled the intake for IOP at [facility], please call [phone] and find it on the website:  [email or web address]    Take Tylenol 650mg po at home before coming to ECT.      Don't take Neurontin morning of ECT until after ECT is done.   Take last does of Neurontin nights  before ECT at about 5 pm but not later.       Other:   You have received Toradol today at 9:56 am. Toradol is an NSAID.  Do not take any NSAID's including: Ibuprofen, Naproxen Sodium, Asprin, Advil, Motrin, Aleve, Jaqueline, etc., until six hours after the above time which would be 3:56 pm. If you have any questions check back with your Physician, or pharmacist.     You also received Kytril 1 mg at 9:56 am to prevent nausea.     Transported by:     Clyde Cook

## 2019-02-11 ENCOUNTER — ANESTHESIA EVENT (OUTPATIENT)
Dept: BEHAVIORAL HEALTH | Facility: CLINIC | Age: 19
End: 2019-02-11

## 2019-02-11 ENCOUNTER — HOSPITAL ENCOUNTER (OUTPATIENT)
Dept: BEHAVIORAL HEALTH | Facility: CLINIC | Age: 19
End: 2019-02-11
Attending: PSYCHIATRY & NEUROLOGY
Payer: COMMERCIAL

## 2019-02-11 ENCOUNTER — ANESTHESIA (OUTPATIENT)
Dept: BEHAVIORAL HEALTH | Facility: CLINIC | Age: 19
End: 2019-02-11
Payer: COMMERCIAL

## 2019-02-11 VITALS
DIASTOLIC BLOOD PRESSURE: 88 MMHG | TEMPERATURE: 98.4 F | SYSTOLIC BLOOD PRESSURE: 125 MMHG | OXYGEN SATURATION: 95 % | HEART RATE: 108 BPM | RESPIRATION RATE: 16 BRPM

## 2019-02-11 DIAGNOSIS — F33.2 SEVERE RECURRENT MAJOR DEPRESSION WITHOUT PSYCHOTIC FEATURES (H): Primary | ICD-10-CM

## 2019-02-11 PROCEDURE — 25000125 ZZHC RX 250: Performed by: ANESTHESIOLOGY

## 2019-02-11 PROCEDURE — 25000128 H RX IP 250 OP 636: Performed by: ANESTHESIOLOGY

## 2019-02-11 PROCEDURE — 40000671 ZZH STATISTIC ANESTHESIA CASE

## 2019-02-11 PROCEDURE — 25000125 ZZHC RX 250: Performed by: PSYCHIATRY & NEUROLOGY

## 2019-02-11 PROCEDURE — 90870 ELECTROCONVULSIVE THERAPY: CPT

## 2019-02-11 PROCEDURE — 25000128 H RX IP 250 OP 636: Performed by: PSYCHIATRY & NEUROLOGY

## 2019-02-11 RX ORDER — KETOROLAC TROMETHAMINE 30 MG/ML
30 INJECTION, SOLUTION INTRAMUSCULAR; INTRAVENOUS
Status: CANCELLED
Start: 2019-02-11 | End: 2019-02-11

## 2019-02-11 RX ORDER — GRANISETRON HYDROCHLORIDE 1 MG/ML
1 INJECTION INTRAVENOUS ONCE
Status: CANCELLED
Start: 2019-02-11 | End: 2019-02-11

## 2019-02-11 RX ORDER — LIDOCAINE HYDROCHLORIDE 20 MG/ML
40 INJECTION, SOLUTION EPIDURAL; INFILTRATION; INTRACAUDAL; PERINEURAL
Status: CANCELLED
Start: 2019-02-11 | End: 2019-02-11

## 2019-02-11 RX ORDER — LIDOCAINE HYDROCHLORIDE 20 MG/ML
40 INJECTION, SOLUTION EPIDURAL; INFILTRATION; INTRACAUDAL; PERINEURAL
Status: COMPLETED | OUTPATIENT
Start: 2019-02-11 | End: 2019-02-11

## 2019-02-11 RX ORDER — GRANISETRON HYDROCHLORIDE 1 MG/ML
1 INJECTION INTRAVENOUS ONCE
Status: COMPLETED | OUTPATIENT
Start: 2019-02-11 | End: 2019-02-11

## 2019-02-11 RX ORDER — ETOMIDATE 2 MG/ML
INJECTION INTRAVENOUS PRN
Status: DISCONTINUED | OUTPATIENT
Start: 2019-02-11 | End: 2019-02-11

## 2019-02-11 RX ORDER — KETOROLAC TROMETHAMINE 30 MG/ML
30 INJECTION, SOLUTION INTRAMUSCULAR; INTRAVENOUS
Status: ACTIVE | OUTPATIENT
Start: 2019-02-11 | End: 2019-02-11

## 2019-02-11 RX ADMIN — LIDOCAINE HYDROCHLORIDE 40 MG: 20 INJECTION, SOLUTION EPIDURAL; INFILTRATION; INTRACAUDAL; PERINEURAL at 09:54

## 2019-02-11 RX ADMIN — GRANISETRON HYDROCHLORIDE 1 MG: 1 INJECTION INTRAVENOUS at 09:46

## 2019-02-11 RX ADMIN — Medication 80 MG: at 09:55

## 2019-02-11 RX ADMIN — ETOMIDATE 14 MG: 2 INJECTION INTRAVENOUS at 09:55

## 2019-02-11 RX ADMIN — SODIUM CHLORIDE, POTASSIUM CHLORIDE, SODIUM LACTATE AND CALCIUM CHLORIDE 1000 ML: 600; 310; 30; 20 INJECTION, SOLUTION INTRAVENOUS at 09:46

## 2019-02-11 NOTE — IP AVS SNAPSHOT
[facility]  [facility] [address] [address]  Phone:  [phone]                                    After Visit Summary   2/11/2019    Stephanie Villanueva    MRN: [id number]           After Visit Summary Signature Page    I have received my discharge instructions, and my questions have been answered. I have discussed any challenges I see with this plan with the nurse or doctor.    ..........................................................................................................................................  Patient/Patient Representative Signature      ..........................................................................................................................................  Patient Representative Print Name and Relationship to Patient    ..................................................               ................................................  Date                                   Time    ..........................................................................................................................................  Reviewed by Signature/Title    ...................................................              ..............................................  Date                                               Time          22EPIC Rev 08/18

## 2019-02-11 NOTE — DISCHARGE INSTRUCTIONS
ECT Discharge Instructions      During your ECT series:      Do not drive or work heavy equipment until 7 days after your last treatment.    Do not drink alcohol or use street drugs (illicit drugs) while you are having treatments.    Do not make important decisions, including legal decisions.    After each treatment:      Get plenty of rest. A responsible adult must stay with your for at least 6 hours.    Avoid heavy or risky activities for 24 hours.    If you feel light-headed, sit for a few minutes before standing. Have someone help you get up.    If you have nausea (feel sick to your stomach): Drink only clear liquids such as apple juice, ginger ale, broth or 7UP, Be sure to drink plenty of liquids. Move to a normal diet as you feel able.     If you received Toradol, wait 6 hours before taking ibuprofen.    Call your doctor if:     You have a fever over 100F (37.7 C) (taken under the tongue), or a fever that last more than 24 hours.    Your IV site is red, swollen, very painful or is getting more tender.    You have nausea that gets very bad or does not improve.      If you have any symptoms after ECT, tell our staff before your next treatment.    The ECT Department can be reached at [phone].  The ECT Department is open Mondays, Wednesdays and Fridays from 7:00 AM to 2:00 PM.      To speak to a doctor, call: Dr. El's clinic [phone], Office [phone], Fax [phone]    Other: You took 600 mg Ibuprofen at home before ECT at 9AM. Do not take any more Ibuprofen or NSAIDS (Aspirin, Aleve, Advil. Execedrin, Motrin, etc.) until 6 hrs after last dose (3 PM). You also received Kytril, 1 mg IV for nausea at 9:45 AM. If you have any questions, lease call your pharmacist.    Plan:   This ends your series.      You'll be attending IOP at [facility].   Don't take Neurontin morning of ECT until after ECT is done.Take last does of Neurontin nights before ECT at about 5 pm but not later.              Transported by:  Mother, Deisy

## 2019-02-11 NOTE — ANESTHESIA POSTPROCEDURE EVALUATION
Anesthesia POST Procedure Evaluation    Patient: Stephanie Villanueva   MRN:     [id number] Gender:   female   Age:    18 year old :      2000        Preoperative Diagnosis: * No pre-op diagnosis entered *   * No procedures listed *   Postop Comments: No value filed.       Anesthesia Type:  General    Reportable Event: NO     PAIN: Uncomplicated   Sign Out status: Comfortable, Well controlled pain     PONV: No PONV   Sign Out status:  No Nausea or Vomiting     Neuro/Psych: Uneventful perioperative course   Sign Out Status: Preoperative baseline; Age appropriate mentation     Airway/Resp.: Uneventful perioperative course   Sign Out Status: Non labored breathing, age appropriate RR; Resp. Status within EXPECTED Parameters     CV: Uneventful perioperative course   Sign Out status: Appropriate BP and perfusion indices; Appropriate HR/Rhythm     Disposition:   Sign Out in:  PACU  Disposition:  Phase II; Home  Recovery Course: Uneventful  Follow-Up: Not required           Last Anesthesia Record Vitals:  CRNA VITALS  2019 0935 - 2019 1012      2019             Pulse:  95    SpO2:  98 %    Resp Rate (set):  8          Last PACU/Preop Vitals:  Vitals:    19 0918 19 1005   BP: 118/72 117/76   Pulse: 104 96   Resp: 16 16   Temp: 36.1  C (97  F) 37.4  C (99.4  F)   SpO2: 100% 99%         Electronically Signed By: Marilee Hernandez MD, 2019, 10:12 AM

## 2019-02-11 NOTE — ANESTHESIA PREPROCEDURE EVALUATION
"Anesthesia Pre-Procedure Evaluation    Patient: Stephanie Villanueva   MRN:     [id number] Gender:   female   Age:    18 year old :      2000        Preoperative Diagnosis: * No pre-op diagnosis entered *   * No procedures listed *     Past Medical History:   Diagnosis Date     Anxiety 2013     CONTUSION OF left iliac crest 9/3/2006     Major depression 2013     Nocturnal enuresis 10/4/2007    Currently resolved.     Salter-Perdomo Type I fracture of distal fibula with routine healing 2013     Uncomplicated asthma     sports induced      No past surgical history on file.              [facility] AN PHYSICAL EXAM      Lab Results   Component Value Date    WBC 5.8 2019    HGB 11.4 (L) 2019    HCT 35.5 2019     2019    SED 10 2008     2019    POTASSIUM 3.7 2019    CHLORIDE 105 2019    CO2 27 2019    BUN 9 2019    CR 0.70 2019    GLC 89 2019    BRITTANIE 8.9 2019    ALBUMIN 4.0 2019    PROTTOTAL 6.9 2019    ALT 17 2019    AST 23 2019    ALKPHOS 75 2019    BILITOTAL 0.5 2019    TSH 5.57 (H) 2019    T4 0.83 2019    HCG Negative 2019       Preop Vitals  BP Readings from Last 3 Encounters:   19 118/72 (64 %/ 70 %)*   19 127/89 (86 %/ >99 %)*   19 120/85 (71 %/ >99 %)*     *BP percentiles are based on the 2017 AAP Clinical Practice Guideline for girls    Pulse Readings from Last 3 Encounters:   19 104   19 75   19 77      Resp Readings from Last 3 Encounters:   19 16   19 16   19 16    SpO2 Readings from Last 3 Encounters:   19 100%   19 100%   19 99%      Temp Readings from Last 1 Encounters:   19 36.1  C (97  F)    Ht Readings from Last 1 Encounters:   19 1.778 m (5' 10\") (99 %)*     * Growth percentiles are based on CDC (Girls, 2-20 Years) data.      Wt Readings from Last " "Encounters:   01/29/19 64.8 kg (142 lb 13.7 oz) (72 %)*     * Growth percentiles are based on CDC (Girls, 2-20 Years) data.    Estimated body mass index is 20.5 kg/m  as calculated from the following:    Height as of 1/19/19: 1.778 m (5' 10\").    Weight as of 1/29/19: 64.8 kg (142 lb 13.7 oz).     LDA:  Peripheral IV 02/11/19 Left Upper arm (Active)   Site Assessment WDL 2/11/2019  9:46 AM   Line Status Infusing 2/11/2019  9:46 AM   Dressing Intervention New dressing  2/11/2019  9:46 AM   Number of days: 0            Assessment:   ASA SCORE: 2    NPO Status: > 6 hours since completed [employer]   Documentation: H&P complete; Preop Testing complete; Consents complete   Proceeding: Proceed without further delay     Plan:   Anes. Type:  General   Pre-Induction: Acetaminophen PO   Induction:  IV (Standard)   Airway: Native Airway   Access/Monitoring: PIV   Maintenance: Balanced   Emergence: Procedure Site   Logistics: Same Day Surgery     Postop Pain/Sedation Strategy:  Standard-Options: Opioids PRN     PONV Management:Adult Risk Factors: Female     CONSENT: Direct conversation   Plan and risks discussed with: Patient   Blood Products: Consent Deferred (Minimal Blood Loss)                             Marilee Hernandez MD  "

## 2019-02-11 NOTE — PROCEDURES
Procedure/Surgery Information   [facility], [place]    Bedside Procedure Note  Date of Service (when I performed the procedure): 02/11/2019    Stephanie Villanueva is a 18 year old female patient.  1. 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     Salter-Perdomo Type I fracture of distal fibula with routine healing 1/25/2013     Uncomplicated asthma     sports induced     Temp: 97  F (36.1  C)   BP: 118/72 Pulse: 104   Resp: 16 SpO2: 100 %        Procedures     Yaron El     [facility], [place]   ECT Procedure Note  02/11/2019    Stephanie Villanueva [id number]   18 year old 2000     Patient Status: Inpatient    Is this the first in a series of 12 treatments?  No    History and Physical: Reviewed in medical record    Consent: Informed consent was signed by: patient    Date Consent Signed: 1/23/19      Allergies   Allergen Reactions     No Known Drug Allergies        Weight:  0 lbs 0 oz              Indications for ECT:   Medications ineffective         Clinical Narrative:   Patient was admitted with worsening depression and suicidal ideation.  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  NPO after 2400.  Alert and Oriented x 3.  Mood is good.  The SI is less but not gone.  No plan or intent to hurt self.  Able to contract for safety.   Her IOP intake is tomorrow and she'll start group next week.           Diagnosis:   Major depression         Pause for the Cause:     Right patient Yes   Right procedure/laterality settings: Yes          Intra-Procedure Documentation:     ECT #: 9   Treatment number this series: 9   Total treatment number: 9     Type of ECT:  Right, unilateral ultrabrief    ECT Medications:     Ibuprofen:  600mg po  Lactated Ringers:  1 liter  Granisetron: 1mg  Lidocaine:  40mg  Etomidate: 14mg  Succinyl Choline: 80mg     ECT Strip Summary:   Energy Level: 10 percent   Motor Seizure Duration:  79 seconds  EEG Seizure Duration:   79 seconds    Complications: No    Plan:   This ends her series.      She'll be attending IOP at [facility].   Don't take Neurontin morning of ECT until after ECT is done.   Take last does of Neurontin nights before ECT at about 5 pm but not later.         Yaron El MD

## 2019-02-12 NOTE — PROGRESS NOTES
Clinic Care Coordination Contact    Situation: Patient chart reviewed by .     Background: SW attempted outreach to Pt/family last week in follow-up to recent inpatient discharge. Clinic RN had also LM with the Pt/family previously. SW aware Pt was scheduled for and completing ECT treatments following discharge.     Assessment: SW reviewed ECT treatment note from yesterday in which the provider indicated it was the last in Pt's series of treatments. Pt scheduled for IOP intake today and will start next week. Pt's visit with PCP on 2/14 has been cancelled.     Plan/Recommendations: SW will not outreach again at this time. SW available at anytime going forward should questions, concerns or other needs arise.     TAMARA Whatley, [facility]  , Care Coordination   [facility]  [phone]  [email or web address]

## 2019-02-26 DIAGNOSIS — F33.2 SEVERE RECURRENT MAJOR DEPRESSION WITHOUT PSYCHOTIC FEATURES (H): Primary | ICD-10-CM

## 2019-03-13 ENCOUNTER — OFFICE VISIT (OUTPATIENT)
Dept: FAMILY MEDICINE | Facility: CLINIC | Age: 19
End: 2019-03-13
Payer: COMMERCIAL

## 2019-03-13 VITALS
BODY MASS INDEX: 19.61 KG/M2 | TEMPERATURE: 98.8 F | SYSTOLIC BLOOD PRESSURE: 99 MMHG | WEIGHT: 137 LBS | HEIGHT: 70 IN | DIASTOLIC BLOOD PRESSURE: 66 MMHG | HEART RATE: 76 BPM

## 2019-03-13 DIAGNOSIS — F41.1 GAD (GENERALIZED ANXIETY DISORDER): ICD-10-CM

## 2019-03-13 DIAGNOSIS — M25.50 ARTHRALGIA, UNSPECIFIED JOINT: Primary | ICD-10-CM

## 2019-03-13 DIAGNOSIS — E55.9 VITAMIN D DEFICIENCY: ICD-10-CM

## 2019-03-13 DIAGNOSIS — M79.10 MYALGIA: ICD-10-CM

## 2019-03-13 LAB
CRP SERPL-MCNC: <2.9 MG/L (ref 0–8)
ERYTHROCYTE [SEDIMENTATION RATE] IN BLOOD BY WESTERGREN METHOD: 8 MM/H (ref 0–20)

## 2019-03-13 PROCEDURE — 99214 OFFICE O/P EST MOD 30 MIN: CPT | Performed by: FAMILY MEDICINE

## 2019-03-13 PROCEDURE — 86140 C-REACTIVE PROTEIN: CPT | Performed by: FAMILY MEDICINE

## 2019-03-13 PROCEDURE — 86235 NUCLEAR ANTIGEN ANTIBODY: CPT | Performed by: FAMILY MEDICINE

## 2019-03-13 PROCEDURE — 36415 COLL VENOUS BLD VENIPUNCTURE: CPT | Performed by: FAMILY MEDICINE

## 2019-03-13 PROCEDURE — 86038 ANTINUCLEAR ANTIBODIES: CPT | Performed by: FAMILY MEDICINE

## 2019-03-13 PROCEDURE — 82550 ASSAY OF CK (CPK): CPT | Performed by: FAMILY MEDICINE

## 2019-03-13 PROCEDURE — 86431 RHEUMATOID FACTOR QUANT: CPT | Performed by: FAMILY MEDICINE

## 2019-03-13 PROCEDURE — 86200 CCP ANTIBODY: CPT | Performed by: FAMILY MEDICINE

## 2019-03-13 PROCEDURE — 82085 ASSAY OF ALDOLASE: CPT | Mod: 90 | Performed by: FAMILY MEDICINE

## 2019-03-13 PROCEDURE — 82306 VITAMIN D 25 HYDROXY: CPT | Performed by: FAMILY MEDICINE

## 2019-03-13 PROCEDURE — 85652 RBC SED RATE AUTOMATED: CPT | Performed by: FAMILY MEDICINE

## 2019-03-13 PROCEDURE — 86225 DNA ANTIBODY NATIVE: CPT | Performed by: FAMILY MEDICINE

## 2019-03-13 PROCEDURE — 99000 SPECIMEN HANDLING OFFICE-LAB: CPT | Performed by: FAMILY MEDICINE

## 2019-03-13 PROCEDURE — 86618 LYME DISEASE ANTIBODY: CPT | Performed by: FAMILY MEDICINE

## 2019-03-13 PROCEDURE — 80076 HEPATIC FUNCTION PANEL: CPT | Performed by: FAMILY MEDICINE

## 2019-03-13 RX ORDER — GABAPENTIN 300 MG/1
900 CAPSULE ORAL 3 TIMES DAILY
COMMUNITY
Start: 2019-01-29 | End: 2019-08-27

## 2019-03-13 ASSESSMENT — MIFFLIN-ST. JEOR: SCORE: 1471.68

## 2019-03-13 NOTE — PROGRESS NOTES
"  SUBJECTIVE:   Stephanie Villanueva is a 19 year old female who presents to clinic today for the following health issues:      She has been having joint pain for 1-1.5 months. She feels that pain is located in elbows, shoulders, hands, wrist, upper back and sometimes knees.   Pain is daily and worse in the morning and better with movement.   Pain is dull aching to sharp pain, aggravated by pressing on the joint and relieved with movement.   She rock climbs and swims. She feels that once she continues the activity it improves.   She endorses joint stiffness after not moving for awhile. Mainly this occurs in the morning. She endorses myalgia.   No joint swelling, hair loss, rashes or malar rash.   No family history of autoimmune disorders.   She takes gabapentin 900 mg QID.   She takes desmopression at bedtime PRN.   No history of lyme.   No recent travel history.     Problem list and histories reviewed & adjusted, as indicated.  Additional history: as documented    Labs reviewed in EPIC    Reviewed and updated as needed this visit by clinical staff       Reviewed and updated as needed this visit by Provider         ROS:  Constitutional, HEENT, cardiovascular, pulmonary, gi and gu systems are negative, except as otherwise noted.    OBJECTIVE:     BP 99/66   Pulse 76   Temp 98.8  F (37.1  C) (Oral)   Ht 1.778 m (5' 10\")   Wt 62.1 kg (137 lb)   LMP  (LMP Unknown)   BMI 19.66 kg/m    Body mass index is 19.66 kg/m .  GENERAL: healthy, alert and no distress  EYES: Eyes grossly normal to inspection  HENT: , nose and mouth without ulcers or lesions  MS/BAC: + left fingers, right elbow and shoulder, + diffuse midline back tenderness   SKIN: no suspicious lesions or rashes    Diagnostic Test Results:  none     ASSESSMENT/PLAN:     ## Arthralgia, unspecified joint  - No family history of autoimmune etiology. Patient is active and started experiencing symptoms around 1-1.5 months ago. No recent illness. No family history of " autoimmune etiology. Discussed with patient that symptoms are concerning for inflammatory arthritis. I recommended obtaining below and following up with rheumatology for further evaluation. Pt has BAILEY and declined prednisone burst. Advised to use tylenol or ibuprofen PRN.  - reviewed recent hearn labs   - RHEUMATOLOGY REFERRAL  - ESR: Erythrocyte sedimentation rate  - CRP, inflammation  - Rheumatoid factor  - Anti Nuclear Yaneth IgG by IFA with Reflex  - DNA double stranded antibodies  - Cyclic Citrullinated Peptide Antibody IgG  - MATA antibody panel  - Lyme Disease Yaneth with reflex to WB Serum  - Vitamin D Deficiency    ## Myalgia  - CK total  - Aldolase  - Hepatic panel (Albumin, ALT, AST, Bili, Alk Phos, TP)    ## BAILEY (generalized anxiety disorder)  - see above    Addendum - vitamin D low tx vitamin D 50, 000 U once/week X 8 weeks. Recheck after finishing tx.     Michelle Berumen MD  [facility] [place]

## 2019-03-14 LAB
ALBUMIN SERPL-MCNC: 4.4 G/DL (ref 3.4–5)
ALDOLASE SERPL-CCNC: 5.1 U/L (ref 1.5–8.1)
ALP SERPL-CCNC: 78 U/L (ref 40–150)
ALT SERPL W P-5'-P-CCNC: 20 U/L (ref 0–50)
ANA SER QL IF: NEGATIVE
AST SERPL W P-5'-P-CCNC: 21 U/L (ref 0–45)
B BURGDOR IGG+IGM SER QL: 0.04 (ref 0–0.89)
BILIRUB DIRECT SERPL-MCNC: 0.2 MG/DL (ref 0–0.2)
BILIRUB SERPL-MCNC: 0.9 MG/DL (ref 0.2–1.3)
CCP AB SER IA-ACNC: 1 U/ML
CK SERPL-CCNC: 90 U/L (ref 30–225)
DEPRECATED CALCIDIOL+CALCIFEROL SERPL-MC: 18 UG/L (ref 20–75)
DSDNA AB SER-ACNC: 1 IU/ML
ENA RNP IGG SER IA-ACNC: <0.2 AI (ref 0–0.9)
ENA SCL70 IGG SER IA-ACNC: <0.2 AI (ref 0–0.9)
ENA SM IGG SER-ACNC: <0.2 AI (ref 0–0.9)
ENA SS-A IGG SER IA-ACNC: <0.2 AI (ref 0–0.9)
ENA SS-B IGG SER IA-ACNC: <0.2 AI (ref 0–0.9)
PROT SERPL-MCNC: 7.3 G/DL (ref 6.8–8.8)
RHEUMATOID FACT SER NEPH-ACNC: <20 IU/ML (ref 0–20)

## 2019-03-18 ENCOUNTER — TELEPHONE (OUTPATIENT)
Dept: FAMILY MEDICINE | Facility: CLINIC | Age: 19
End: 2019-03-18

## 2019-03-18 ENCOUNTER — HOSPITAL ENCOUNTER (INPATIENT)
Facility: CLINIC | Age: 19
LOS: 6 days | Discharge: HOME | DRG: 885 | End: 2019-03-25
Attending: EMERGENCY MEDICINE | Admitting: PSYCHIATRY & NEUROLOGY
Payer: COMMERCIAL

## 2019-03-18 DIAGNOSIS — F41.9 ANXIETY: ICD-10-CM

## 2019-03-18 DIAGNOSIS — R45.1 AGITATION: ICD-10-CM

## 2019-03-18 DIAGNOSIS — F32.A DEPRESSION, UNSPECIFIED DEPRESSION TYPE: Primary | ICD-10-CM

## 2019-03-18 DIAGNOSIS — F33.2 SEVERE RECURRENT MAJOR DEPRESSION WITHOUT PSYCHOTIC FEATURES (H): ICD-10-CM

## 2019-03-18 DIAGNOSIS — F41.1 GENERALIZED ANXIETY DISORDER: Chronic | ICD-10-CM

## 2019-03-18 PROCEDURE — 99285 EMERGENCY DEPT VISIT HI MDM: CPT | Mod: 25 | Performed by: EMERGENCY MEDICINE

## 2019-03-18 PROCEDURE — 25000128 H RX IP 250 OP 636: Performed by: EMERGENCY MEDICINE

## 2019-03-18 PROCEDURE — 99291 CRITICAL CARE FIRST HOUR: CPT | Mod: Z6 | Performed by: EMERGENCY MEDICINE

## 2019-03-18 PROCEDURE — 96374 THER/PROPH/DIAG INJ IV PUSH: CPT | Performed by: EMERGENCY MEDICINE

## 2019-03-18 PROCEDURE — 96372 THER/PROPH/DIAG INJ SC/IM: CPT | Performed by: EMERGENCY MEDICINE

## 2019-03-18 RX ORDER — HALOPERIDOL 5 MG/ML
5 INJECTION INTRAMUSCULAR ONCE
Status: COMPLETED | OUTPATIENT
Start: 2019-03-18 | End: 2019-03-18

## 2019-03-18 RX ORDER — LORAZEPAM 2 MG/ML
2 INJECTION INTRAMUSCULAR ONCE
Status: COMPLETED | OUTPATIENT
Start: 2019-03-18 | End: 2019-03-18

## 2019-03-18 RX ORDER — ERGOCALCIFEROL 1.25 MG/1
50000 CAPSULE, LIQUID FILLED ORAL WEEKLY
Qty: 8 CAPSULE | Refills: 0 | Status: SHIPPED | OUTPATIENT
Start: 2019-03-18 | End: 2019-06-28

## 2019-03-18 RX ADMIN — LORAZEPAM 2 MG: 2 INJECTION INTRAMUSCULAR; INTRAVENOUS at 22:34

## 2019-03-18 RX ADMIN — HALOPERIDOL LACTATE 5 MG: 5 INJECTION, SOLUTION INTRAMUSCULAR at 22:33

## 2019-03-18 NOTE — TELEPHONE ENCOUNTER
RN, can you please inform pt that all labs were normal except for vitamin D. I have sent prescription for vitamin D 50, 000 U once/week X 8 weeks. Lab only visit after completing tx. DM

## 2019-03-18 NOTE — TELEPHONE ENCOUNTER
Writer called patient, reviewed message per Dr. Berumen, along with to which pharmacy medication was sent.    Patient verbalized understanding and in agreement with plan.    Patient will follow up with Rheumatology-she called them and they will reach out to her for scheduling.    JENNIFER BlountN, RN

## 2019-03-19 ENCOUNTER — TELEPHONE (OUTPATIENT)
Dept: BEHAVIORAL HEALTH | Facility: CLINIC | Age: 19
End: 2019-03-19
Payer: COMMERCIAL

## 2019-03-19 PROBLEM — R45.851 SUICIDAL IDEATION: Status: ACTIVE | Noted: 2019-03-19

## 2019-03-19 LAB
AMPHETAMINES UR QL SCN: NEGATIVE
BARBITURATES UR QL: NEGATIVE
BENZODIAZ UR QL: NEGATIVE
CANNABINOIDS UR QL SCN: NEGATIVE
COCAINE UR QL: NEGATIVE
ETHANOL UR QL SCN: NEGATIVE
OPIATES UR QL SCN: POSITIVE

## 2019-03-19 PROCEDURE — G0177 OPPS/PHP; TRAIN & EDUC SERV: HCPCS

## 2019-03-19 PROCEDURE — 25000132 ZZH RX MED GY IP 250 OP 250 PS 637: Performed by: CLINICAL NURSE SPECIALIST

## 2019-03-19 PROCEDURE — 12400001 ZZH R&B MH UMMC

## 2019-03-19 PROCEDURE — 80307 DRUG TEST PRSMV CHEM ANLYZR: CPT | Performed by: EMERGENCY MEDICINE

## 2019-03-19 PROCEDURE — 80320 DRUG SCREEN QUANTALCOHOLS: CPT | Performed by: EMERGENCY MEDICINE

## 2019-03-19 RX ORDER — BISACODYL 10 MG
10 SUPPOSITORY, RECTAL RECTAL DAILY PRN
Status: DISCONTINUED | OUTPATIENT
Start: 2019-03-19 | End: 2019-03-25 | Stop reason: HOSPADM

## 2019-03-19 RX ORDER — OLANZAPINE 5 MG/1
5-10 TABLET, ORALLY DISINTEGRATING ORAL 3 TIMES DAILY PRN
Status: DISCONTINUED | OUTPATIENT
Start: 2019-03-19 | End: 2019-03-21

## 2019-03-19 RX ORDER — ACETAMINOPHEN 325 MG/1
650 TABLET ORAL EVERY 4 HOURS PRN
Status: DISCONTINUED | OUTPATIENT
Start: 2019-03-19 | End: 2019-03-25 | Stop reason: HOSPADM

## 2019-03-19 RX ORDER — DESMOPRESSIN ACETATE 0.2 MG/1
600 TABLET ORAL AT BEDTIME
Status: DISCONTINUED | OUTPATIENT
Start: 2019-03-19 | End: 2019-03-25 | Stop reason: HOSPADM

## 2019-03-19 RX ORDER — HYDROXYZINE HYDROCHLORIDE 25 MG/1
25 TABLET, FILM COATED ORAL EVERY 4 HOURS PRN
Status: DISCONTINUED | OUTPATIENT
Start: 2019-03-19 | End: 2019-03-19

## 2019-03-19 RX ORDER — OLANZAPINE 10 MG/2ML
10 INJECTION, POWDER, FOR SOLUTION INTRAMUSCULAR 3 TIMES DAILY PRN
Status: DISCONTINUED | OUTPATIENT
Start: 2019-03-19 | End: 2019-03-21

## 2019-03-19 RX ORDER — OLANZAPINE 5 MG/1
5-10 TABLET, ORALLY DISINTEGRATING ORAL 3 TIMES DAILY PRN
Status: DISCONTINUED | OUTPATIENT
Start: 2019-03-19 | End: 2019-03-19

## 2019-03-19 RX ORDER — ALUMINA, MAGNESIA, AND SIMETHICONE 2400; 2400; 240 MG/30ML; MG/30ML; MG/30ML
30 SUSPENSION ORAL EVERY 4 HOURS PRN
Status: DISCONTINUED | OUTPATIENT
Start: 2019-03-19 | End: 2019-03-25 | Stop reason: HOSPADM

## 2019-03-19 RX ORDER — HYDROXYZINE HYDROCHLORIDE 25 MG/1
25-50 TABLET, FILM COATED ORAL EVERY 4 HOURS PRN
Status: DISCONTINUED | OUTPATIENT
Start: 2019-03-19 | End: 2019-03-25

## 2019-03-19 RX ORDER — ERGOCALCIFEROL 1.25 MG/1
50000 CAPSULE, LIQUID FILLED ORAL WEEKLY
Status: DISCONTINUED | OUTPATIENT
Start: 2019-03-19 | End: 2019-03-25 | Stop reason: HOSPADM

## 2019-03-19 RX ORDER — GABAPENTIN 300 MG/1
900 CAPSULE ORAL 4 TIMES DAILY
Status: DISCONTINUED | OUTPATIENT
Start: 2019-03-19 | End: 2019-03-25 | Stop reason: HOSPADM

## 2019-03-19 RX ORDER — TRAZODONE HYDROCHLORIDE 50 MG/1
50 TABLET, FILM COATED ORAL
Status: DISCONTINUED | OUTPATIENT
Start: 2019-03-19 | End: 2019-03-25 | Stop reason: HOSPADM

## 2019-03-19 RX ADMIN — GABAPENTIN 900 MG: 300 CAPSULE ORAL at 22:29

## 2019-03-19 RX ADMIN — GABAPENTIN 900 MG: 300 CAPSULE ORAL at 17:42

## 2019-03-19 RX ADMIN — DESMOPRESSIN ACETATE 600 MCG: 0.2 TABLET ORAL at 22:30

## 2019-03-19 RX ADMIN — ERGOCALCIFEROL 50000 UNITS: 1.25 CAPSULE ORAL at 17:42

## 2019-03-19 ASSESSMENT — ACTIVITIES OF DAILY LIVING (ADL)
RETIRED_EATING: 0-->INDEPENDENT
TOILETING: 0-->INDEPENDENT
BATHING: 0-->INDEPENDENT
DRESS: 0-->INDEPENDENT
SWALLOWING: 0-->SWALLOWS FOODS/LIQUIDS WITHOUT DIFFICULTY
FALL_HISTORY_WITHIN_LAST_SIX_MONTHS: NO
AMBULATION: 0-->INDEPENDENT
TRANSFERRING: 0-->INDEPENDENT
HYGIENE/GROOMING: INDEPENDENT
RETIRED_COMMUNICATION: 0-->UNDERSTANDS/COMMUNICATES WITHOUT DIFFICULTY
DRESS: INDEPENDENT
ORAL_HYGIENE: INDEPENDENT
COGNITION: 0 - NO COGNITION ISSUES REPORTED

## 2019-03-19 NOTE — ED NOTES
Within an hour after restraint an in person face to face assessment was completed, including an evaluation of the patient's immediate reaction to the intervention, behavioral assessment and review/assessment of history, drugs and medications, recent labs, etc., and behavioral condition.  The patient experienced: No adverse physical outcome from seclusion/restraint initiation.  The intervention of restraint or seclusion needs to terminate.     Ilya Narayanan MD  03/18/19 2858

## 2019-03-19 NOTE — ED NOTES
Restraints discontinued at this time. Pt calm and sleeping soundly without incident. Attempted to debrief but unable. See flowsheet for additional detail.

## 2019-03-19 NOTE — PLAN OF CARE
INITIAL OT NOTE  OT Goal 1    Pt attended 1 out of 3 OT groups offered. Pt actively participated in occupational therapy clinic. Pt was able to ask for assistance with prompting, and independently initiated a familiar, goal-directed task. Pt demonstrated good focus, planning, and attention to detail. Organized and efficient in her task approach. Minimal interaction with peers observed, and minimal response on approach. Flat affect. Will continue to assess. Pt will be given self-assessment form, and OT staff will explain the purpose of including them in their treatment plan and offer options for meeting their needs. Initial assessment to be completed upon additional group participation.

## 2019-03-19 NOTE — ED NOTES
"ED to Behavioral Floor Handoff    SITUATION  Stephanie Villanueva is a 19 year old female who speaks English and lives in a home with family members The patient arrived in the ED by private car from home with a complaint of Panic Attack (Pt here with parents, brought back to room 11 in wheelchair, pt has headphones on, not answering questions, eyes closed, parents state pt came home from work and stated \"I need to go to the hospital I am having an episode.\")  .The patient's current symptoms started/worsened sometime yesterday while at work and during this time the symptoms have increased.   In the ED, pt was diagnosed with   Final diagnoses:   Depression, unspecified depression type   Agitation        Initial vitals were: BP: 101/47  Pulse: 89  Temp: 98.2  F (36.8  C)  Resp: 16  SpO2: 95 %   --------  Is the patient diabetic? No   If yes, last blood glucose? --     If yes, was this treated in the ED? --  --------  Is the patient inebriated (ETOH) No or Impaired on other substances? No  MSSA done? N/A  Last MSSA score: --    Were withdrawal symptoms treated? N/A  Does the patient have a seizure history? No. If yes, date of most recent seizure--  --------  Is the patient patient experiencing suicidal ideation? denies current or recent suicidal ideation     Homicidal ideation? denies current or recent homicidal ideation or behaviors.    Self-injurious behavior/urges? denies current or recent self injurious behavior or ideation.  ------  Was pt aggressive in the ED Yes  Was a code called Yes  Is the pt now cooperative? Yes  -------  Meds given in ED:   Medications   haloperidol lactate (HALDOL) injection 5 mg (5 mg Intravenous Given 3/18/19 2233)   LORazepam (ATIVAN) injection 2 mg (2 mg Intramuscular Given 3/18/19 2234)      Family present during ED course? Yes  Family currently present? No    BACKGROUND  Does the patient have a cognitive impairment or developmental disability? No  Allergies:   Allergies   Allergen Reactions "     No Known Drug Allergies    .   Social demographics are   Social History     Socioeconomic History     Marital status: Single     Spouse name: None     Number of children: None     Years of education: None     Highest education level: None   Occupational History     None   Social Needs     Financial resource strain: None     Food insecurity:     Worry: None     Inability: None     Transportation needs:     Medical: None     Non-medical: None   Tobacco Use     Smoking status: Never Smoker     Smokeless tobacco: Never Used   Substance and Sexual Activity     Alcohol use: Yes     Comment: sometimes     Drug use: Yes     Types: Marijuana     Comment: THC last use over 1 year ago.     Sexual activity: Yes     Partners: Female, Male   Lifestyle     Physical activity:     Days per week: None     Minutes per session: None     Stress: None   Relationships     Social connections:     Talks on phone: None     Gets together: None     Attends Yazdanism service: None     Active member of club or organization: None     Attends meetings of clubs or organizations: None     Relationship status: None     Intimate partner violence:     Fear of current or ex partner: None     Emotionally abused: None     Physically abused: None     Forced sexual activity: None   Other Topics Concern     Parent/sibling w/ CABG, MI or angioplasty before 65F 55M? Not Asked   Social History Narrative     None        ASSESSMENT  Labs results Labs Ordered and Resulted from Time of ED Arrival Up to the Time of Departure from the ED - No data to display   Imaging Studies: No results found for this or any previous visit (from the past 24 hour(s)).   Most recent vital signs /47   Pulse 89   Temp 98.2  F (36.8  C) (Axillary)   Resp 16   LMP  (LMP Unknown)   SpO2 99%    Abnormal labs/tests/findings requiring intervention:---   Pain control: Pt had none  Nausea control: pt had none    RECOMMENDATION  Are any infection precautions needed (MRSA, VRE,  etc.)? No If yes, what infection? --  ---  Does the patient have mobility issues? independently. If yes, what device does the pt use? ---  ---  Is patient on 72 hour hold or commitment? No If on 72 hour hold, have hold and rights been given to patient? N/A  Are admitting orders written if after 10 p.m. ?N/A  Tasks needing to be completed:---     Lu Tidwell RN  [phone] [facility]

## 2019-03-19 NOTE — ED NOTES
Pt began banging her head against the picture frame on the wall, and kicking her legs frantically. Pts parents attempted to calm her down. Pt would not stop kicking, code 21 was called.    During code, pt was searched and patted down.

## 2019-03-19 NOTE — ED NOTES
Received report on Pt from Elsi ORDAZ RN.  Pt is resting with eyes closed at present.  Resp are even and unlabored.

## 2019-03-19 NOTE — ED PROVIDER NOTES
"  History     Chief Complaint   Patient presents with     Panic Attack     Pt here with parents, brought back to room 11 in wheelchair, pt has headphones on, not answering questions, eyes closed, parents state pt came home from work and stated \"I need to go to the hospital I am having an episode.\"     HPI  Stephanie Villanueva is a 19-year-old female with a history of depression, generalized anxiety disorder, OCD who presents with anxiety and agitation.  Her father states that he picked her up from work and when he returned home the patient went to her mother and stated she need to go to the hospital because she was starting to feel very anxious.  Apparently she went to the bathroom and had an episode of being unresponsiveness that lasted for approximately 5-10 minutes.  She had no body shaking and no urinary incontinence. She was brought to the emergency department for evaluation and when she arrived to the emergency department in the waiting room she became very agitated and required restraints.  There is no reports of drug or alcohol abuse although the history is very limited.  The patient was most recently admitted to psychiatry and discharged January 29, 2019 and at that time she was started on ECT.  Her parents states she is currently not taking any medications.    I have reviewed the Medications, Allergies, Past Medical and Surgical History, and Social History in the [facility] system.    Past Medical History:   Diagnosis Date     Anxiety october 2013     CONTUSION OF left iliac crest 9/3/2006     Major depression october 2013     Nocturnal enuresis 10/4/2007    Currently resolved.     Salter-Perdomo Type I fracture of distal fibula with routine healing 1/25/2013     Uncomplicated asthma     sports induced       History reviewed. No pertinent surgical history.    Family History   Problem Relation Age of Onset     Substance Abuse Maternal Grandfather         alcoholism     Depression Maternal Aunt      Substance Abuse " Maternal Aunt         alcoholism     Hypothyroidism Maternal Aunt      Anxiety Disorder Brother      Hypothyroidism Maternal Grandmother        Social History     Tobacco Use     Smoking status: Never Smoker     Smokeless tobacco: Never Used   Substance Use Topics     Alcohol use: Yes     Comment: sometimes       No current facility-administered medications for this encounter.      Current Outpatient Medications   Medication     desmopressin (DDAVP) 0.2 MG tablet     gabapentin (NEURONTIN) 300 MG capsule     gabapentin (NEURONTIN) 300 MG capsule     vitamin D2 (ERGOCALCIFEROL) 68673 units (1250 mcg) capsule        Allergies   Allergen Reactions     No Known Drug Allergies       Review of Systems   All other systems reviewed and are negative.        Physical Exam          Physical Exam   Constitutional: She appears distressed.   HENT:   Head: Normocephalic and atraumatic.   Neck: Normal range of motion. Neck supple.   Pulmonary/Chest: No respiratory distress.   Musculoskeletal: Normal range of motion.   Neurological: She is alert.   Skin: Skin is warm. She is not diaphoretic.   Psychiatric:   Patient appears very anxious and agitated, she will not respond to questions but covers her face with her hands and screams out.  She had episodes of hitting herself to the face and against the side rails   Nursing note and vitals reviewed.      ED Course        Procedures             Critical Care time:  30 minutes              Labs Ordered and Resulted from Time of ED Arrival Up to the Time of Departure from the ED - No data to display         Assessments & Plan (with Medical Decision Making)   1.  Severe agitation    19-year-old female with a history of depression and anxiety who recently completed a course of ECT who presents with severe anxiety and agitation.  She had to be placed in five-point restraints and was treated with Haldol 5mg IM and Ativan 2 mg IM.  I discussed the case with family who is unaware of any recent  drug use, and states that she was only acting very agitated when she got to the emergency department.  At this point Randy will be admitted to psychiatry and placed on a 72-hour hold for further evaluation.            I have reviewed the nursing notes.    I have reviewed the findings, diagnosis, plan and need for follow up with the patient.       Medication List      There are no discharge medications for this visit.         Final diagnoses:   None   I, Ruy Roberts, am serving as a trained medical scribe to document services personally performed by Ilya Narayanan MD, based on the provider's statements to me.   IIlya MD, was physically present and have reviewed and verified the accuracy of this note documented by Ruy Roberst.     3/18/2019   [facility], [place], EMERGENCY DEPARTMENT     Ilya Narayanan MD  03/19/19 0016       Ilya Narayanan MD  03/19/19 0232

## 2019-03-19 NOTE — PLAN OF CARE
"  Activity, Energy or Vigor Impairment (Anxiety Signs/Symptoms)  Improved Energy/Vigor (Anxiety Signs/Symptoms)  3/19/2019 1433 - No Change by Anju Arevalo RN     S: Stephanie is a 19 year old pt admitted to 4A from [place] ED for anxiety/panic attack . PTA medications re-ordered by provider      B: Upon arrival to the unit, pt was searched by two female staff. Pt was cooperative with the search, vitals, and the admission interview. Pt stated she is here because \"I had an episode.\" \"psychotic, just didn't feel real\". Patient lives with parents. She works as a  and says she loves her job. Patient was discharged from here January 2019. Patient denies feeling suicidal. She reports not being able to see her OP psychiatrist sooner is a stressor. Patient believes she could benefit from additional ECT treatments.  Utox was positive for opiates.  MICHAEL signed for mom. Patient is on 72HH started 3/19/19 12:15AM. Vegan diet.      A: Patient is calm. Speech is clear. She endorses depression 7.5/10 and anxiety 7.5/10. Denies SI/SIB/HI. Able to communicate her needs well. Pt is receptive to medication and learning healthy coping skills     R: Status 15 checks initiated per unit policy. Contracts for safety. Suicide and self injury precautions initiated. Promote mental health and safety.           "

## 2019-03-19 NOTE — TELEPHONE ENCOUNTER
R: Admit to 4A under [facility]/Dr. Parker. Unit notified of pending admission at 10:22am. ED charge RN paged with disposition at 10:28am.

## 2019-03-19 NOTE — ED NOTES
"Pt suddenly began banging head against wall while holding on to cart with hands, hitting and kicking self, kicking at staff and screaming \"don't fucking touch me,\" kicking siderails of cart and screaming, code 21 called.  "

## 2019-03-19 NOTE — PROGRESS NOTES
03/19/19 1317   Valuables   Patient Belongings remains with patient   Patient Belongings Remaining with Patient body jewelry;clothing;bracelet   Patient Belongings Put in Hospital Secure Location (Security or Locker, etc.) clothing   Did you bring any home meds/supplements to the hospital?  No     Belongings remaining with pt upon admission:  -Ankle Bracelet  -Body Jewelry  -1 Bra  -1 Pair of Underwear  -1 Hair Tie      Belongings placed in Pt Bin on 03/19/19:  -1 Enzo pin  -1 Pair of Grey Sweat pants  -1 Red Sweatshirt  -1 Black T-Shirt    Brought 3/19/19:  Red back pack, socks (3), underwear (4), gray bra, toiletries (4)= in discharge bin, cell phone, tie dye bag, gray leggings, orange long sleeve, gray zip up, white t-shirt, navy sweatpants     Brought in 3/22/19:  Underwear, socks x2, Curology Bottles x3    Brought in 3/24/19:  Sweatshirt, Kobe LOMELI               Admission:  I am responsible for any personal items that are not sent to the safe or pharmacy.  [facility] is not responsible for loss, theft or damage of any property in my possession.    Signature:  _________________________________ Date: _______  Time: _____                                              Staff Signature:  ____________________________ Date: ________  Time: _____      2nd Staff person, if patient is unable/unwilling to sign:    Signature: ________________________________ Date: ________  Time: _____     Discharge:  [facility] has returned all of my personal belongings:    Signature: _________________________________ Date: ________  Time: _____                                          Staff Signature:  ____________________________ Date: ________  Time: _____

## 2019-03-19 NOTE — ED TRIAGE NOTES
"I was called to lobby for pt lying on the floor yelling. Pt is laying curled up on her side on the floor of the lobby with her hands over her ears and rocking/crying rhythmically. Pt's mom and dad are at her side and are trying to calm her. Mom reports pt is having a panic attack and does not want to be touched and does not like men. I asked pt if we could try to help her into w/c so she could get into a room. Pt shakes her head \"no\". At this time, mom and dad are wanting to stay with pt and help her calm down to see if they can get her into ED. I instructed them to get staff for assist if needed.  "

## 2019-03-19 NOTE — ED NOTES
Pt sleeping soundly. Discovered after the fact due to lack of MD communication, that pt was put on 72 hour hold. Copy of rights provided and placed on bedside table. To further discuss with patient if she wakes up this shift. In addition, order placed in Epic for more accurate documentation.

## 2019-03-20 PROCEDURE — 25000132 ZZH RX MED GY IP 250 OP 250 PS 637: Performed by: CLINICAL NURSE SPECIALIST

## 2019-03-20 PROCEDURE — 99223 1ST HOSP IP/OBS HIGH 75: CPT | Mod: AI | Performed by: CLINICAL NURSE SPECIALIST

## 2019-03-20 PROCEDURE — 12400001 ZZH R&B MH UMMC

## 2019-03-20 PROCEDURE — G0177 OPPS/PHP; TRAIN & EDUC SERV: HCPCS

## 2019-03-20 RX ADMIN — ACETAMINOPHEN 650 MG: 325 TABLET, FILM COATED ORAL at 09:53

## 2019-03-20 RX ADMIN — GABAPENTIN 900 MG: 300 CAPSULE ORAL at 19:58

## 2019-03-20 RX ADMIN — OLANZAPINE 5 MG: 5 TABLET, ORALLY DISINTEGRATING ORAL at 12:49

## 2019-03-20 RX ADMIN — Medication 5 MG: at 19:58

## 2019-03-20 RX ADMIN — GABAPENTIN 900 MG: 300 CAPSULE ORAL at 09:05

## 2019-03-20 RX ADMIN — TRAZODONE HYDROCHLORIDE 50 MG: 50 TABLET ORAL at 00:01

## 2019-03-20 RX ADMIN — DESMOPRESSIN ACETATE 600 MCG: 0.2 TABLET ORAL at 19:58

## 2019-03-20 RX ADMIN — GABAPENTIN 900 MG: 300 CAPSULE ORAL at 12:45

## 2019-03-20 ASSESSMENT — ACTIVITIES OF DAILY LIVING (ADL)
DRESS: INDEPENDENT
HYGIENE/GROOMING: INDEPENDENT
ORAL_HYGIENE: INDEPENDENT
LAUNDRY: WITH SUPERVISION

## 2019-03-20 NOTE — PROGRESS NOTES
Initial OT Assessment      03/20/19 1500   Clinical Impression   Affect Flat   Orientation Oriented to person, place and time   Appearance and ADLs Neatly groomed   Attention to Internal Stimuli No observed signs   Interaction Skills Guarded;Withdrawn   Ability to Communicate Needs Does so with prompts   Verbal Content Appropriate to topic   Ability to Maintain Boundaries Maintains appropriate physical boundaries;Maintains appropriate verbal boundaries   Participation Independently participates   Concentration Concentrates 30+ minutes   Ability to Concentrate With structure   Follows and Comprehends Directions Independently follows 2 step verbal directions   Memory Delayed and immediate recall intact   Organization Independently organizes simple tasks   Decision Making Independent   Planning and Problem Solving Occasionally needs assist/feedback   Ability to Apply and Learn Concepts Applies within group structure   Frustrations / Stress Tolerance Independently identifies sources of frustration/stress;Independently identifies skills    Level of Insight Identifies needs with structure/support   Self Esteem Can identify positives   Social Supports Has knowledge of support systems

## 2019-03-20 NOTE — H&P
Admitted:     03/18/2019      DATE OF ASSESSMENT:  03/20/2019      IDENTIFYING INFORMATION:  Stephanie Villanueva is a 19-year-old -American female presenting with depression/anxiety and agitation.  The patient was cleared by ED for inpatient admission to unit 4A.      CHIEF COMPLAINT:  The patient did not answer question.      HISTORY OF PRESENT ILLNESS:  Stephanie Villanueva is a 19-year-old -American female presenting with depression, increased anxiety and agitation.  The patient was discharged from [facility] in January.  At that time, she was started on ECT.  She had 9 ECT treatments.  She was followed by Dr. El in the community.  Upon discharge in January, she was on gabapentin 300 mg t.i.d.  That dose was increased to 900 mg q.i.d.  Per parents, the father picked up the patient from work.  When he returned home and the patient went to her mother, stated that she needed to go the hospital because she was trying to feel very anxious.  She went to the bathroom, had an episode of unresponsiveness that lasted approximately 5-10 minutes while she was in the bathroom.  She was brought to the ED for evaluation. Patient reports she feels detached from her body which is the beginning of a panic attack. She feels ECT was very helpful but then starting having more difficulty with anxiety.      Upon admission to ED, the patient became agitated, which escalated.  She needed restraints and IM medication of Haldol and Ativan to calm.  She calmed within approximately 30 minutes.  Clarified with mother that the patient was taking gabapentin 900 mg q.i.d. regularly.  The mother said the patient manages her own medications, but felt that she was taking medications.      PSYCHIATRIC REVIEW OF SYSTEMS:  The patient is very guarded with her responses but states that she is depressed.  She has been sleeping.  Concentration is difficult.  Patient denies passive  Suicidal thinking.  The patient deneis homicidal.  The patient does not  "exhibit any symptoms of dami.  She does not have pressured speech.  She is sleeping.  Appetite is adequate.  She reports feeling detached form her body which she feels is \"psychosis\" She denies auditory or visual hallucinations.  Denies feelings of paranoia.  The patient is very anxious and hyperventilates at times. Mother reports she stares at herself in the mirror to ground herself.  The patient was not able to explain why she was having these feelings but was given Zyprexa to calm.  The patient denied symptoms of PTSD, eating disorder or OCD.      PSYCHIATRIC HISTORY:  The patient has had multiple hospitalizations at [facility] for suicidal ideation or suicide attempt by overdose.  The patient also has a history of cutting.  Past medication history is fluoxetine, bupropion, duloxetine, venlafaxine, clomipramine, aripiprazole, ziprasidone, quetiapine, amphetamine salts, hydroxyzine and clonazepam.  Currently, the patient is on gabapentin.  The patient has a recent history of ECT.  She received 9 treatments.      PAST MEDICAL HISTORY:  Nocturnal enuresis, uncomplicated asthma, history of Salter-Perdomo type 1 fracture of distal fibula with routine healing, contusion of left iliac crest in 2006.      SUBSTANCE ABUSE HISTORY:  U-tox is positive for opiates.      FAMILY HISTORY:  Substance abuse and depression run on maternal side of the family.  The brother endorses anxiety disorder.      SOCIAL HISTORY:  The patient lives with parents and a 17-year-old brother and 12-year-old sister.  The patient denies any family history of mental health problems. Patient works par-time as a [employer] (20 -25 hours per week).          MEDICAL REVIEW OF SYSTEMS:  Reviewed documentation for a 10-point systems review completed by Ilya Narayanan MD, dated 03/18/2019.  No changes noted.      PHYSICAL EXAMINATION:   VITAL SIGNS:  Blood pressure 101/47, temperature 98.2 Fahrenheit, respirations 16, pulse 89.        Reviewed " documentation for physical examination completed by Ilya Narayanan MD, dated 03/18/2019.  No changes are noted.      MENTAL STATUS EXAMINATION:  The patient appears his stated age.  She is dressed in scrubs.  She has adequate hygiene.  The patient was in her room lying on her bed.  She got up, she went into the lounge area.  She was holding her head in her hands.  She was wearing her headphones.  The patient was unable to say why she was so distressed.  The patient was not cooperative with the interview.  She became dysregulated.  The patient needed Zyprexa 5 mg in order to calm herself.  Eye contact was very poor.  The patient was sitting with her knees up to her chest with her arms around her legs.  Speech was latent and very soft.  The patient was very guarded with answers, describes her mood as anxious.  Affect blunted and congruent.  Thought process appeared to be linear.  Associations were intact.  The patient did not display any evidence of psychosis.  She denies having passive suicidal thoughts.  She deneis homicidal thoughts.  Insight and judgment appear to be impaired.  Cognition appears intact to interviewing, including orientation to person, place, time and situation, use of language and fund of knowledge.  Recent and remote memory is grossly intact.  The patient is remembering staff on the unit.  The patient remembers some of the nurses.  Muscle strength, tone and gait appear within normal limits upon observation.      ASSESSMENT:   1.  Major depressive disorder, recurrent, severe without psychotic symptoms.   2.  Traits of borderline personality disorder.   3.  General anxiety disorder.   4.  Obsessive-compulsive disorder.   5.  History of cannabis use disorder.   6.  Autism spectrum disorder.      PLAN:   1.  The patient has been admitted to behavioral unit 4A on a 72-hour hold, which was initiated on 03/19.  We will continue the hold to allow for a period of observation and willingness to cooperate  with treatment plan.   2.  Continue gabapentin 900 mg q.i.d. after verification with mother and with pharmacy.  I discussed risks, benefits and side effects of medication with the patient.   3.  Psychosocial treatments to be addressed with CTC. Patient wants to start maintenance ECT. Emailed Dr. El.   4.  Estimated length of stay 5-7 days.         DEBRA A. NAEGELE, APRN, CNS             D: 2019   T: 2019   MT:       Name:     ROBIN GUPTA   MRN:      [id number]        Account:      [id number]   :      2000        Admitted:     2019                   Document: [id number]       cc: Jose Rodríguez MD

## 2019-03-20 NOTE — PLAN OF CARE
"OT General Care Plan  OT Goal 1  Description  Within 1 week, Pt will demonstrate increased openness as evidenced by sharing >2 thoughts and/or feelings on mental health or substance use.      Pt attended 2 out of 3 OT groups offered. Pt actively participated in a structured occupational therapy topic group involving identification of coping skills beginning with every letter of the alphabet facilitated through group discussion. Pt consistently contributed ideas of positive coping skills, and was receptive and respectful of ideas contributed by peers. Pt was unable to identify a specific coping skill that was most important or a coping skill that they intend to start using more frequently. Pt reported using all coping skills discussed daily which she finds helpful. Pt participated in occupational therapy clinic. Pt was able to ask for assistance as needed, and independently initiate self-selected novel goal-directed task. Pt demonstrated good focus and planning. When appearing slightly disregulated pt was able to problem solve though situation and identify current needs by briefly leaving group and return with noise-canceling headphones and continue to actively attend to task independently. Pt was calm and cooperative with flat affect throughout session.       OT Self-Assessment  Pt was given and completed a written self-assessment form. OT staff reviewed with pt and explained the value of having them involved in their treatment plan, and provided options to meet current needs/self-identified goals.     Pt identified \"psychotic (7 years)/anxiety attack episode (1 week)\" as stressors/events that led to hospitalization.    Pt identified the following symptoms that they are currently dealing with:   Emotions: sadness, anxiety or fear, anger or resentment, feeling numb, feeling helpless, rage, feeling depressed, guilt, feeling overwhelmed   Thoughts: negative thoughts, racing thoughts, memory problems, trouble " "concentrating, trouble making decisions, slowed thinking, blanking out, self-harm or suicidal thoughts  Behaviors: withdrawal, or spending too much time alone, problems starting or completing projects, procrastinating, eating disorder (restrict)    Self-identified coping skills: \"climbing, swimming, yoga, reading, podcasts, naps\"  Self-identified social supports: \"parents, friends\"  Self-identified personal strengths: \"determined, resilient, motivated     Goals: make a safety plan, identify and express my feelings in a better way, find resources and support, manage anxiety, manage stress.        "

## 2019-03-20 NOTE — PLAN OF CARE
This RN walked in on pt and PA Neftaly talking in the lounge and pt began to hyperventilate for up to 5 minutes and this RN attempted to talk to the pt but she had her face in her hands with her head phones on. She eventually stopped hyperventilating and sat up in the chair. Sierra RN was able to talk pt into taking zyprexa 5 mg po at 1250. Pt wasn't able to explain what happened with her feelings so will continue to assess if the zyprexa is working.

## 2019-03-20 NOTE — PROGRESS NOTES
"INITIAL PSYCHOSOCIAL ASSESSMENT    I have reviewed the chart and interviewed the patient.     Presenting Problem  Per ED provider note, \"Stephanie Villanueva is a 19-year-old female with a history of depression, generalized anxiety disorder, OCD who presents with anxiety and agitation.  Her father states that he picked her up from work and when he returned home the patient went to her mother and stated she need to go to the hospital because she was starting to feel very anxious.  Apparently she went to the bathroom and had an episode of being unresponsiveness that lasted for approximately 5-10 minutes.  She had no body shaking and no urinary incontinence. She was brought to the emergency department for evaluation and when she arrived to the emergency department in the waiting room she became very agitated and required restraints.  There is no reports of drug or alcohol abuse although the history is very limited.  The patient was most recently admitted to psychiatry and discharged January 29, 2019 and at that time she was started on ECT.  Her parents states she is currently not taking any medications.\" Ilya Narayanan MD        Orders Placed This Encounter      72 Hour Hold      Legal status 72 Hour Hold    Is patient under a civil commitment/legal guardian?  No    History of Mental Health and Chemical Dependency:  Pt has had numerous previous psychiatric admission to [facility] going back to 2013.  Her most recent admission was January 2019.     Previous diagnoses are:  BPD, BAILEY, MDD, and OCD        Family Description (Constellation, Family Psychiatric History):  Pt lives with her parents and a 17 year ols brother and a 12 year old sister.  She reports no family history of substance abuse problems or mental health problems.     Significant Life Events (Illness, Abuse, Trauma, Death):  Pt reports no history of trauma, losses, or abuse.     Living Situation:  Pt lives with her Parents and 2 siblings.     Educational " Background:  High School Graduate, [employer].  Pt started college at [employer] in Fall 2018.  She has recently come home and is not sure if she will be returning.     Occupational History:  Pt is presently not employed, last Summer she had a job as a .     Financial Status:  Parents support.     Legal Issues:  None     Ethnic/Cultural Issues:       Spiritual Orientation:  None      Service History:  No     Social Functioning (organization, interests):  Likes rock climbing and Running.       Current Health Care Providers  Medication Management:   Therapist: Ranjana Sun- [phone]  Primary Care: Provider: [address]. [address] [place] [address] [phone]   :   [facility]/Home Health nurse:   Home Health Nurse:        Social Service Assessment/Plan    Patient would benefit from Medication management and DBT.   CTC will consult with treatment team for additional treatment recommendations.  CTC will schedule appointments with outpatient providers for follow-up post discharge.   Patient will continue to receive therapeutic support while hospitalized and is encouraged to attend therapies on the unit

## 2019-03-20 NOTE — PROGRESS NOTES
03/19/19 2000   Behavioral Health   Hallucinations denies / not responding to hallucinations   Thinking intact   Orientation person: oriented;place: oriented;date: oriented;time: oriented   Memory baseline memory   Insight insight appropriate to situation   Judgement intact   Eye Contact at examiner   Affect full range affect   Mood mood is calm   Physical Appearance/Attire attire appropriate to age and situation   Hygiene well groomed   Suicidality other (see comments)  (pt denied SI)   1. Wish to be Dead No   2. Non-Specific Active Suicidal Thoughts  No   Self Injury other (see comment)  (pt denied SIB)   Elopement (no concerns)   Activity other (see comment)  (pt is social with staff and visible)   Speech clear;coherent   Medication Sensitivity no stated side effects;no observed side effects   Psychomotor / Gait balanced;steady   Activities of Daily Living   Hygiene/Grooming independent   Oral Hygiene independent   Dress independent   Room Organization independent     Pt denied SI and SIB. Pt shows full range of affect and bright affect. She was often observed smiling and jovial with staff or reading in her room. Pt had a visit with her family. Pt reported she likes ECT and has found it helpful. Pt reported that she would like to discharge but is willing to wait until her hold is complete. Pt mood appears calm.

## 2019-03-20 NOTE — PROGRESS NOTES
"   03/20/19 1200   Behavioral Health   Hallucinations denies / not responding to hallucinations   Thinking intact   Orientation place: oriented;date: oriented   Memory baseline memory   Insight poor   Judgement intact   Eye Contact at floor;into space   Affect blunted, flat;tense   Mood anxious;irritable   Physical Appearance/Attire attire appropriate to age and situation   Hygiene well groomed   Suicidality chronic thoughts with no stated plan  (Pt denies SI)   1. Wish to be Dead Yes   2. Non-Specific Active Suicidal Thoughts  No   Self Injury other (see comment)  (Pt denies SIB today, \"not yet\")   Elopement (none observed)   Activity withdrawn;other (see comment)  (visible, limited social and groups)   Speech clear;coherent   Medication Sensitivity other (see comment)  (Pt stopped answering)   Psychomotor / Gait balanced;steady     Pt was observed in the milieu eating meals (sm amount), some groups participation, and limited socializing with peers.  Pt answered writer's questions up until the last one concerning medication side effects.  Pt froze, her eyes glossed over and she sat this way for 2 full minutes.  Writer asked if this question was bothersome to think about.  Pt began hyperventilating, clutching her head, and rocking in her seat.  Pt was unresponsive during this (in part due to noise-cancelling headphones), so when RN tapped her shoulder to offer medication, Pt screamed and scrambled to get away, RN backed away.  Pt continued rocking and clutching for 10 minutes before the Bx slowed and Pt was eventually convinced to accept a PRN medication and calmed down.  Pt stated her food has been ok but has not been eating due to an intent to self-deprive.  Pt endorsed chronic thoughts only of \"passive\" SI and stated no plan exists (RN notified). Pt denied SIB, stating, \"not yet\".  Pt denied AH/VH.  Pt rated depression and anxiety at 7.  "

## 2019-03-20 NOTE — PROGRESS NOTES
"Pt has been in milieu since beginning of night shift; Pt requested trazodone at midnight and then requested her face wash materials, both were given to Pt; Pt currently sitting on floor in hallway outside her room    Pt came to desk asking if there are any private rooms available; Pt informed there are no private rooms, to which she stated \"I just want to sleep with my sound machine\" and then returned to her room  "

## 2019-03-20 NOTE — PROGRESS NOTES
Pt sitting in chair in lounge area holding self in ball had directed toward the floor wearing her noise cancelling/sensory headphone hyperventilating very loudly; staff and writer present, milieu cleared due to pt hypervigilance and not responding to verbal reassurance or redirection;  With time and distant verbal reassurance, pt makes eye contact with writer accepts ice pack and applies to forehead; she begins to breathe normally;  Pt still declining any medication , food(lunch trays just arrive) or to have 1:1; pt monitored closely by writer and another staff for about 20 minutes and pt accepts her gabapentin and prn zyprexa with reassurance 1245 pm; Pt later reports eating few bites of her lunch, unwitnessed and is now resting in her room. Will monitor closely and assist prn.

## 2019-03-21 LAB
HGB BLD-MCNC: 13 G/DL (ref 11.7–15.7)
POTASSIUM SERPL-SCNC: 4.1 MMOL/L (ref 3.4–5.3)

## 2019-03-21 PROCEDURE — 12400001 ZZH R&B MH UMMC

## 2019-03-21 PROCEDURE — 99232 SBSQ HOSP IP/OBS MODERATE 35: CPT | Performed by: CLINICAL NURSE SPECIALIST

## 2019-03-21 PROCEDURE — 93010 ELECTROCARDIOGRAM REPORT: CPT | Performed by: INTERNAL MEDICINE

## 2019-03-21 PROCEDURE — 93005 ELECTROCARDIOGRAM TRACING: CPT

## 2019-03-21 PROCEDURE — 25000132 ZZH RX MED GY IP 250 OP 250 PS 637: Performed by: CLINICAL NURSE SPECIALIST

## 2019-03-21 PROCEDURE — 85018 HEMOGLOBIN: CPT | Performed by: PSYCHIATRY & NEUROLOGY

## 2019-03-21 PROCEDURE — G0177 OPPS/PHP; TRAIN & EDUC SERV: HCPCS

## 2019-03-21 PROCEDURE — 84132 ASSAY OF SERUM POTASSIUM: CPT | Performed by: PSYCHIATRY & NEUROLOGY

## 2019-03-21 PROCEDURE — 36415 COLL VENOUS BLD VENIPUNCTURE: CPT | Performed by: PSYCHIATRY & NEUROLOGY

## 2019-03-21 RX ORDER — PROPRANOLOL HYDROCHLORIDE 10 MG/1
10 TABLET ORAL DAILY PRN
Status: DISCONTINUED | OUTPATIENT
Start: 2019-03-21 | End: 2019-03-25 | Stop reason: HOSPADM

## 2019-03-21 RX ADMIN — GABAPENTIN 900 MG: 300 CAPSULE ORAL at 08:45

## 2019-03-21 RX ADMIN — GABAPENTIN 900 MG: 300 CAPSULE ORAL at 12:42

## 2019-03-21 RX ADMIN — HYDROXYZINE HYDROCHLORIDE 25 MG: 25 TABLET ORAL at 21:25

## 2019-03-21 RX ADMIN — DESMOPRESSIN ACETATE 600 MCG: 0.2 TABLET ORAL at 21:25

## 2019-03-21 RX ADMIN — HYDROXYZINE HYDROCHLORIDE 25 MG: 25 TABLET ORAL at 16:33

## 2019-03-21 RX ADMIN — GABAPENTIN 900 MG: 300 CAPSULE ORAL at 16:33

## 2019-03-21 RX ADMIN — Medication 5 MG: at 21:25

## 2019-03-21 ASSESSMENT — ACTIVITIES OF DAILY LIVING (ADL)
HYGIENE/GROOMING: INDEPENDENT
ORAL_HYGIENE: INDEPENDENT
HYGIENE/GROOMING: INDEPENDENT
DRESS: INDEPENDENT
ORAL_HYGIENE: INDEPENDENT
DRESS: INDEPENDENT

## 2019-03-21 NOTE — PLAN OF CARE
48 Hour Nursing Assessment    Patient evaluation continues. Assessed mood, anxiety, thoughts and behavior. Patient denies auditory or visual hallucinations. Is progressing toward goals. Encourage participation in groups and developing healthy coping skills. Will continue to assess.     Pt has spent most of the shift sleeping due to receiving Zyprexa on day shift during a panic attack. Pt denied any particular trigger for the panic attack, but pt reported feeling frustrated that she was woken up to meet with the provider stating that she needs 30 minutes to wake up. Writer asked pt if there is anything staff can do to be more helpful when she is having a panic attack and pt stated that she is usually able to calm herself down, but did not elaborate further. Pt more flat and guarded than yesterday. Pt's parents visited this evening and brought pt dinner. Pt endorses chronic SI, but denies any plan. Pt denies SIB and hallucinations. Pt has been taking her scheduled medications and denies any side effects. Pt reports a poor appetite. Pt denies any other concerns at this time.

## 2019-03-21 NOTE — PROGRESS NOTES
"[facility], [place]   Psychiatric Progress Note        Interim History:   The patient's care was discussed with the treatment team during the daily team meeting and/or staff's chart notes were reviewed.  Staff report patient is visible in the milieu. She has been calm and reading in her room.    Psychiatric symptoms and interventions:   Continued gabapentin 900 mg QID to address anxiety.   Discontinued Zyprexa . Patient reports she gets \"too sedated from it.\"  Started propranolol 10 mg daily PRN for anxiety.  ECT consult.  Spoke with mother about tramtnet plan of maintenance ECT and continue gabapentin.    Medical issues: no acute problems    Behavioral/psychology/social:  Encouraged patient to attend therapeutic hospital programming as tolerated  Patient has not required restraint or seclusion within the last 24 hours.          Medications:       desmopressin  600 mcg Oral At Bedtime     gabapentin  900 mg Oral 4x Daily     melatonin  5 mg Oral At Bedtime     vitamin D2  50,000 Units Oral Weekly          Allergies:     Allergies   Allergen Reactions     No Known Drug Allergies           Labs:   No results found for this or any previous visit (from the past 24 hour(s)).       Psychiatric Examination:     /81   Pulse 83   Temp 96.6  F (35.9  C) (Tympanic)   Resp 18   Wt 61.3 kg (135 lb 2.3 oz)   LMP  (LMP Unknown)   SpO2 99%   BMI 19.39 kg/m    Weight is 135 lbs 2.27 oz  Body mass index is 19.39 kg/m .  Orthostatic Vitals       Most Recent      Sitting Orthostatic /81 03/21 0849    Sitting Orthostatic Pulse (bpm) 83 03/21 0849    Standing Orthostatic /78 03/21 0849    Standing Orthostatic Pulse (bpm) 110 03/21 0849            Appearance: awake, alert and adequately groomed  Attitude:  cooperative  Eye Contact:  good  Mood:  better  Affect:  intensity is blunted  Speech:  clear, coherent  Psychomotor Behavior:  no evidence of tardive dyskinesia, dystonia, or " tics  Throught Process:  linear  Associations:  no loose associations  Thought Content:  no evidence of suicidal ideation or homicidal ideation  Insight:  fair  Judgement:  intact  Oriented to:  time, person, and place  Attention Span and Concentration:  intact  Recent and Remote Memory:  intact    Clinical Global Impressions  First:  Considering your total clinical experience with this particular patient population, how severe are the patient's symptoms at this time?: 6 (03/19/19 1615)  Compared to the patient's condition at the START of treatment, this patient's condition is:: 6 (03/19/19 1615)  Most recent:  Considering your total clinical experience with this particular patient population, how severe are the patient's symptoms at this time?: 6 (03/19/19 1615)  Compared to the patient's condition at the START of treatment, this patient's condition is:: 6 (03/19/19 1615)         Precautions:     Behavioral Orders   Procedures     Code 1 - Restrict to Unit     Routine Programming     As clinically indicated     Self Injury Precaution     Patient was hitting her head against the wall in the ED. Needed restraint for her safety.     Status 15     Every 15 minutes.     Suicide precautions     Patients on Suicide Precautions should have a Combination Diet ordered that includes a Diet selection(s) AND a Behavioral Tray selection for Safe Tray - with utensils, or Safe Tray - NO utensils            DIagnoses:   1.  Major depressive disorder, recurrent, severe without psychotic symptoms.   2.  Traits of borderline personality disorder.   3.  General anxiety disorder.   4.  Obsessive-compulsive disorder.   5.  History of cannabis use disorder.   6.  Autism spectrum disorder.          Plan:     Legal status:  Discontinue 72 hour hold. Patient signed in voluntary.    Medication management: Discontinue PRN Zyprexa, Continued gabapentin 900 mg QID    Disposition plan: Stabilize with medications, maintenance ECT, return to home.

## 2019-03-21 NOTE — PLAN OF CARE
OT General Care Plan  OT Goal 1  Within 1 week, Pt will demonstrate increased openness as evidenced by sharing >2 thoughts and/or feelings on mental health or substance use.      Pt attended 2 out of 3 OT groups offered. Pt actively participated in occupational therapy clinic. Pt was able to ask for assistance with prompting, and independently returned to a goal-directed task. Pt demonstrated good attention to task and attention to detail. Occasionally social with peers throughout group today, which is an improvement in comparison to groups earlier in the week. Blunted affect, though moments of brightness observed in the context of interacting with peers.

## 2019-03-21 NOTE — PROGRESS NOTES
"Pt had a panic attack after lunch today (about 1300) and was sitting on the edge of her bed with her torso slumped over her legs. Pt was hyperventilating, then calmed herself. Pt tried using ice cubes to help bring herself back down, but was unable to express if that helped. Pt had delayed speech around this incident, waiting many seconds before responding to a question posed by writer. During discussion, pt verbally lashed out, yelling that she would not take Zyprexa and that she hated that people \"pushed pills\" on her when she had a panic attack. She said, \"[my nurse] can take those pills and shove them.\"  Writer clarified that writer was just making sure she heard that her nurse had asked her to meet him for her scheduled medication. She calmed with that.      03/21/19 1400   Behavioral Health   Hallucinations denies / not responding to hallucinations   Thinking distractable;poor concentration   Orientation person: oriented;place: oriented   Memory other (see comment)  (JUDI)   Insight poor   Judgement impaired   Eye Contact into space;staring   Affect blunted, flat;irritable;tense   Mood anxious;depressed;irritable   Physical Appearance/Attire attire appropriate to age and situation   Hygiene well groomed   Suicidality other (see comments)   1. Wish to be Dead Yes   2. Non-Specific Active Suicidal Thoughts  No   Self Injury other (see comment)  (no concerning statements or behaviors)   Elopement (No concerning statements or behaviors)   Activity withdrawn;isolative   Speech other (see comments);clear  (delayed)   Medication Sensitivity no observed side effects  (stated sedation yesterday)   Psychomotor / Gait balanced;steady   Psycho Education   Type of Intervention 1:1 intervention   Response participates, initiates socially appropriate   Hours 0.5   Treatment Detail Check in   Safety   Suicidality Status 15   Activities of Daily Living   Hygiene/Grooming independent   Oral Hygiene independent   Dress " independent   Room Organization independent   Activity   Activity Assistance Provided independent

## 2019-03-21 NOTE — PROGRESS NOTES
"Pt's mother (Deisy) called wanting to give writer some information about pt. Mother stated that earlier today, pt stated that she felt another \"episode\" coming on. Pt's mother states that pt usually sleeps and \"comes out of it.\" Pt has described these episodes to her mother as her brain and thoughts getting fuzzy and she will look into the mirror to try to concentrate. Pt's mother states that pt is reluctant to share these things with staff and instead has been calling her mother to tell her.    Pt's mother also stated that pt was at the [facility] last week and had a genetic screening done for meds. Mother states that they do not have a hard copy of the results currently, but it is in the [facility] portal and she wants her treatment team to have the results.  "

## 2019-03-22 ENCOUNTER — ANESTHESIA (OUTPATIENT)
Dept: BEHAVIORAL HEALTH | Facility: CLINIC | Age: 19
End: 2019-03-22

## 2019-03-22 ENCOUNTER — ANESTHESIA EVENT (OUTPATIENT)
Dept: BEHAVIORAL HEALTH | Facility: CLINIC | Age: 19
End: 2019-03-22

## 2019-03-22 LAB
ALBUMIN SERPL-MCNC: 3.7 G/DL (ref 3.4–5)
ALP SERPL-CCNC: 83 U/L (ref 40–150)
ALT SERPL W P-5'-P-CCNC: 20 U/L (ref 0–50)
ANION GAP SERPL CALCULATED.3IONS-SCNC: 8 MMOL/L (ref 3–14)
AST SERPL W P-5'-P-CCNC: 21 U/L (ref 0–45)
BILIRUB SERPL-MCNC: 0.4 MG/DL (ref 0.2–1.3)
BUN SERPL-MCNC: 19 MG/DL (ref 7–30)
CALCIUM SERPL-MCNC: 8.6 MG/DL (ref 8.5–10.1)
CHLORIDE SERPL-SCNC: 110 MMOL/L (ref 94–109)
CO2 SERPL-SCNC: 25 MMOL/L (ref 20–32)
CREAT SERPL-MCNC: 0.73 MG/DL (ref 0.52–1.04)
GFR SERPL CREATININE-BSD FRML MDRD: >90 ML/MIN/{1.73_M2}
GLUCOSE SERPL-MCNC: 104 MG/DL (ref 70–99)
INTERPRETATION ECG - MUSE: NORMAL
POTASSIUM SERPL-SCNC: 3.9 MMOL/L (ref 3.4–5.3)
PROT SERPL-MCNC: 6.7 G/DL (ref 6.8–8.8)
SODIUM SERPL-SCNC: 143 MMOL/L (ref 133–144)

## 2019-03-22 PROCEDURE — 25000125 ZZHC RX 250: Performed by: ANESTHESIOLOGY

## 2019-03-22 PROCEDURE — GZB0ZZZ ELECTROCONVULSIVE THERAPY, UNILATERAL-SINGLE SEIZURE: ICD-10-PCS | Performed by: PSYCHIATRY & NEUROLOGY

## 2019-03-22 PROCEDURE — 99232 SBSQ HOSP IP/OBS MODERATE 35: CPT | Mod: 25 | Performed by: CLINICAL NURSE SPECIALIST

## 2019-03-22 PROCEDURE — 25000128 H RX IP 250 OP 636: Performed by: ANESTHESIOLOGY

## 2019-03-22 PROCEDURE — 25000132 ZZH RX MED GY IP 250 OP 250 PS 637: Performed by: PSYCHIATRY & NEUROLOGY

## 2019-03-22 PROCEDURE — 80053 COMPREHEN METABOLIC PANEL: CPT | Performed by: CLINICAL NURSE SPECIALIST

## 2019-03-22 PROCEDURE — 25000132 ZZH RX MED GY IP 250 OP 250 PS 637: Performed by: CLINICAL NURSE SPECIALIST

## 2019-03-22 PROCEDURE — 90870 ELECTROCONVULSIVE THERAPY: CPT

## 2019-03-22 PROCEDURE — 12400001 ZZH R&B MH UMMC

## 2019-03-22 PROCEDURE — 25000128 H RX IP 250 OP 636: Performed by: PSYCHIATRY & NEUROLOGY

## 2019-03-22 PROCEDURE — 25000125 ZZHC RX 250: Performed by: PSYCHIATRY & NEUROLOGY

## 2019-03-22 PROCEDURE — 36415 COLL VENOUS BLD VENIPUNCTURE: CPT | Performed by: CLINICAL NURSE SPECIALIST

## 2019-03-22 PROCEDURE — 25800030 ZZH RX IP 258 OP 636: Performed by: PSYCHIATRY & NEUROLOGY

## 2019-03-22 RX ORDER — LIDOCAINE HYDROCHLORIDE 20 MG/ML
40 INJECTION, SOLUTION EPIDURAL; INFILTRATION; INTRACAUDAL; PERINEURAL
Status: COMPLETED | OUTPATIENT
Start: 2019-03-22 | End: 2019-03-22

## 2019-03-22 RX ORDER — ETOMIDATE 2 MG/ML
INJECTION INTRAVENOUS PRN
Status: DISCONTINUED | OUTPATIENT
Start: 2019-03-22 | End: 2019-03-22

## 2019-03-22 RX ORDER — IBUPROFEN 200 MG
600 TABLET ORAL
Status: DISCONTINUED | OUTPATIENT
Start: 2019-03-22 | End: 2019-03-25 | Stop reason: HOSPADM

## 2019-03-22 RX ORDER — IBUPROFEN 600 MG/1
600 TABLET, FILM COATED ORAL DAILY
Status: DISCONTINUED | OUTPATIENT
Start: 2019-03-22 | End: 2019-03-25 | Stop reason: HOSPADM

## 2019-03-22 RX ORDER — GRANISETRON HYDROCHLORIDE 1 MG/ML
1 INJECTION INTRAVENOUS ONCE
Status: COMPLETED | OUTPATIENT
Start: 2019-03-22 | End: 2019-03-22

## 2019-03-22 RX ORDER — ONDANSETRON 4 MG/1
4 TABLET, FILM COATED ORAL EVERY 6 HOURS PRN
Status: DISCONTINUED | OUTPATIENT
Start: 2019-03-22 | End: 2019-03-25 | Stop reason: HOSPADM

## 2019-03-22 RX ORDER — IBUPROFEN 600 MG/1
600 TABLET, FILM COATED ORAL EVERY 6 HOURS PRN
Status: DISCONTINUED | OUTPATIENT
Start: 2019-03-22 | End: 2019-03-25 | Stop reason: HOSPADM

## 2019-03-22 RX ADMIN — SODIUM CHLORIDE, POTASSIUM CHLORIDE, SODIUM LACTATE AND CALCIUM CHLORIDE 1000 ML: 600; 310; 30; 20 INJECTION, SOLUTION INTRAVENOUS at 10:24

## 2019-03-22 RX ADMIN — HYDROXYZINE HYDROCHLORIDE 25 MG: 25 TABLET ORAL at 17:20

## 2019-03-22 RX ADMIN — IBUPROFEN 600 MG: 600 TABLET ORAL at 11:26

## 2019-03-22 RX ADMIN — IBUPROFEN 600 MG: 600 TABLET ORAL at 18:47

## 2019-03-22 RX ADMIN — ETOMIDATE 14 MG: 2 INJECTION INTRAVENOUS at 10:32

## 2019-03-22 RX ADMIN — ACETAMINOPHEN 650 MG: 325 TABLET, FILM COATED ORAL at 17:20

## 2019-03-22 RX ADMIN — Medication 5 MG: at 21:35

## 2019-03-22 RX ADMIN — GABAPENTIN 900 MG: 300 CAPSULE ORAL at 11:26

## 2019-03-22 RX ADMIN — GABAPENTIN 900 MG: 300 CAPSULE ORAL at 20:13

## 2019-03-22 RX ADMIN — Medication 80 MG: at 10:32

## 2019-03-22 RX ADMIN — GABAPENTIN 900 MG: 300 CAPSULE ORAL at 16:06

## 2019-03-22 RX ADMIN — LIDOCAINE HYDROCHLORIDE 40 MG: 20 INJECTION, SOLUTION EPIDURAL; INFILTRATION; INTRACAUDAL; PERINEURAL at 10:34

## 2019-03-22 RX ADMIN — ACETAMINOPHEN 650 MG: 325 TABLET, FILM COATED ORAL at 09:31

## 2019-03-22 RX ADMIN — ACETAMINOPHEN 650 MG: 325 TABLET, FILM COATED ORAL at 12:54

## 2019-03-22 RX ADMIN — HYDROXYZINE HYDROCHLORIDE 25 MG: 25 TABLET ORAL at 23:18

## 2019-03-22 RX ADMIN — GRANISETRON HYDROCHLORIDE 1 MG: 1 INJECTION INTRAVENOUS at 10:24

## 2019-03-22 RX ADMIN — HYDROXYZINE HYDROCHLORIDE 25 MG: 25 TABLET ORAL at 21:37

## 2019-03-22 RX ADMIN — DESMOPRESSIN ACETATE 600 MCG: 0.2 TABLET ORAL at 21:35

## 2019-03-22 ASSESSMENT — ACTIVITIES OF DAILY LIVING (ADL)
DRESS: INDEPENDENT
DRESS: INDEPENDENT
HYGIENE/GROOMING: INDEPENDENT
ORAL_HYGIENE: INDEPENDENT
HYGIENE/GROOMING: INDEPENDENT
ORAL_HYGIENE: INDEPENDENT

## 2019-03-22 NOTE — PROCEDURES
Procedure/Surgery Information   [facility], [place]    Bedside Procedure Note  Date of Service (when I performed the procedure): 03/22/2019    Stephanie Villanueva is a 19 year old female patient.  1. Depression, unspecified depression type    2. Agitation    3. Anxiety      Past Medical History:   Diagnosis Date     Anxiety october 2013     CONTUSION OF left iliac crest 9/3/2006     Major depression october 2013     Nocturnal enuresis 10/4/2007    Currently resolved.     Salter-Perdomo Type I fracture of distal fibula with routine healing 1/25/2013     Uncomplicated asthma     sports induced     Temp: 98  F (36.7  C) Temp src: Tympanic BP: 127/82 Pulse: 84              Procedures     Yaron El     [facility], [place]   ECT Procedure Note  03/22/2019    Stephanie Villanueva [id number]   19 year old 2000     Patient Status: Inpatient    Is this the first in a series of 12 treatments?  Yes    History and Physical: Reviewed in medical record    Consent: Informed consent was signed by: patient    Date Consent Signed: 3/22/19      Allergies   Allergen Reactions     No Known Drug Allergies        Weight:  135 lbs 2.27 oz              Indications for ECT:   Medications ineffective and History of good ECT response in one or more previous episodes of illness         Clinical Narrative:   Patient has a long h/o depression and suicidal ideation.   She has treatment resistant depression and is resuming ECT for depression.  She was readmitted with worsening depression, anxiety and suicidal ideation.  NPO after 2400.  Alert and Oriented x 3.           Diagnosis:   Major depression         Pause for the Cause:     Right patient Yes   Right procedure/laterality settings: Yes          Intra-Procedure Documentation:     ECT #: 1   Treatment number this series: 1   Total treatment number: 10     Type of ECT:  Right, unilateral ultrabrief    ECT Medications:    Ibuprofen:  600mg po (but  today instead got Tylenol: 650mg)  Lactated Ringers:  1 liter  Granisetron: 1mg  Lidocaine:  40mg  Etomidate: 14mg  Succinyl Choline:  80mg    ECT Strip Summary:   Energy Level: 10 percent   Motor Seizure Duration: 60 seconds  EEG Seizure Duration: 60 seconds    Complications: No    Plan:   Next ECT 3/25/19    Yaron El MD

## 2019-03-22 NOTE — OR NURSING
Patient adequate for discharge. Report called to inpatient RNTracie. VSS, disoriented to place and time, IV removed. Discharged with staff at this time.

## 2019-03-22 NOTE — PROGRESS NOTES
Pt has been requesting tylenol and motrin for aches after her ECT this am. Will continue to assess.

## 2019-03-22 NOTE — ANESTHESIA POSTPROCEDURE EVALUATION
Anesthesia POST Procedure Evaluation    Patient: Stephanie Villanueva   MRN:     [id number] Gender:   female   Age:    19 year old :      2000        Preoperative Diagnosis: * No pre-op diagnosis entered *   * No procedures listed *   Postop Comments: No value filed.       Anesthesia Type:  General    Reportable Event: NO     PAIN: Uncomplicated   Sign Out status: Comfortable, Well controlled pain     PONV: No PONV   Sign Out status:  No Nausea or Vomiting     Neuro/Psych: Uneventful perioperative course   Sign Out Status: Preoperative baseline; Age appropriate mentation     Airway/Resp.: Uneventful perioperative course   Sign Out Status: Non labored breathing, age appropriate RR; Resp. Status within EXPECTED Parameters     CV: Uneventful perioperative course   Sign Out status: Appropriate BP and perfusion indices; Appropriate HR/Rhythm     Disposition:   Sign Out in:  PACU  Disposition:  Phase II; Home  Recovery Course: Uneventful  Follow-Up: Not required           Last Anesthesia Record Vitals:  CRNA VITALS  3/22/2019 1013 - 3/22/2019 1113      3/22/2019             Resp Rate (set):  8          Last PACU/Preop Vitals:  Vitals:    19 1043 19 1058 19 1113   BP: (!) 113/99 109/74 124/76   Pulse:   112   Resp: 16 16 16   Temp: 36.3  C (97.3  F)  36.9  C (98.5  F)   SpO2: 92% 97% 95%         Electronically Signed By: Marah Lugo MD, 2019, 11:17 AM

## 2019-03-22 NOTE — PROGRESS NOTES
"   03/21/19 2100   Behavioral Health   Hallucinations denies / not responding to hallucinations   Thinking distractable   Orientation place: oriented;person: oriented;date: oriented;time: oriented   Memory baseline memory   Insight poor   Judgement impaired   Eye Contact into space   Affect blunted, flat;tense   Mood mood is calm   Physical Appearance/Attire attire appropriate to age and situation   Hygiene well groomed   Suicidality other (see comments)  (pt denied SI)   1. Wish to be Dead No   2. Non-Specific Active Suicidal Thoughts  No   Self Injury other (see comment)  (pt denied SIB)   Elopement (no concerns)   Activity withdrawn   Speech coherent;clear   Medication Sensitivity no observed side effects;no stated side effects   Psychomotor / Gait balanced;steady   Activities of Daily Living   Hygiene/Grooming independent   Oral Hygiene independent   Dress independent   Room Organization independent     Pt denied all mental health symptoms and reported she is excited to start ECT. Pt appeared visibly upset with head in hands during a fire alarm drill. Pt was social with staff. Pt is visible in the milieu. Pt reported she is bored and \"all there is to do here is to eat.\" Pt appeared to have blunted, flat affect. Pt visited with her parents this evening. Pt was observed smiling while the therapy dog was on the unit. Pt was observed playing card games and reading this evening.  "

## 2019-03-22 NOTE — PROGRESS NOTES
"[facility], [place]   Psychiatric Progress Note        Interim History:   The patient's care was discussed with the treatment team during the daily team meeting and/or staff's chart notes were reviewed.  Staff report patient is visible in the milieu. She has been calm and reading in her room.    Psychiatric symptoms and interventions:   Continued gabapentin 900 mg QID to address anxiety.   Discontinued Zyprexa . Patient reports she gets \"too sedated from it.\"  Started propranolol 10 mg daily PRN for anxiety.  ECT consult.  Spoke with mother about treatment plan of maintenance ECT and continue gabapentin.    3/22 Patient responded well to ECT. She is smiling. Mood improved. She is not anxious. Talked about starting an antidepressant. Patient will access the [facility] portal . She had gene testing done at [facility].      Medical issues: no acute problems     Behavioral/psychology/social:  Encouraged patient to attend therapeutic hospital programming as tolerated  Patient has not required restraint or seclusion within the last 24 hours.             Medications:       desmopressin  600 mcg Oral At Bedtime     gabapentin  900 mg Oral 4x Daily     ibuprofen  600 mg Oral Daily     melatonin  5 mg Oral At Bedtime     vitamin D2  50,000 Units Oral Weekly          Allergies:     Allergies   Allergen Reactions     No Known Drug Allergies           Labs:     Recent Results (from the past 24 hour(s))   EKG 12-lead, complete    Collection Time: 03/21/19  3:00 PM   Result Value Ref Range    Interpretation ECG Click View Image link to view waveform and result    Comprehensive metabolic panel    Collection Time: 03/22/19  7:16 AM   Result Value Ref Range    Sodium 143 133 - 144 mmol/L    Potassium 3.9 3.4 - 5.3 mmol/L    Chloride 110 (H) 94 - 109 mmol/L    Carbon Dioxide 25 20 - 32 mmol/L    Anion Gap 8 3 - 14 mmol/L    Glucose 104 (H) 70 - 99 mg/dL    Urea Nitrogen 19 7 - 30 mg/dL    Creatinine 0.73 0.52 - 1.04 " mg/dL    GFR Estimate >90 >60 mL/min/[1.73_m2]    GFR Estimate If Black >90 >60 mL/min/[1.73_m2]    Calcium 8.6 8.5 - 10.1 mg/dL    Bilirubin Total 0.4 0.2 - 1.3 mg/dL    Albumin 3.7 3.4 - 5.0 g/dL    Protein Total 6.7 (L) 6.8 - 8.8 g/dL    Alkaline Phosphatase 83 40 - 150 U/L    ALT 20 0 - 50 U/L    AST 21 0 - 45 U/L          Psychiatric Examination:     /76 (BP Location: Right arm)   Pulse 112   Temp 98.5  F (36.9  C) (Tympanic)   Resp 16   Wt 61.3 kg (135 lb 2.3 oz)   LMP  (LMP Unknown)   SpO2 95%   BMI 19.39 kg/m    Weight is 135 lbs 2.27 oz  Body mass index is 19.39 kg/m .  Orthostatic Vitals       Most Recent      Sitting Orthostatic /78 03/22 0926    Sitting Orthostatic Pulse (bpm) 102 03/22 0926    Standing Orthostatic /70 03/22 0926    Standing Orthostatic Pulse (bpm) 101 03/22 0926            Appearance: awake, alert and adequately groomed  Attitude:  cooperative  Eye Contact:  good  Mood:  better  Affect:  appropriate and in normal range  Speech:  clear, coherent  Psychomotor Behavior:  no evidence of tardive dyskinesia, dystonia, or tics  Throught Process:  logical, linear and goal oriented  Associations:  no loose associations  Thought Content:  no evidence of suicidal ideation or homicidal ideation and thoughts of self-harm, which are no thoughts of self harm.   Insight:  fair  Judgement:  fair  Oriented to:  time, person, and place  Attention Span and Concentration:  intact  Recent and Remote Memory:  intact    Clinical Global Impressions  First:  Considering your total clinical experience with this particular patient population, how severe are the patient's symptoms at this time?: 6 (03/19/19 1615)  Compared to the patient's condition at the START of treatment, this patient's condition is:: 6 (03/19/19 1615)  Most recent:  Considering your total clinical experience with this particular patient population, how severe are the patient's symptoms at this time?: 6 (03/19/19  0005)  Compared to the patient's condition at the START of treatment, this patient's condition is:: 6 (03/19/19 4042)         Precautions:     Behavioral Orders   Procedures     Code 1 - Restrict to Unit     Code 2     Electroconvulsive therapy     ECT every Monday, Wednesday, and Friday     Routine Programming     As clinically indicated     Self Injury Precaution     Patient was hitting her head against the wall in the ED. Needed restraint for her safety.     Status 15     Every 15 minutes.     Suicide precautions     Patients on Suicide Precautions should have a Combination Diet ordered that includes a Diet selection(s) AND a Behavioral Tray selection for Safe Tray - with utensils, or Safe Tray - NO utensils            DIagnoses:   1.  Major depressive disorder, recurrent, severe without psychotic symptoms.   2.  Traits of borderline personality disorder.   3.  General anxiety disorder.   4.  Obsessive-compulsive disorder.   5.  History of cannabis use disorder.   6.  Autism spectrum disorder.           Plan:     Legal status:  Discontinue 72 hour hold. Patient signed in voluntary.     Medication management: Discontinue PRN Zyprexa, Continued gabapentin 900 mg QID     Disposition plan: Stabilize with medications, maintenance ECT, return to home.

## 2019-03-22 NOTE — ANESTHESIA PREPROCEDURE EVALUATION
Anesthesia Pre-Procedure Evaluation    Patient: Stephanie Villanueva   MRN:     [id number] Gender:   female   Age:    18 year old :      2000        Preoperative Diagnosis: * No pre-op diagnosis entered *   * No procedures listed *     Past Medical History:   Diagnosis Date     Anxiety 2013     CONTUSION OF left iliac crest 9/3/2006     Major depression 2013     Nocturnal enuresis 10/4/2007    Currently resolved.     Salter-Perdomo Type I fracture of distal fibula with routine healing 2013     Uncomplicated asthma     sports induced      No past surgical history on file.       Anesthesia Evaluation     .             ROS/MED HX    ENT/Pulmonary:     (+)asthma , . .    Neurologic:  - neg neurologic ROS     Cardiovascular:  - neg cardiovascular ROS       METS/Exercise Tolerance:     Hematologic:  - neg hematologic  ROS       Musculoskeletal:  - neg musculoskeletal ROS       GI/Hepatic:  - neg GI/hepatic ROS       Renal/Genitourinary:  - ROS Renal section negative       Endo:         Psychiatric:     (+) psychiatric history anxiety, depression and other (comment) (Borderline personality disorder)      Infectious Disease:  - neg infectious disease ROS       Malignancy:      - no malignancy   Other:                         PHYSICAL EXAM:   Mental Status/Neuro: A/A/O   Airway: Facies: Feasible  Mallampati: I  Mouth/Opening: Full  TM distance: > 6 cm  Neck ROM: Full   Respiratory: Auscultation: CTAB     Resp. Rate: Normal     Resp. Effort: Normal      CV: Rhythm: Regular  Rate: Age appropriate  Heart: Normal Sounds   Comments:      Dental: Normal                  Lab Results   Component Value Date    WBC 5.8 2019    HGB 13.0 2019    HCT 35.5 2019     2019    CRP <2.9 2019    SED 8 2019     2019    POTASSIUM 3.9 2019    CHLORIDE 110 (H) 2019    CO2 25 2019    BUN 19 2019    CR 0.73 2019     (H) 2019    BRITTANIE 8.6  "03/22/2019    ALBUMIN 3.7 03/22/2019    PROTTOTAL 6.7 (L) 03/22/2019    ALT 20 03/22/2019    AST 21 03/22/2019    ALKPHOS 83 03/22/2019    BILITOTAL 0.4 03/22/2019    TSH 5.57 (H) 01/21/2019    T4 0.83 01/21/2019    HCG Negative 01/19/2019       Preop Vitals  BP Readings from Last 3 Encounters:   03/22/19 124/78   03/13/19 99/66   02/11/19 125/88    Pulse Readings from Last 3 Encounters:   03/22/19 102   03/13/19 76   02/11/19 108      Resp Readings from Last 3 Encounters:   03/22/19 16   02/11/19 16   02/08/19 16    SpO2 Readings from Last 3 Encounters:   03/22/19 99%   02/11/19 95%   02/08/19 100%      Temp Readings from Last 1 Encounters:   03/22/19 36  C (96.8  F) (Tympanic)    Ht Readings from Last 1 Encounters:   03/13/19 1.778 m (5' 10\")      Wt Readings from Last 1 Encounters:   03/21/19 61.3 kg (135 lb 2.3 oz)    Estimated body mass index is 19.39 kg/m  as calculated from the following:    Height as of 3/13/19: 1.778 m (5' 10\").    Weight as of 3/21/19: 61.3 kg (135 lb 2.3 oz).     LDA:  Peripheral IV 03/22/19 Right Upper forearm (Active)   Site Assessment WDL 3/22/2019 10:00 AM   Line Status Saline locked 3/22/2019 10:00 AM   Number of days: 0            Assessment:   ASA SCORE: 2    NPO Status: > 6 hours since completed [employer]   Documentation: H&P complete; Preop Testing complete; Consents complete   Proceeding: Proceed without further delay  Tobacco Use:  NO Active use of Tobacco/UNKNOWN Tobacco use status     Plan:   Anes. Type:  General   Pre-Induction: Acetaminophen PO   Induction:  IV (Standard)   Airway: Native Airway   Access/Monitoring: PIV   Maintenance: Balanced   Emergence: Procedure Site   Logistics: Same Day Surgery     Postop Pain/Sedation Strategy:  Standard-Options: Opioids PRN     PONV Management:  Adult Risk Factors: Female, Non-Smoker     CONSENT: Direct conversation   Plan and risks discussed with: Patient   Blood Products: Consent Deferred (Minimal Blood Loss) " "          Anesthesia Pre-Procedure Evaluation    Patient: Stephanie Villanueva   MRN:     [id number] Gender:   female   Age:    18 year old :      2000        Preoperative Diagnosis: * No pre-op diagnosis entered *   * No procedures listed *     Past Medical History:   Diagnosis Date     Anxiety 2013     CONTUSION OF left iliac crest 9/3/2006     Major depression 2013     Nocturnal enuresis 10/4/2007    Currently resolved.     Salter-Perdomo Type I fracture of distal fibula with routine healing 2013     Uncomplicated asthma     sports induced      No past surgical history on file.              [facility] AN PHYSICAL EXAM      Lab Results   Component Value Date    WBC 5.8 2019    HGB 13.0 2019    HCT 35.5 2019     2019    CRP <2.9 2019    SED 8 2019     2019    POTASSIUM 3.9 2019    CHLORIDE 110 (H) 2019    CO2 25 2019    BUN 19 2019    CR 0.73 2019     (H) 2019    BRITTANIE 8.6 2019    ALBUMIN 3.7 2019    PROTTOTAL 6.7 (L) 2019    ALT 20 2019    AST 21 2019    ALKPHOS 83 2019    BILITOTAL 0.4 2019    TSH 5.57 (H) 2019    T4 0.83 2019    HCG Negative 2019       Preop Vitals  BP Readings from Last 3 Encounters:   19 124/78   19 99/66   19 125/88    Pulse Readings from Last 3 Encounters:   19 102   19 76   19 108      Resp Readings from Last 3 Encounters:   19 16   19 16   19 16    SpO2 Readings from Last 3 Encounters:   19 99%   19 95%   19 100%      Temp Readings from Last 1 Encounters:   19 36  C (96.8  F) (Tympanic)    Ht Readings from Last 1 Encounters:   19 1.778 m (5' 10\")      Wt Readings from Last 1 Encounters:   19 61.3 kg (135 lb 2.3 oz)    Estimated body mass index is 19.39 kg/m  as calculated from the following:    Height as of 3/13/19: 1.778 m (5' " "10\").    Weight as of 3/21/19: 61.3 kg (135 lb 2.3 oz).     LDA:  Peripheral IV 03/22/19 Right Upper forearm (Active)   Site Assessment WDL 3/22/2019 10:00 AM   Line Status Saline locked 3/22/2019 10:00 AM   Number of days: 0            Assessment:   ASA SCORE: 2    NPO Status: > 6 hours since completed Solid [employer]   Documentation: H&P complete; Preop Testing complete; Consents complete   Proceeding: Proceed without further delay     Plan:   Anes. Type:  General   Pre-Induction: Acetaminophen PO   Induction:  IV (Standard)   Airway: Native Airway   Access/Monitoring: PIV   Maintenance: Balanced   Emergence: Procedure Site   Logistics: Same Day Surgery     Postop Pain/Sedation Strategy:  Standard-Options: Opioids PRN     PONV Management:Adult Risk Factors: Female     CONSENT: Direct conversation   Plan and risks discussed with: Patient   Blood Products: Consent Deferred (Minimal Blood Loss)                             MD Marah Galvan MD  "

## 2019-03-22 NOTE — CONSULTS
Psych Consultation    Patient is well-known to Dr. El.   She's readmitted with depression,anxiety and suicidal ideation.      Plan:  Begin ECT today 3/22/19.  She will hopefully need a brief series this time and maybe continue with maintenance ECT.     Yaron El MD

## 2019-03-22 NOTE — ANESTHESIA CARE TRANSFER NOTE
Patient: Stephanie Villanueva    * No procedures listed *    Diagnosis: * No pre-op diagnosis entered *  Diagnosis Additional Information: No value filed.    Anesthesia Type:   No value filed.     Note:  Airway :Room Air  Patient transferred to:PACU  Handoff Report: Identifed the Patient, Identified the Reponsible Provider, Reviewed the pertinent medical history, Discussed the surgical course, Reviewed Intra-OP anesthesia mangement and issues during anesthesia, Set expectations for post-procedure period and Allowed opportunity for questions and acknowledgement of understanding      Vitals: (Last set prior to Anesthesia Care Transfer)    CRNA VITALS  3/22/2019 1013 - 3/22/2019 1043      3/22/2019             Resp Rate (set):  8                Electronically Signed By: BAILEY Sandoval CRNA  March 22, 2019  10:43 AM

## 2019-03-22 NOTE — PROGRESS NOTES
"CTC checked-in with pt regarding discharge and aftercare plan. Pt reported she is feeling \"much better\" after her ECT appointment. Pt asked about getting a [facility] CM and starting day treatment once she is done with ECT. CTC let pt know she would get a referral for CM and discuss PHP recs with pt's provider.   "

## 2019-03-22 NOTE — PROGRESS NOTES
"Pt stated yes to \"wish she were dead\" but has no plan or intent on unit. Pt cooperative with staff requests. Pt's affect improved considerably in the afternoon. Pt was blunted with delayed answers to questions in the morning, but smiled in the afternoon and would often answer questions with minimal delay. Pt showered this shift after ECT. When asked what helped her to feel better, pt stated \"getting shocked\" and smiled.      03/22/19 1400   Behavioral Health   Hallucinations denies / not responding to hallucinations   Thinking distractable   Orientation place: oriented;person: oriented   Memory long term   Insight admits / accepts   Judgement impaired   Eye Contact at examiner;into space   Affect full range affect;other (see comments)  (significantly improved in afternoon)   Mood mood is calm;depressed   Physical Appearance/Attire attire appropriate to age and situation   Hygiene well groomed   Suicidality thoughts only   1. Wish to be Dead Yes   2. Non-Specific Active Suicidal Thoughts  No   Self Injury other (see comment)  (denies)   Elopement (no concerning statements or behaviors)   Activity withdrawn   Speech coherent;other (see comments)  (delayed)   Medication Sensitivity no stated side effects   Psychomotor / Gait steady;balanced   Psycho Education   Type of Intervention 1:1 intervention   Response participates, initiates socially appropriate   Hours 0.5   Treatment Detail check in   Activities of Daily Living   Hygiene/Grooming independent   Oral Hygiene independent   Dress independent   Room Organization independent   Activity   Activity Assistance Provided independent     "

## 2019-03-23 PROCEDURE — 25000132 ZZH RX MED GY IP 250 OP 250 PS 637: Performed by: CLINICAL NURSE SPECIALIST

## 2019-03-23 PROCEDURE — 12400001 ZZH R&B MH UMMC

## 2019-03-23 PROCEDURE — 25000132 ZZH RX MED GY IP 250 OP 250 PS 637: Performed by: PSYCHIATRY & NEUROLOGY

## 2019-03-23 RX ADMIN — IBUPROFEN 600 MG: 600 TABLET ORAL at 09:40

## 2019-03-23 RX ADMIN — GABAPENTIN 900 MG: 300 CAPSULE ORAL at 09:40

## 2019-03-23 RX ADMIN — GABAPENTIN 900 MG: 300 CAPSULE ORAL at 22:34

## 2019-03-23 RX ADMIN — GABAPENTIN 900 MG: 300 CAPSULE ORAL at 13:01

## 2019-03-23 RX ADMIN — DESMOPRESSIN ACETATE 600 MCG: 0.2 TABLET ORAL at 22:34

## 2019-03-23 RX ADMIN — GABAPENTIN 900 MG: 300 CAPSULE ORAL at 18:27

## 2019-03-23 RX ADMIN — Medication 5 MG: at 22:34

## 2019-03-23 ASSESSMENT — ACTIVITIES OF DAILY LIVING (ADL)
DRESS: INDEPENDENT
HYGIENE/GROOMING: INDEPENDENT
LAUNDRY: UNABLE TO COMPLETE
HYGIENE/GROOMING: INDEPENDENT
ORAL_HYGIENE: INDEPENDENT
ORAL_HYGIENE: INDEPENDENT
DRESS: INDEPENDENT

## 2019-03-23 NOTE — PROGRESS NOTES
Stephanie was visible in the milieu the majority of the shift. She stated that her body pain from ECT was more severe this evening that past treatments. Her mood was calm. Affect flat/blunted. No SI/SIB stated or observed.            03/22/19 2200   Behavioral Health   Hallucinations denies / not responding to hallucinations   Thinking poor concentration   Orientation place: oriented;date: oriented;time: oriented   Memory long term   Insight admits / accepts   Judgement (limited)   Eye Contact at examiner   Affect full range affect   Mood mood is calm;depressed   Physical Appearance/Attire attire appropriate to age and situation   Hygiene well groomed   Suicidality other (see comments)  (none stated or observed)   1. Wish to be Dead No   2. Non-Specific Active Suicidal Thoughts  No   Self Injury other (see comment)  (none stated or observed)   Elopement (no concerns this shift)   Activity other (see comment)  (visible)   Speech coherent;clear   Medication Sensitivity no stated side effects;no observed side effects   Psychomotor / Gait balanced;steady   Activities of Daily Living   Hygiene/Grooming independent   Oral Hygiene independent   Dress independent   Room Organization independent   Activity   Activity Assistance Provided independent

## 2019-03-23 NOTE — CONSULTS
[facility]  Internal Medicine Consult    Stephanie Villanueva MRN# [id number]   Age: 19 year old YOB: 2000     Date of Admission: 3/18/2019  Date of Consult:  3/22/2019    Requesting Service: Behavioral Health - Isac Parker MD  Reason for Consult: Pre ECT Medicine Evaluation   Indication for ECT: Refractory depression, anxiety       HPI: Stephanie Villanueva is a 19 year old female admitted for increased anxiety and agitation.  Internal medicine is consulted for evaluation prior to ECT.  Pt had already completed ECT this morning prior to completion of IM evaluation.  Previously seen for ECT clearance in Jan 2019.      Review of Systems:   Cardiovascular: neg  Pulmonary: neg  Neurological: neg    Past Medical History:   Prior Anesthesia: Yes  If yes, any complications: Unknown    Prior ECT: Yes  If yes,   1/2019  Response: good   Side Effects: n/a     Cardiovascular: CAD No, MI No, HTN No, CHF No, pacemaker or ICD No  Pulmonary: Asthma/COPD No, Prior respiratory failure or need for emergent intubation No, On theophylline No (note that theophylline use is a contraindication to proceeding with ECT, needs to be tapered off prior)  Neurological: Brain tumor No, TIA/CVA No, Dementia No, Neuromuscular Disease (including post polio syndrome) No, Seizures and/or Epilepsy No, Head Injury No, Intracranial Hemorrhage No    Past Surgical History:   History reviewed. No pertinent surgical history.    Allergies:      Allergies   Allergen Reactions     No Known Drug Allergies        Medication list reviewed.    Physical Exam:  /76 (BP Location: Right arm)   Pulse 112   Temp 98.5  F (36.9  C) (Tympanic)   Resp 16   Wt 61.3 kg (135 lb 2.3 oz)   LMP  (LMP Unknown)   SpO2 95%   BMI 19.39 kg/m     Cardiovascular: RRR. S1, S2. No murmurs, rubs, gallops.   Pulmonary: Effort normal. Lungs CTAB with no wheezing, rales, rhonchi.   Neurological: A&Ox3. No focal deficits. PERRLA.     Data:  EKG:HR 70,  sinus rhythm with sinus arrhythmia   Head Imaging: n/a  Labs:   CBC:  Recent Labs   Lab Test 03/21/19  1432 01/19/19  0439   WBC  --  5.8   RBC  --  3.77*   HGB 13.0 11.4*   HCT  --  35.5   MCV  --  94   MCH  --  30.2   MCHC  --  32.1   RDW  --  12.3   PLT  --  293     CMP:  Recent Labs   Lab Test 03/22/19  0716      POTASSIUM 3.9   CHLORIDE 110*   BRITTANIE 8.6   CO2 25   BUN 19   CR 0.73   *   AST 21   ALT 20   BILITOTAL 0.4   ALBUMIN 3.7   PROTTOTAL 6.7*   ALKPHOS 83     HCG: neg    Recommendations:   Diuretics and oral hypoglycemics should be held the morning of ECT.   All other antihypertensive medications can be continued.     Patient does not have absolute medical contraindications to proceeding with ECT at this time. Treatment plan per psychiatry.       Maria Esther Kenyon  [facility]  Hospitalist Service  Pager: [phone]

## 2019-03-23 NOTE — PROGRESS NOTES
Pt has been present in the milieu and presents with a full range affect and calm mood. Pt denies experiencing any severe depression or anxiety today and states that the ECT helps her a lot. Pt mentioned that without ECT, her desire to die is much stronger and she is not able to think into the future, but when she is consistent with ECT she is able to think about her future plans and she has less of a desire to die. Pt ate both meals this shift. Pt denies experiencing any hallucinations or delusions at this time. Pt denies experiencing any SIB and admits to experiencing some passive thoughts of SI. Pt has been cooperative and respectful this shift.      03/23/19 1429   Behavioral Health   Hallucinations denies / not responding to hallucinations   Thinking intact   Orientation person: oriented;place: oriented;date: oriented;time: oriented   Memory baseline memory   Insight admits / accepts   Judgement intact   Eye Contact at examiner   Affect full range affect   Mood mood is calm   Physical Appearance/Attire attire appropriate to age and situation   Hygiene well groomed   Suicidality thoughts only;other (see comments)  (passive)   1. Wish to be Dead Yes   2. Non-Specific Active Suicidal Thoughts  No   Self Injury other (see comment)  (Pt denies SIB)   Elopement Statements about wanting to leave   Activity other (see comment)  (Pt is present in milieu; participates in groups)   Speech clear;coherent   Medication Sensitivity no stated side effects;no observed side effects   Psychomotor / Gait balanced;steady   Activities of Daily Living   Hygiene/Grooming independent   Oral Hygiene independent   Dress independent   Laundry unable to complete   Room Organization independent

## 2019-03-23 NOTE — PLAN OF CARE
48 hour nursing assessment:  Pt evaluation continues. Assessed mood, anxiety, thoughts, and behavior. Is progressing towards goals. Encourage participation in groups and developing healthy coping skills. Pt denies auditory or visual  hallucinations. Refer to daily team meeting notes for individualized plan of care. Will continue to assess.

## 2019-03-24 PROCEDURE — 12400001 ZZH R&B MH UMMC

## 2019-03-24 PROCEDURE — 25000132 ZZH RX MED GY IP 250 OP 250 PS 637: Performed by: CLINICAL NURSE SPECIALIST

## 2019-03-24 PROCEDURE — 25000132 ZZH RX MED GY IP 250 OP 250 PS 637: Performed by: PSYCHIATRY & NEUROLOGY

## 2019-03-24 RX ADMIN — GABAPENTIN 900 MG: 300 CAPSULE ORAL at 11:25

## 2019-03-24 RX ADMIN — IBUPROFEN 600 MG: 600 TABLET ORAL at 09:23

## 2019-03-24 RX ADMIN — GABAPENTIN 900 MG: 300 CAPSULE ORAL at 09:23

## 2019-03-24 RX ADMIN — HYDROXYZINE HYDROCHLORIDE 25 MG: 25 TABLET ORAL at 22:37

## 2019-03-24 RX ADMIN — HYDROXYZINE HYDROCHLORIDE 50 MG: 25 TABLET ORAL at 11:25

## 2019-03-24 RX ADMIN — Medication 5 MG: at 22:36

## 2019-03-24 RX ADMIN — DESMOPRESSIN ACETATE 600 MCG: 0.2 TABLET ORAL at 22:36

## 2019-03-24 RX ADMIN — GABAPENTIN 900 MG: 300 CAPSULE ORAL at 17:41

## 2019-03-24 ASSESSMENT — ACTIVITIES OF DAILY LIVING (ADL)
ORAL_HYGIENE: INDEPENDENT
DRESS: INDEPENDENT
HYGIENE/GROOMING: INDEPENDENT

## 2019-03-24 NOTE — PROGRESS NOTES
"Pt was sleeping when writer began the shift. Pt remained sleeping until 1700. Pt came out for dinner and then remained in the milieu and was visible and social the rest of the evening. Pt's affect was bright this evening. Pt spent time reading and then watched a movie. Pt's also had a visit from her mother this evening.     Pt denies SI and SIB. Pt states anxiety and depression are present, but \"mild\".       03/23/19 2200   Behavioral Health   Hallucinations denies / not responding to hallucinations   Thinking intact   Orientation person: oriented;place: oriented;date: oriented;time: oriented   Memory baseline memory   Insight insight appropriate to situation   Judgement intact   Eye Contact at examiner   Affect full range affect   Mood mood is calm   Physical Appearance/Attire attire appropriate to age and situation   Hygiene well groomed   1. Wish to be Dead No   2. Non-Specific Active Suicidal Thoughts  No   Self Injury other (see comment)  (denies)   Activity (WDL) WDL   Speech (WDL) WDL   Speech clear;coherent   Psychomotor Gait (WDL) WDL   Psychomotor / Gait balanced;steady   Activities of Daily Living   Hygiene/Grooming independent   Oral Hygiene independent   Dress independent   Room Organization independent   Activity   Activity Assistance Provided independent       "

## 2019-03-25 ENCOUNTER — ANESTHESIA (OUTPATIENT)
Dept: BEHAVIORAL HEALTH | Facility: CLINIC | Age: 19
End: 2019-03-25

## 2019-03-25 ENCOUNTER — ANESTHESIA EVENT (OUTPATIENT)
Dept: BEHAVIORAL HEALTH | Facility: CLINIC | Age: 19
End: 2019-03-25

## 2019-03-25 VITALS
SYSTOLIC BLOOD PRESSURE: 127 MMHG | RESPIRATION RATE: 14 BRPM | HEART RATE: 102 BPM | TEMPERATURE: 98.5 F | BODY MASS INDEX: 20.47 KG/M2 | DIASTOLIC BLOOD PRESSURE: 79 MMHG | WEIGHT: 142.64 LBS | OXYGEN SATURATION: 100 %

## 2019-03-25 PROCEDURE — 90870 ELECTROCONVULSIVE THERAPY: CPT

## 2019-03-25 PROCEDURE — 25000125 ZZHC RX 250: Performed by: ANESTHESIOLOGY

## 2019-03-25 PROCEDURE — 99239 HOSP IP/OBS DSCHRG MGMT >30: CPT | Mod: 25 | Performed by: CLINICAL NURSE SPECIALIST

## 2019-03-25 PROCEDURE — 25800030 ZZH RX IP 258 OP 636: Performed by: PSYCHIATRY & NEUROLOGY

## 2019-03-25 PROCEDURE — 25000125 ZZHC RX 250: Performed by: PSYCHIATRY & NEUROLOGY

## 2019-03-25 PROCEDURE — 25000128 H RX IP 250 OP 636: Performed by: ANESTHESIOLOGY

## 2019-03-25 PROCEDURE — 25000132 ZZH RX MED GY IP 250 OP 250 PS 637: Performed by: PSYCHIATRY & NEUROLOGY

## 2019-03-25 PROCEDURE — G0177 OPPS/PHP; TRAIN & EDUC SERV: HCPCS

## 2019-03-25 PROCEDURE — 25000132 ZZH RX MED GY IP 250 OP 250 PS 637: Performed by: CLINICAL NURSE SPECIALIST

## 2019-03-25 RX ORDER — HYDROXYZINE HYDROCHLORIDE 25 MG/1
25 TABLET, FILM COATED ORAL EVERY 4 HOURS PRN
Status: DISCONTINUED | OUTPATIENT
Start: 2019-03-25 | End: 2019-03-25 | Stop reason: HOSPADM

## 2019-03-25 RX ORDER — ETOMIDATE 2 MG/ML
INJECTION INTRAVENOUS PRN
Status: DISCONTINUED | OUTPATIENT
Start: 2019-03-25 | End: 2019-03-25

## 2019-03-25 RX ORDER — HYDROXYZINE HYDROCHLORIDE 25 MG/1
25 TABLET, FILM COATED ORAL EVERY 4 HOURS PRN
Qty: 120 TABLET | Refills: 1 | Status: SHIPPED | OUTPATIENT
Start: 2019-03-25 | End: 2020-04-16

## 2019-03-25 RX ORDER — GRANISETRON HYDROCHLORIDE 1 MG/ML
1 INJECTION INTRAVENOUS ONCE
Status: CANCELLED
Start: 2019-03-25 | End: 2019-03-25

## 2019-03-25 RX ORDER — LIDOCAINE HYDROCHLORIDE 20 MG/ML
40 INJECTION, SOLUTION EPIDURAL; INFILTRATION; INTRACAUDAL; PERINEURAL
Status: CANCELLED
Start: 2019-03-25 | End: 2019-03-25

## 2019-03-25 RX ORDER — LIDOCAINE HYDROCHLORIDE 20 MG/ML
40 INJECTION, SOLUTION EPIDURAL; INFILTRATION; INTRACAUDAL; PERINEURAL
Status: COMPLETED | OUTPATIENT
Start: 2019-03-25 | End: 2019-03-25

## 2019-03-25 RX ORDER — GRANISETRON HYDROCHLORIDE 1 MG/ML
1 INJECTION INTRAVENOUS ONCE
Status: COMPLETED | OUTPATIENT
Start: 2019-03-25 | End: 2019-03-25

## 2019-03-25 RX ADMIN — GRANISETRON HYDROCHLORIDE 1 MG: 1 INJECTION INTRAVENOUS at 10:07

## 2019-03-25 RX ADMIN — IBUPROFEN 600 MG: 600 TABLET ORAL at 08:31

## 2019-03-25 RX ADMIN — GABAPENTIN 900 MG: 300 CAPSULE ORAL at 12:23

## 2019-03-25 RX ADMIN — SODIUM CHLORIDE, POTASSIUM CHLORIDE, SODIUM LACTATE AND CALCIUM CHLORIDE 1000 ML: 600; 310; 30; 20 INJECTION, SOLUTION INTRAVENOUS at 10:07

## 2019-03-25 RX ADMIN — ETOMIDATE 14 MG: 2 INJECTION INTRAVENOUS at 10:16

## 2019-03-25 RX ADMIN — Medication 80 MG: at 10:19

## 2019-03-25 RX ADMIN — LIDOCAINE HYDROCHLORIDE 40 MG: 20 INJECTION, SOLUTION EPIDURAL; INFILTRATION; INTRACAUDAL; PERINEURAL at 10:20

## 2019-03-25 RX ADMIN — ETOMIDATE 6 MG: 2 INJECTION INTRAVENOUS at 10:17

## 2019-03-25 RX ADMIN — ETOMIDATE 6 MG: 2 INJECTION INTRAVENOUS at 10:19

## 2019-03-25 RX ADMIN — TRAZODONE HYDROCHLORIDE 50 MG: 50 TABLET ORAL at 01:00

## 2019-03-25 NOTE — ANESTHESIA PREPROCEDURE EVALUATION
Anesthesia Pre-Procedure Evaluation    Patient: Stephanie Villanueva   MRN:     [id number] Gender:   female   Age:    18 year old :      2000        Preoperative Diagnosis: * No pre-op diagnosis entered *   * No procedures listed *     Past Medical History:   Diagnosis Date     Anxiety 2013     CONTUSION OF left iliac crest 9/3/2006     Major depression 2013     Nocturnal enuresis 10/4/2007    Currently resolved.     Salter-Perdomo Type I fracture of distal fibula with routine healing 2013     Uncomplicated asthma     sports induced      No past surgical history on file.       Anesthesia Evaluation     .             ROS/MED HX    ENT/Pulmonary:     (+)asthma , . .    Neurologic:  - neg neurologic ROS     Cardiovascular:  - neg cardiovascular ROS       METS/Exercise Tolerance:     Hematologic:  - neg hematologic  ROS       Musculoskeletal:  - neg musculoskeletal ROS       GI/Hepatic:  - neg GI/hepatic ROS       Renal/Genitourinary:  - ROS Renal section negative       Endo:         Psychiatric:     (+) psychiatric history anxiety, depression and other (comment) (Borderline personality disorder)      Infectious Disease:  - neg infectious disease ROS       Malignancy:      - no malignancy   Other:                         PHYSICAL EXAM:   Mental Status/Neuro: A/A/O   Airway: Facies: Feasible  Mallampati: I  Mouth/Opening: Full  TM distance: > 6 cm  Neck ROM: Full   Respiratory: Auscultation: CTAB     Resp. Rate: Normal     Resp. Effort: Normal      CV: Rhythm: Regular  Rate: Age appropriate  Heart: Normal Sounds   Comments:      Dental: Normal                  Lab Results   Component Value Date    WBC 5.8 2019    HGB 13.0 2019    HCT 35.5 2019     2019    CRP <2.9 2019    SED 8 2019     2019    POTASSIUM 3.9 2019    CHLORIDE 110 (H) 2019    CO2 25 2019    BUN 19 2019    CR 0.73 2019     (H) 2019    BRITTANIE 8.6  "2019    ALBUMIN 3.7 2019    PROTTOTAL 6.7 (L) 2019    ALT 20 2019    AST 21 2019    ALKPHOS 83 2019    BILITOTAL 0.4 2019    TSH 5.57 (H) 2019    T4 0.83 2019    HCG Negative 2019       Preop Vitals  BP Readings from Last 3 Encounters:   19 107/72   19 99/66   19 125/88    Pulse Readings from Last 3 Encounters:   19 94   19 76   19 108      Resp Readings from Last 3 Encounters:   19 16   19 16   19 16    SpO2 Readings from Last 3 Encounters:   19 100%   19 95%   19 100%      Temp Readings from Last 1 Encounters:   19 36.4  C (97.5  F) (Tympanic)    Ht Readings from Last 1 Encounters:   19 1.778 m (5' 10\")      Wt Readings from Last 1 Encounters:   19 64.7 kg (142 lb 10.2 oz)    Estimated body mass index is 20.47 kg/m  as calculated from the following:    Height as of 3/13/19: 1.778 m (5' 10\").    Weight as of 3/24/19: 64.7 kg (142 lb 10.2 oz).     LDA:            Assessment:   ASA SCORE: 2    NPO Status: > 6 hours since completed Solid Foods   Documentation: H&P complete; Preop Testing complete; Consents complete   Proceeding: Proceed without further delay  Tobacco Use:  NO Active use of Tobacco/UNKNOWN Tobacco use status     Plan:   Anes. Type:  General   Pre-Induction: Acetaminophen PO   Induction:  IV (Standard)   Airway: Native Airway   Access/Monitoring: PIV   Maintenance: Balanced   Emergence: Procedure Site   Logistics: Same Day Surgery     Postop Pain/Sedation Strategy:  Standard-Options: Opioids PRN     PONV Management:  Adult Risk Factors: Female, Non-Smoker     CONSENT: Direct conversation   Plan and risks discussed with: Patient   Blood Products: Consent Deferred (Minimal Blood Loss)           Anesthesia Pre-Procedure Evaluation    Patient: Stephanie Villanueva   MRN:     [id number] Gender:   female   Age:    18 year old :      2000        Preoperative " "Diagnosis: * No pre-op diagnosis entered *   * No procedures listed *     Past Medical History:   Diagnosis Date     Anxiety october 2013     CONTUSION OF left iliac crest 9/3/2006     Major depression october 2013     Nocturnal enuresis 10/4/2007    Currently resolved.     Salter-Perdomo Type I fracture of distal fibula with routine healing 1/25/2013     Uncomplicated asthma     sports induced      No past surgical history on file.              [facility] AN PHYSICAL EXAM      Lab Results   Component Value Date    WBC 5.8 01/19/2019    HGB 13.0 03/21/2019    HCT 35.5 01/19/2019     01/19/2019    CRP <2.9 03/13/2019    SED 8 03/13/2019     03/22/2019    POTASSIUM 3.9 03/22/2019    CHLORIDE 110 (H) 03/22/2019    CO2 25 03/22/2019    BUN 19 03/22/2019    CR 0.73 03/22/2019     (H) 03/22/2019    BRITTANIE 8.6 03/22/2019    ALBUMIN 3.7 03/22/2019    PROTTOTAL 6.7 (L) 03/22/2019    ALT 20 03/22/2019    AST 21 03/22/2019    ALKPHOS 83 03/22/2019    BILITOTAL 0.4 03/22/2019    TSH 5.57 (H) 01/21/2019    T4 0.83 01/21/2019    HCG Negative 01/19/2019       Preop Vitals  BP Readings from Last 3 Encounters:   03/25/19 107/72   03/13/19 99/66   02/11/19 125/88    Pulse Readings from Last 3 Encounters:   03/25/19 94   03/13/19 76   02/11/19 108      Resp Readings from Last 3 Encounters:   03/25/19 16   02/11/19 16   02/08/19 16    SpO2 Readings from Last 3 Encounters:   03/25/19 100%   02/11/19 95%   02/08/19 100%      Temp Readings from Last 1 Encounters:   03/25/19 36.4  C (97.5  F) (Tympanic)    Ht Readings from Last 1 Encounters:   03/13/19 1.778 m (5' 10\")      Wt Readings from Last 1 Encounters:   03/24/19 64.7 kg (142 lb 10.2 oz)    Estimated body mass index is 20.47 kg/m  as calculated from the following:    Height as of 3/13/19: 1.778 m (5' 10\").    Weight as of 3/24/19: 64.7 kg (142 lb 10.2 oz).     LDA:            Assessment:   ASA SCORE: 2    NPO Status: > 6 hours since completed Solid Foods   Documentation: " H&P complete; Preop Testing complete; Consents complete   Proceeding: Proceed without further delay     Plan:   Anes. Type:  General   Pre-Induction: Acetaminophen PO   Induction:  IV (Standard)   Airway: Native Airway   Access/Monitoring: PIV   Maintenance: Balanced   Emergence: Procedure Site   Logistics: Same Day Surgery     Postop Pain/Sedation Strategy:  Standard-Options: Opioids PRN     PONV Management:Adult Risk Factors: Female     CONSENT: Direct conversation   Plan and risks discussed with: Patient   Blood Products: Consent Deferred (Minimal Blood Loss)                             MD Dhara Marcelino MD

## 2019-03-25 NOTE — PROCEDURES
Procedure/Surgery Information   [facility], [place]    Bedside Procedure Note  Date of Service (when I performed the procedure): 03/25/2019    Stephanie Villanueva is a 19 year old female patient.  1. Depression, unspecified depression type    2. Agitation    3. Anxiety    4. 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     Salter-Perdomo Type I fracture of distal fibula with routine healing 1/25/2013     Uncomplicated asthma     sports induced     Temp: 97.5  F (36.4  C) Temp src: Tympanic BP: 107/72 Pulse: 94 Heart Rate: 83 Resp: 16 SpO2: 100 % O2 Device: None (Room air)      Procedures     Yaron El     [facility], [place]   ECT Procedure Note  03/25/2019    Stephanie Villanueva [id number]   19 year old 2000     Patient Status: Inpatient    Is this the first in a series of 12 treatments?  No    History and Physical: Reviewed in medical record    Consent: Informed consent was signed by: patient    Date Consent Signed: 3/22/19      Allergies   Allergen Reactions     No Known Drug Allergies        Weight:  142 lbs 10.2 oz              Indications for ECT:   Medications ineffective and History of good ECT response in one or more previous episodes of illness         Clinical Narrative:   Patient has a long h/o depression and suicidal ideation.   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  She was readmitted with worsening depression, anxiety and suicidal ideation.  Mood is doing much better.  She expects to go home today.  NPO after 2400.  Alert and Oriented x 3.           Diagnosis:   Major depression         Pause for the Cause:     Right patient Yes   Right procedure/laterality settings: Yes          Intra-Procedure  Documentation:     ECT #: 2   Treatment number this series: 2   Total treatment number: 11     Type of ECT:  Right, unilateral ultrabrief    ECT Medications:    Ibuprofen:  600mg po (but today instead got Tylenol: 650mg)  Lactated Ringers:  1 liter  Granisetron: 1mg  Lidocaine:  40mg  Etomidate:  14mg + 6mg + 6mg  Succinyl Choline:  80mg    ECT Strip Summary:   Energy Level: 10 percent   Motor Seizure Duration: 52 seconds  EEG Seizure Duration:  79 seconds    Complications: No    Plan:   Patient will discharge to home today.    Next ECT will be outpatient in 1 week on 4/1/19    Yaron El MD

## 2019-03-25 NOTE — DISCHARGE INSTRUCTIONS
Behavioral Discharge Planning and Instructions      Summary:  You were admitted on 3/18/2019  due to Depression, Anxiety and Agitation.  You were treated by Debra Naegele, APRN, CNS and discharged on 03/25/2019 from [facility] to Home      Principal Diagnosis:   1.  Major depressive disorder, recurrent, severe without psychotic symptoms.   2.  Traits of borderline personality disorder.   3.  General anxiety disorder.   4.  Obsessive-compulsive disorder.   5.  History of cannabis use disorder.   6.  Autism spectrum disorder.       Health Care Follow-up Appointments:   ECT Follow Appointments-  Date/Time: ***    Provider: [facility]-  Address: [address] [place] [address]  Phone:  Fax: ***    Medication Management/ECT:   Date/Time: Tuesday, April 30th   Provider: Dr. El **Left a VM with Dr. El's nurse about scheduling an earlier apt.**  Address: [facility]  [address] [place] [id number]  [phone] Fax: [phone]     Individual Therapy: **Pt will call to reschedule her Intake appointment**  Provider: [facility]  Address: [address], [place] [address]  [phone]      If no appointments scheduled, explain ***.  Attend all scheduled appointments with your outpatient providers. Call at least 24 hours in advance if you need to reschedule an appointment to ensure continued access to your outpatient providers.   Major Treatments, Procedures and Findings:  You were provided with: a psychiatric assessment, assessed for medical stability, medication evaluation and/or management, group therapy and milieu management    Symptoms to Report: feeling more aggressive, increased confusion, losing more sleep, mood getting worse or thoughts of suicide    Early warning signs can include: increased depression or anxiety sleep disturbances increased thoughts or behaviors of suicide or self-harm  increased unusual thinking, such as  "paranoia or hearing voices    Safety and Wellness:  Take all medicines as directed.  Make no changes unless your doctor suggests them.      Follow treatment recommendations.  Refrain from alcohol and non-prescribed drugs.  If there is a concern for safety, call 911.    Resources:   Crisis Intervention: 523.198.8601 or 370-104-2500 (TTY: 806.425.4740).  Call anytime for help.  National [place] on Mental Illness (www.[place].jennifer.org): 439.215.6597 or 923-471-7102.  Suicide Awareness Voices of Education (SAVE) (www.save.org): 530-349-AGRA (3137)  National Suicide Prevention Line (www.mentalhealthmn.org): 693-238-XIGV (8492)  [facility] Crisis (COPE) Response - Adult 442 568-0093  Text 4 Life: txt \"LIFE\" to 00103 for immediate support and crisis intervention  Crisis text line: Text \"[place]\" to 684625. Free, confidential, 24/7.  Crisis Intervention: 166.646.3178 or 720-811-6068. Call anytime for help.       The treatment team has appreciated the opportunity to work with you.     If you have any questions or concerns our unit number is [phone]        "

## 2019-03-25 NOTE — PLAN OF CARE
Patient returned from ECT about 1115.  Alert and oriented.  Attended group and was engaged.  Affect mid-range. Irritable edge and slightly sarcastic.  Denies SI/SIB or thoughts to hurt others. Denies hallucinations.  Acknowledged understanding of discharge instructions and follow-up. Discharged home with all belongings accompanied by her mother.

## 2019-03-25 NOTE — PROGRESS NOTES
"   03/24/19 2100   Behavioral Health   Hallucinations denies / not responding to hallucinations   Thinking intact   Orientation person: oriented;place: oriented;date: oriented;time: oriented   Memory baseline memory   Insight insight appropriate to situation;insight appropriate to events   Judgement intact   Eye Contact at examiner   Affect full range affect   Mood mood is calm   Physical Appearance/Attire attire appropriate to age and situation   Hygiene well groomed   Suicidality other (see comments)  (pt denied SI)   1. Wish to be Dead No   2. Non-Specific Active Suicidal Thoughts  No   Self Injury other (see comment)  (pt denied SIB)   Elopement (no concerns)   Activity other (see comment)  (pt is social and visible in the milieu)   Speech clear;coherent   Medication Sensitivity no observed side effects;no stated side effects   Psychomotor / Gait balanced;steady   Activities of Daily Living   Hygiene/Grooming independent   Oral Hygiene independent   Dress independent   Room Organization independent     Pt denied all mental health symptoms. Pt was cooperative with staff. Pt was social and visible in the milieu. Pt showed full range of affect. Pt reported she feels \"good.\" Pt was observed watching movies with peers.  "

## 2019-03-25 NOTE — DISCHARGE SUMMARY
Psychiatric Discharge Summary    Stephanie Villanueva MRN# [id number]   Age: 19 year old YOB: 2000     Date of Admission:  3/18/2019  Date of Discharge:  3/25/2019  2:36 PM  Admitting Physician:  Isac Parker MD  Discharge Physician:  Debra A. Naegele, APRN CNS (Contact: [phone])         Event Leading to Hospitalization:   Stephanie Villanueva is a 19-year-old -American female presenting with depression, increased anxiety and agitation.  The patient was discharged from [facility] in January.  At that time, she was started on ECT.  She had 9 ECT treatments.  She was followed by Dr. El in the community.  Upon discharge in January, she was on gabapentin 300 mg t.i.d.  That dose was increased to 900 mg q.i.d.  Per parents, the father picked up the patient from work.  When he returned home and the patient went to her mother, stated that she needed to go the hospital because she was trying to feel very anxious.  She went to the bathroom, had an episode of unresponsiveness that lasted approximately 5-10 minutes while she was in the bathroom.  She was brought to the ED for evaluation. Patient reports she feels detached from her body which is the beginning of a panic attack. She feels ECT was very helpful but then starting having more difficulty with anxiety.       Upon admission to ED, the patient became agitated, which escalated.  She needed restraints and IM medication of Haldol and Ativan to calm.  She calmed within approximately 30 minutes.  Clarified with mother that the patient was taking gabapentin 900 mg q.i.d. regularly.  The mother said the patient manages her own medications, but felt that she was taking medications.            See Admission note by Naegele, Debra Ann, APRN CNS on 3/20/2019 for additional details.          DIagnoses:   1.  Major depressive disorder, recurrent, severe without psychotic symptoms.   2.  Traits of borderline personality disorder.   3.  General anxiety disorder.    4.  Obsessive-compulsive disorder.   5.  History of cannabis use disorder.   6.  Autism spectrum disorder.           Labs:     Results for orders placed or performed during the hospital encounter of 03/18/19   Drug abuse screen 6 urine (tox)   Result Value Ref Range    Amphetamine Qual Urine Negative NEG^Negative    Barbiturates Qual Urine Negative NEG^Negative    Benzodiazepine Qual Urine Negative NEG^Negative    Cannabinoids Qual Urine Negative NEG^Negative    Cocaine Qual Urine Negative NEG^Negative    Ethanol Qual Urine Negative NEG^Negative    Opiates Qualitative Urine Positive (A) NEG^Negative   Hemoglobin   Result Value Ref Range    Hemoglobin 13.0 11.7 - 15.7 g/dL   Potassium   Result Value Ref Range    Potassium 4.1 3.4 - 5.3 mmol/L   Comprehensive metabolic panel   Result Value Ref Range    Sodium 143 133 - 144 mmol/L    Potassium 3.9 3.4 - 5.3 mmol/L    Chloride 110 (H) 94 - 109 mmol/L    Carbon Dioxide 25 20 - 32 mmol/L    Anion Gap 8 3 - 14 mmol/L    Glucose 104 (H) 70 - 99 mg/dL    Urea Nitrogen 19 7 - 30 mg/dL    Creatinine 0.73 0.52 - 1.04 mg/dL    GFR Estimate >90 >60 mL/min/[1.73_m2]    GFR Estimate If Black >90 >60 mL/min/[1.73_m2]    Calcium 8.6 8.5 - 10.1 mg/dL    Bilirubin Total 0.4 0.2 - 1.3 mg/dL    Albumin 3.7 3.4 - 5.0 g/dL    Protein Total 6.7 (L) 6.8 - 8.8 g/dL    Alkaline Phosphatase 83 40 - 150 U/L    ALT 20 0 - 50 U/L    AST 21 0 - 45 U/L   EKG 12-lead, complete   Result Value Ref Range    Interpretation ECG Click View Image link to view waveform and result    Internal Medicine Adult IP Consult for BEH ECT Clearance: Patient to be seen: Routine within 24 hrs; Call back #: Dr El [phone]; clear for ECT; Consultant may enter orders: Yes; Requesting provider? Other supervising provider; Name: Maria Esther Deleon, BAILEY RICHARDSON     3/22/2019  9:33 PM    [facility]  Internal Medicine Consult    Stephanie Villanueva MRN# [id number]   Age: 19 year  old YOB: 2000     Date of Admission: 3/18/2019  Date of Consult:  3/22/2019    Requesting Service: Behavioral Health - Isac Parker MD  Reason for Consult: Pre ECT Medicine Evaluation   Indication for ECT: Refractory depression, anxiety       HPI: Stephanie Villanueva is a 19 year old female admitted for increased   anxiety and agitation.  Internal medicine is consulted for   evaluation prior to ECT.  Pt had already completed ECT this   morning prior to completion of IM evaluation.  Previously seen   for ECT clearance in Jan 2019.      Review of Systems:   Cardiovascular: neg  Pulmonary: neg  Neurological: neg    Past Medical History:   Prior Anesthesia: Yes  If yes, any complications: Unknown    Prior ECT: Yes  If yes,   1/2019  Response: good   Side Effects: n/a     Cardiovascular: CAD No, MI No, HTN No, CHF No, pacemaker or ICD   No  Pulmonary: Asthma/COPD No, Prior respiratory failure or need for   emergent intubation No, On theophylline No (note that   theophylline use is a contraindication to proceeding with ECT,   needs to be tapered off prior)  Neurological: Brain tumor No, TIA/CVA No, Dementia No,   Neuromuscular Disease (including post polio syndrome) No,   Seizures and/or Epilepsy No, Head Injury No, Intracranial   Hemorrhage No    Past Surgical History:   History reviewed. No pertinent surgical history.    Allergies:      Allergies   Allergen Reactions     No Known Drug Allergies        Medication list reviewed.    Physical Exam:  /76 (BP Location: Right arm)   Pulse 112   Temp 98.5  F   (36.9  C) (Tympanic)   Resp 16   Wt 61.3 kg (135 lb 2.3 oz)     LMP  (LMP Unknown)   SpO2 95%   BMI 19.39 kg/m     Cardiovascular: RRR. S1, S2. No murmurs, rubs, gallops.   Pulmonary: Effort normal. Lungs CTAB with no wheezing, rales,   rhonchi.   Neurological: A&Ox3. No focal deficits. PERRLA.     Data:  EKG:HR 70, sinus rhythm with sinus arrhythmia   Head Imaging: n/a  Labs:   CBC:  Recent Labs    Lab Test 03/21/19  1432 01/19/19  0439   WBC  --  5.8   RBC  --  3.77*   HGB 13.0 11.4*   HCT  --  35.5   MCV  --  94   MCH  --  30.2   MCHC  --  32.1   RDW  --  12.3   PLT  --  293     CMP:  Recent Labs   Lab Test 03/22/19  0716      POTASSIUM 3.9   CHLORIDE 110*   BRITTANIE 8.6   CO2 25   BUN 19   CR 0.73   *   AST 21   ALT 20   BILITOTAL 0.4   ALBUMIN 3.7   PROTTOTAL 6.7*   ALKPHOS 83     HCG: neg    Recommendations:   Diuretics and oral hypoglycemics should be held the morning of   ECT.   All other antihypertensive medications can be continued.     Patient does not have absolute medical contraindications to   proceeding with ECT at this time. Treatment plan per psychiatry.       Maria Esther [facility]  Hospitalist Service  Pager: [phone]           ECT IP Consult    Narrative    Yaron El MD     3/22/2019 10:57 AM  Psych Consultation    Patient is well-known to Dr. El.   She's readmitted with   depression,anxiety and suicidal ideation.      Plan:  Begin ECT today 3/22/19.  She will hopefully need a brief   series this time and maybe continue with maintenance ECT.     Yaron El MD            Consults:   Consultation during this admission received from Dr. El for ECT.          Hospital Course:   Stephanie Villanueva was admitted to [facility] with attending Isac Parker MD on a 72 hour mental health hold, but patient subsequently signed in voluntarily. The patient was placed under status 15 (15 minute checks) to ensure patient safety.     Patient was admitted for severe anxiety and agitation. Patient reported she had 9 ECT treatments. After the treatments stopped she noticed more anxiety, agitation and depression. Her gabapentin was increased (outpaitent) to 900 mg QID to help her mange the anxiety. She did not feel it was working. She was re-started on maintenance ECT after consultation with Dr. El. Patient had 2 ECT treatments . Her mood and anxiety improved  very quickly .  Patient reports that Zyprexa leaves her feeling sedated and foggy. She reported that she liked hydroxyzine and wanted a prescription at discharge.  Discussed risks, benefits and side effects of medications with patient and patient's mother.     Patient completed gene testing at [facility]. Patient will send results to Dr. El and he will start an antidepressant.     Met with mother and discussed medications and treatment.     Stephanie Villanueva did participate in groups and was visible in the milieu. The patient's symptoms of anxiety/agitation improved. Patient participated in groups. Her anxiety improved along with her mood. She denies suicidal ideation.. She used hydroxyzine to help her manage anxiety more effectively. Patient reported ECT was very helpful.     Patient has protective factors of supportive family and seeking out treatment. Patient no longer meets criteria for hospital level of care. She is at low risk of relapse.     Stephanie Villanueva was released to home. At the time of discharge Stephanie Villanueva was determined to not be a danger to herself or others.          Discharge Medications:     Current Discharge Medication List      START taking these medications    Details   hydrOXYzine (ATARAX) 25 MG tablet Take 1 tablet (25 mg) by mouth every 4 hours as needed for anxiety  Qty: 120 tablet, Refills: 1    Associated Diagnoses: Generalized anxiety disorder         CONTINUE these medications which have NOT CHANGED    Details   desmopressin (DDAVP) 0.2 MG tablet Take 0.6 mg by mouth At Bedtime       gabapentin (NEURONTIN) 300 MG capsule Take 900 mg by mouth      vitamin D2 (ERGOCALCIFEROL) 86735 units (1250 mcg) capsule Take 1 capsule (50,000 Units) by mouth once a week for 8 doses  Qty: 8 capsule, Refills: 0    Associated Diagnoses: Vitamin D deficiency                  Psychiatric Examination:   Appearance:  awake, alert and adequately groomed  Attitude:  cooperative  Eye Contact:  good  Mood:  good  Affect:   appropriate and in normal range  Speech:  clear, coherent  Psychomotor Behavior:  no evidence of tardive dyskinesia, dystonia, or tics  Thought Process:  logical, linear and goal oriented  Associations:  no loose associations  Thought Content:  no evidence of suicidal ideation or homicidal ideation, thoughts of self-harm, which are resolved, no auditory hallucinations present and no visual hallucinations present  Insight:  good  Judgment:  intact  Oriented to:  time, person, and place  Attention Span and Concentration:  intact  Recent and Remote Memory:  fair  Language: Able to name objects, Able to repeat phrases and Able to read and write  Fund of Knowledge: appropriate  Muscle Strength and Tone: normal  Gait and Station: Normal         Discharge Plan:   Behavioral Discharge Planning and Instructions        Summary:  You were admitted on 3/18/2019  due to Depression, Anxiety and Agitation.  You were treated by Debra Naegele, APRN, CNS and discharged on 03/25/2019 from [facility] to Home        Principal Diagnosis:   1.  Major depressive disorder, recurrent, severe without psychotic symptoms.   2.  Traits of borderline personality disorder.   3.  General anxiety disorder.   4.  Obsessive-compulsive disorder.   5.  History of cannabis use disorder.   6.  Autism spectrum disorder.         Health Care Follow-up Appointments:   ECT Follow Appointments-  Date/Time: 3/1/2019    Provider: [facility]  Address: [address] [place] [address]       Medication Management/ECT:   Date/Time: Tuesday, April 30th   Provider: Dr. El Left a VM with Dr. El's nurse about scheduling an earlier apt.  Address: [facility], [address], [place] [address]  [phone] Fax: [phone]      Individual Therapy:  Pt will call to reschedule her Intake appointment  Provider: [facility] [place]  Address: [address], [place] [address]  [phone]   "       Attend all scheduled appointments with your outpatient providers. Call at least 24 hours in advance if you need to reschedule an appointment to ensure continued access to your outpatient providers.   Major Treatments, Procedures and Findings:  You were provided with: a psychiatric assessment, assessed for medical stability, medication evaluation and/or management, group therapy and milieu management     Symptoms to Report: feeling more aggressive, increased confusion, losing more sleep, mood getting worse or thoughts of suicide     Early warning signs can include: increased depression or anxiety sleep disturbances increased thoughts or behaviors of suicide or self-harm  increased unusual thinking, such as paranoia or hearing voices     Safety and Wellness:  Take all medicines as directed.  Make no changes unless your doctor suggests them.      Follow treatment recommendations.  Refrain from alcohol and non-prescribed drugs.  If there is a concern for safety, call 911.     Resources:   Crisis Intervention: 373.647.1300 or 121-819-4977 (TTY: 857.808.7908).  Call anytime for help.  National [place] on Mental Illness (www.[place].jennifer.org): 589.741.4097 or 603-729-5992.  Suicide Awareness Voices of Education (SAVE) (www.save.org): 934-157-FBEL (7319)  National Suicide Prevention Line (www.mentalhealthmn.org): 851-833-GJOM (1547)  [facility] Crisis (COPE) Response - Adult 343 765-0653  Text 4 Life: txt \"LIFE\" to 96102 for immediate support and crisis intervention  Crisis text line: Text \"[place]\" to 706399. Free, confidential, 24/7.  Crisis Intervention: 156.172.3335 or 572-106-4889. Call anytime for help.         The treatment team has appreciated the opportunity to work with you.     If you have any questions or concerns our unit number is [phone]        Attestation:  The patient has been seen and evaluated by me,  Debra A. Naegele, APRN CNS on 3/26/2019  Discharge summary time > 30 minutes   "

## 2019-03-25 NOTE — PROGRESS NOTES
01:15 -- Pt has been in milieu since beginning of night shift (23:15); requested snack at 01:00 and was informed she is NPO for ECT later this morning. Pt requested and received 50 mg Trazodone for sleep

## 2019-03-25 NOTE — PLAN OF CARE
OT General Care Plan  OT Goal 1  Description  Within 1 week, Pt will demonstrate increased openness as evidenced by sharing >2 thoughts and/or feelings on mental health or substance use.      Pt attended 2 out of 3 OT groups offered. Pt quietly participated in occupational therapy clinic. Pt was able to ask for assistance as needed, and independently initiate self-selected task. Pt demonstrated good focus, planning, and problem solving. Pt appeared comfortable interacting when approached my peers. Pt was quiet and attentive to goal-directed task. Pt was calm and cooperative throughout group.

## 2019-03-25 NOTE — ANESTHESIA POSTPROCEDURE EVALUATION
Anesthesia POST Procedure Evaluation    Patient: Stephanie Villanueva   MRN:     [id number] Gender:   female   Age:    19 year old :      2000        Preoperative Diagnosis: * No pre-op diagnosis entered *   * No procedures listed *   Postop Comments: No value filed.       Anesthesia Type:  General    Reportable Event: NO     PAIN: Uncomplicated   Sign Out status: Comfortable, Well controlled pain     PONV: No PONV   Sign Out status:  No Nausea or Vomiting     Neuro/Psych: Uneventful perioperative course   Sign Out Status: Preoperative baseline; Age appropriate mentation     Airway/Resp.: Uneventful perioperative course   Sign Out Status: Non labored breathing, age appropriate RR; Resp. Status within EXPECTED Parameters     CV: Uneventful perioperative course   Sign Out status: Appropriate BP and perfusion indices; Appropriate HR/Rhythm     Disposition:   Sign Out in:  PACU  Disposition:  Phase II; Home  Recovery Course: Uneventful  Follow-Up: Not required           Last Anesthesia Record Vitals:  CRNA VITALS  3/25/2019 0958 - 3/25/2019 1041      3/25/2019             Resp Rate (set):  8          Last PACU/Preop Vitals:  Vitals:    19 0836 19 0850 19 1025   BP:  107/72 129/67   Pulse:  94 121   Resp:  16 14   Temp: 36  C (96.8  F) 36.4  C (97.5  F) 36.9  C (98.4  F)   SpO2:  100% 100%         Electronically Signed By: Dhara Draper MD, 2019, 10:41 AM

## 2019-03-26 ENCOUNTER — TELEPHONE (OUTPATIENT)
Dept: PEDIATRICS | Facility: CLINIC | Age: 19
End: 2019-03-26

## 2019-03-26 NOTE — TELEPHONE ENCOUNTER
"  Hospital/ED for chronic condition Discharge Protocol    \"Hi, my name is Celina Donahue, a registered nurse, and I am calling from [facility].  I am calling to follow up and see how things are going after Stephanie Villanueva's recent emergency visit/hospital stay.\"    Tell me how he/she is doing now that they are home?\" Spoke with Stephanie who states that she is feeling better. She had ECT which has greatly improved her symptoms. She is also taking hydroxyzine prn for anxiety.       Discharge Instructions    \"Let's review the discharge instructions.  What is/are the follow-up recommendations?  Response: Follow up with Dr. El for ECT     \"Has an appointment with the primary care provider been scheduled?\"   Yes. (confirm)- with ECT. [facility].     \"When your child sees the provider, I would recommend that you bring the medications with you.\"    Medications    \"Tell me what changed about his/her medicines when he/she discharged?\"    Changes to chronic meds?    0-1    \"What questions do you have about the medications?\"    None          Medication reconciliation completed? Yes  Was MTM referral placed (*Make sure to put transitions as reason for referral)?   No    Call Summary    \"What questions or concerns do you have about your child's recent visit and the follow-up care?\"     none    \"If you have questions or things don't continue to improve, we encourage you contact us through the main clinic number (give number).  Even if the clinic is not open, triage nurses are available 24/7 to help you.     We would like you to know that our clinic has extended hours (provide information).  We also have urgent care (provide details on closest location and hours/contact info)\"      \"Thank you for your time and take care!\"      Celina Donahue RN, IBCLC        "

## 2019-03-26 NOTE — TELEPHONE ENCOUNTER
This patient was discharged from [facility] on 3/25/2019.    Discharge Diagnosis: Depression, Unspecified Depression Type    Follow-up instructions:   Apr 01, 2019 8:15 AM CDTElectroconvulsive Therapy [facility]    A follow-up visit has not been scheduled in our clinic.

## 2019-04-01 ENCOUNTER — HOSPITAL ENCOUNTER (OUTPATIENT)
Dept: BEHAVIORAL HEALTH | Facility: CLINIC | Age: 19
End: 2019-04-01
Attending: PSYCHIATRY & NEUROLOGY
Payer: COMMERCIAL

## 2019-04-01 ENCOUNTER — ANESTHESIA (OUTPATIENT)
Dept: BEHAVIORAL HEALTH | Facility: CLINIC | Age: 19
End: 2019-04-01

## 2019-04-01 ENCOUNTER — ANESTHESIA EVENT (OUTPATIENT)
Dept: BEHAVIORAL HEALTH | Facility: CLINIC | Age: 19
End: 2019-04-01

## 2019-04-01 VITALS
RESPIRATION RATE: 16 BRPM | TEMPERATURE: 98.1 F | OXYGEN SATURATION: 95 % | DIASTOLIC BLOOD PRESSURE: 79 MMHG | HEART RATE: 94 BPM | SYSTOLIC BLOOD PRESSURE: 114 MMHG

## 2019-04-01 DIAGNOSIS — F33.2 SEVERE RECURRENT MAJOR DEPRESSION WITHOUT PSYCHOTIC FEATURES (H): Primary | ICD-10-CM

## 2019-04-01 PROCEDURE — 25000125 ZZHC RX 250: Performed by: PSYCHIATRY & NEUROLOGY

## 2019-04-01 PROCEDURE — 25000128 H RX IP 250 OP 636: Performed by: PSYCHIATRY & NEUROLOGY

## 2019-04-01 PROCEDURE — 25800030 ZZH RX IP 258 OP 636: Performed by: PSYCHIATRY & NEUROLOGY

## 2019-04-01 PROCEDURE — 90870 ELECTROCONVULSIVE THERAPY: CPT

## 2019-04-01 PROCEDURE — 37000008 ZZH ANESTHESIA TECHNICAL FEE, 1ST 30 MIN

## 2019-04-01 PROCEDURE — 25000125 ZZHC RX 250: Performed by: ANESTHESIOLOGY

## 2019-04-01 PROCEDURE — 25000128 H RX IP 250 OP 636: Performed by: ANESTHESIOLOGY

## 2019-04-01 RX ORDER — LIDOCAINE HYDROCHLORIDE 20 MG/ML
40 INJECTION, SOLUTION EPIDURAL; INFILTRATION; INTRACAUDAL; PERINEURAL
Status: COMPLETED | OUTPATIENT
Start: 2019-04-01 | End: 2019-04-01

## 2019-04-01 RX ORDER — GRANISETRON HYDROCHLORIDE 1 MG/ML
1 INJECTION INTRAVENOUS ONCE
Status: COMPLETED | OUTPATIENT
Start: 2019-04-01 | End: 2019-04-01

## 2019-04-01 RX ORDER — LIDOCAINE HYDROCHLORIDE 20 MG/ML
40 INJECTION, SOLUTION EPIDURAL; INFILTRATION; INTRACAUDAL; PERINEURAL
Status: CANCELLED
Start: 2019-04-01 | End: 2019-04-01

## 2019-04-01 RX ORDER — IBUPROFEN 200 MG
600 TABLET ORAL EVERY 4 HOURS PRN
COMMUNITY
End: 2020-04-16

## 2019-04-01 RX ORDER — GRANISETRON HYDROCHLORIDE 1 MG/ML
1 INJECTION INTRAVENOUS ONCE
Status: CANCELLED
Start: 2019-04-01 | End: 2019-04-01

## 2019-04-01 RX ORDER — ETOMIDATE 2 MG/ML
INJECTION INTRAVENOUS PRN
Status: DISCONTINUED | OUTPATIENT
Start: 2019-04-01 | End: 2019-04-01

## 2019-04-01 RX ADMIN — ETOMIDATE 6 MG: 2 INJECTION INTRAVENOUS at 08:54

## 2019-04-01 RX ADMIN — LIDOCAINE HYDROCHLORIDE 40 MG: 20 INJECTION, SOLUTION EPIDURAL; INFILTRATION; INTRACAUDAL; PERINEURAL at 08:52

## 2019-04-01 RX ADMIN — SODIUM CHLORIDE, POTASSIUM CHLORIDE, SODIUM LACTATE AND CALCIUM CHLORIDE 1000 ML: 600; 310; 30; 20 INJECTION, SOLUTION INTRAVENOUS at 08:48

## 2019-04-01 RX ADMIN — Medication 80 MG: at 08:55

## 2019-04-01 RX ADMIN — GRANISETRON HYDROCHLORIDE 1 MG: 1 INJECTION INTRAVENOUS at 08:48

## 2019-04-01 RX ADMIN — ETOMIDATE 14 MG: 2 INJECTION INTRAVENOUS at 08:53

## 2019-04-01 RX ADMIN — ETOMIDATE 6 MG: 2 INJECTION INTRAVENOUS at 08:55

## 2019-04-01 NOTE — DISCHARGE INSTRUCTIONS
ECT Discharge Instructions      During your ECT series:      Do not drive for 24 hours after treatment. If you will have more than one treatment within a week, no driving until 7 days after your last ECT treatment.     Do not drink alcohol or use street drugs (illicit drugs) while you are having treatments.    Do not make important decisions, including legal decisions.    After each treatment:      Get plenty of rest. A responsible adult must stay with your for at least 6 hours.    Avoid heavy or risky activities for 24 hours.    If you feel light-headed, sit for a few minutes before standing. Have someone help you get up.    If you have nausea (feel sick to your stomach): Drink only clear liquids such as apple juice, ginger ale, broth or 7UP, Be sure to drink plenty of liquids. Move to a normal diet as you feel able.     If you received Toradol, wait 6 hours before taking ibuprofen.    Call your doctor if:     You have a fever over 100F (37.7 C) (taken under the tongue), or a fever that last more than 24 hours.    Your IV site is red, swollen, very painful or is getting more tender.    You have nausea that gets very bad or does not improve.      If you have any symptoms after ECT, tell our staff before your next treatment.    The ECT Department can be reached at [phone].  The ECT Department is open Mondays, Wednesdays and Fridays from 7:00 AM to 2:00 PM.      To speak to a doctor, call: Dr. El at [phone]      Other: You received Kytril 1 mg at at 8:50 AM.

## 2019-04-01 NOTE — ANESTHESIA PREPROCEDURE EVALUATION
Anesthesia Pre-Procedure Evaluation    Patient: Stephanie Villanueva   MRN:     [id number] Gender:   female   Age:    18 year old :      2000        Preoperative Diagnosis: * No pre-op diagnosis entered *   * No procedures listed *     Past Medical History:   Diagnosis Date     Anxiety 2013     CONTUSION OF left iliac crest 9/3/2006     Major depression 2013     Nocturnal enuresis 10/4/2007    Currently resolved.     Salter-Perdomo Type I fracture of distal fibula with routine healing 2013     Uncomplicated asthma     sports induced      No past surgical history on file.       Anesthesia Evaluation     .             ROS/MED HX    ENT/Pulmonary:     (+)asthma , . .    Neurologic:  - neg neurologic ROS     Cardiovascular:  - neg cardiovascular ROS       METS/Exercise Tolerance:     Hematologic:  - neg hematologic  ROS       Musculoskeletal:  - neg musculoskeletal ROS       GI/Hepatic:  - neg GI/hepatic ROS       Renal/Genitourinary:  - ROS Renal section negative       Endo:         Psychiatric:     (+) psychiatric history anxiety, depression and other (comment) (Borderline personality disorder)      Infectious Disease:  - neg infectious disease ROS       Malignancy:      - no malignancy   Other:                         PHYSICAL EXAM:   Mental Status/Neuro: A/A/O   Airway: Facies: Feasible  Mallampati: I  Mouth/Opening: Full  TM distance: > 6 cm  Neck ROM: Full   Respiratory: Auscultation: CTAB     Resp. Rate: Normal     Resp. Effort: Normal      CV: Rhythm: Regular  Rate: Age appropriate  Heart: Normal Sounds   Comments:      Dental: Normal                  Lab Results   Component Value Date    WBC 5.8 2019    HGB 13.0 2019    HCT 35.5 2019     2019    CRP <2.9 2019    SED 8 2019     2019    POTASSIUM 3.9 2019    CHLORIDE 110 (H) 2019    CO2 25 2019    BUN 19 2019    CR 0.73 2019     (H) 2019    BRITTANIE 8.6  "2019    ALBUMIN 3.7 2019    PROTTOTAL 6.7 (L) 2019    ALT 20 2019    AST 21 2019    ALKPHOS 83 2019    BILITOTAL 0.4 2019    TSH 5.57 (H) 2019    T4 0.83 2019    HCG Negative 2019       Preop Vitals  BP Readings from Last 3 Encounters:   19 123/76   19 127/79   19 99/66    Pulse Readings from Last 3 Encounters:   19 81   19 102   19 76      Resp Readings from Last 3 Encounters:   19 16   19 14   19 16    SpO2 Readings from Last 3 Encounters:   19 98%   19 100%   19 95%      Temp Readings from Last 1 Encounters:   19 36.1  C (97  F)    Ht Readings from Last 1 Encounters:   19 1.778 m (5' 10\")      Wt Readings from Last 1 Encounters:   19 64.7 kg (142 lb 10.2 oz)    Estimated body mass index is 20.47 kg/m  as calculated from the following:    Height as of 3/13/19: 1.778 m (5' 10\").    Weight as of 3/24/19: 64.7 kg (142 lb 10.2 oz).     LDA:            Assessment:   ASA SCORE: 2    NPO Status: > 6 hours since completed Solid Foods   Documentation: H&P complete; Preop Testing complete; Consents complete   Proceeding: Proceed without further delay  Tobacco Use:  NO Active use of Tobacco/UNKNOWN Tobacco use status     Plan:   Anes. Type:  General   Pre-Induction: Acetaminophen PO   Induction:  IV (Standard)   Airway: Native Airway   Access/Monitoring: PIV   Maintenance: Balanced   Emergence: Procedure Site   Logistics: Same Day Surgery     Postop Pain/Sedation Strategy:  Standard-Options: Opioids PRN     PONV Management:  Adult Risk Factors: Female, Non-Smoker     CONSENT: Direct conversation   Plan and risks discussed with: Patient   Blood Products: Consent Deferred (Minimal Blood Loss)           Anesthesia Pre-Procedure Evaluation    Patient: Stephanie Villanueva   MRN:     [id number] Gender:   female   Age:    18 year old :      2000        Preoperative Diagnosis: * No " "pre-op diagnosis entered *   * No procedures listed *     Past Medical History:   Diagnosis Date     Anxiety october 2013     CONTUSION OF left iliac crest 9/3/2006     Major depression october 2013     Nocturnal enuresis 10/4/2007    Currently resolved.     Salter-Perdomo Type I fracture of distal fibula with routine healing 1/25/2013     Uncomplicated asthma     sports induced      No past surgical history on file.              [facility] AN PHYSICAL EXAM      Lab Results   Component Value Date    WBC 5.8 01/19/2019    HGB 13.0 03/21/2019    HCT 35.5 01/19/2019     01/19/2019    CRP <2.9 03/13/2019    SED 8 03/13/2019     03/22/2019    POTASSIUM 3.9 03/22/2019    CHLORIDE 110 (H) 03/22/2019    CO2 25 03/22/2019    BUN 19 03/22/2019    CR 0.73 03/22/2019     (H) 03/22/2019    BRITTANIE 8.6 03/22/2019    ALBUMIN 3.7 03/22/2019    PROTTOTAL 6.7 (L) 03/22/2019    ALT 20 03/22/2019    AST 21 03/22/2019    ALKPHOS 83 03/22/2019    BILITOTAL 0.4 03/22/2019    TSH 5.57 (H) 01/21/2019    T4 0.83 01/21/2019    HCG Negative 01/19/2019       Preop Vitals  BP Readings from Last 3 Encounters:   04/01/19 123/76   03/25/19 127/79   03/13/19 99/66    Pulse Readings from Last 3 Encounters:   04/01/19 81   03/25/19 102   03/13/19 76      Resp Readings from Last 3 Encounters:   04/01/19 16   03/25/19 14   02/11/19 16    SpO2 Readings from Last 3 Encounters:   04/01/19 98%   03/25/19 100%   02/11/19 95%      Temp Readings from Last 1 Encounters:   04/01/19 36.1  C (97  F)    Ht Readings from Last 1 Encounters:   03/13/19 1.778 m (5' 10\")      Wt Readings from Last 1 Encounters:   03/24/19 64.7 kg (142 lb 10.2 oz)    Estimated body mass index is 20.47 kg/m  as calculated from the following:    Height as of 3/13/19: 1.778 m (5' 10\").    Weight as of 3/24/19: 64.7 kg (142 lb 10.2 oz).     LDA:            Assessment:   ASA SCORE: 2    NPO Status: > 6 hours since completed Solid Foods   Documentation: H&P complete; Preop Testing " complete; Consents complete   Proceeding: Proceed without further delay     Plan:   Anes. Type:  General   Pre-Induction: Acetaminophen PO   Induction:  IV (Standard)   Airway: Native Airway   Access/Monitoring: PIV   Maintenance: Balanced   Emergence: Procedure Site   Logistics: Same Day Surgery     Postop Pain/Sedation Strategy:  Standard-Options: Opioids PRN     PONV Management:Adult Risk Factors: Female     CONSENT: Direct conversation   Plan and risks discussed with: Patient   Blood Products: Consent Deferred (Minimal Blood Loss)                             MD Marah Galvan MD

## 2019-04-01 NOTE — ANESTHESIA POSTPROCEDURE EVALUATION
Anesthesia POST Procedure Evaluation    Patient: Stephanie Villanueva   MRN:     [id number] Gender:   female   Age:    19 year old :      2000        Preoperative Diagnosis: * No pre-op diagnosis entered *   * No procedures listed *   Postop Comments: No value filed.       Anesthesia Type:  General    Reportable Event: NO     PAIN: Uncomplicated   Sign Out status: Comfortable, Well controlled pain     PONV: No PONV   Sign Out status:  No Nausea or Vomiting     Neuro/Psych: Uneventful perioperative course   Sign Out Status: Preoperative baseline; Age appropriate mentation     Airway/Resp.: Uneventful perioperative course   Sign Out Status: Non labored breathing, age appropriate RR; Resp. Status within EXPECTED Parameters     CV: Uneventful perioperative course   Sign Out status: Appropriate BP and perfusion indices; Appropriate HR/Rhythm     Disposition:   Sign Out in:  PACU  Disposition:  Phase II; Home  Recovery Course: Uneventful  Follow-Up: Not required           Last Anesthesia Record Vitals:  CRNA VITALS  2019 0834 - 2019 0934      2019             Resp Rate (set):  8          Last PACU/Preop Vitals:  Vitals:    19 0902 19 0918 19 0930   BP: 95/65 122/79 114/79   Pulse: 117 118 94   Resp: 14 16 16   Temp: 36.4  C (97.6  F)  36.7  C (98.1  F)   SpO2: 98% 99% 95%         Electronically Signed By: Marah Lugo MD, 2019, 9:56 AM

## 2019-04-01 NOTE — PROCEDURES
Procedure/Surgery Information   [facility], [place]    Bedside Procedure Note  Date of Service (when I performed the procedure): 04/01/2019    Stephanie Villanueva is a 19 year old female patient.  1. 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     Salter-Perdomo Type I fracture of distal fibula with routine healing 1/25/2013     Uncomplicated asthma     sports induced     Temp: 97  F (36.1  C)   BP: 123/76 Pulse: 81   Resp: 16 SpO2: 98 %        Procedures     Yaron El     [facility], [place]   ECT Procedure Note  04/01/2019    Stephanie Villanueva [id number]   19 year old 2000     Patient Status: Inpatient    Is this the first in a series of 12 treatments?  No    History and Physical: Reviewed in medical record    Consent: Informed consent was signed by: patient    Date Consent Signed: 3/22/19      Allergies   Allergen Reactions     No Known Drug Allergies        Weight:  0 lbs 0 oz              Indications for ECT:   Medications ineffective and History of good ECT response in one or more previous episodes of illness         Clinical Narrative:   Patient has a long h/o depression and suicidal ideation.   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  She was readmitted with worsening depression, anxiety and suicidal ideation.  Mood is doing much better.  She expects to go home today.  NPO after 2400.  Alert and Oriented x 3.           Diagnosis:   Major depression         Pause for the Cause:     Right patient Yes   Right procedure/laterality settings: Yes          Intra-Procedure Documentation:     ECT #: 3   Treatment number this series: 3   Total treatment number: 12     Type of ECT:  Right, unilateral  ultrabrief    ECT Medications:    Ibuprofen:  600mg po   Lactated Ringers:  1 liter  Granisetron: 1mg  Lidocaine:  40mg  Etomidate:  14mg + 6mg + 6mg (next Tx start with Etoidate:  20mg)  Succinyl Choline:  80mg    ECT Strip Summary:   Energy Level: 10 percent   Motor Seizure Duration: 59 seconds  EEG Seizure Duration:  59 seconds    Complications: No    Plan:   Next ECT in 1 week on 4/8/19    Yaron El MD

## 2019-04-01 NOTE — IP AVS SNAPSHOT
[facility]  [facility] [address] [address]  Phone:  [phone]                                    After Visit Summary   4/1/2019    Stephanie Villanueva    MRN: [id number]           After Visit Summary Signature Page    I have received my discharge instructions, and my questions have been answered. I have discussed any challenges I see with this plan with the nurse or doctor.    ..........................................................................................................................................  Patient/Patient Representative Signature      ..........................................................................................................................................  Patient Representative Print Name and Relationship to Patient    ..................................................               ................................................  Date                                   Time    ..........................................................................................................................................  Reviewed by Signature/Title    ...................................................              ..............................................  Date                                               Time          22EPIC Rev 08/18

## 2019-04-10 ENCOUNTER — HOSPITAL ENCOUNTER (OUTPATIENT)
Dept: BEHAVIORAL HEALTH | Facility: CLINIC | Age: 19
End: 2019-04-10
Attending: PSYCHIATRY & NEUROLOGY
Payer: COMMERCIAL

## 2019-04-10 ENCOUNTER — ANESTHESIA EVENT (OUTPATIENT)
Dept: BEHAVIORAL HEALTH | Facility: CLINIC | Age: 19
End: 2019-04-10

## 2019-04-10 ENCOUNTER — ANESTHESIA (OUTPATIENT)
Dept: BEHAVIORAL HEALTH | Facility: CLINIC | Age: 19
End: 2019-04-10

## 2019-04-10 VITALS
OXYGEN SATURATION: 95 % | DIASTOLIC BLOOD PRESSURE: 81 MMHG | SYSTOLIC BLOOD PRESSURE: 121 MMHG | TEMPERATURE: 98.2 F | HEART RATE: 112 BPM | RESPIRATION RATE: 24 BRPM

## 2019-04-10 DIAGNOSIS — F33.2 SEVERE RECURRENT MAJOR DEPRESSION WITHOUT PSYCHOTIC FEATURES (H): Primary | ICD-10-CM

## 2019-04-10 PROCEDURE — 25000128 H RX IP 250 OP 636: Performed by: ANESTHESIOLOGY

## 2019-04-10 PROCEDURE — 37000008 ZZH ANESTHESIA TECHNICAL FEE, 1ST 30 MIN

## 2019-04-10 PROCEDURE — 25800030 ZZH RX IP 258 OP 636: Performed by: PSYCHIATRY & NEUROLOGY

## 2019-04-10 PROCEDURE — 25000128 H RX IP 250 OP 636: Performed by: PSYCHIATRY & NEUROLOGY

## 2019-04-10 PROCEDURE — 90870 ELECTROCONVULSIVE THERAPY: CPT

## 2019-04-10 PROCEDURE — 25000125 ZZHC RX 250: Performed by: ANESTHESIOLOGY

## 2019-04-10 PROCEDURE — 25000125 ZZHC RX 250: Performed by: PSYCHIATRY & NEUROLOGY

## 2019-04-10 RX ORDER — ETOMIDATE 2 MG/ML
INJECTION INTRAVENOUS PRN
Status: DISCONTINUED | OUTPATIENT
Start: 2019-04-10 | End: 2019-04-10

## 2019-04-10 RX ORDER — LIDOCAINE HYDROCHLORIDE 20 MG/ML
40 INJECTION, SOLUTION EPIDURAL; INFILTRATION; INTRACAUDAL; PERINEURAL
Status: CANCELLED
Start: 2019-04-10

## 2019-04-10 RX ORDER — LIDOCAINE HYDROCHLORIDE 20 MG/ML
40 INJECTION, SOLUTION EPIDURAL; INFILTRATION; INTRACAUDAL; PERINEURAL
Status: COMPLETED | OUTPATIENT
Start: 2019-04-10 | End: 2019-04-10

## 2019-04-10 RX ORDER — GRANISETRON HYDROCHLORIDE 1 MG/ML
1 INJECTION INTRAVENOUS ONCE
Status: COMPLETED | OUTPATIENT
Start: 2019-04-10 | End: 2019-04-10

## 2019-04-10 RX ORDER — GRANISETRON HYDROCHLORIDE 1 MG/ML
1 INJECTION INTRAVENOUS ONCE
Status: CANCELLED
Start: 2019-04-10

## 2019-04-10 RX ADMIN — LIDOCAINE HYDROCHLORIDE 40 MG: 20 INJECTION, SOLUTION EPIDURAL; INFILTRATION; INTRACAUDAL; PERINEURAL at 09:22

## 2019-04-10 RX ADMIN — ETOMIDATE 20 MG: 2 INJECTION INTRAVENOUS at 09:19

## 2019-04-10 RX ADMIN — SODIUM CHLORIDE, POTASSIUM CHLORIDE, SODIUM LACTATE AND CALCIUM CHLORIDE 1000 ML: 600; 310; 30; 20 INJECTION, SOLUTION INTRAVENOUS at 09:14

## 2019-04-10 RX ADMIN — Medication 80 MG: at 09:19

## 2019-04-10 RX ADMIN — GRANISETRON HYDROCHLORIDE 1 MG: 1 INJECTION INTRAVENOUS at 09:15

## 2019-04-10 NOTE — ANESTHESIA PREPROCEDURE EVALUATION
Anesthesia Pre-Procedure Evaluation    Patient: Stephanie Villanueva   MRN:     [id number] Gender:   female   Age:    18 year old :      2000        Preoperative Diagnosis: * No pre-op diagnosis entered *   * No procedures listed *     Past Medical History:   Diagnosis Date     Anxiety 2013     CONTUSION OF left iliac crest 9/3/2006     Major depression 2013     Nocturnal enuresis 10/4/2007    Currently resolved.     Salter-Perdomo Type I fracture of distal fibula with routine healing 2013     Uncomplicated asthma     sports induced      No past surgical history on file.       Anesthesia Evaluation     .             ROS/MED HX    ENT/Pulmonary:     (+)asthma , . .    Neurologic:  - neg neurologic ROS     Cardiovascular:  - neg cardiovascular ROS       METS/Exercise Tolerance:     Hematologic:  - neg hematologic  ROS       Musculoskeletal:  - neg musculoskeletal ROS       GI/Hepatic:  - neg GI/hepatic ROS       Renal/Genitourinary:  - ROS Renal section negative       Endo:         Psychiatric:     (+) psychiatric history anxiety, depression and other (comment) (Borderline personality disorder)      Infectious Disease:  - neg infectious disease ROS       Malignancy:      - no malignancy   Other:                         PHYSICAL EXAM:   Mental Status/Neuro: A/A/O   Airway: Facies: Feasible  Mallampati: I  Mouth/Opening: Full  TM distance: > 6 cm  Neck ROM: Full   Respiratory: Auscultation: CTAB     Resp. Rate: Normal     Resp. Effort: Normal      CV: Rhythm: Regular  Rate: Age appropriate  Heart: Normal Sounds   Comments:      Dental: Normal                  Lab Results   Component Value Date    WBC 5.8 2019    HGB 13.0 2019    HCT 35.5 2019     2019    CRP <2.9 2019    SED 8 2019     2019    POTASSIUM 3.9 2019    CHLORIDE 110 (H) 2019    CO2 25 2019    BUN 19 2019    CR 0.73 2019     (H) 2019    BRITTANIE 8.6  "2019    ALBUMIN 3.7 2019    PROTTOTAL 6.7 (L) 2019    ALT 20 2019    AST 21 2019    ALKPHOS 83 2019    BILITOTAL 0.4 2019    TSH 5.57 (H) 2019    T4 0.83 2019    HCG Negative 2019       Preop Vitals  BP Readings from Last 3 Encounters:   04/10/19 113/71   19 114/79   19 127/79    Pulse Readings from Last 3 Encounters:   04/10/19 107   19 94   19 102      Resp Readings from Last 3 Encounters:   04/10/19 16   19 16   19 14    SpO2 Readings from Last 3 Encounters:   04/10/19 95%   19 95%   19 100%      Temp Readings from Last 1 Encounters:   04/10/19 36.1  C (97  F)    Ht Readings from Last 1 Encounters:   19 1.778 m (5' 10\")      Wt Readings from Last 1 Encounters:   19 64.7 kg (142 lb 10.2 oz)    Estimated body mass index is 20.47 kg/m  as calculated from the following:    Height as of 3/13/19: 1.778 m (5' 10\").    Weight as of 3/24/19: 64.7 kg (142 lb 10.2 oz).     LDA:            Assessment:   ASA SCORE: 2    NPO Status: > 6 hours since completed Solid Foods   Documentation: H&P complete; Preop Testing complete; Consents complete   Proceeding: Proceed without further delay  Tobacco Use:  NO Active use of Tobacco/UNKNOWN Tobacco use status     Plan:   Anes. Type:  General   Pre-Induction: Acetaminophen PO   Induction:  IV (Standard)   Airway: Native Airway   Access/Monitoring: PIV   Maintenance: Balanced   Emergence: Procedure Site   Logistics: Same Day Surgery     Postop Pain/Sedation Strategy:  Standard-Options: Opioids PRN     PONV Management:  Adult Risk Factors: Female, Non-Smoker     CONSENT: Direct conversation   Plan and risks discussed with: Patient   Blood Products: Consent Deferred (Minimal Blood Loss)           Anesthesia Pre-Procedure Evaluation    Patient: Stephanie Villanueva   MRN:     [id number] Gender:   female   Age:    18 year old :      2000        Preoperative Diagnosis: * No " "pre-op diagnosis entered *   * No procedures listed *     Past Medical History:   Diagnosis Date     Anxiety october 2013     CONTUSION OF left iliac crest 9/3/2006     Major depression october 2013     Nocturnal enuresis 10/4/2007    Currently resolved.     Salter-Perdomo Type I fracture of distal fibula with routine healing 1/25/2013     Uncomplicated asthma     sports induced      No past surgical history on file.              [facility] AN PHYSICAL EXAM      Lab Results   Component Value Date    WBC 5.8 01/19/2019    HGB 13.0 03/21/2019    HCT 35.5 01/19/2019     01/19/2019    CRP <2.9 03/13/2019    SED 8 03/13/2019     03/22/2019    POTASSIUM 3.9 03/22/2019    CHLORIDE 110 (H) 03/22/2019    CO2 25 03/22/2019    BUN 19 03/22/2019    CR 0.73 03/22/2019     (H) 03/22/2019    BRITTANIE 8.6 03/22/2019    ALBUMIN 3.7 03/22/2019    PROTTOTAL 6.7 (L) 03/22/2019    ALT 20 03/22/2019    AST 21 03/22/2019    ALKPHOS 83 03/22/2019    BILITOTAL 0.4 03/22/2019    TSH 5.57 (H) 01/21/2019    T4 0.83 01/21/2019    HCG Negative 01/19/2019       Preop Vitals  BP Readings from Last 3 Encounters:   04/10/19 113/71   04/01/19 114/79   03/25/19 127/79    Pulse Readings from Last 3 Encounters:   04/10/19 107   04/01/19 94   03/25/19 102      Resp Readings from Last 3 Encounters:   04/10/19 16   04/01/19 16   03/25/19 14    SpO2 Readings from Last 3 Encounters:   04/10/19 95%   04/01/19 95%   03/25/19 100%      Temp Readings from Last 1 Encounters:   04/10/19 36.1  C (97  F)    Ht Readings from Last 1 Encounters:   03/13/19 1.778 m (5' 10\")      Wt Readings from Last 1 Encounters:   03/24/19 64.7 kg (142 lb 10.2 oz)    Estimated body mass index is 20.47 kg/m  as calculated from the following:    Height as of 3/13/19: 1.778 m (5' 10\").    Weight as of 3/24/19: 64.7 kg (142 lb 10.2 oz).     LDA:            Assessment:   ASA SCORE: 2    NPO Status: > 6 hours since completed Solid Foods   Documentation: H&P complete; Preop Testing " complete; Consents complete   Proceeding: Proceed without further delay     Plan:   Anes. Type:  General   Pre-Induction: Acetaminophen PO   Induction:  IV (Standard)   Airway: Native Airway   Access/Monitoring: PIV   Maintenance: Balanced   Emergence: Procedure Site   Logistics: Same Day Surgery     Postop Pain/Sedation Strategy:  Standard-Options: Opioids PRN     PONV Management:Adult Risk Factors: Female     CONSENT: Direct conversation   Plan and risks discussed with: Patient   Blood Products: Consent Deferred (Minimal Blood Loss)                             MD Marah Galvan MD

## 2019-04-10 NOTE — IP AVS SNAPSHOT
[facility]  [facility] [address] [address]  Phone:  [phone]                                    After Visit Summary   4/10/2019    Stephanie Villanueva    MRN: [id number]           After Visit Summary Signature Page    I have received my discharge instructions, and my questions have been answered. I have discussed any challenges I see with this plan with the nurse or doctor.    ..........................................................................................................................................  Patient/Patient Representative Signature      ..........................................................................................................................................  Patient Representative Print Name and Relationship to Patient    ..................................................               ................................................  Date                                   Time    ..........................................................................................................................................  Reviewed by Signature/Title    ...................................................              ..............................................  Date                                               Time          22EPIC Rev 08/18

## 2019-04-10 NOTE — ANESTHESIA POSTPROCEDURE EVALUATION
Anesthesia POST Procedure Evaluation    Patient: Stephanie Villanueva   MRN:     [id number] Gender:   female   Age:    19 year old :      2000        Preoperative Diagnosis: * No pre-op diagnosis entered *   * No procedures listed *   Postop Comments: No value filed.       Anesthesia Type:  General    Reportable Event: NO     PAIN: Uncomplicated   Sign Out status: Comfortable, Well controlled pain     PONV: No PONV   Sign Out status:  No Nausea or Vomiting     Neuro/Psych: Uneventful perioperative course   Sign Out Status: Preoperative baseline; Age appropriate mentation     Airway/Resp.: Uneventful perioperative course   Sign Out Status: Non labored breathing, age appropriate RR; Resp. Status within EXPECTED Parameters     CV: Uneventful perioperative course   Sign Out status: Appropriate BP and perfusion indices; Appropriate HR/Rhythm     Disposition:   Sign Out in:  PACU  Disposition:  Phase II; Home  Recovery Course: Uneventful  Follow-Up: Not required           Last Anesthesia Record Vitals:  CRNA VITALS  4/10/2019 0859 - 4/10/2019 0959      4/10/2019             Resp Rate (set):  8          Last PACU Vitals:  No vitals data found for the desired time range.        Electronically Signed By: Marah Lugo MD, April 10, 2019, 10:21 AM

## 2019-04-10 NOTE — DISCHARGE INSTRUCTIONS
ECT Discharge Instructions      During your ECT series:      Do not drive or work heavy equipment until 7 days after your last treatment.    Do not drink alcohol or use street drugs (illicit drugs) while you are having treatments.    Do not make important decisions, including legal decisions.    During your ECT maintenance:    -Do not drive for 24 hours after treatment.  If you will be having more than one treatment within a week, no driving until 7 days after your last ECT treatment.        After each treatment:      Get plenty of rest. A responsible adult must stay with your for at least 6 hours.    Avoid heavy or risky activities for 24 hours.    If you feel light-headed, sit for a few minutes before standing. Have someone help you get up.    If you have nausea (feel sick to your stomach): Drink only clear liquids such as apple juice, ginger ale, broth or 7UP, Be sure to drink plenty of liquids. Move to a normal diet as you feel able.     If you received Toradol, wait 6 hours before taking ibuprofen.    Call your doctor if:     You have a fever over 100F (37.7 C) (taken under the tongue), or a fever that last more than 24 hours.    Your IV site is red, swollen, very painful or is getting more tender.    You have nausea that gets very bad or does not improve.      If you have any symptoms after ECT, tell our staff before your next treatment.    The ECT Department can be reached at [phone].  The ECT Department is open Mondays, Wednesdays and Fridays from 7:00 AM to 2:00 PM.    To speak to a doctor, call: Dr. El's office # [phone] Fax # [phone]    Other: Today you received IV Granisetron (Kytril) 1mg at 0915am & IV fluids (Lactated Ringers) for nausea.      Transported by: Deisy

## 2019-04-10 NOTE — PROGRESS NOTES
Patient meets recovery room discharge criteria. Pt discharged from recovery room via wheelchair to discharge room at  1009.

## 2019-04-10 NOTE — PROGRESS NOTES
Procedure/Surgery Information   [facility], [place]    Bedside Procedure Note  Date of Service (when I performed the procedure): 04/10/2019    Stephanie Villanueva is a 19 year old female patient.  1. 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     Salter-Perdomo Type I fracture of distal fibula with routine healing 1/25/2013     Uncomplicated asthma     sports induced     Temp: 97  F (36.1  C)   BP: 113/71 Pulse: 107   Resp: 16 SpO2: 95 %        Procedures     Yaron El     [facility], [place]   ECT Procedure Note  04/10/2019    Stephanie Villanueva [id number]   19 year old 2000     Patient Status: Inpatient    Is this the first in a series of 12 treatments?  No    History and Physical: Reviewed in medical record    Consent: Informed consent was signed by: patient    Date Consent Signed: 3/22/19      Allergies   Allergen Reactions     No Known Drug Allergies        Weight:  0 lbs 0 oz              Indications for ECT:   Medications ineffective and History of good ECT response in one or more previous episodes of illness         Clinical Narrative:   Patient has a long h/o depression and suicidal ideation.   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   She recently had another psych admission and is continuing ECT for depression.  Mood is much better.  NPO after 2400.  Alert and Oriented x 3.           Diagnosis:   Major depression         Pause for the Cause:     Right patient Yes   Right procedure/laterality settings: Yes          Intra-Procedure Documentation:     ECT #: 4   Treatment number this series: 4   Total treatment number: 13     Type of ECT:  Right, unilateral ultrabrief    ECT Medications:    Ibuprofen:   600mg po   Lactated Ringers:  1 liter  Granisetron: 1mg  Lidocaine:  40mg  Etomidate:  20mg  Succinyl Choline:  80mg    ECT Strip Summary:   Energy Level: 10 percent   Motor Seizure Duration: 65 seconds  EEG Seizure Duration:  91 seconds    Complications: No    Plan:   Next ECT in 1 week on 4/17/19    Yaron El MD

## 2019-04-17 ENCOUNTER — HOSPITAL ENCOUNTER (OUTPATIENT)
Dept: BEHAVIORAL HEALTH | Facility: CLINIC | Age: 19
End: 2019-04-17
Attending: PSYCHIATRY & NEUROLOGY
Payer: COMMERCIAL

## 2019-04-17 ENCOUNTER — ANESTHESIA (OUTPATIENT)
Dept: BEHAVIORAL HEALTH | Facility: CLINIC | Age: 19
End: 2019-04-17
Payer: COMMERCIAL

## 2019-04-17 ENCOUNTER — ANESTHESIA EVENT (OUTPATIENT)
Dept: BEHAVIORAL HEALTH | Facility: CLINIC | Age: 19
End: 2019-04-17

## 2019-04-17 VITALS
RESPIRATION RATE: 16 BRPM | DIASTOLIC BLOOD PRESSURE: 98 MMHG | HEART RATE: 100 BPM | OXYGEN SATURATION: 95 % | TEMPERATURE: 98 F | SYSTOLIC BLOOD PRESSURE: 118 MMHG

## 2019-04-17 DIAGNOSIS — F33.2 SEVERE RECURRENT MAJOR DEPRESSION WITHOUT PSYCHOTIC FEATURES (H): Primary | ICD-10-CM

## 2019-04-17 PROCEDURE — 37000008 ZZH ANESTHESIA TECHNICAL FEE, 1ST 30 MIN

## 2019-04-17 PROCEDURE — 25000125 ZZHC RX 250: Performed by: STUDENT IN AN ORGANIZED HEALTH CARE EDUCATION/TRAINING PROGRAM

## 2019-04-17 PROCEDURE — 25000128 H RX IP 250 OP 636: Performed by: STUDENT IN AN ORGANIZED HEALTH CARE EDUCATION/TRAINING PROGRAM

## 2019-04-17 PROCEDURE — 25000128 H RX IP 250 OP 636: Performed by: PSYCHIATRY & NEUROLOGY

## 2019-04-17 PROCEDURE — 90870 ELECTROCONVULSIVE THERAPY: CPT

## 2019-04-17 PROCEDURE — 25800030 ZZH RX IP 258 OP 636: Performed by: PSYCHIATRY & NEUROLOGY

## 2019-04-17 RX ORDER — LIDOCAINE HYDROCHLORIDE 20 MG/ML
40 INJECTION, SOLUTION EPIDURAL; INFILTRATION; INTRACAUDAL; PERINEURAL
Status: CANCELLED
Start: 2019-04-17

## 2019-04-17 RX ORDER — DESVENLAFAXINE 50 MG/1
50 TABLET, FILM COATED, EXTENDED RELEASE ORAL DAILY
COMMUNITY
End: 2019-08-27

## 2019-04-17 RX ORDER — ETOMIDATE 2 MG/ML
INJECTION INTRAVENOUS PRN
Status: DISCONTINUED | OUTPATIENT
Start: 2019-04-17 | End: 2019-04-17

## 2019-04-17 RX ORDER — LIDOCAINE HYDROCHLORIDE 20 MG/ML
40 INJECTION, SOLUTION EPIDURAL; INFILTRATION; INTRACAUDAL; PERINEURAL
Status: ACTIVE | OUTPATIENT
Start: 2019-04-17 | End: 2019-04-17

## 2019-04-17 RX ORDER — GRANISETRON HYDROCHLORIDE 1 MG/ML
1 INJECTION INTRAVENOUS ONCE
Status: COMPLETED | OUTPATIENT
Start: 2019-04-17 | End: 2019-04-17

## 2019-04-17 RX ORDER — LIDOCAINE HYDROCHLORIDE 20 MG/ML
INJECTION, SOLUTION INFILTRATION; PERINEURAL PRN
Status: DISCONTINUED | OUTPATIENT
Start: 2019-04-17 | End: 2019-04-17

## 2019-04-17 RX ORDER — GRANISETRON HYDROCHLORIDE 1 MG/ML
1 INJECTION INTRAVENOUS ONCE
Status: CANCELLED
Start: 2019-04-17

## 2019-04-17 RX ADMIN — ETOMIDATE 6 MG: 2 INJECTION INTRAVENOUS at 09:54

## 2019-04-17 RX ADMIN — LIDOCAINE HYDROCHLORIDE 40 MG: 20 INJECTION, SOLUTION INFILTRATION; PERINEURAL at 09:52

## 2019-04-17 RX ADMIN — SODIUM CHLORIDE, POTASSIUM CHLORIDE, SODIUM LACTATE AND CALCIUM CHLORIDE 1000 ML: 600; 310; 30; 20 INJECTION, SOLUTION INTRAVENOUS at 09:39

## 2019-04-17 RX ADMIN — GRANISETRON HYDROCHLORIDE 1 MG: 1 INJECTION INTRAVENOUS at 09:47

## 2019-04-17 RX ADMIN — Medication 80 MG: at 09:56

## 2019-04-17 RX ADMIN — ETOMIDATE 4 MG: 2 INJECTION INTRAVENOUS at 09:56

## 2019-04-17 RX ADMIN — ETOMIDATE 20 MG: 2 INJECTION INTRAVENOUS at 09:52

## 2019-04-17 NOTE — PROCEDURES
Procedure/Surgery Information   [facility], [place]    Bedside Procedure Note  Date of Service (when I performed the procedure): 04/17/2019    Stephanie Villanueva is a 19 year old female patient.  1. 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     Salter-Perdomo Type I fracture of distal fibula with routine healing 1/25/2013     Uncomplicated asthma     sports induced     Temp: 97.7  F (36.5  C)   BP: 139/74 Pulse: 85   Resp: 16 SpO2: 99 %        Procedures     Yaron El     [facility], [place]   ECT Procedure Note  04/17/2019    Stephanie Villanueva [id number]   19 year old 2000     Patient Status: Outpatient    Is this the first in a series of 12 treatments?  No    History and Physical: Reviewed in medical record    Consent: Informed consent was signed by: patient    Date Consent Signed: 3/22/19      Allergies   Allergen Reactions     No Known Drug Allergies        Weight:  0 lbs 0 oz              Indications for ECT:   Medications ineffective and History of good ECT response in one or more previous episodes of illness         Clinical Narrative:   Patient has a long h/o depression and suicidal ideation.   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  She was readmitted with worsening depression, anxiety and suicidal ideation.  Mood is pretty good.   NPO after 2400.  Alert and Oriented x 3.           Diagnosis:   Major depression         Pause for the Cause:     Right patient Yes   Right procedure/laterality settings: Yes          Intra-Procedure Documentation:     ECT #: 4   Treatment number this series: 4   Total treatment number: 13     Type of ECT:  Right, unilateral ultrabrief    ECT Medications:    Ibuprofen:   600mg po (didn't take today)  Lactated Ringers:  1 liter  Granisetron: 1mg  Lidocaine:  40mg  Etomidate:  20mg + 6mg + 4mg (next Tx switch to Brevital and start at 100mg)  Succinyl Choline:  80mg    ECT Strip Summary:    Energy Level: 10 percent   Motor Seizure Duration: 52 seconds  EEG Seizure Duration:  59 seconds    Complications: No    Plan:   Next ECT in 2 weeks (out from 1 week) on 5/1/19.    Yaron El MD

## 2019-04-17 NOTE — DISCHARGE INSTRUCTIONS
ECT Discharge Instructions      During your ECT series:      Do not drive or work heavy equipment until 7 days after your last treatment.    Do not drink alcohol or use street drugs (illicit drugs) while you are having treatments.    Do not make important decisions, including legal decisions.    After each treatment:      Get plenty of rest. A responsible adult must stay with your for at least 6 hours.    Avoid heavy or risky activities for 24 hours.    If you feel light-headed, sit for a few minutes before standing. Have someone help you get up.    If you have nausea (feel sick to your stomach): Drink only clear liquids such as apple juice, ginger ale, broth or 7UP, Be sure to drink plenty of liquids. Move to a normal diet as you feel able.     If you received Toradol, wait 6 hours before taking ibuprofen.    Call your doctor if:     You have a fever over 100F (37.7 C) (taken under the tongue), or a fever that last more than 24 hours.    Your IV site is red, swollen, very painful or is getting more tender.    You have nausea that gets very bad or does not improve.      If you have any symptoms after ECT, tell our staff before your next treatment.    The ECT Department can be reached at [phone].  The ECT Department is open Mondays, Wednesdays and Fridays from 7:00 AM to 2:00 PM.    To speak to a doctor, call: Dr. El Office # [phone] and Fax # [phone]    Transported by: Friend, Estefany       _________________________________________________  ________________________  Patient/Responsible party Signature      Date          ________________________________________________   ________________________  Nurse Signature        Date

## 2019-04-17 NOTE — IP AVS SNAPSHOT
MRN:[id number]                      After Visit Summary   4/17/2019    Stephanie Villanueva    MRN: [id number]           Visit Information        Provider Department      4/17/2019  9:15 AM Yaron El MD [facility]           Review of your medicines      CONTINUE these medicines which have NOT CHANGED       Dose / Directions   desmopressin 0.2 MG tablet  Commonly known as:  DDAVP      Dose:  0.6 mg  Take 0.6 mg by mouth At Bedtime Only taking sometimes  Refills:  0     desvenlafaxine 50 MG 24 hr tablet  Commonly known as:  PRISTIQ  Indication:  Major Depressive Disorder      Dose:  50 mg  Take 50 mg by mouth daily Take 25mg x14 days, then increase to 50mg.  Refills:  0     gabapentin 300 MG capsule  Commonly known as:  NEURONTIN      Dose:  900 mg  Take 900 mg by mouth 4 times daily  Refills:  0     hydrOXYzine 25 MG tablet  Commonly known as:  ATARAX  Used for:  Generalized anxiety disorder      Dose:  25 mg  Take 1 tablet (25 mg) by mouth every 4 hours as needed for anxiety  Quantity:  120 tablet  Refills:  1     ibuprofen 200 MG tablet  Commonly known as:  ADVIL/MOTRIN      Dose:  600 mg  Take 600 mg by mouth every 4 hours as needed for mild pain (for prevention of ECT headache and joint pain)  Refills:  0     vitamin D2 02090 units (1250 mcg) capsule  Commonly known as:  ERGOCALCIFEROL  Used for:  Vitamin D deficiency      Dose:  18079 Units  Take 1 capsule (50,000 Units) by mouth once a week for 8 doses  Quantity:  8 capsule  Refills:  0              Protect others around you: Learn how to safely use, store and throw away your medicines at www.disposemymeds.org.       Follow-ups after your visit       Your next 10 appointments already scheduled    May 01, 2019  9:15 AM CDT  Electroconvulsive Therapy with Yaron El MD  [facility] ([facility]) [address]  [facility] [id number] [phone]          Care Instructions       Further instructions from your care team       ECT Discharge Instructions      During your ECT series:      Do not drive or work heavy equipment until 7 days after your last treatment.    Do not drink alcohol or use street drugs (illicit drugs) while you are having treatments.    Do not make important decisions, including legal decisions.    After each treatment:      Get plenty of rest. A responsible adult must stay with your for at least 6 hours.    Avoid heavy or risky activities for 24 hours.    If you feel light-headed, sit for a few minutes before standing. Have someone help you get up.    If you have nausea (feel sick to your stomach): Drink only clear liquids such as apple juice, ginger ale, broth or 7UP, Be sure to drink plenty of liquids. Move to a normal diet as you feel able.     If you received Toradol, wait 6 hours before taking ibuprofen.    Call your doctor if:     You have a fever over 100F (37.7 C) (taken under the tongue), or a fever that last more than 24 hours.    Your IV site is red, swollen, very painful or is getting more tender.    You have nausea that gets very bad or does not improve.      If you have any symptoms after ECT, tell our staff before your next treatment.    The ECT Department can be reached at [phone].  The ECT Department is open Mondays, Wednesdays and Fridays from 7:00 AM to 2:00 PM.    To speak to a doctor, call: Dr. El Office # [phone] and Fax # [phone]    Transported by: Friend, Estefany       _________________________________________________  ________________________  Patient/Responsible party Signature      Date          ________________________________________________   ________________________  Nurse Signature        Date    Additional Information About Your Visit       [employer] Information    [employer] lets you send messages to your doctor, view your test results, renew your prescriptions, schedule appointments and more. To sign  "up, go to [email or web address] . Click on \"Log in\" on the left side of the screen, which will take you to the Welcome page. Then click on \"Sign up Now\" on the right side of the page.     You will be asked to enter the access code listed below, as well as some personal information. Please follow the directions to create your username and password.     Your access code is: [email or web address]  Expires: 2019  2:36 PM     Your access code will  in 60 days. If you need help or a new code, please call your [facility] clinic or [phone].       Care EveryWhere ID    This is your Care EveryWhere ID. This could be used by other organizations to access your [facility] medical records  [id number]       Your Vitals Were     Blood Pressure   118/98      Pulse   100    Temperature   98  F (36.7  C) (Temporal)    Respirations   16    Pulse Oximetry   95%          Primary Care Provider Office Phone # Fax #    Jose Rodríguez [phone]      Equal Access to Services    [facility]: Hadii antony newby hadshorty Turner, waaxda parisa, qaybta mina reid, mathew brian . So [facility] [phone].    ATENCIÓN: Si habla español, tiene a aust[place] disposición servicios gratuitos de asistencia lingüística. Sharmila al [phone].    We comply with applicable federal civil rights laws and [place] laws. We do not discriminate on the basis of race, color, national origin, age, disability, sex, sexual orientation, or gender identity.           Thank you!    Thank you for choosing [facility] for your care. Our goal is always to provide you with excellent care. Hearing back from our patients is one way we can continue to improve our services. Please take a few minutes to complete the written survey that you may receive in the mail after you visit with us. Thank you!            Medication List      Medications       Morning Afternoon Evening Bedtime As Needed   desmopressin 0.2 MG tablet  Also known " as:  DDAVP  INSTRUCTIONS:  Take 0.6 mg by mouth At Bedtime Only taking sometimes                    desvenlafaxine 50 MG 24 hr tablet  Also known as:  PRISTIQ  INSTRUCTIONS:  Take 50 mg by mouth daily Take 25mg x14 days, then increase to 50mg.  Reason for med:  Major Depressive Disorder                    gabapentin 300 MG capsule  Also known as:  NEURONTIN  INSTRUCTIONS:  Take 900 mg by mouth 4 times daily                    hydrOXYzine 25 MG tablet  Also known as:  ATARAX  INSTRUCTIONS:  Take 1 tablet (25 mg) by mouth every 4 hours as needed for anxiety                    ibuprofen 200 MG tablet  Also known as:  ADVIL/MOTRIN  INSTRUCTIONS:  Take 600 mg by mouth every 4 hours as needed for mild pain (for prevention of ECT headache and joint pain)                    vitamin D2 33404 units (1250 mcg) capsule  Also known as:  ERGOCALCIFEROL  INSTRUCTIONS:  Take 1 capsule (50,000 Units) by mouth once a week for 8 doses

## 2019-04-17 NOTE — IP AVS SNAPSHOT
[facility]  [facility] [address] [address]  Phone:  [phone]                                    After Visit Summary   4/17/2019    Stephanie Villanueva    MRN: [id number]           After Visit Summary Signature Page    I have received my discharge instructions, and my questions have been answered. I have discussed any challenges I see with this plan with the nurse or doctor.    ..........................................................................................................................................  Patient/Patient Representative Signature      ..........................................................................................................................................  Patient Representative Print Name and Relationship to Patient    ..................................................               ................................................  Date                                   Time    ..........................................................................................................................................  Reviewed by Signature/Title    ...................................................              ..............................................  Date                                               Time          22EPIC Rev 08/18

## 2019-04-17 NOTE — ANESTHESIA PREPROCEDURE EVALUATION
Anesthesia Pre-Procedure Evaluation    Patient: Stephanie Villanueva   MRN:     [id number] Gender:   female   Age:    18 year old :      2000        Preoperative Diagnosis: * No pre-op diagnosis entered *   * No procedures listed *     Past Medical History:   Diagnosis Date     Anxiety 2013     CONTUSION OF left iliac crest 9/3/2006     Major depression 2013     Nocturnal enuresis 10/4/2007    Currently resolved.     Salter-Perdomo Type I fracture of distal fibula with routine healing 2013     Uncomplicated asthma     sports induced      No past surgical history on file.       Anesthesia Evaluation     .             ROS/MED HX    ENT/Pulmonary:     (+)asthma , . .    Neurologic:  - neg neurologic ROS     Cardiovascular:  - neg cardiovascular ROS       METS/Exercise Tolerance:     Hematologic:  - neg hematologic  ROS       Musculoskeletal:  - neg musculoskeletal ROS       GI/Hepatic:  - neg GI/hepatic ROS       Renal/Genitourinary:  - ROS Renal section negative       Endo:         Psychiatric:     (+) psychiatric history anxiety, depression and other (comment) (Borderline personality disorder)      Infectious Disease:  - neg infectious disease ROS       Malignancy:      - no malignancy   Other:                         PHYSICAL EXAM:   Mental Status/Neuro: A/A/O   Airway: Facies: Feasible  Mallampati: I  Mouth/Opening: Full  TM distance: > 6 cm  Neck ROM: Full   Respiratory: Auscultation: CTAB     Resp. Rate: Normal     Resp. Effort: Normal      CV: Rhythm: Regular  Rate: Age appropriate  Heart: Normal Sounds   Comments:      Dental: Normal                  Lab Results   Component Value Date    WBC 5.8 2019    HGB 13.0 2019    HCT 35.5 2019     2019    CRP <2.9 2019    SED 8 2019     2019    POTASSIUM 3.9 2019    CHLORIDE 110 (H) 2019    CO2 25 2019    BUN 19 2019    CR 0.73 2019     (H) 2019    BRITTANIE 8.6  "2019    ALBUMIN 3.7 2019    PROTTOTAL 6.7 (L) 2019    ALT 20 2019    AST 21 2019    ALKPHOS 83 2019    BILITOTAL 0.4 2019    TSH 5.57 (H) 2019    T4 0.83 2019    HCG Negative 2019       Preop Vitals  BP Readings from Last 3 Encounters:   19 139/74   04/10/19 121/81   19 114/79    Pulse Readings from Last 3 Encounters:   19 85   04/10/19 112   19 94      Resp Readings from Last 3 Encounters:   19 16   04/10/19 24   19 16    SpO2 Readings from Last 3 Encounters:   19 99%   04/10/19 95%   19 95%      Temp Readings from Last 1 Encounters:   19 36.5  C (97.7  F)    Ht Readings from Last 1 Encounters:   19 1.778 m (5' 10\")      Wt Readings from Last 1 Encounters:   19 64.7 kg (142 lb 10.2 oz)    Estimated body mass index is 20.47 kg/m  as calculated from the following:    Height as of 3/13/19: 1.778 m (5' 10\").    Weight as of 3/24/19: 64.7 kg (142 lb 10.2 oz).     LDA:            Assessment:   ASA SCORE: 2    NPO Status: > 6 hours since completed Solid Foods   Documentation: H&P complete; Preop Testing complete; Consents complete   Proceeding: Proceed without further delay  Tobacco Use:  NO Active use of Tobacco/UNKNOWN Tobacco use status     Plan:   Anes. Type:  General   Pre-Induction: Acetaminophen PO   Induction:  IV (Standard)   Airway: Native Airway   Access/Monitoring: PIV   Maintenance: Balanced   Emergence: Procedure Site   Logistics: Same Day Surgery     Postop Pain/Sedation Strategy:  Standard-Options: Opioids PRN     PONV Management:  Adult Risk Factors: Female, Non-Smoker     CONSENT: Direct conversation   Plan and risks discussed with: Patient   Blood Products: Consent Deferred (Minimal Blood Loss)           Anesthesia Pre-Procedure Evaluation    Patient: Stephanie Villanueva   MRN:     [id number] Gender:   female   Age:    18 year old :      2000        Preoperative Diagnosis: * No " "pre-op diagnosis entered *   * No procedures listed *     Past Medical History:   Diagnosis Date     Anxiety october 2013     CONTUSION OF left iliac crest 9/3/2006     Major depression october 2013     Nocturnal enuresis 10/4/2007    Currently resolved.     Salter-Perdomo Type I fracture of distal fibula with routine healing 1/25/2013     Uncomplicated asthma     sports induced      No past surgical history on file.              [facility] AN PHYSICAL EXAM      Lab Results   Component Value Date    WBC 5.8 01/19/2019    HGB 13.0 03/21/2019    HCT 35.5 01/19/2019     01/19/2019    CRP <2.9 03/13/2019    SED 8 03/13/2019     03/22/2019    POTASSIUM 3.9 03/22/2019    CHLORIDE 110 (H) 03/22/2019    CO2 25 03/22/2019    BUN 19 03/22/2019    CR 0.73 03/22/2019     (H) 03/22/2019    BRITTANIE 8.6 03/22/2019    ALBUMIN 3.7 03/22/2019    PROTTOTAL 6.7 (L) 03/22/2019    ALT 20 03/22/2019    AST 21 03/22/2019    ALKPHOS 83 03/22/2019    BILITOTAL 0.4 03/22/2019    TSH 5.57 (H) 01/21/2019    T4 0.83 01/21/2019    HCG Negative 01/19/2019       Preop Vitals  BP Readings from Last 3 Encounters:   04/17/19 139/74   04/10/19 121/81   04/01/19 114/79    Pulse Readings from Last 3 Encounters:   04/17/19 85   04/10/19 112   04/01/19 94      Resp Readings from Last 3 Encounters:   04/17/19 16   04/10/19 24   04/01/19 16    SpO2 Readings from Last 3 Encounters:   04/17/19 99%   04/10/19 95%   04/01/19 95%      Temp Readings from Last 1 Encounters:   04/17/19 36.5  C (97.7  F)    Ht Readings from Last 1 Encounters:   03/13/19 1.778 m (5' 10\")      Wt Readings from Last 1 Encounters:   03/24/19 64.7 kg (142 lb 10.2 oz)    Estimated body mass index is 20.47 kg/m  as calculated from the following:    Height as of 3/13/19: 1.778 m (5' 10\").    Weight as of 3/24/19: 64.7 kg (142 lb 10.2 oz).     LDA:            Assessment:   ASA SCORE: 2    NPO Status: > 6 hours since completed Solid Foods   Documentation: H&P complete; Preop Testing " complete; Consents complete   Proceeding: Proceed without further delay     Plan:   Anes. Type:  General   Pre-Induction: Acetaminophen PO   Induction:  IV (Standard)   Airway: Native Airway   Access/Monitoring: PIV   Maintenance: Balanced   Emergence: Procedure Site   Logistics: Same Day Surgery     Postop Pain/Sedation Strategy:  Standard-Options: Opioids PRN     PONV Management:Adult Risk Factors: Female     CONSENT: Direct conversation   Plan and risks discussed with: Patient   Blood Products: Consent Deferred (Minimal Blood Loss)                             MD Kameron Quinn MD

## 2019-04-17 NOTE — ANESTHESIA POSTPROCEDURE EVALUATION
Anesthesia POST Procedure Evaluation    Patient: Stephanie Villanueva   MRN:     [id number] Gender:   female   Age:    19 year old :      2000        Preoperative Diagnosis: * No pre-op diagnosis entered *   * No procedures listed *   Postop Comments: No value filed.       Anesthesia Type:  General    Reportable Event: NO     PAIN: Uncomplicated   Sign Out status: Comfortable, Well controlled pain     PONV: No PONV   Sign Out status:  No Nausea or Vomiting     Neuro/Psych: Uneventful perioperative course   Sign Out Status: Preoperative baseline; Age appropriate mentation     Airway/Resp.: Uneventful perioperative course   Sign Out Status: Non labored breathing, age appropriate RR; Resp. Status within EXPECTED Parameters     CV: Uneventful perioperative course   Sign Out status: Appropriate BP and perfusion indices; Appropriate HR/Rhythm     Disposition:   Sign Out in:  PACU (ect)  Disposition:  Phase II; Home  Recovery Course: Uneventful  Follow-Up: Not required           Last Anesthesia Record Vitals:  CRNA VITALS  2019 0936 - 2019 1036      2019             Resp Rate (set):  8          Last PACU Vitals:  No vitals data found for the desired time range.        Electronically Signed By: Kameron Sanchez MD, 2019, 11:08 AM

## 2019-05-13 ENCOUNTER — TRANSFERRED RECORDS (OUTPATIENT)
Dept: HEALTH INFORMATION MANAGEMENT | Facility: CLINIC | Age: 19
End: 2019-05-13

## 2019-06-06 ENCOUNTER — OFFICE VISIT (OUTPATIENT)
Dept: ORTHOPEDICS | Facility: CLINIC | Age: 19
End: 2019-06-06
Payer: COMMERCIAL

## 2019-06-06 ENCOUNTER — ANCILLARY PROCEDURE (OUTPATIENT)
Dept: GENERAL RADIOLOGY | Facility: CLINIC | Age: 19
End: 2019-06-06
Attending: FAMILY MEDICINE
Payer: COMMERCIAL

## 2019-06-06 VITALS
BODY MASS INDEX: 20.28 KG/M2 | DIASTOLIC BLOOD PRESSURE: 63 MMHG | SYSTOLIC BLOOD PRESSURE: 118 MMHG | HEIGHT: 70 IN | WEIGHT: 141.7 LBS

## 2019-06-06 DIAGNOSIS — M25.562 ACUTE PAIN OF BOTH KNEES: Primary | ICD-10-CM

## 2019-06-06 DIAGNOSIS — M25.561 ACUTE PAIN OF BOTH KNEES: Primary | ICD-10-CM

## 2019-06-06 ASSESSMENT — MIFFLIN-ST. JEOR: SCORE: 1493

## 2019-06-06 NOTE — PROGRESS NOTES
"      SPORTS & ORTHOPEDIC WALK-IN VISIT 6/6/2019    Primary Care Physician: Dr. Rodríguez    Here today with mother for bilateral knee pain. Has been dx with hypermobility syndrome by rheumatology. May also have myofascial pain syndrome. Has pain and instability in bilateral knees when full extended. Feels like they will hyperextend and buckle. Has aching pain constantly but sharp pain with instability. Has been referred to PT but has not gone yet. No specific knee trauma. Has not tried bracing.     Reason for visit:     What part of your body is injured / painful?  Bilateral knee     What caused the injury /pain? Chronic     How long ago did your injury occur or pain begin? A month or two     What are your most bothersome symptoms? Pain, Swelling and Giving way or instability    How would you characterize your symptom?  aching, dull and sharp    What makes your symptoms better? Nothing    What makes your symptoms worse? Standing, Walking and Movement    Have you been previously seen for this problem? Yes, PT and Rhuem     Medical History:    Any recent changes to your medical history? No    Any new medication prescribed since last visit? No    Have you had surgery on this body part before? No    Social History:    Occupation: Student at [employer]     Handedness: Right    Exercise: running    Review of Systems:    Do you have fever, chills, weight loss? No    Do you have any vision problems? No    Do you have any chest pain or edema? No    Do you have any shortness of breath or wheezing?  No    Do you have stomach problems? No    Do you have any numbness or focal weakness? No    Do you have diabetes? No    Do you have problems with bleeding or clotting? No    Do you have an rashes or other skin lesions? No         Past Medical History, Current Medications, and Allergies are reviewed in the electronic medical record as appropriate.       EXAM:/63   Ht 1.778 m (5' 10\")   Wt 64.3 kg (141 lb 11.2 oz)   BMI 20.33 kg/m  "     Patient is alert, No acute distress, pleasant and conversational.    Gait: nonantalgic. Normal heel toe gait.    Patient is able to perform two legged squat with some report of pain, but no weakness    bilateral knee:   Skin intact. No erythema or ecchymosis.  No effusion or soft tissue swelling.    AROM: Zero to approximately 135  without restriction or reported pain.    Palpation: No medial or lateral facet joint tenderness.  No posterior medial or posterior lateral joint line tenderness     Special Tests:  Negative bounce test, negative forced flexion and negative Jagjit's.  No ligamentous laxity or pain with valgus or varus stress.  Negative Lachman's, Anterior Drawer and Posterior Drawer     Full Isometric quad strength, extensor mechanism in place     Neurovascularly intact in the lower extremity    Hip and Ankle with full AROM and nontender      Imaging: xrays of bilateral knees performed and reviewed independently demonstrating  No acute fx or dislocation. No significant degenerative disease. See EMR for formal radiology report.       Assessment: Patient is a 19 year old female with hypermobility syndrome here with bilateral knee pain. No sign of structural knee injury on exam or imaging today. Suspect that pain is secondary to hypermobility and lack deconditioning/weakness of musculature of hips and knees.     Recommendations:   Reviewed imaging and assessment with patient and mom in detail.   Strongly encouraged follow up with PT as well as regular physical activity  Given knee braces to be used in near term for pain control and support but discussed avoiding becoming dependent on brace.   Follow up elizabeth Watkins MD

## 2019-06-28 NOTE — PROGRESS NOTES
Subjective     Stephanie Villanueva is a 19 year old female who presents to clinic today for the following health issues:    Over the last three months she has been experiencing more fatigue, energy crashes, brain fog  If she does too much she feels energy crash. Doing too much would be staying out with her friends too long, working or walking the dog. She has to lay down, unable to talk, she feels floppy, she can't get up. During the episode she twitches.   She had to quit work as she was overstimulated by too much work or too light.   It is hard to focus on one thing, she can't read right now.   She stays in her room a lot. She is currently training her service dog.  Her mom does laundry and cooks for her.   When these episodes first started she came home from college. Mom thought it was due to mental health and she received mental health treatment. She notes that symptoms continued despite mental health symptoms being under control.   No head trauma.   She has fibromyalgia.    HPI     Reviewed and updated as needed this visit by Provider         Review of Systems   ROS COMP: Constitutional, HEENT, cardiovascular, pulmonary, GI, , musculoskeletal, neuro, skin, endocrine and psych systems are negative, except as otherwise noted.      Objective    There were no vitals taken for this visit.  There is no height or weight on file to calculate BMI.   /71   Pulse 68   Temp 96.6  F (35.9  C) (Tympanic)   Wt 64.9 kg (143 lb)   SpO2 99%   BMI 20.52 kg/m    Physical Exam   GENERAL: alert, depressed appearing   EYES: Eyes grossly normal to inspection, conjunctivae and sclerae normal  HENT: ear canals and TM's normal, nose and mouth without ulcers or lesions  RESP: lungs clear to auscultation - no rales, rhonchi or wheezes  CV: regular rate and rhythm, normal S1 S2  NEURO: neuro exam wasn't completed as patient started having jerking in upper extremity     Diagnostic Test Results:  none         Assessment & Plan     1.  Spell of altered consciousness  - Patient presented with Mom in clinic for further evaluation. During visit patient had jerking and Mom stated that his was an episode. I discussed my concern to evaluate for seizures and recommended ER evaluation. EMS was called and EMS female staff told Mom and patient that ER wouldn't do any work up and work up would be outpatient. She then stated that they could take their private car instead of being taken by ambulance. I then went in and discussed my concern again with patients Mother who decided to take patient by private car. They are aware of risks.     2. Myoclonic jerking  - see above     Michelle Berumen MD  [facility]

## 2019-07-01 ENCOUNTER — OFFICE VISIT (OUTPATIENT)
Dept: FAMILY MEDICINE | Facility: CLINIC | Age: 19
End: 2019-07-01
Payer: COMMERCIAL

## 2019-07-01 ENCOUNTER — ALLIED HEALTH/NURSE VISIT (OUTPATIENT)
Dept: NEUROLOGY | Facility: CLINIC | Age: 19
End: 2019-07-01
Attending: PSYCHIATRY & NEUROLOGY
Payer: COMMERCIAL

## 2019-07-01 ENCOUNTER — HOSPITAL ENCOUNTER (OUTPATIENT)
Facility: CLINIC | Age: 19
Setting detail: OBSERVATION
Discharge: HOME | End: 2019-07-02
Attending: EMERGENCY MEDICINE | Admitting: PSYCHIATRY & NEUROLOGY
Payer: COMMERCIAL

## 2019-07-01 VITALS
SYSTOLIC BLOOD PRESSURE: 110 MMHG | OXYGEN SATURATION: 99 % | WEIGHT: 143 LBS | HEART RATE: 68 BPM | TEMPERATURE: 96.6 F | DIASTOLIC BLOOD PRESSURE: 71 MMHG | BODY MASS INDEX: 20.52 KG/M2

## 2019-07-01 DIAGNOSIS — R40.4 SPELL OF ALTERED CONSCIOUSNESS: Primary | ICD-10-CM

## 2019-07-01 DIAGNOSIS — R25.1 SHUDDERING SPELL: ICD-10-CM

## 2019-07-01 DIAGNOSIS — G25.3 MYOCLONIC JERKING: ICD-10-CM

## 2019-07-01 DIAGNOSIS — R56.9 SEIZURE-LIKE ACTIVITY (H): Primary | ICD-10-CM

## 2019-07-01 LAB
ALBUMIN UR-MCNC: NEGATIVE MG/DL
AMPHETAMINES UR QL SCN: NEGATIVE
ANION GAP SERPL CALCULATED.3IONS-SCNC: 6 MMOL/L (ref 3–14)
APPEARANCE UR: CLEAR
BARBITURATES UR QL: NEGATIVE
BASOPHILS # BLD AUTO: 0 10E9/L (ref 0–0.2)
BASOPHILS NFR BLD AUTO: 1 %
BENZODIAZ UR QL: NEGATIVE
BILIRUB UR QL STRIP: NEGATIVE
BUN SERPL-MCNC: 14 MG/DL (ref 7–30)
CALCIUM SERPL-MCNC: 9.8 MG/DL (ref 8.5–10.1)
CANNABINOIDS UR QL SCN: NEGATIVE
CHLORIDE SERPL-SCNC: 104 MMOL/L (ref 94–109)
CO2 SERPL-SCNC: 28 MMOL/L (ref 20–32)
COCAINE UR QL: NEGATIVE
COLOR UR AUTO: NORMAL
CREAT SERPL-MCNC: 0.7 MG/DL (ref 0.52–1.04)
DIFFERENTIAL METHOD BLD: ABNORMAL
EOSINOPHIL # BLD AUTO: 0.1 10E9/L (ref 0–0.7)
EOSINOPHIL NFR BLD AUTO: 1.7 %
ERYTHROCYTE [DISTWIDTH] IN BLOOD BY AUTOMATED COUNT: 12.8 % (ref 10–15)
ETHANOL UR QL SCN: NEGATIVE
GFR SERPL CREATININE-BSD FRML MDRD: >90 ML/MIN/{1.73_M2}
GLUCOSE SERPL-MCNC: 80 MG/DL (ref 70–99)
GLUCOSE UR STRIP-MCNC: NEGATIVE MG/DL
HCG UR QL: NEGATIVE
HCT VFR BLD AUTO: 48.5 % (ref 35–47)
HGB BLD-MCNC: 15.2 G/DL (ref 11.7–15.7)
HGB UR QL STRIP: NEGATIVE
IMM GRANULOCYTES # BLD: 0 10E9/L (ref 0–0.4)
IMM GRANULOCYTES NFR BLD: 0.2 %
KETONES UR STRIP-MCNC: NEGATIVE MG/DL
LEUKOCYTE ESTERASE UR QL STRIP: NEGATIVE
LYMPHOCYTES # BLD AUTO: 1.5 10E9/L (ref 0.8–5.3)
LYMPHOCYTES NFR BLD AUTO: 37.9 %
MCH RBC QN AUTO: 27.9 PG (ref 26.5–33)
MCHC RBC AUTO-ENTMCNC: 31.3 G/DL (ref 31.5–36.5)
MCV RBC AUTO: 89 FL (ref 78–100)
MONOCYTES # BLD AUTO: 0.3 10E9/L (ref 0–1.3)
MONOCYTES NFR BLD AUTO: 6.7 %
NEUTROPHILS # BLD AUTO: 2.1 10E9/L (ref 1.6–8.3)
NEUTROPHILS NFR BLD AUTO: 52.5 %
NITRATE UR QL: NEGATIVE
NRBC # BLD AUTO: 0 10*3/UL
NRBC BLD AUTO-RTO: 0 /100
OPIATES UR QL SCN: NEGATIVE
PH UR STRIP: 5.5 PH (ref 5–7)
PLATELET # BLD AUTO: 358 10E9/L (ref 150–450)
POTASSIUM SERPL-SCNC: 4.2 MMOL/L (ref 3.4–5.3)
RBC # BLD AUTO: 5.45 10E12/L (ref 3.8–5.2)
RBC #/AREA URNS AUTO: <1 /HPF (ref 0–2)
SODIUM SERPL-SCNC: 138 MMOL/L (ref 133–144)
SOURCE: NORMAL
SP GR UR STRIP: 1.01 (ref 1–1.03)
SQUAMOUS #/AREA URNS AUTO: <1 /HPF (ref 0–1)
UROBILINOGEN UR STRIP-MCNC: NORMAL MG/DL (ref 0–2)
WBC # BLD AUTO: 4 10E9/L (ref 4–11)
WBC #/AREA URNS AUTO: <1 /HPF (ref 0–5)

## 2019-07-01 PROCEDURE — 80048 BASIC METABOLIC PNL TOTAL CA: CPT | Performed by: EMERGENCY MEDICINE

## 2019-07-01 PROCEDURE — 25000128 H RX IP 250 OP 636: Performed by: EMERGENCY MEDICINE

## 2019-07-01 PROCEDURE — 99207 ZZC OFFICE-HOSPITAL ADMIT: CPT | Performed by: FAMILY MEDICINE

## 2019-07-01 PROCEDURE — 25000132 ZZH RX MED GY IP 250 OP 250 PS 637: Performed by: STUDENT IN AN ORGANIZED HEALTH CARE EDUCATION/TRAINING PROGRAM

## 2019-07-01 PROCEDURE — 95951 ZZHC EEG VIDEO < 12 HR: CPT | Mod: 52,ZF

## 2019-07-01 PROCEDURE — 99285 EMERGENCY DEPT VISIT HI MDM: CPT | Mod: Z6 | Performed by: EMERGENCY MEDICINE

## 2019-07-01 PROCEDURE — 85025 COMPLETE CBC W/AUTO DIFF WBC: CPT | Performed by: EMERGENCY MEDICINE

## 2019-07-01 PROCEDURE — 81025 URINE PREGNANCY TEST: CPT | Performed by: EMERGENCY MEDICINE

## 2019-07-01 PROCEDURE — 96360 HYDRATION IV INFUSION INIT: CPT | Performed by: EMERGENCY MEDICINE

## 2019-07-01 PROCEDURE — 96361 HYDRATE IV INFUSION ADD-ON: CPT | Performed by: EMERGENCY MEDICINE

## 2019-07-01 PROCEDURE — 80320 DRUG SCREEN QUANTALCOHOLS: CPT | Performed by: EMERGENCY MEDICINE

## 2019-07-01 PROCEDURE — 99285 EMERGENCY DEPT VISIT HI MDM: CPT | Mod: 25 | Performed by: EMERGENCY MEDICINE

## 2019-07-01 PROCEDURE — 81001 URINALYSIS AUTO W/SCOPE: CPT | Mod: XU | Performed by: EMERGENCY MEDICINE

## 2019-07-01 PROCEDURE — 80307 DRUG TEST PRSMV CHEM ANLYZR: CPT | Performed by: EMERGENCY MEDICINE

## 2019-07-01 PROCEDURE — G0378 HOSPITAL OBSERVATION PER HR: HCPCS

## 2019-07-01 RX ORDER — GABAPENTIN 300 MG/1
900 CAPSULE ORAL 3 TIMES DAILY
Status: DISCONTINUED | OUTPATIENT
Start: 2019-07-01 | End: 2019-07-02 | Stop reason: HOSPADM

## 2019-07-01 RX ORDER — NALOXONE HYDROCHLORIDE 0.4 MG/ML
.1-.4 INJECTION, SOLUTION INTRAMUSCULAR; INTRAVENOUS; SUBCUTANEOUS
Status: DISCONTINUED | OUTPATIENT
Start: 2019-07-01 | End: 2019-07-02 | Stop reason: HOSPADM

## 2019-07-01 RX ORDER — LORAZEPAM 0.5 MG/1
0.5 TABLET ORAL EVERY 6 HOURS PRN
COMMUNITY
End: 2020-04-16

## 2019-07-01 RX ORDER — LANOLIN ALCOHOL/MO/W.PET/CERES
1000 CREAM (GRAM) TOPICAL DAILY
Status: DISCONTINUED | OUTPATIENT
Start: 2019-07-02 | End: 2019-07-02 | Stop reason: HOSPADM

## 2019-07-01 RX ORDER — OLANZAPINE 10 MG/1
10 TABLET ORAL
COMMUNITY
End: 2019-09-16

## 2019-07-01 RX ORDER — SODIUM CHLORIDE 9 MG/ML
1000 INJECTION, SOLUTION INTRAVENOUS CONTINUOUS
Status: DISCONTINUED | OUTPATIENT
Start: 2019-07-01 | End: 2019-07-01

## 2019-07-01 RX ORDER — ONDANSETRON 2 MG/ML
4 INJECTION INTRAMUSCULAR; INTRAVENOUS EVERY 6 HOURS PRN
Status: DISCONTINUED | OUTPATIENT
Start: 2019-07-01 | End: 2019-07-02 | Stop reason: HOSPADM

## 2019-07-01 RX ORDER — ACETAMINOPHEN 650 MG/1
650 SUPPOSITORY RECTAL EVERY 4 HOURS PRN
Status: DISCONTINUED | OUTPATIENT
Start: 2019-07-01 | End: 2019-07-02 | Stop reason: HOSPADM

## 2019-07-01 RX ORDER — GABAPENTIN 300 MG/1
900 CAPSULE ORAL DAILY PRN
Status: DISCONTINUED | OUTPATIENT
Start: 2019-07-01 | End: 2019-07-02 | Stop reason: HOSPADM

## 2019-07-01 RX ORDER — LANOLIN ALCOHOL/MO/W.PET/CERES
1000 CREAM (GRAM) TOPICAL DAILY
Status: ON HOLD | COMMUNITY
End: 2020-04-20

## 2019-07-01 RX ORDER — MULTIVITAMIN,THERAPEUTIC
1 TABLET ORAL DAILY
COMMUNITY
End: 2020-04-16

## 2019-07-01 RX ORDER — ACETAMINOPHEN 325 MG/1
650 TABLET ORAL EVERY 4 HOURS PRN
Status: DISCONTINUED | OUTPATIENT
Start: 2019-07-01 | End: 2019-07-02 | Stop reason: HOSPADM

## 2019-07-01 RX ORDER — TRAZODONE HYDROCHLORIDE 50 MG/1
50 TABLET, FILM COATED ORAL
COMMUNITY
End: 2019-09-16

## 2019-07-01 RX ORDER — DESVENLAFAXINE 50 MG/1
50 TABLET, FILM COATED, EXTENDED RELEASE ORAL DAILY
Status: DISCONTINUED | OUTPATIENT
Start: 2019-07-01 | End: 2019-07-02 | Stop reason: HOSPADM

## 2019-07-01 RX ORDER — ONDANSETRON 4 MG/1
4 TABLET, ORALLY DISINTEGRATING ORAL EVERY 6 HOURS PRN
Status: DISCONTINUED | OUTPATIENT
Start: 2019-07-01 | End: 2019-07-02 | Stop reason: HOSPADM

## 2019-07-01 RX ADMIN — ACETAMINOPHEN 650 MG: 325 TABLET, FILM COATED ORAL at 23:35

## 2019-07-01 RX ADMIN — GABAPENTIN 900 MG: 300 CAPSULE ORAL at 20:24

## 2019-07-01 RX ADMIN — DESVENLAFAXINE 50 MG: 50 TABLET, FILM COATED, EXTENDED RELEASE ORAL at 20:24

## 2019-07-01 RX ADMIN — SODIUM CHLORIDE 1000 ML: 9 INJECTION, SOLUTION INTRAVENOUS at 11:51

## 2019-07-01 ASSESSMENT — MIFFLIN-ST. JEOR: SCORE: 1494.36

## 2019-07-01 ASSESSMENT — ENCOUNTER SYMPTOMS
FATIGUE: 1
FEVER: 0
ABDOMINAL PAIN: 0
SHORTNESS OF BREATH: 0
TREMORS: 1

## 2019-07-01 NOTE — H&P
[facility]  Neurology H&P    Patient Name:  Stephanie Villanueva  MRN:  [id number]    :  2000  Date of Service:  2019  Primary care provider:  Michelle Berumen    History of Present Illness:   The patient is a 19 year old female with a PMH of borderline personality disorder, depression, fibromyalgia, and anxiety who presents with evaluation of spells. Mother is present to provide history as well. The patient's mother stated episodes of shaking and unresponsiveness began in 2018. She stated she was going to [employer] to study psychology and during winter break was noted to be depressed. Her mother stated it was severe enough to the point she required ECT treatment which she responded to. Patient and mother feel her depression is well treated now. The episodes of shaking and unresponsiveness have been occurring every other day and despite improvement in depression the episodes are still occurring. Mother stated the episodes can last anywhere from 5 minutes to 1 hour. The episodes can be variable but mostly consist of eyelid twitching, eyelid fluttering and variable limb jerking.  There will also be times where she will be unresponsive to voice. Triggers include bright lights as well as reading. No tongue biting or incontinence during these episodes.    No history of TBI, meningitis, or febrile seizures. Brother has a history of seizures.    ROS  A 10-point ROS was performed as per HPI.     PMH  Past Medical History:   Diagnosis Date     Anxiety 2013     CONTUSION OF left iliac crest 9/3/2006     Major depression 2013     Nocturnal enuresis 10/4/2007    Currently resolved.     Salter-Perdomo Type I fracture of distal fibula with routine healing 2013     Uncomplicated asthma     sports induced     History reviewed. No pertinent surgical history.    Medications     (Not in a hospital admission)    Allergies  Allergies   Allergen  "Reactions     No Known Drug Allergies      Social History  Social History     Tobacco Use     Smoking status: Never Smoker     Smokeless tobacco: Never Used   Substance Use Topics     Alcohol use: Yes     Comment: sometimes       Family History    Family History   Problem Relation Age of Onset     Substance Abuse Maternal Grandfather         alcoholism     Depression Maternal Aunt      Substance Abuse Maternal Aunt         alcoholism     Hypothyroidism Maternal Aunt      Anxiety Disorder Brother      Hypothyroidism Maternal Grandmother      Physical Examination   Vitals: /73   Pulse 79   Temp 98.9  F (37.2  C) (Oral)   Resp 16   Ht 1.778 m (5' 10\")   Wt 64.4 kg (142 lb)   SpO2 100%   BMI 20.37 kg/m    General: Adult, appears stated age, in NAD, cooperative  HEENT: NC/AT, no icterus, op pink and moist  Cardiac: RRR  Chest: no respiratory distress  Abdomen: nondistended  Extremities: No LE swelling.  Skin: Multiple scars on wrists bilaterally  Neuro:  Mental status: Awake, alert, attentive, and oriented to person, place, and time. Able to name current president. No evidence of aphasia.  Cranial nerves: Eyes conjugate, PERRL, EOMI, visual fields full, facial sensation intact, symmetric facial movements, No evidence of dysarthria.  Motor: Limited participation in the motor examination however the patient was moving all extremities equally, symmetrically, and antigravity.  Reflexes: Normoreflexic and symmetric biceps, brachioradialis, patellar, and achilles. No Prater, no clonus  Sensory: Intact to light touch throughout  Coordination: Limited due to patient participation  Gait: Limited due to patient participation    Event witness by writer:    The patient began to have bilateral eyelid twitching and fluttering along with right arm jerking. The right arm jerking was variable in amplitude. It also stopped with distraction. When the writer clapped his hands together the eyelid twitching and fluttering " "subsided and the patient then grabbed her ears and began rocking back and forth. This lasted about 1 minute then the patient was lying in bed and not responding to verbal stimuli. After about 4-5 minutes the patient then began answering questions appropriately.       Investigations     MRI brain (12/2/2013): \"Normal MRI of the brain\"    Assessment and Plan  The patient is a 19 year old female with a PMH of borderline personality disorder, depression, and anxiety who presents with evaluation of spells.     #Spells: most likely non-epileptic given the description and duration. Given the frequency of the events will admit to observation for spell characterization.   - VEEG monitoring    #Depression  #Anxiety  #Fibromyalgia  - Continue PTA desvenlafaxine 50mg daily  - Continue PTA gabapentin 900mg TID and 900mg daily prn (prescription is for 900mg QID but patient reports above regimen)   - Holding prn ativan while on VEEG    Diet: regular  DVT ppx: SCDs and ambulation  GI ppx: not indicated  Code status: Full    Dispo: Likely discharge in 1-2 days    Patient was seen and discussed with Dr. Burnette.    Edgar Wood  Neurology PGY4  1350  "

## 2019-07-01 NOTE — PHARMACY-ADMISSION MEDICATION HISTORY
Admission medication history interview status for the 7/1/2019 admission is complete. See Epic admission navigator for allergy information, pharmacy, prior to admission medications and immunization status.     Medication history interview sources:  Patient and [facility]    Changes made to PTA medication list (reason)  Added: Vitamin B12 and multivitamin (per patient report); olanzapine and trazodone (per [facility])  Deleted: None  Changed: Lorazepam (0.5 mg by mouth changed to 0.5 mg by mouth every 6 hours as needed per [facility])    Additional medication history information (including reliability of information, actions taken by pharmacist):  - Desmopressin: Patient states no longer taking with last dose a couple of months ago.  Her provider did not direct her to stop taking the medication.  Per [facility] last filled 6/27/2019 for 30 days supply.  - Gabapentin: Prescription is written 900 mg four times a day per [facility]; however, patient reports taking three times a day as she often forgets the fourth dose.  - Hydroxyzine: Patient reports not taking with last dose in March. Per [facility] last filled 6/27/2019 for 30 days supply.  - Olanzapine and trazodone: Patient reports these are just as needed medications and hasn't taken them since March. Per [facility] last filled 6/27/2019 for 30 days supply.  - Patient fills prescriptions at the [facility].    Prior to Admission medications    Medication Sig Last Dose Taking? Auth Provider   cyanocobalamin (VITAMIN B-12) 1000 MCG tablet Take 1,000 mcg by mouth daily 6/30/2019 at Unknown Yes Unknown, Entered By History   desvenlafaxine (PRISTIQ) 50 MG 24 hr tablet Take 50 mg by mouth daily  6/30/2019 at 1500 Yes Reported, Patient   gabapentin (NEURONTIN) 300 MG capsule Take 900 mg by mouth 3 times daily  7/1/2019 at 0830 Yes Reported, Patient   ibuprofen (ADVIL/MOTRIN) 200 MG tablet Take 600 mg by mouth every 4 hours as needed for mild  pain (for prevention of ECT headache and joint pain) 6/30/2019 at Unknown time Yes Reported, Patient   LORazepam (ATIVAN) 0.5 MG tablet Take 0.5 mg by mouth every 6 hours as needed  7/1/2019 at 1000 Yes Reported, Patient   multivitamin, therapeutic (THERA-VIT) TABS tablet Take 1 tablet by mouth daily 6/30/2019 at Unknown time Yes Unknown, Entered By History   desmopressin (DDAVP) 0.2 MG tablet Take 0.6 mg by mouth At Bedtime  More than a month at Unknown time  Unknown, Entered By History   hydrOXYzine (ATARAX) 25 MG tablet Take 1 tablet (25 mg) by mouth every 4 hours as needed for anxiety  Patient not taking: Reported on 7/1/2019 More than a month at Unknown time  Naegele, Debra Ann, APRN CNS   OLANZapine (ZYPREXA) 10 MG tablet Take 10 mg by mouth every 2 hours as needed More than a month at Unknown time  Unknown, Entered By History   traZODone (DESYREL) 50 MG tablet Take 50 mg by mouth nightly as needed for sleep More than a month at Unknown time  Unknown, Entered By History     Medication history completed by: Peri Martinez, PharmD IV Student

## 2019-07-01 NOTE — PROGRESS NOTES
Time/length of seizure event: 30 seconds  Movements (head, eyes, extremities): eyes closes, shoulder jerking up, arms at sides and jerking.  Orientation during seizure: Non responsive  After seizure, remembers the unique phrase given during seizure: No  After seizure, remembers an unnamed visual object shown during seizure: No  Able to identify/name aloud item during seizure: No  Able to follow command during seizure: No  Able to read test sentence aloud during seizure: No  Able to read test sentence aloud again after the seizure: No, too tired to speak.  After seizure, remembers name of object shown during seizure: No  Orientation and level of consciousness after seizure: Oriented, refused to speak.   VS and oxygen saturation during/after seizure: VSS.  Recall of the event?:  No  Was this a typical seizure/event?: Yes  Presence of aura or pre-seizure activity: No.  Incontinence: No

## 2019-07-01 NOTE — ED TRIAGE NOTES
Pt presents to ED with c/o seizures. Was at primary care clinic and had seizure like episode and was sent here. In ED pt was seen having facial twitches with unresponsive episodes. Pt is sensitive to sound and light, has extreme fatigue. Has been having episodes every day to every other day since December.

## 2019-07-01 NOTE — ED NOTES
[facility], [place]   ED Nurse to Floor Handoff     Stephanie Villanueva is a 19 year old female who speaks English and lives with family members,  in a home  They arrived in the ED by car from home    ED Chief Complaint: Seizures    ED Dx;   Final diagnoses:   Myoclonic jerking   Shuddering spell         Needed?: No    Allergies:   Allergies   Allergen Reactions     No Known Drug Allergies    .  Past Medical Hx:   Past Medical History:   Diagnosis Date     Anxiety october 2013     CONTUSION OF left iliac crest 9/3/2006     Major depression october 2013     Nocturnal enuresis 10/4/2007    Currently resolved.     Salter-Perdomo Type I fracture of distal fibula with routine healing 1/25/2013     Uncomplicated asthma     sports induced      Baseline Mental status: WDL  Current Mental Status changes: at basesline    Infection present or suspected this encounter: no  Sepsis suspected: No  Isolation type: No active isolations     Activity level - Baseline/Home:  Independent  Activity Level - Current:   Independent    Bariatric equipment needed?: No    In the ED these meds were given:   Medications   0.9% sodium chloride BOLUS (1,000 mLs Intravenous New Bag 7/1/19 1151)     Followed by   sodium chloride 0.9% infusion (has no administration in time range)       Drips running?  No    Home pump  No    Current LDAs  Peripheral IV 07/01/19 Right Wrist (Active)   Site Assessment WDL 7/1/2019 11:52 AM   Number of days: 0       Wound 01/19/19 Anterior;Left Arm Self inflicted;Laceration Pt has 13cm deep laceration with bleeding on R forearm. Pt also has a 3.5 cm and 4.5 cm lac. (Active)   Number of days: 163       Labs results:   Labs Ordered and Resulted from Time of ED Arrival Up to the Time of Departure from the ED   CBC WITH PLATELETS DIFFERENTIAL - Abnormal; Notable for the following components:       Result Value    RBC Count 5.45 (*)     Hematocrit 48.5 (*)     MCHC 31.3 (*)     All other  "components within normal limits   BASIC METABOLIC PANEL   ROUTINE UA WITH MICROSCOPIC   HCG QUALITATIVE URINE   DRUG ABUSE SCREEN 6 CHEM DEP URINE ([facility])       Imaging Studies: No results found for this or any previous visit (from the past 24 hour(s)).    Recent vital signs:   /70   Pulse 69   Temp 98.9  F (37.2  C) (Oral)   Resp 13   Ht 1.778 m (5' 10\")   Wt 64.4 kg (142 lb)   SpO2 99%   BMI 20.37 kg/m      Gibbon Coma Scale Score: 15 (07/01/19 1110)       Cardiac Rhythm: Normal Sinus  Pt needs tele? No  Skin/wound Issues: None    Code Status: Full Code    Pain control: good    Nausea control: good    Abnormal labs/tests/findings requiring intervention: N/A    Family present during ED course? Yes   Family Comments/Social Situation comments: N/A    Tasks needing completion: None    Lester Alston, RN  [phone] [facility]      "

## 2019-07-01 NOTE — ED NOTES
Initial Assessment: VSS. Unco-op. Does not participate in cares or co-op with medical team. Denies SOB. Denies chest/shoulder/arm pain or discomfort. No acute distress noted. Family at the bedside in supportive role.

## 2019-07-01 NOTE — ED PROVIDER NOTES
[place] EMERGENCY DEPARTMENT ([facility])  7/01/19   History     Chief Complaint   Patient presents with     Seizures     The history is provided by a parent, the patient and medical records.     Stephanie Villanueva is a 19 year old female with a medical history significant for major depression, BAILEY, borderline personality disorder, OCD and iron deficiency anemia who presents to the Emergency Department for evaluation of episodes of tremors/jerking.  The mother provides the majority of the history as the patient asked her mother to.  The patient over the last few months has been suffering from chronic fatigue; she will only be able to do a small amount each day or exert herself for a small amount of time before she becomes significantly fatigued and needs to lay down.  The mother reports that she spends most of her day in her room sleeping because of this.  The patient up until December 2018 was in college in [place].  The patient came home from winter break and when it was time for her to go back, she told her mother that she could not continue on with college due to her worsening depression and chronic fatigue.  The patient has been seeing her PCP as well as her psychiatrist.  She did just recently start seeing a new psychiatrist.  Patient was also being evaluated for chronic joint pain and she recently saw a rheumatologist, the mother reports that she was diagnosed with fibromyalgia and she was referred to an endocrinologist.  The patient did recently have MRI imaging done including an MR brain and ULT thyroid.    The patient for the last 3 to 4 months has been having these episodes of tremors/jerking or shaking that the patient reports happens usually every other day.  The patient feels that these tremors come on after exerting herself and her fatigue worsening.  The patient does not lose consciousness during these episodes and she is able to talk and speak.  The mother reports that her fatigue has been  worsening to the point that she sometimes needs help getting up stairs to her bedroom.  The patient saw her PCP today for these issues as well as follow-up of her fibromyalgia.  The patient while having the physical exam done today in clinic he began having another 1 of these tremorous episodes.  The mother reports that they were told that the patient should see a neurologist and possibly have an EEG done.  They were also advised to come to the Emergency Department to be evaluated.    The mother here reports that the patient does have an appointment at [facility] for the end of July.  The patient denies any drug or alcohol use.  The patient feels that her episodes are worsening as well as her fatigue is worsening.    Brain/Pituitary MRI without and with contrast (5/29/2019)  Impression: Normal MRI of the pituitary and brain.     ULT Thyroid (5/16/2019)  Impression:   1. Bilateral solid thyroid nodules, which are under the size threshold for specific follow-up recommendations.  2. Thyroid gland is not enlarged.    I have reviewed the Medications, Allergies, Past Medical and Surgical History, and Social History in the [employer] system.    Past Medical History:   Diagnosis Date     Anxiety october 2013     CONTUSION OF left iliac crest 9/3/2006     Major depression october 2013     Nocturnal enuresis 10/4/2007    Currently resolved.     Salter-Perdomo Type I fracture of distal fibula with routine healing 1/25/2013     Uncomplicated asthma     sports induced       History reviewed. No pertinent surgical history.    Family History   Problem Relation Age of Onset     Substance Abuse Maternal Grandfather         alcoholism     Depression Maternal Aunt      Substance Abuse Maternal Aunt         alcoholism     Hypothyroidism Maternal Aunt      Anxiety Disorder Brother      Hypothyroidism Maternal Grandmother        Social History     Tobacco Use     Smoking status: Never Smoker     Smokeless tobacco: Never Used   Substance Use  "Topics     Alcohol use: Yes     Comment: sometimes       Current Facility-Administered Medications   Medication     sodium chloride 0.9% infusion     Current Outpatient Medications   Medication     cyanocobalamin (VITAMIN B-12) 1000 MCG tablet     desvenlafaxine (PRISTIQ) 50 MG 24 hr tablet     gabapentin (NEURONTIN) 300 MG capsule     ibuprofen (ADVIL/MOTRIN) 200 MG tablet     LORazepam (ATIVAN) 0.5 MG tablet     multivitamin, therapeutic (THERA-VIT) TABS tablet     desmopressin (DDAVP) 0.2 MG tablet     hydrOXYzine (ATARAX) 25 MG tablet     OLANZapine (ZYPREXA) 10 MG tablet     traZODone (DESYREL) 50 MG tablet        Allergies   Allergen Reactions     No Known Drug Allergies          Review of Systems   Constitutional: Positive for fatigue (chronic; worsening). Negative for fever.   Respiratory: Negative for shortness of breath.    Cardiovascular: Negative for chest pain.   Gastrointestinal: Negative for abdominal pain.   Neurological: Positive for tremors (episodes). Negative for syncope.   All other systems reviewed and are negative.      Physical Exam   BP: 100/70  Pulse: 63  Temp: 98.9  F (37.2  C)  Resp: 18  Height: 177.8 cm (5' 10\")  Weight: 64.4 kg (142 lb)  SpO2: 100 %      Physical Exam   Constitutional: She is oriented to person, place, and time. She appears well-developed and well-nourished. No distress.   Neck: Neck supple.   Cardiovascular: Normal rate, regular rhythm and normal heart sounds.   Pulmonary/Chest: Effort normal and breath sounds normal.   Neurological: She is alert and oriented to person, place, and time. GCS eye subscore is 4. GCS verbal subscore is 5. GCS motor subscore is 6.   No classic seizure activity she has intermittent myoclonic jerking without evidence for postictal.  Unlikely to represent true seizure activity   Nursing note and vitals reviewed.      ED Course   11:19 AM  The patient was seen and examined by Bruno Desouza MD in Room ED11.        Procedures        Seen by " Neurology       Labs Ordered and Resulted from Time of ED Arrival Up to the Time of Departure from the ED   CBC WITH PLATELETS DIFFERENTIAL - Abnormal; Notable for the following components:       Result Value    RBC Count 5.45 (*)     Hematocrit 48.5 (*)     MCHC 31.3 (*)     All other components within normal limits   BASIC METABOLIC PANEL   ROUTINE UA WITH MICROSCOPIC   HCG QUALITATIVE URINE   DRUG ABUSE SCREEN 6 CHEM DEP URINE ([facility])       Consults  Neurology: Responded (07/01/19 1133)    Assessments & Plan (with Medical Decision Making)   19-year-old female here from clinic.  She has an extensive psychiatric history including depression.  She is basically nonfunctional according to mother.  There is no definite medical cause for her symptoms.  She is followed closely by psychiatry and is compliant with her medications.  She has a history of these myoclonic jerks, this was happening during the examination today and the primary care clinic and was prompted for transfer here.  This does not appear to be seizures, neurology was consulted and saw the patient, and they agreed to admit her as observation for EEG to rule out seizure activity.  This will be of assistance for future providers in her care as the diagnosis of seizure activity can then be reliably excluded.    This part of the medical record was transcribed by Brendon Cruz, Medical Scribe, from a dictation done by Bruno Desouza MD.       I have reviewed the nursing notes.    I have reviewed the findings, diagnosis, plan and need for follow up with the patient.       Medication List      There are no discharge medications for this visit.         Final diagnoses:   Myoclonic jerking   Shuddering spell     I, Brendon Cruz, am serving as a trained medical scribe to document services personally performed by Bruno Desouza MD, based on the provider's statements to me.   IBruno MD, was physically present and have reviewed and verified  the accuracy of this note documented by Brendon Cruz.    7/1/2019   [facility], [place], EMERGENCY DEPARTMENT     Bruno Desouza MD  07/01/19 1506

## 2019-07-01 NOTE — PROGRESS NOTES
Time/length of seizure event: <1 Minute  Movements (head, eyes, extremities):R arm jerking, eyes closed, body twitching  Orientation during seizure: Non-responsive  After seizure, remembers the unique phrase given during seizure: Yes  After seizure, remembers an unnamed visual object shown during seizure: Yes  Able to identify/name aloud item during seizure: No  Able to follow command during seizure: No  Able to read test sentence aloud during seizure: No  Able to read test sentence aloud again after the seizure: Yes  After seizure, remembers name of object shown during seizure: No  Orientation and level of consciousness after seizure: Slow to speak, blinked frequently, answered correctly when given options.  VS and oxygen saturation during/after seizure: VSS  Recall of the event?: No  Was this a typical seizure/event?: Yes  Presence of aura or pre-seizure activity: no  Incontinence: No

## 2019-07-02 ENCOUNTER — ALLIED HEALTH/NURSE VISIT (OUTPATIENT)
Dept: NEUROLOGY | Facility: CLINIC | Age: 19
End: 2019-07-02
Attending: PSYCHIATRY & NEUROLOGY
Payer: COMMERCIAL

## 2019-07-02 VITALS
OXYGEN SATURATION: 100 % | RESPIRATION RATE: 16 BRPM | WEIGHT: 142 LBS | SYSTOLIC BLOOD PRESSURE: 100 MMHG | TEMPERATURE: 97 F | HEART RATE: 72 BPM | HEIGHT: 70 IN | BODY MASS INDEX: 20.33 KG/M2 | DIASTOLIC BLOOD PRESSURE: 56 MMHG

## 2019-07-02 DIAGNOSIS — R56.9 SEIZURE-LIKE ACTIVITY (H): Primary | ICD-10-CM

## 2019-07-02 PROCEDURE — 25000132 ZZH RX MED GY IP 250 OP 250 PS 637: Performed by: STUDENT IN AN ORGANIZED HEALTH CARE EDUCATION/TRAINING PROGRAM

## 2019-07-02 PROCEDURE — G0378 HOSPITAL OBSERVATION PER HR: HCPCS

## 2019-07-02 PROCEDURE — 95951 ZZHC EEG VIDEO < 12 HR: CPT | Mod: 52,ZF

## 2019-07-02 RX ADMIN — CYANOCOBALAMIN TAB 1000 MCG 1000 MCG: 1000 TAB at 09:06

## 2019-07-02 RX ADMIN — GABAPENTIN 900 MG: 300 CAPSULE ORAL at 09:06

## 2019-07-02 NOTE — PLAN OF CARE
Time/length of seizure event:2338, 4 min  Movements (head, eyes, extremities): twitching of eyes, shoulders, arms  Orientation during seizure: JUDI  After seizure, remembers the unique phrase given during seizure: JUDI  After seizure, remembers an unnamed visual object shown during seizure:JUDI  Able to identify/name aloud item during seizure:JUDI  Able to follow command during seizure:JUDI  Able to read test sentence aloud during seizure:JUDI  Able to read test sentence aloud again after the seizure: JUDI  After seizure, remembers name of object shown during seizure:JUDI  Orientation and level of consciousness after seizure:JUDI - sleeping, not responding  VS and oxygen saturation during/after seizure:VSS  Recall of the event?: JUDI  Was this a typical seizure/event?:yes  Presence of aura or pre-seizure activity:no  Incontinence:no

## 2019-07-02 NOTE — PLAN OF CARE
Status: Patient admitted for characterization of events  Vitals: VSS  Neuros: Intact  IV: PIV removed by patient  Diet: Regular diet  : Voiding spontaneously  Skin: Intact  Pain: Denies  Activity: Up with SBA  Social: Mother at bedside, supportive with cares  Plan: Discharge instructions given to patient and mother at bedside, patient ambulated to front door

## 2019-07-02 NOTE — PROCEDURES
[facility] #-1       TYPE OF STUDY:  Inpatient video EEG monitoring.      DATE OF RECORDING/SERVICE DATE:  07/01/2019.       SOURCE FILE DURATION:  6 hours 35 minutes 15 seconds.      HISTORY:  Day 1 of video EEG monitoring in Stephanie Villanueva, a 19-year-old with fibromyalgia and chronic pain.  She is being evaluated for spells of altered mental status, unresponsiveness, collapse, and irregular jerking.  She is being evaluated due to spell description, she is being evaluated for epilepsy.  She is being treated with gabapentin at 2700 mg per day.      TECHNICAL SUMMARY:  EEG is recorded from scalp electrodes placed according to the 10-20 international system.  Additional electrodes are utilized for referencing, artifact detection, and recording from other cerebral regions.  Video was continuously recorded.  Video was reviewed for clinical correlation and to assist with EEG interpretation.      FINDINGS:  During quiet wakefulness, well-defined 10 Hz posterior dominant rhythm, symmetrical, and reactive.  No clear focal slowing.  The patient is awake throughout the recording session.      Photic stimulation does not induce any abnormalities.  Hyperventilation is performed and does induce one of the patient's spells (see below).  It does not, however induce any EEG abnormalities.      OTHER INTERICTAL ABNORMALITIES:  No epileptiform discharges.      ICTAL ABNORMALITIES:  Two spells are marked by staff.  One at about 6:04 p.m. following hyperventilation.  The other one at approximately 11:38 p.m., occurring spontaneously.  Clinical features are similar.  The patient closes eyes and does not respond to questions delivered by caregivers.  Irregular tremoring and jerking movements are noted of the face, neck, both arms and both legs.  These are asynchronous and asymmetrical with long interruptions up to several seconds between individual movements.  A sustained a pattern of clonic jerking or tonic-clonic movements are not seen.   EEG during both periods of unusual clinical activity is well interpretable and shows a well-sustained 10 Hz posterior dominant rhythm.  A seizure discharge is not seen at any point.      IMPRESSION:  Within normal limits.  Background activity is normal and there is no epileptiform activity.  Two spells are recorded consisting of irregular twitching and unresponsiveness.  Clinical features are not consistent with epilepsy and suggest a psychogenic etiology.  EEG during the spells is normal and does not show seizure discharge.      The two recorded spells are not epileptic seizures.  Clinical correlation is needed to determine whether these are patient's target events.  The interictal EEG does not provide any information to support the diagnosis of epilepsy either.         ELY FERRER MD             D: 2019   T: 2019   MT: [place]      Name:     ROBIN GUPTA   MRN:      [id number]        Account:        [id number]   :      2000           Procedure Date: 2019      Document: [id number]

## 2019-07-02 NOTE — PLAN OF CARE
Status: Patient admitted for characterization of spells.  Vitals: VSS.  Neuros: A&Ox4. Neuros intact, uncooperative at times. Refuses pupil assessment. VEEG, leads intact. One episode witnessed this shift, see previous note.  IV: PIV SL.  Resp/trach: No issues, on room air.  Diet: Regular diet.  Bowel status: No BM this shift.  : Voiding spontaneously.   Skin: Scars all over arms.  Pain: Tylenol given x1 for generalized pain.   Activity: Up SBA and GB.  Social: No visitors this shift.   Plan: Continue to monitor for more events. On observation status.

## 2019-07-02 NOTE — PROCEDURES
[facility] #-[facility]      DATE OF RECORDING/SERVICE DATE:  07/02/2019      TYPE OF STUDY:  Inpatient video EEG monitoring.      SOURCE FILE DURATION:  9 hours, 42 minutes and 56 seconds.      HISTORY:  Day 2 of video-EEG monitoring in Stephanie Villanueva, a 19-year-old who presents for evaluation of spells consisting of collapse, altered mental status and jerking.  She is being evaluated for convulsive epilepsy.      TECHNICAL SUMMARY:  EEG is recorded from scalp electrodes placed according to the 10-20 international system.  Additional electrodes are utilized for referencing, artifact detection, and recording from other cerebral regions.  Video was continuously recorded.  Video was reviewed for clinical correlation and to assist with EEG interpretation.      FINDINGS:  During quiet wakefulness, 9-10 Hz posterior dominant rhythm, symmetrical, reactive.  During sleep, irregular slowing, vertex waves, sleep spindles.  Hyperventilation and photic stimulation were not performed      OTHER INTERICTAL ABNORMALITIES:  No epileptiform discharges.      ICTAL ABNORMALITIES:  No electrographic seizures.      IMPRESSION:  Normal.  No epileptiform activity or seizures.      SUMMARY OF 2 DAYS OF VIDEO-EEG MONITORING:  Normal 10 Hz posterior dominant rhythm.  No epileptiform discharges.  Two spells were recorded.  These consisted of irregular jerking and tremoring of arms, legs, face and neck.  Jerks occurred asynchronously and shifted from side-to-side.  The duration ranged from 10-15 minutes.  The patient was unresponsive when examined with eyes closed.  EEG during the spells was easily interpretable without excessive movement or other artifact.  EEG showed patient's normal posterior dominant rhythm and did not show seizure discharges.      Recorded spells were not epileptic seizures.  Some of the clinical features suggested a psychogenic etiology.  Clinical correlation is needed to confirm that recorded spells were patient's spells of  interest.         ELY FERRER MD             D: 2019   T: 2019   MT: DEANNA      Name:     ROBIN GUPTA   MRN:      [id number]        Account:        [id number]   :      2000           Procedure Date: 2019      Document: [id number]

## 2019-07-02 NOTE — PROGRESS NOTES
Pt on observation status per providers order. Handout given, status discussed. Patient and family questions answered.

## 2019-07-02 NOTE — PROGRESS NOTES
Observation goals:    -diagnostic tests and consults completed and resulted - not met  -Complete VEEG monitoring -not met  -vital signs normal or at patient baseline - met    Nurse to notify provider when observation goals have been met and patient is ready for discharge.

## 2019-07-02 NOTE — PLAN OF CARE
OBSERVATION GOALS:     -diagnostic tests and consults completed and resulted - not met, currently on VEEG- no others tests or consults ordered at time.   -Complete VEEG monitoring - not met, still on VEEG.  -vital signs normal or at patient baseline - met

## 2019-07-02 NOTE — PROGRESS NOTES
EEG CLINICAL NEUROPHYSIOLOGY PRELIMINARY REPORT    First two hours of video-EEG reviewed. 10 Hz posterior dominant rhythm. No epileptiform discharges. A spell of unresponsiveness and irregular jerking of arms with eyes closed recorded following hyperventilation shortly after 6 PM today. Clinical features of spell were not consistent with epilepsy and EEG during spell did not show seizure discharge. Well preserved posterior dominant rhythm was noted throughout the spell. The recorded spell was not an epileptic seizure. Clinical correlation is needed ot determine wether this spell was a target spell.    Will continue video-EEG monitoring. Full report to follow.    Ko Samaniego MD  Pager [phone]

## 2019-07-02 NOTE — PLAN OF CARE
Status: On 6A observation status for spell characterization. 2 witnessed events. See notes.  Vitals: VSS. VSS during events as well.  Neuros: Neuros intact. Refused pupil exam. Speaks very quietly. Weak during strengths tests, however 5/5 when observed.  IV: PIV SL.  Resp/trach: WNL  Diet: Regular diet. Vegan.  Bowel status: BS +  : Voids spon.  Skin: Self-harm scars throughout extremities.  Pain: Denies.  Activity: Up with SBA for seizure precautions.  Social: Family and friends visited this evening.  Plan: Continue to monitor and follow POC.

## 2019-07-02 NOTE — DISCHARGE SUMMARY
[facility]  NEUROLOGY DISCHARGE SUMMARY    Patient Name:  Stephanie Villanueva  MRN:  [id number]      :  2000      Date of Admission:  2019  Date of Discharge:  2019  Admitting Physician:  Fitz Burnette MD  Discharge Physician:  Fitz Burnette MD  Primary Care Provider:   Michelle Berumen  Discharge Disposition:   Discharged to home    Admission Diagnoses:  Spells    Discharge Diagnoses:    Psychogenic non-epileptic seizure    Brief History of Illness:   The patient is a 19 year old female with a PMH of borderline personality disorder, depression, and anxiety who presents with evaluation of spells which have been occurring every other day since 2018.    Hospital Course:    Spells: Patient admitted to observation for spell characterization due to frequency of events (roughly every other day since 2018). She had an episode witnessed by admitting physician before she had been placed on EEG; spell was characterized by eyelid twitching and fluttering with right arm jerking which was distractable and variable in amplitude. She was shortly thereafter put on video EEG. She had another spell overnight captured on EEG, which showed normal background with no epileptiform activity. Of note, patient was not receptive to this diagnosis and shortly thereafter ripped out EEG wires and IV. EEG tech was called to properly remove EEG leads. Patient was discharged home with recommendations to follow up with her outpatient psychiatrist for help with managing underlying psychologic stressors, which is a risk factor for psychogenic non-epileptic seizures (PNES).      Depression / Anxiety / Fibromyalgia: PTA desvenlafaxine and gabapentin continued. Held prn Ativan.      Pertinent Investigations:  EEG    Consultations:    None    Recommendations and Follow-up:  Follow up with outpatient psychiatrist in 1-2 weeks for psychogenic non-epileptic seizures    Discharge  "physical examination:   /56 (BP Location: Left arm)   Pulse 72   Temp 97  F (36.1  C) (Oral)   Resp 16   Ht 1.778 m (5' 10\")   Wt 64.4 kg (142 lb)   SpO2 100%   Breastfeeding? No   BMI 20.37 kg/m    General: Young adult female sitting up in bed, using smartphone.   HEENT: NC/AT, mucous membranes moist.  Chest: Nonlabored breathing on RA.   Extremities: Moves all extremities spontaneously, ROM full.   Psych: Depressed affect.   Neuro: Patient refusing neurologic examination.       Discharge Medications:  Discharge Medication List as of 7/2/2019 11:18 AM      CONTINUE these medications which have NOT CHANGED    Details   cyanocobalamin (VITAMIN B-12) 1000 MCG tablet Take 1,000 mcg by mouth daily, Historical      desvenlafaxine (PRISTIQ) 50 MG 24 hr tablet Take 50 mg by mouth daily , Historical      gabapentin (NEURONTIN) 300 MG capsule Take 900 mg by mouth 3 times daily , Historical      ibuprofen (ADVIL/MOTRIN) 200 MG tablet Take 600 mg by mouth every 4 hours as needed for mild pain (for prevention of ECT headache and joint pain), Historical      LORazepam (ATIVAN) 0.5 MG tablet Take 0.5 mg by mouth every 6 hours as needed , Historical      multivitamin, therapeutic (THERA-VIT) TABS tablet Take 1 tablet by mouth daily, Historical      desmopressin (DDAVP) 0.2 MG tablet Take 0.6 mg by mouth At Bedtime , Historical      hydrOXYzine (ATARAX) 25 MG tablet Take 1 tablet (25 mg) by mouth every 4 hours as needed for anxiety, Disp-120 tablet, R-1, E-Prescribe      OLANZapine (ZYPREXA) 10 MG tablet Take 10 mg by mouth every 2 hours as needed, Historical      traZODone (DESYREL) 50 MG tablet Take 50 mg by mouth nightly as needed for sleep, Historical             Discharge follow up and instructions:     MENTAL HEALTH REFERRAL  - Adult; Psychiatry and Medication Management; Psychiatry; Other: Not Listed - Enter Referral Details in Scheduling Comments Below; Patient call to schedule: [phone]   Treatment for " [facility] ([facility])   Evaluate for ketamine infusion therapy for treatment of PNES      Reason for your hospital stay    You were hospitalized for characterization of spells.     Activity    Your activity upon discharge: activity as tolerated     Discharge Instructions    Video EEG monitoring during your hospitalization showed normal brain activity with no epileptiform discharges during the episode that you had overnight. This is most consistent with psychogenic non-epileptic seizures (PNES), which is defined as episodic change in behavior that may resemble an epileptic seizure but has no epileptiform correlate when monitored on EEG.     We recommend that you follow up with your outpatient psychiatrist, as this condition is associated with underlying psychologic stressors and does have available treatments.     Adult [facility] Follow-up and recommended labs and tests    Follow up with your outpatient psychiatrist within 1-2 weeks for hospital follow-up.  No follow up labs or test are needed.      Appointments on [place] and/or [facility] (with [facility] or [facility] provider or service). Call [phone] if you haven't heard regarding these appointments within 7 days of discharge.     Full Code     Diet    Follow this diet upon discharge: Regular       Patient seen and discussed with attending Dr. Burnette.    Sylwia Michael MD  Neurology PGY-2  [phone]

## 2019-07-02 NOTE — PROGRESS NOTES
EEG CLINICAL NEUROPHYSIOLOGY PRELIMINARY REPORT    Video-EEG through 0600 today reviewed. 10 Hz posterior dominant rhythm. Normal sleep overnight. No interictal epileptiform discharges. Two spells recorded. Clinically irregular jerking with eyes closed, unresponsive,  lasting up to 15 minutes. EEG during spells quite interpretable and shows normal waking background acitivity without anything suggesting seizure discharge.    Normal background with no epileptiform activity. Two spells recorded that are not epileptic seizures. Clinical features suggest psychogenic etiology. Clinical correlation needed to confirm that spells are target events.    Full report to follow.    Ko Samaniego MD  Pager [phone]

## 2019-07-03 ENCOUNTER — PATIENT OUTREACH (OUTPATIENT)
Dept: CARE COORDINATION | Facility: CLINIC | Age: 19
End: 2019-07-03

## 2019-07-03 ASSESSMENT — ASTHMA QUESTIONNAIRES: ACT_TOTALSCORE: 21

## 2019-07-23 ENCOUNTER — TELEPHONE (OUTPATIENT)
Dept: FAMILY MEDICINE | Facility: CLINIC | Age: 19
End: 2019-07-23

## 2019-07-23 NOTE — TELEPHONE ENCOUNTER
Patient/family was instructed to return call to [facility]'s [facility] RN directly on the RN Call Back Line at [phone].    Becca Espinal RN

## 2019-07-23 NOTE — TELEPHONE ENCOUNTER
Panel Management Review      Patient has the following on her problem list:     Depression / Dysthymia review    Measure:  Needs PHQ-9 score of 4 or less during index window.  Administer PHQ-9 and if score is 5 or more, send encounter to provider for next steps.    5 - 7 month window range: 5/10-9/7    PHQ-9 SCORE 6/20/2017 6/7/2018 1/9/2019   PHQ-9 Total Score 21 8 25   Some encounter information is confidential and restricted. Go to Review Flowsheets activity to see all data.       If PHQ-9 recheck is 5 or more, route to provider for next steps.    Patient is due for:  PHQ9      Composite cancer screening  Chart review shows that this patient is due/due soon for the following None  Summary:    Patient is due/failing the following:   PHQ9 and PHYSICAL    Action needed:   Patient needs office visit for preventive care. and Patient needs to do PHQ9.    Type of outreach:    Routed to care team for outreach    Questions for provider review:    None                                                                                                                                    Tiny Anne        Chart routed to Care Team .

## 2019-07-24 NOTE — TELEPHONE ENCOUNTER
Patient/family was instructed to return call to [facility]'s [facility] RN directly on the RN Call Back Line at [phone].    Becca Espinal RN

## 2019-08-09 ENCOUNTER — TELEPHONE (OUTPATIENT)
Dept: PSYCHIATRY | Facility: CLINIC | Age: 19
End: 2019-08-09

## 2019-08-09 NOTE — TELEPHONE ENCOUNTER
I spoke to the patient about bringing in records. She said she would try but that all records should be in Care Everywhere through the [facility]. I was not able to pull them from here but can try again when the patient is in clinic. I told her to bring what she could.

## 2019-08-13 ENCOUNTER — OFFICE VISIT (OUTPATIENT)
Dept: PSYCHIATRY | Facility: CLINIC | Age: 19
End: 2019-08-13
Payer: COMMERCIAL

## 2019-08-13 VITALS
SYSTOLIC BLOOD PRESSURE: 107 MMHG | DIASTOLIC BLOOD PRESSURE: 63 MMHG | WEIGHT: 145.6 LBS | BODY MASS INDEX: 20.89 KG/M2 | HEART RATE: 73 BPM

## 2019-08-13 DIAGNOSIS — F44.9 DISSOCIATIVE AND CONVERSION DISORDER, UNSPECIFIED: ICD-10-CM

## 2019-08-13 DIAGNOSIS — F43.10 PTSD (POST-TRAUMATIC STRESS DISORDER): Primary | ICD-10-CM

## 2019-08-13 ASSESSMENT — PAIN SCALES - GENERAL: PAINLEVEL: MODERATE PAIN (5)

## 2019-08-13 ASSESSMENT — PATIENT HEALTH QUESTIONNAIRE - PHQ9: SUM OF ALL RESPONSES TO PHQ QUESTIONS 1-9: 18
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
## 2019-08-22 ENCOUNTER — TELEPHONE (OUTPATIENT)
Dept: PSYCHIATRY | Facility: CLINIC | Age: 19
End: 2019-08-22

## 2019-08-22 DIAGNOSIS — F33.2 SEVERE RECURRENT MAJOR DEPRESSION WITHOUT PSYCHOTIC FEATURES (H): Primary | ICD-10-CM

## 2019-08-22 NOTE — TELEPHONE ENCOUNTER
-Writer called and spoke with Stephanie.  She is interested in seeing Raymundo Resendiz at [facility].  Writer will follow up with Clinic Supervisor on this.  She is also wondering if Dr. Hernández would be able to fill her medications in the mean time.  States she needs her Gabapentin 900 mg TID and Desvenlafaxine 50 mg daily.      -Writer will route to provider

## 2019-08-22 NOTE — TELEPHONE ENCOUNTER
----- Message from Jil Moreno sent at 8/21/2019  3:14 PM CDT -----  Please call and offer an appointment with Raymundo Resendiz at [facility] for ART and med management, roughly a one month wait.  If they want to wait for meds but start ART right away, Raymundo can provide me one a referral to a local therapist that does ART.    Thanks, Jil

## 2019-08-26 PROCEDURE — 99284 EMERGENCY DEPT VISIT MOD MDM: CPT | Mod: 25 | Performed by: EMERGENCY MEDICINE

## 2019-08-26 PROCEDURE — 93005 ELECTROCARDIOGRAM TRACING: CPT | Performed by: EMERGENCY MEDICINE

## 2019-08-26 PROCEDURE — 93010 ELECTROCARDIOGRAM REPORT: CPT | Mod: Z6 | Performed by: EMERGENCY MEDICINE

## 2019-08-26 PROCEDURE — 96360 HYDRATION IV INFUSION INIT: CPT | Performed by: EMERGENCY MEDICINE

## 2019-08-26 ASSESSMENT — MIFFLIN-ST. JEOR: SCORE: 1494.82

## 2019-08-27 ENCOUNTER — HOSPITAL ENCOUNTER (EMERGENCY)
Facility: CLINIC | Age: 19
Discharge: HOME | End: 2019-08-27
Attending: EMERGENCY MEDICINE | Admitting: EMERGENCY MEDICINE
Payer: COMMERCIAL

## 2019-08-27 VITALS
BODY MASS INDEX: 21.56 KG/M2 | TEMPERATURE: 98.1 F | DIASTOLIC BLOOD PRESSURE: 57 MMHG | RESPIRATION RATE: 16 BRPM | HEIGHT: 69 IN | WEIGHT: 145.6 LBS | OXYGEN SATURATION: 100 % | HEART RATE: 61 BPM | SYSTOLIC BLOOD PRESSURE: 109 MMHG

## 2019-08-27 DIAGNOSIS — F44.5 PSYCHOGENIC NONEPILEPTIC SEIZURE: ICD-10-CM

## 2019-08-27 LAB
ALBUMIN SERPL-MCNC: 3.8 G/DL (ref 3.4–5)
ALBUMIN UR-MCNC: NEGATIVE MG/DL
ALP SERPL-CCNC: 72 U/L (ref 40–150)
ALT SERPL W P-5'-P-CCNC: 26 U/L (ref 0–50)
AMPHETAMINES UR QL SCN: NEGATIVE
ANION GAP SERPL CALCULATED.3IONS-SCNC: 9 MMOL/L (ref 3–14)
APPEARANCE UR: CLEAR
AST SERPL W P-5'-P-CCNC: 21 U/L (ref 0–45)
BACTERIA #/AREA URNS HPF: ABNORMAL /HPF
BARBITURATES UR QL: NEGATIVE
BASOPHILS # BLD AUTO: 0.1 10E9/L (ref 0–0.2)
BASOPHILS NFR BLD AUTO: 1 %
BENZODIAZ UR QL: NEGATIVE
BILIRUB SERPL-MCNC: 0.5 MG/DL (ref 0.2–1.3)
BILIRUB UR QL STRIP: NEGATIVE
BUN SERPL-MCNC: 10 MG/DL (ref 7–30)
CALCIUM SERPL-MCNC: 8.8 MG/DL (ref 8.5–10.1)
CANNABINOIDS UR QL SCN: NEGATIVE
CHLORIDE SERPL-SCNC: 108 MMOL/L (ref 94–109)
CO2 SERPL-SCNC: 26 MMOL/L (ref 20–32)
COCAINE UR QL: NEGATIVE
COLOR UR AUTO: ABNORMAL
CREAT SERPL-MCNC: 0.68 MG/DL (ref 0.52–1.04)
DIFFERENTIAL METHOD BLD: NORMAL
EOSINOPHIL # BLD AUTO: 0.1 10E9/L (ref 0–0.7)
EOSINOPHIL NFR BLD AUTO: 1.5 %
ERYTHROCYTE [DISTWIDTH] IN BLOOD BY AUTOMATED COUNT: 12.7 % (ref 10–15)
ETHANOL UR QL SCN: NEGATIVE
GFR SERPL CREATININE-BSD FRML MDRD: >90 ML/MIN/{1.73_M2}
GLUCOSE SERPL-MCNC: 79 MG/DL (ref 70–99)
GLUCOSE UR STRIP-MCNC: NEGATIVE MG/DL
HCG UR QL: NEGATIVE
HCT VFR BLD AUTO: 38.1 % (ref 35–47)
HGB BLD-MCNC: 12.5 G/DL (ref 11.7–15.7)
HGB UR QL STRIP: NEGATIVE
IMM GRANULOCYTES # BLD: 0 10E9/L (ref 0–0.4)
IMM GRANULOCYTES NFR BLD: 0.3 %
INTERNAL QC OK POCT: YES
INTERPRETATION ECG - MUSE: NORMAL
KETONES UR STRIP-MCNC: NEGATIVE MG/DL
LEUKOCYTE ESTERASE UR QL STRIP: NEGATIVE
LYMPHOCYTES # BLD AUTO: 2.6 10E9/L (ref 0.8–5.3)
LYMPHOCYTES NFR BLD AUTO: 42.9 %
MCH RBC QN AUTO: 29.6 PG (ref 26.5–33)
MCHC RBC AUTO-ENTMCNC: 32.8 G/DL (ref 31.5–36.5)
MCV RBC AUTO: 90 FL (ref 78–100)
MONOCYTES # BLD AUTO: 0.5 10E9/L (ref 0–1.3)
MONOCYTES NFR BLD AUTO: 7.7 %
NEUTROPHILS # BLD AUTO: 2.8 10E9/L (ref 1.6–8.3)
NEUTROPHILS NFR BLD AUTO: 46.6 %
NITRATE UR QL: NEGATIVE
NRBC # BLD AUTO: 0 10*3/UL
NRBC BLD AUTO-RTO: 0 /100
OPIATES UR QL SCN: NEGATIVE
PH UR STRIP: 7.5 PH (ref 5–7)
PLATELET # BLD AUTO: 284 10E9/L (ref 150–450)
POTASSIUM SERPL-SCNC: 3.5 MMOL/L (ref 3.4–5.3)
PROT SERPL-MCNC: 6.8 G/DL (ref 6.8–8.8)
RBC # BLD AUTO: 4.23 10E12/L (ref 3.8–5.2)
RBC #/AREA URNS AUTO: 0 /HPF (ref 0–2)
SODIUM SERPL-SCNC: 143 MMOL/L (ref 133–144)
SOURCE: ABNORMAL
SP GR UR STRIP: 1.01 (ref 1–1.03)
SQUAMOUS #/AREA URNS AUTO: 1 /HPF (ref 0–1)
UROBILINOGEN UR STRIP-MCNC: NORMAL MG/DL (ref 0–2)
WBC # BLD AUTO: 6.1 10E9/L (ref 4–11)
WBC #/AREA URNS AUTO: 0 /HPF (ref 0–5)

## 2019-08-27 PROCEDURE — 80053 COMPREHEN METABOLIC PANEL: CPT | Performed by: EMERGENCY MEDICINE

## 2019-08-27 PROCEDURE — 96360 HYDRATION IV INFUSION INIT: CPT | Performed by: EMERGENCY MEDICINE

## 2019-08-27 PROCEDURE — 25000128 H RX IP 250 OP 636: Performed by: EMERGENCY MEDICINE

## 2019-08-27 PROCEDURE — 80307 DRUG TEST PRSMV CHEM ANLYZR: CPT | Performed by: EMERGENCY MEDICINE

## 2019-08-27 PROCEDURE — 80320 DRUG SCREEN QUANTALCOHOLS: CPT | Performed by: EMERGENCY MEDICINE

## 2019-08-27 PROCEDURE — 85025 COMPLETE CBC W/AUTO DIFF WBC: CPT | Performed by: EMERGENCY MEDICINE

## 2019-08-27 PROCEDURE — 81001 URINALYSIS AUTO W/SCOPE: CPT | Performed by: EMERGENCY MEDICINE

## 2019-08-27 PROCEDURE — 81025 URINE PREGNANCY TEST: CPT | Performed by: EMERGENCY MEDICINE

## 2019-08-27 RX ORDER — DESVENLAFAXINE 50 MG/1
50 TABLET, FILM COATED, EXTENDED RELEASE ORAL DAILY
Qty: 30 TABLET | Refills: 0 | Status: SHIPPED | OUTPATIENT
Start: 2019-08-27 | End: 2020-04-16 | Stop reason: DRUGHIGH

## 2019-08-27 RX ORDER — GABAPENTIN 300 MG/1
900 CAPSULE ORAL 3 TIMES DAILY
Qty: 270 CAPSULE | Refills: 0 | Status: SHIPPED | OUTPATIENT
Start: 2019-08-27

## 2019-08-27 RX ADMIN — SODIUM CHLORIDE 1000 ML: 9 INJECTION, SOLUTION INTRAVENOUS at 03:07

## 2019-08-27 ASSESSMENT — ENCOUNTER SYMPTOMS
SEIZURES: 1
FEVER: 0
NAUSEA: 1
SHORTNESS OF BREATH: 0
DIZZINESS: 1
DIARRHEA: 0
VOMITING: 0
WEAKNESS: 1
ABDOMINAL PAIN: 0

## 2019-08-27 NOTE — ED PROVIDER NOTES
"  History     Chief Complaint   Patient presents with     Seizures     HPI  Stephanie Villanueva is a 19 year old female with a history of borderline personality disorder, depression, anxiety, as well as recent diagnoses of fibromyalgia, chronic fatigue and POTS, who presents today after 2 episodes of, \"nonepileptic seizures,\" as well as one reported episode of syncope.  The patient reports, \"I crashed.\"  She has had previous episodes of syncope, her mother reports she has had recent evaluation at [place] and this is what brought on the diagnosis of POTS.  The patient reports she had headache behind her eyes today, which she admits she has intermittently most days.  She also reports feeling a little bit nauseous today, which she also reports is chronic.  She states that although these are chronic complaints, they perhaps felt slightly worse today.  No fever.  She reports one episode of nonepileptic seizure today, which she reports as jerking and twitching.  She reports that she went to group therapy today, and after that felt lightheaded while she was standing waiting for her mother, so laid down on the ground.  She believes she passed out, and then felt that she was having her typical jerking.  Her mother found her at that point, states that the patient was alert enough to be able to texture her location.  Her mother states that the episode looks somewhat similar, though she had coughing during the episode today, which was not typical for her.  Following this her mother brought her here for evaluation.  The patient reports that she currently feels, \"sick,\" though is very vague as to what she feels- saying again she continues to have headache behind her eyes as well as nausea.  She generally is a very difficult historian, speaking softly, staring straight ahead, giving a very brief, often one-word answers.  She does state that today she feels as though her neck feels somewhat fatigued.  She reports feeling some slight tingling " "and possibly numbness in her feet bilaterally.  She denies any dysuria.  No other complaints such as stuffy nose, sore throat, fever, ongoing cough, abdominal pain, vomiting, diarrhea.  She denies taking any sort of ingestion today.  She states her only drug use is occasional marijuana.  She states she is not currently suicidal.    Past Medical History:   Diagnosis Date     Anxiety october 2013     CONTUSION OF left iliac crest 9/3/2006     Major depression october 2013     Nocturnal enuresis 10/4/2007    Currently resolved.     Salter-Perdomo Type I fracture of distal fibula with routine healing 1/25/2013     Uncomplicated asthma     sports induced       History reviewed. No pertinent surgical history.    Family History   Problem Relation Age of Onset     Substance Abuse Maternal Grandfather         alcoholism     Depression Maternal Aunt      Substance Abuse Maternal Aunt         alcoholism     Hypothyroidism Maternal Aunt      Anxiety Disorder Brother      Hypothyroidism Maternal Grandmother        Social History     Tobacco Use     Smoking status: Never Smoker     Smokeless tobacco: Never Used   Substance Use Topics     Alcohol use: Yes     Comment: sometimes         I have reviewed the Medications, Allergies, Past Medical and Surgical History, and Social History in the Epic system.    Review of Systems   Constitutional: Negative for fever.   Respiratory: Negative for shortness of breath.    Cardiovascular: Negative for chest pain.   Gastrointestinal: Positive for nausea. Negative for abdominal pain, diarrhea and vomiting.   Neurological: Positive for dizziness, seizures, syncope and weakness.   All other systems reviewed and are negative.      Physical Exam   BP: 114/70  Pulse: 76  Temp: 97.4  F (36.3  C)  Resp: (!) 75  Height: 175.3 cm (5' 9\")  Weight: 66 kg (145 lb 9.6 oz)  SpO2: 100 %      Physical Exam   Constitutional: She is oriented to person, place, and time. No distress.   HENT:   Head: Atraumatic. "   Mouth/Throat: Oropharynx is clear and moist. No oropharyngeal exudate.   Eyes: Pupils are equal, round, and reactive to light. No scleral icterus.   Cardiovascular: Normal heart sounds and intact distal pulses.   Pulmonary/Chest: Breath sounds normal. No respiratory distress.   Abdominal: Soft. There is no tenderness.   Musculoskeletal: She exhibits no edema or tenderness.   Neurological: She is alert and oriented to person, place, and time. No cranial nerve deficit or sensory deficit. She exhibits normal muscle tone (The patient with generally poor effort, though strength seems equal bilaterally). Coordination normal.   Skin: Skin is warm. No rash noted. She is not diaphoretic.   Psychiatric:   Very flat affect, poor eye contact       ED Course        Procedures             Critical Care time:  none              EKG Interpretation:      Interpreted by Elsi Friedman MD  Time [id number]  Symptoms at time of EKG: nausea   Rhythm: Normal sinus   Rate: Normal  Axis: Normal  Ectopy: None  Conduction: Normal  ST Segments/ T Waves: No ST-T wave changes and No acute ischemic changes  Q Waves: None  Comparison to prior:     Clinical Impression: normal EKG           Labs Ordered and Resulted from Time of ED Arrival Up to the Time of Departure from the ED   ROUTINE UA WITH MICROSCOPIC - Abnormal; Notable for the following components:       Result Value    pH Urine 7.5 (*)     Bacteria Urine Few (*)     All other components within normal limits   HCG QUAL URINE POCT - Normal   CBC WITH PLATELETS DIFFERENTIAL   COMPREHENSIVE METABOLIC PANEL   DRUG ABUSE SCREEN 6 CHEM DEP URINE ([facility])            Assessments & Plan (with Medical Decision Making)   The patient had a recent video EEG which showed nonepileptic spells.  Is unclear to me whether she had a true syncopal event today or not.  She has a recent diagnosis of POTS, so it is a possibility.  I did do an EKG which was unremarkable.  Basic labs are likewise  unremarkable.  She is not pregnant.  She has no concerning symptoms such as chest pain or shortness of breath.  Neurologic exam is completely nonfocal.  She has very poor effort.  She has a very flat affect, makes no eye contact, speaks softly.  Based on the patient's history, this does not seem like an atypical event for her.  She is not presenting with any acute symptoms suggestive of infection or significant serious etiology.  I do not think she requires imaging.  She did not fall, states she laid down on the ground.  She was given some fluids here in the ER.  I do time.  I strongly encourage her to continue her normal outpatient medications, follow-up with her outpatient providers, return to the ER if new or worsening symptoms.  She verbalizes understanding and is agreeable to the plan.    Dictation Disclaimer: Some of this Note has been completed with voice-recognition dictation software. Although errors are generally corrected real-time, there is the potential for a rare error to be present in the completed chart.      I have reviewed the nursing notes.    I have reviewed the findings, diagnosis, plan and need for follow up with the patient.    Discharge Medication List as of 8/27/2019  4:28 AM          Final diagnoses:   Psychogenic nonepileptic seizure       8/26/2019   [facility], [place], EMERGENCY DEPARTMENT     Elsi Friedman MD  08/27/19 0515

## 2019-08-27 NOTE — DISCHARGE INSTRUCTIONS
Continue with your normal meds and therapy. Follow up with your outpatient provider within the next week. Return to the ER with new or worsening symptoms.

## 2019-08-27 NOTE — ED TRIAGE NOTES
Pt presents to ED with mom with c/o prolonged post-ictal period after seizure. Has non-epileptic seizures. Had 2 seizures today, last one at 1900. Pt still minimally responsive. Recently diagnosed with POTS and chronic fatigue. Not feeling well last couple days.

## 2019-08-27 NOTE — TELEPHONE ENCOUNTER
RECORDS RECEIVED FROM: Internal/Care Everywhere   DATE RECEIVED: 9-23   NOTES STATUS DETAILS   OFFICE NOTE from referring provider    Internal    OFFICE NOTE from other cardiologists   and neurologists N/A    DISCHARGE SUMMARY from hospital    Internal 7-1-19   DISCHARGE REPORT from the ER   Internal 8-27-19   OPERATIVE REPORT    N/A    MEDICATION LIST   Internal    LABS     BMP   Internal 7-1-19   CBC   Internal 8-27-19   CMP   Internal 8-27-19   Lipids   N/A    TSH   Care Everywhere 5-14-19   DIAGNOSTIC PROCEDURES     EKG  Strips *important Internal    Monitor Reports  Strips *important N/A    Cardioversions   N/A    ICD/pacemaker implant       Tilt table studies   N/A Autonomic relflex screen?   IMAGING (DISC & REPORT)      ECHO's   N/A    Stress Tests   N/A    Cath   N/A    CT/CTA   N/A    MRI/MRA   N/A

## 2019-08-27 NOTE — ED AVS SNAPSHOT
[facility], [place], Emergency Department  [address] [place] [address]  Phone:  [phone]                                    Stephanie Villanueva   MRN: [id number]    Department:  [facility], Emergency Department   Date of Visit:  8/26/2019           After Visit Summary Signature Page    I have received my discharge instructions, and my questions have been answered. I have discussed any challenges I see with this plan with the nurse or doctor.    ..........................................................................................................................................  Patient/Patient Representative Signature      ..........................................................................................................................................  Patient Representative Print Name and Relationship to Patient    ..................................................               ................................................  Date                                   Time    ..........................................................................................................................................  Reviewed by Signature/Title    ...................................................              ..............................................  Date                                               Time          22EPIC Rev 08/18

## 2019-09-05 ENCOUNTER — TELEPHONE (OUTPATIENT)
Dept: FAMILY MEDICINE | Facility: CLINIC | Age: 19
End: 2019-09-05

## 2019-09-05 NOTE — LETTER
September 5, 2019      Stephanie Villanueva  [address]  [facility] [address]        Hi Stephanie,      Our team is reaching out to you, in regards to your health. After a review of your chart it appears, that you have not completed the recommended chlamydia screening this year. The [place] Department of Health has recommended that you get yearly screening if you meet any of the following criteria:    If you currently are or have been sexually active  If you currently are or have been pregnant  If you currently are on or have been on contraception (even if NOT sexually active)    Annual testing is recommended for sexually active women between the ages of 15 and 25.     Chlamydia has no symptoms and left untreated, it can cause infertility and other serious health problems. Chlamydia is easily cured with antibiotics.      Chlamydia is the most common bacterial sexually transmitted disease in the [place], according to the Centers for Disease Control (CDC), yet many women considered at risk for the disease do not get the recommended annual screening test. Testing is done by leaving a urine sample with a lab-only appointment or you can make an office visit if you have other concerns.     In addition, we recommend that you make an Office Visit/Physical to follow up the following additional items due:    Health Maintenance Due   Topic Date Due     ASTHMA ACTION PLAN  1999     DEPRESSION ACTION PLAN  1999     HIV SCREENING  02/17/2014     CHLAMYDIA SCREENING  06/20/2018     PREVENTIVE CARE VISIT  06/07/2019     INFLUENZA VACCINE (1) 09/01/2019     PHQ-9  09/13/2019         Also, please note that if you have not seen your provider in over a year a visit will be necessary for any refills to medications.    If you have already completed any of these items elsewhere please contact us with test name, a location, date, and result through [employer] or call [phone] so we can update our records.     We also  understand that you may have already discussed some this with your provider however, [facility] has an additional healthcare team specifically designed to help you remember to complete your regular screening tests.  We apologize in advance for any duplicate messages you may have received, we hope you understand that our primary goal is your health and well-being.      Thank you for trusting us with your health care,    Your [facility] Care Team

## 2019-09-05 NOTE — TELEPHONE ENCOUNTER
Panel Management Review      Patient has the following on her problem list: Chlamydia screening     Composite cancer screening  Chart review shows that this patient is due/due soon for the following None  Summary:    Patient is due/failing the following:   Ua/swab for std screening    Action needed:   Patient needs office visit for std screening.    Type of outreach:    Sent letter.    Questions for provider review:    None                                                                                                                                    Noe Mullen CMA     Chart routed to Care Team. First attempt.

## 2019-09-05 NOTE — TELEPHONE ENCOUNTER
Panel Management Review      Patient has the following on her problem list:     Asthma review     ACT Total Scores 9/5/2019   ACT TOTAL SCORE (Goal Greater than or Equal to 20) 24   In the past 12 months, how many times did you visit the emergency room for your asthma without being admitted to the hospital? 0   In the past 12 months, how many times were you hospitalized overnight because of your asthma? 0      1. Is Asthma diagnosis on the Problem List? Yes    2. Is Asthma listed on Health Maintenance? Yes    3. Patient is due for:  ACT      Composite cancer screening  Chart review shows that this patient is due/due soon for the following None  Summary:    Patient is due/failing the following:   ACT    Action needed:   Patient needs to do ACT.    Type of outreach:    Phone, spoke to patient.  ACT done    Questions for provider review:    None                                                                                                                                      .Rosaura Ramirez MA

## 2019-09-06 ASSESSMENT — ASTHMA QUESTIONNAIRES: ACT_TOTALSCORE: 24

## 2019-09-09 ENCOUNTER — APPOINTMENT (OUTPATIENT)
Dept: CT IMAGING | Facility: CLINIC | Age: 19
End: 2019-09-09
Attending: EMERGENCY MEDICINE
Payer: COMMERCIAL

## 2019-09-09 ENCOUNTER — HOSPITAL ENCOUNTER (EMERGENCY)
Facility: CLINIC | Age: 19
Discharge: HOME | End: 2019-09-09
Attending: EMERGENCY MEDICINE | Admitting: EMERGENCY MEDICINE
Payer: COMMERCIAL

## 2019-09-09 VITALS
SYSTOLIC BLOOD PRESSURE: 106 MMHG | TEMPERATURE: 98.6 F | BODY MASS INDEX: 21.41 KG/M2 | OXYGEN SATURATION: 100 % | DIASTOLIC BLOOD PRESSURE: 77 MMHG | WEIGHT: 145 LBS | RESPIRATION RATE: 16 BRPM | HEART RATE: 81 BPM

## 2019-09-09 DIAGNOSIS — S06.0X1A CONCUSSION WITH LOSS OF CONSCIOUSNESS OF 30 MINUTES OR LESS, INITIAL ENCOUNTER: ICD-10-CM

## 2019-09-09 LAB
ANION GAP SERPL CALCULATED.3IONS-SCNC: 5 MMOL/L (ref 3–14)
BASOPHILS # BLD AUTO: 0 10E9/L (ref 0–0.2)
BASOPHILS NFR BLD AUTO: 0.5 %
BUN SERPL-MCNC: 18 MG/DL (ref 7–30)
CALCIUM SERPL-MCNC: 9.2 MG/DL (ref 8.5–10.1)
CHLORIDE SERPL-SCNC: 108 MMOL/L (ref 94–109)
CO2 SERPL-SCNC: 29 MMOL/L (ref 20–32)
CREAT SERPL-MCNC: 0.69 MG/DL (ref 0.52–1.04)
DIFFERENTIAL METHOD BLD: NORMAL
EOSINOPHIL # BLD AUTO: 0.1 10E9/L (ref 0–0.7)
EOSINOPHIL NFR BLD AUTO: 1.5 %
ERYTHROCYTE [DISTWIDTH] IN BLOOD BY AUTOMATED COUNT: 12.4 % (ref 10–15)
GFR SERPL CREATININE-BSD FRML MDRD: >90 ML/MIN/{1.73_M2}
GLUCOSE BLDC GLUCOMTR-MCNC: 85 MG/DL (ref 70–99)
GLUCOSE SERPL-MCNC: 85 MG/DL (ref 70–99)
HCG SERPL QL: NEGATIVE
HCT VFR BLD AUTO: 37.5 % (ref 35–47)
HGB BLD-MCNC: 12.8 G/DL (ref 11.7–15.7)
IMM GRANULOCYTES # BLD: 0 10E9/L (ref 0–0.4)
IMM GRANULOCYTES NFR BLD: 0.2 %
LYMPHOCYTES # BLD AUTO: 2.5 10E9/L (ref 0.8–5.3)
LYMPHOCYTES NFR BLD AUTO: 41 %
MCH RBC QN AUTO: 30.2 PG (ref 26.5–33)
MCHC RBC AUTO-ENTMCNC: 34.1 G/DL (ref 31.5–36.5)
MCV RBC AUTO: 88 FL (ref 78–100)
MONOCYTES # BLD AUTO: 0.5 10E9/L (ref 0–1.3)
MONOCYTES NFR BLD AUTO: 8.5 %
NEUTROPHILS # BLD AUTO: 2.9 10E9/L (ref 1.6–8.3)
NEUTROPHILS NFR BLD AUTO: 48.3 %
NRBC # BLD AUTO: 0 10*3/UL
NRBC BLD AUTO-RTO: 0 /100
PLATELET # BLD AUTO: 300 10E9/L (ref 150–450)
POTASSIUM SERPL-SCNC: 3.9 MMOL/L (ref 3.4–5.3)
RBC # BLD AUTO: 4.24 10E12/L (ref 3.8–5.2)
SODIUM SERPL-SCNC: 142 MMOL/L (ref 133–144)
WBC # BLD AUTO: 6 10E9/L (ref 4–11)

## 2019-09-09 PROCEDURE — 99285 EMERGENCY DEPT VISIT HI MDM: CPT | Mod: 25 | Performed by: EMERGENCY MEDICINE

## 2019-09-09 PROCEDURE — 72125 CT NECK SPINE W/O DYE: CPT

## 2019-09-09 PROCEDURE — 80048 BASIC METABOLIC PNL TOTAL CA: CPT | Performed by: EMERGENCY MEDICINE

## 2019-09-09 PROCEDURE — 70450 CT HEAD/BRAIN W/O DYE: CPT

## 2019-09-09 PROCEDURE — 85025 COMPLETE CBC W/AUTO DIFF WBC: CPT | Performed by: EMERGENCY MEDICINE

## 2019-09-09 PROCEDURE — 93005 ELECTROCARDIOGRAM TRACING: CPT | Performed by: EMERGENCY MEDICINE

## 2019-09-09 PROCEDURE — 93010 ELECTROCARDIOGRAM REPORT: CPT | Mod: Z6 | Performed by: EMERGENCY MEDICINE

## 2019-09-09 PROCEDURE — 00000146 ZZHCL STATISTIC GLUCOSE BY METER IP

## 2019-09-09 PROCEDURE — 84703 CHORIONIC GONADOTROPIN ASSAY: CPT | Performed by: EMERGENCY MEDICINE

## 2019-09-09 ASSESSMENT — ENCOUNTER SYMPTOMS
HEADACHES: 1
NECK PAIN: 1
PHOTOPHOBIA: 1
BACK PAIN: 1
CONFUSION: 1

## 2019-09-09 NOTE — ED AVS SNAPSHOT
[facility], [place], Emergency Department  [address]  [place] [address]  Phone:  [phone]  Fax:  [phone]                                    Stephanie Villanueva   MRN: [id number]    Department:  [facility], Emergency Department   Date of Visit:  9/9/2019           After Visit Summary Signature Page    I have received my discharge instructions, and my questions have been answered. I have discussed any challenges I see with this plan with the nurse or doctor.    ..........................................................................................................................................  Patient/Patient Representative Signature      ..........................................................................................................................................  Patient Representative Print Name and Relationship to Patient    ..................................................               ................................................  Date                                   Time    ..........................................................................................................................................  Reviewed by Signature/Title    ...................................................              ..............................................  Date                                               Time          22EPIC Rev 08/18

## 2019-09-10 ENCOUNTER — PATIENT OUTREACH (OUTPATIENT)
Dept: CARE COORDINATION | Facility: CLINIC | Age: 19
End: 2019-09-10

## 2019-09-10 DIAGNOSIS — Z71.89 OTHER SPECIFIED COUNSELING: ICD-10-CM

## 2019-09-10 LAB — INTERPRETATION ECG - MUSE: NORMAL

## 2019-09-10 NOTE — DISCHARGE INSTRUCTIONS
Thank you for coming to the [facility] Emergency Department.     Please follow up with your primary care clinic this week.     OK to continue your medications as usual.    It is very important to avoid repeat head injuries in general, and especially in the next few weeks while you are healing from this current head injury. Wear a helmet. Avoid heights, contact sports and slippery surfaces.     While healing, decrease phone, TV and other screen use.

## 2019-09-10 NOTE — PROGRESS NOTES
Clinic Care Coordination Contact  [facility]/Voicemail       Clinical Data: Care Coordinator Outreach  Outreach attempted x 1.  Left message on patient's voicemail with call back information and requested return call.  Patient is scheduled to see PCP 9/11/19  Plan:  Care Coordinator will try to reach patient again in 3-5 business days.

## 2019-09-10 NOTE — ED PROVIDER NOTES
"  History     Chief Complaint   Patient presents with     Head Injury     Patient was walking dog and had a unwitnessed fall unknown LOC per pt. Pt at home and mom and dad noted confusion (pt calling backpack coat), pt reports headache/blurry vision/double vision/slow to respond     The history is provided by the patient and a parent (father). The history is limited by the condition of the patient (confused).      Stephanie Villanueva is a 19 year old female with a history of POTS, anxiety, and depression who presents with her father for evaluation of confusion after a syncopal fall. Per father, the patient was walking a dog around 2:30 PM today when she loss consciousness. He states the fall was unwitnessed and notes that syncopal episodes for the patient are normal given her history of POTS. However, around 4 PM, he noticed that the patient was confused -- mistaking her backpack for her jacket, etc. He states these symptoms continued even after the patient took a nap and at 7:30, he called the [facility] Nurse Line who recommended he bring the patient here.     The patient does not remember losing consciousness. She states she passes out \"every day\" due to her POTS but her current confusion is unusual. Currently, she is complaining of a headache and pain in her neck and mid/upper back as well as photo and auditory sensitivity. The patient does not think she injured her legs. She is not anticoagulated.     Per chart review, the patient was evaluated here on 8/27/19 after two episodes of \"nonepileptic seizures\" where the patient would pass out and begin to jerk/twitch. The patient had a normal EKG, labs, and a nonfocal neurologic exam and was discharged after she improved with IV fluids.     Past Medical History:   Diagnosis Date     Anxiety october 2013     CONTUSION OF left iliac crest 9/3/2006     Major depression october 2013     Nocturnal enuresis 10/4/2007    Currently resolved.     Salter-Perdomo Type I fracture of " distal fibula with routine healing 1/25/2013     Uncomplicated asthma     sports induced       No past surgical history on file.    Family History   Problem Relation Age of Onset     Substance Abuse Maternal Grandfather         alcoholism     Depression Maternal Aunt      Substance Abuse Maternal Aunt         alcoholism     Hypothyroidism Maternal Aunt      Anxiety Disorder Brother      Hypothyroidism Maternal Grandmother        Social History     Tobacco Use     Smoking status: Never Smoker     Smokeless tobacco: Never Used   Substance Use Topics     Alcohol use: Yes     Comment: sometimes       No current facility-administered medications for this encounter.      Current Outpatient Medications   Medication     cyanocobalamin (VITAMIN B-12) 1000 MCG tablet     desmopressin (DDAVP) 0.2 MG tablet     desvenlafaxine (PRISTIQ) 50 MG 24 hr tablet     gabapentin (NEURONTIN) 300 MG capsule     hydrOXYzine (ATARAX) 25 MG tablet     ibuprofen (ADVIL/MOTRIN) 200 MG tablet     LORazepam (ATIVAN) 0.5 MG tablet     multivitamin, therapeutic (THERA-VIT) TABS tablet     OLANZapine (ZYPREXA) 10 MG tablet     traZODone (DESYREL) 50 MG tablet        Allergies   Allergen Reactions     No Known Drug Allergies      I have reviewed the Medications, Allergies, Past Medical and Surgical History, and Social History in the Epic system.    Review of Systems   Eyes: Positive for photophobia.   Musculoskeletal: Positive for back pain and neck pain.   Neurological: Positive for syncope and headaches.   Psychiatric/Behavioral: Positive for confusion.   All other systems reviewed and are negative.      Physical Exam   BP: 114/67  Pulse: 99  Heart Rate: 89  Temp: 98.6  F (37  C)  Resp: 16  Weight: 65.8 kg (145 lb)  SpO2: 99 %      Physical Exam  Gen: affect is unusual, pt generally cooperative   HEENT:PERRL, no facial tenderness or wounds, head atraumatic, oropharynx clear, mucous membranes moist, TMs clear bilaterally, no skull base fracture  signs  Neck: mid and low cervical midline tenderness  Back: no CVA tenderness, no midline bony tenderness  CV:RRR without murmurs  PULM:Clear to auscultation bilaterally  Abd:soft, nontender, nondistended. Bowel sounds present and normal  UE:No traumatic injuries, skin normal  LE:no traumatic injuries, skin normal, no LE edema.   Neuro:CN II-XII intact, strength 5/5 of flexion and extension of the toes, ankles, knees, hips, hands, wrists, elbows and shoulders.  Sensation intact to touch throughout. Coordination normal on finger to nose testing. Reflexes 3/6 and symmetric throughout. No clonus.  Gait normal.  Skin: no rashes or ecchymoses, multiple healed wounds on her forearms    ED Course        Procedures       8:18 PM  The patient was seen and examined by Dr. Nicole in Room 1.          EKG Interpretation:      Interpreted by Christine Nicole MD  Time reviewed: 8:39pm  Symptoms at time of EKG: syncope   Rhythm: normal sinus   Rate: 88  Axis: normal  Ectopy: none  Conduction: normal  ST Segments/ T Waves: No ST-T wave changes  Q Waves: none  Comparison to prior: Unchanged from Aug. 27, 2019    Clinical Impression: normal EKG        Critical Care time:  none    Labs Ordered and Resulted from Time of ED Arrival Up to the Time of Departure from the ED   CBC WITH PLATELETS DIFFERENTIAL   BASIC METABOLIC PANEL   GLUCOSE MONITOR NURSING POCT   HCG QUALITATIVE   GLUCOSE BY METER   PERIPHERAL IV CATHETER   CARDIAC CONTINUOUS MONITORING            Assessments & Plan (with Medical Decision Making)   20 yo F with a hx of POTS, depression, anxiety and borderline personality disorder.   Presenting with confusion after a fall/syncopal episode.   Also notes neck discomfort.   Vitals stable.   IV access obtained and lab testing done.   EKG unchanged from baseline. Pt was monitored on tele and no arrhythmias were noted.   CBC and BMP unremarkable.   Glucose 85.   bHCG negative.   CT head and cervical spine without traumatic  injuries.   Pt's mental state improved while in the ED.   We discussed concussion symptoms and home care, including avoidance or repeat injuries, slow return to physical activity as tolerated and minimizing screen exposure.   Follow up with primary care.   Discharged home with her Dad.     I have reviewed the nursing notes.    I have reviewed the findings, diagnosis, plan and need for follow up with the patient.    Discharge Medication List as of 9/9/2019 10:48 PM          Final diagnoses:   Concussion with loss of consciousness of 30 minutes or less, initial encounter   I, Timmy Connelly, am serving as a trained medical scribe to document services personally performed by Christine Nicole MD, based on the provider's statements to me.   I, Christine Nicole MD, was physically present and have reviewed and verified the accuracy of this note documented by Timmy Connelly.    9/9/2019   [facility], [place], EMERGENCY DEPARTMENT    MD ANAYELI Cline Katrina Anne, MD  09/10/19 0139

## 2019-09-10 NOTE — ED TRIAGE NOTES
Patient had head injury from falling today at 230 on sidewalk. Unknown LOC. History of [place]. Patient friend helped patient. Patient went home and took nap. Parents called nurse line and they told her to wake her, pt awoke more confused and slow to respond.

## 2019-09-11 ENCOUNTER — OFFICE VISIT (OUTPATIENT)
Dept: FAMILY MEDICINE | Facility: CLINIC | Age: 19
End: 2019-09-11
Payer: COMMERCIAL

## 2019-09-11 VITALS
SYSTOLIC BLOOD PRESSURE: 114 MMHG | DIASTOLIC BLOOD PRESSURE: 75 MMHG | BODY MASS INDEX: 21.41 KG/M2 | TEMPERATURE: 98.2 F | HEART RATE: 87 BPM | OXYGEN SATURATION: 98 % | WEIGHT: 145 LBS

## 2019-09-11 DIAGNOSIS — G90.A POTS (POSTURAL ORTHOSTATIC TACHYCARDIA SYNDROME): Primary | ICD-10-CM

## 2019-09-11 DIAGNOSIS — M24.9 HYPERMOBILITY OF JOINT: ICD-10-CM

## 2019-09-11 DIAGNOSIS — Z23 NEED FOR PROPHYLACTIC VACCINATION AND INOCULATION AGAINST INFLUENZA: ICD-10-CM

## 2019-09-11 PROCEDURE — 90686 IIV4 VACC NO PRSV 0.5 ML IM: CPT | Performed by: FAMILY MEDICINE

## 2019-09-11 PROCEDURE — 99213 OFFICE O/P EST LOW 20 MIN: CPT | Mod: 25 | Performed by: FAMILY MEDICINE

## 2019-09-11 PROCEDURE — 90471 IMMUNIZATION ADMIN: CPT | Performed by: FAMILY MEDICINE

## 2019-09-11 RX ORDER — FLUDROCORTISONE ACETATE 0.1 MG/1
0.1 TABLET ORAL DAILY
Qty: 14 TABLET | Refills: 0 | Status: SHIPPED | OUTPATIENT
Start: 2019-09-11 | End: 2019-09-16

## 2019-09-11 NOTE — PROGRESS NOTES
Subjective     Stephanie Villanueva is a 19 year old female who presents to clinic today for the following health issues:    HPI     Follow up     She needs refill for florinef. She ran out a few days ago. She is taking medication for fibromyalgia.   She needs support of a walker due to POTS.    Needs letter for service dog.   She would like genetic testing for Wanda' danlos.   Resources for POTS and chronic fatigue.     Reviewed and updated as needed this visit by Provider         Review of Systems   ROS COMP: Constitutional, HEENT, cardiovascular, pulmonary, gi and gu systems are negative, except as otherwise noted.      Objective    There were no vitals taken for this visit.  There is no height or weight on file to calculate BMI.  Physical Exam   /75   Pulse 87   Temp 98.2  F (36.8  C) (Oral)   Wt 65.8 kg (145 lb)   SpO2 98%   BMI 21.41 kg/m    GENERAL: healthy, alert and no distress  EYES: Eyes grossly normal to inspection  HENT:  nose and mouth without ulcers or lesions  MS: no gross musculoskeletal defects noted    Diagnostic Test Results:  none         Assessment & Plan       1. POTS (postural orthostatic tachycardia syndrome)  - refilled short script for medication   - pt will f/u with cardiology   - order for DME; Equipment being ordered: walker  Dispense: 1 Device; Refill: 0    2. Hypermobility of joint  - GENETICS REFERRAL    3. Need for prophylactic vaccination and inoculation against influenza  - HC FLU VAC PRESRV FREE QUAD SPLIT VIR > 6 MONTHS IM [01712]  - Vaccine Administration, Initial [52503]    Michelle Berumen MD  [facility]

## 2019-09-12 NOTE — PROGRESS NOTES
Clinic Care Coordination Contact  Care Team Conversations    Patient saw PCP 9/11/19  No clinic care coordination outreach at this time.

## 2019-09-16 ENCOUNTER — OFFICE VISIT (OUTPATIENT)
Dept: CARDIOLOGY | Facility: CLINIC | Age: 19
End: 2019-09-16
Attending: INTERNAL MEDICINE
Payer: COMMERCIAL

## 2019-09-16 ENCOUNTER — PRE VISIT (OUTPATIENT)
Dept: CARDIOLOGY | Facility: CLINIC | Age: 19
End: 2019-09-16

## 2019-09-16 VITALS — HEIGHT: 70 IN | OXYGEN SATURATION: 98 % | WEIGHT: 150 LBS | BODY MASS INDEX: 21.47 KG/M2

## 2019-09-16 DIAGNOSIS — G90.A POTS (POSTURAL ORTHOSTATIC TACHYCARDIA SYNDROME): Primary | ICD-10-CM

## 2019-09-16 DIAGNOSIS — F29 PSYCHOSIS, UNSPECIFIED PSYCHOSIS TYPE (H): ICD-10-CM

## 2019-09-16 PROCEDURE — 93010 ELECTROCARDIOGRAM REPORT: CPT | Mod: ZP | Performed by: INTERNAL MEDICINE

## 2019-09-16 PROCEDURE — G0463 HOSPITAL OUTPT CLINIC VISIT: HCPCS | Mod: 25,ZF

## 2019-09-16 PROCEDURE — 99204 OFFICE O/P NEW MOD 45 MIN: CPT | Mod: 25 | Performed by: INTERNAL MEDICINE

## 2019-09-16 PROCEDURE — 93005 ELECTROCARDIOGRAM TRACING: CPT | Mod: ZF

## 2019-09-16 RX ORDER — MIDODRINE HYDROCHLORIDE 5 MG/1
5 TABLET ORAL 2 TIMES DAILY
Qty: 180 TABLET | Refills: 3 | Status: SHIPPED | OUTPATIENT
Start: 2019-09-16 | End: 2020-04-16

## 2019-09-16 RX ORDER — MIDODRINE HYDROCHLORIDE 2.5 MG/1
2.5 TABLET ORAL 2 TIMES DAILY
Qty: 14 TABLET | Refills: 0 | Status: SHIPPED | OUTPATIENT
Start: 2019-09-16 | End: 2020-04-16

## 2019-09-16 RX ORDER — SODIUM CHLORIDE 1 G/1
2 TABLET ORAL 2 TIMES DAILY
Qty: 28 TABLET | Refills: 0 | Status: SHIPPED | OUTPATIENT
Start: 2019-09-16 | End: 2020-04-16

## 2019-09-16 RX ORDER — MIDODRINE HYDROCHLORIDE 2.5 MG/1
2.5 TABLET ORAL 2 TIMES DAILY
Qty: 90 TABLET | Refills: 3 | Status: SHIPPED | OUTPATIENT
Start: 2019-09-16 | End: 2019-09-16

## 2019-09-16 RX ORDER — SODIUM CHLORIDE 1 G/1
4 TABLET ORAL 2 TIMES DAILY
Qty: 720 TABLET | Refills: 3 | Status: SHIPPED | OUTPATIENT
Start: 2019-09-16 | End: 2020-04-16

## 2019-09-16 RX ORDER — SODIUM CHLORIDE 1 G/1
2 TABLET ORAL 2 TIMES DAILY
Qty: 90 TABLET | Refills: 3 | Status: SHIPPED | OUTPATIENT
Start: 2019-09-16 | End: 2019-09-16

## 2019-09-16 ASSESSMENT — ENCOUNTER SYMPTOMS
COUGH: 0
DOUBLE VISION: 0
NERVOUS/ANXIOUS: 1
SYNCOPE: 1
POOR WOUND HEALING: 0
SPUTUM PRODUCTION: 0
HEADACHES: 1
MUSCLE WEAKNESS: 1
ARTHRALGIAS: 1
SEIZURES: 1
NECK PAIN: 1
INCREASED ENERGY: 1
DYSPNEA ON EXERTION: 1
TROUBLE SWALLOWING: 0
STIFFNESS: 1
SWOLLEN GLANDS: 0
NUMBNESS: 1
ALTERED TEMPERATURE REGULATION: 1
TREMORS: 0
PALPITATIONS: 1
EXERCISE INTOLERANCE: 1
BACK PAIN: 1
DISTURBANCES IN COORDINATION: 1
LOSS OF CONSCIOUSNESS: 1
CHILLS: 0
EYE IRRITATION: 0
SLEEP DISTURBANCES DUE TO BREATHING: 0
NIGHT SWEATS: 0
MUSCLE CRAMPS: 1
SINUS CONGESTION: 0
SKIN CHANGES: 0
COUGH DISTURBING SLEEP: 0
BRUISES/BLEEDS EASILY: 1
HEMATURIA: 0
DEPRESSION: 1
NECK MASS: 0
HYPOTENSION: 0
JOINT SWELLING: 0
HOARSE VOICE: 0
SINUS PAIN: 0
HEMOPTYSIS: 0
HALLUCINATIONS: 0
MYALGIAS: 1
EYE REDNESS: 0
WEAKNESS: 1
LEG PAIN: 1
POLYDIPSIA: 0
DECREASED CONCENTRATION: 1
EYE WATERING: 0
PARALYSIS: 1
DIZZINESS: 1
SNORES LOUDLY: 0
PANIC: 1
POLYPHAGIA: 0
FEVER: 0
POSTURAL DYSPNEA: 1
SHORTNESS OF BREATH: 1
MEMORY LOSS: 1
SORE THROAT: 1
INSOMNIA: 0
WEIGHT LOSS: 0
ORTHOPNEA: 1
DYSURIA: 0
EYE PAIN: 0
SPEECH CHANGE: 0
TASTE DISTURBANCE: 0
LIGHT-HEADEDNESS: 1
FATIGUE: 1
FLANK PAIN: 0
DIFFICULTY URINATING: 0
SMELL DISTURBANCE: 0
DECREASED APPETITE: 1
WHEEZING: 0
TINGLING: 1
WEIGHT GAIN: 0
NAIL CHANGES: 0
HYPERTENSION: 0

## 2019-09-16 ASSESSMENT — PAIN SCALES - GENERAL: PAINLEVEL: MODERATE PAIN (4)

## 2019-09-16 ASSESSMENT — MIFFLIN-ST. JEOR: SCORE: 1530.65

## 2019-09-16 NOTE — NURSING NOTE
Chief Complaint   Patient presents with     New Patient     new - see ED notes from 8-27-19 and 9-9-19     Vitals were taken and medications were reconciled. EKG was performed.    Hermila Cerrato CMA    3:56 PM

## 2019-09-16 NOTE — LETTER
"9/16/2019       RE: Stephanie Villanueva  [address]  [facility] [id number]     Dear Colleague,    Thank you for referring your patient, Stephanie Villanueva, to the [facility] at [facility]. Please see a copy of my visit note below.      Chief complaint: Dizziness and syncope    HPI: Ms. Stephanie Villanueva is a 19 year old female with PMH significant for POTS syndrome, complex psychiatric issues (borderline personality disorder, suicidal ideation, anorexia, major depression with psychosis, social anxiety disorder, autism spectrum disorder, panic disorder, dissociative disorder, PTSD). She presents to our clinic to establish case for her diagnosis of POTS (hypovolemic subtype) and to help reduce the frequency of \"syncope.\"    The patient presented to [facility] fibromyalgia and fatigue clinic for evaluation of widespread musculoskeletal pain, profound fatigue and sensory sensitivities over the last 9 months. She underwent extensive laboratory tests including autoimmune antibodies, EEG and tilt testing which was suggestive of POTS syndrome (hypovolemic subtype). Patient was recommended an exercise program. She has seen psychiatry, rheumatology (benign hypermobility syndrome), physical medicine/rehabilitation, physical therapy, and pulmonology (enuresis) for respective issues    On history taking today, she is accompanied by her mother who does the majority of the talking. Stephanie reports a history of depression and fatigue since she was 11. She reports doing relatively well until last year when she ran a half-marathon. She and her mother report after the half marathon she developed bilateral hip issues, along with widespread joint problems and pain, making it difficult to run. Stephanie returned to college for Fall semester, but returned home due to progressive and debilitating depression, fatigue, brain fog, dizziness, and recurrent \"syncope.\" She has chronic dizziness with standing. Her " "mother states the patient passes out every day, and it has been going on for almost a year. Her mother just wants Stephanie to be \"functional\" again, and wants her to get back to school. Stephanie denies breaking bones or significantly injuring herself during any \"syncopal\" event, but is adamant that she passed out and hit her head on the sidewalk just a few days ago causing a \"mild concussion.\"    I asked Stephanie to describe an episode of \"syncope,\" to gain a better understanding of what she means and feels. She describes feeling dizzy, and \"presyncopal\" and eventually her vision turns blurry. She subsequently looses the ability to control her body, but she is still able to hear what's going on around her (most of the time). She describes sometimes she cannot hear anything. In clinic today, we measured orthostatics which were negative. However, during the test, Stephanie had one of her episodes. She described not being able to control her body, but was able to hear what was going on in the room. She reported constant dizziness during the test. She has no particular relieving factors, although she did state during ECT treatments she would feel much higher energy levels after receiving 500cc-1000cc of normal saline. Otherwise, no relief nor improvement with any particular medication that she is on or tried (Fluorinef, desmopressin). She denies exertional chest pains. She has chronic dyspnea with any activity, and chronic multi-site pain. No PND nor orthopnea. Occasional palpitations.  Patient is currently on desvenlafaxine, fludrocortisone 0.1 mg, gabapentin 900 mg TID, and lorazepam 0.5 mg as needed. Denies new medications except florinef.  The patient's risk factor profile is: (-) HTN, (-) diabetes, (-) hyperlipidemia, (-) tobacco use, (-) family Hx CAD.     I have reviewed her EKG in clinic today which shows sinus rhythm with incomplete right bundle branch block, normal ME and QTc intervals.  No preexcitation.  No ST-T wave " changes.  Normal axis.    Medications, personal, family, and social history reviewed with patient and revised.    PAST MEDICAL HISTORY:  Past Medical History:   Diagnosis Date     Anxiety october 2013     CONTUSION OF left iliac crest 9/3/2006     Major depression october 2013     Nocturnal enuresis 10/4/2007    Currently resolved.     Salter-Perdomo Type I fracture of distal fibula with routine healing 1/25/2013     Uncomplicated asthma     sports induced       CURRENT MEDICATIONS:  Current Outpatient Medications   Medication Sig Dispense Refill     cyanocobalamin (VITAMIN B-12) 1000 MCG tablet Take 1,000 mcg by mouth daily       desvenlafaxine (PRISTIQ) 50 MG 24 hr tablet Take 1 tablet (50 mg) by mouth daily 30 tablet 0     fludrocortisone (FLORINEF) 0.1 MG tablet Take 1 tablet (0.1 mg) by mouth daily for 14 days 14 tablet 0     gabapentin (NEURONTIN) 300 MG capsule Take 3 capsules (900 mg) by mouth 3 times daily 270 capsule 0     hydrOXYzine (ATARAX) 25 MG tablet Take 1 tablet (25 mg) by mouth every 4 hours as needed for anxiety 120 tablet 1     ibuprofen (ADVIL/MOTRIN) 200 MG tablet Take 600 mg by mouth every 4 hours as needed for mild pain (for prevention of ECT headache and joint pain)       LORazepam (ATIVAN) 0.5 MG tablet Take 0.5 mg by mouth every 6 hours as needed        order for DME Equipment being ordered: walker 1 Device 0     multivitamin, therapeutic (THERA-VIT) TABS tablet Take 1 tablet by mouth daily         PAST SURGICAL HISTORY:  No past surgical history on file.    ALLERGIES:     Allergies   Allergen Reactions     No Known Drug Allergies        FAMILY HISTORY:  Family History   Problem Relation Age of Onset     Substance Abuse Maternal Grandfather         alcoholism     Depression Maternal Aunt      Substance Abuse Maternal Aunt         alcoholism     Hypothyroidism Maternal Aunt      Anxiety Disorder Brother      Hypothyroidism Maternal Grandmother          SOCIAL HISTORY:  Social History  "    Tobacco Use     Smoking status: Never Smoker     Smokeless tobacco: Never Used   Substance Use Topics     Alcohol use: Yes     Comment: sometimes     Drug use: Yes     Types: Marijuana       ROS:   Constitutional: No fever, chills, or sweats. Weight stable.   ENT: No visual disturbance, ear ache, epistaxis, sore throat.   Cardiovascular: As per HPI.   Respiratory: No cough, hemoptysis.    GI: No nausea, vomiting, hematemesis, melena, or hematochezia.   : No hematuria.   Integument: Negative.   Psychiatric: Negative.   Hematologic:  No easy bruising, no easy bleeding.  Neuro: Negative.   Endocrinology: No significant heat or cold intolerance   Musculoskeletal: No myalgia.    Exam:  Ht 1.778 m (5' 10\")   Wt 68 kg (150 lb)   SpO2 98%   BMI 21.52 kg/m     GENERAL APPEARANCE: alert and no distress  HEENT: no icterus, no central cyanosis  LYMPH/NECK: no adenopathy, no asymmetry, JVP not elevated, no carotid bruits.  RESPIRATORY: lungs clear to auscultation - no rales, rhonchi or wheezes, no use of accessory muscles, no retractions, respirations are unlabored, normal respiratory rate  CARDIOVASCULAR: regular rhythm, normal S1, S2, no S3 or S4 and no murmur, click or rub, precordium quiet with normal PMI.  GI: soft, non tender  EXTREMITIES: peripheral pulses normal, no edema  NEURO: alert, normal speech,and affect  VASC: Radial, dorsalis pedis and posterior tibialis pulses are normal in volumes and symmetric bilaterally.   SKIN: no ecchymoses, no rashes     I have reviewed the labs. [facility] medical records and personally reviewed the imaging below and made my comment in the assessment and plan.    Labs:  CBC RESULTS:   Lab Results   Component Value Date    WBC 6.0 09/09/2019    RBC 4.24 09/09/2019    HGB 12.8 09/09/2019    HCT 37.5 09/09/2019    MCV 88 09/09/2019    MCH 30.2 09/09/2019    MCHC 34.1 09/09/2019    RDW 12.4 09/09/2019     09/09/2019       BMP RESULTS:  Lab Results   Component Value Date     " "09/09/2019    POTASSIUM 3.9 09/09/2019    CHLORIDE 108 09/09/2019    CO2 29 09/09/2019    ANIONGAP 5 09/09/2019    GLC 85 09/09/2019    BUN 18 09/09/2019    CR 0.69 09/09/2019    GFRESTIMATED >90 09/09/2019    GFRESTBLACK >90 09/09/2019    BRITTANIE 9.2 09/09/2019     Orthostatic Vitals  9/16/2019  Date and Time Orthostatic BP Orthostatic Pulse Patient Position BP   Location Cuff Size   09/16/19 1657 118/78 94 Standing Standing for 1 minute Right arm Adult   Regular   09/16/19 1551 115/78 76 Standing Standing immediate Right arm Adult   Regular   09/16/19 1539 112/71 Per pt feeling dizzy 81 Supine Supine for 3 mins   Right arm Adult Regular      Autonomic reflex screen 7/31/2019 [facility]  Conclusion  There is no evidence of autonomic failure. There is evidence of symptomatic orthostatic tachycardia, which can be seen in deconditioning, dehydration, as a constitutional trait, in hyper-adrenergic states (including anxiety), and primary disorders of orthostatic tolerance.    Comments on pseudomotor test: QSART responses were normal at all sites  Comments on Valsalva maneuver: Heart rate response to Valsalva maneuver was normal.  Blood pressure and heart rate response to tilt: Patient was tilted for 10 minutes.  Orthostatic hypotension was not detected.  Heart rate response was excessive.  The patient reported dizziness and other symptoms.    EKG  - sinus arrhythmia, incomplete side right bundle branch block otherwise normal    Assessment and Plan:   Ms. Stephanie Villanueva is a 19 year old female with PMH significant for POTS syndrome, complex psychiatric issues (suicidal ideation, anorexia, major depression with psychosis, social anxiety disorder, autism spectrum disorder, panic disorder, dissociative disorder, PTSD). She presents to our clinic to establish case for her diagnosis of POTS (hypovolemic subtype) and to help reduce the frequency of \"syncope.\"    Stephanie had an episode of \"syncope\" during our clinic visit today during " which we were checking orthostatics. Her BP remained stable (115 systolic) and heart rate remained stable increasing modestly from 76 bpm to 90 bpm. Her tilt table testing at [place] suggests some mild underlying POTS (40 bpm increase over 10 minutes, no BP change). Interestingly her episode today (and many in the past as described) do not sound like true syncope, as she reports often being able to hear well what's going on during the episodes. Her lack of blood pressure change would also support more of a neurological dissociative picture.  Additionally, she has history of psychogenic seizures. She does endorse occasional episodes of what sound possibly as true syncope, for which the addition of midodrine could be trialed.     #POTS/syncope  - Initiate midodrine 2.5 mg bid for one week, then increase to 5 mg bid   - Stop Fluorinef 0.1 mg  - Salt tablet 2 gr bid for a week, then increase to 4 gr twice daily  - Continue hydration with 2 lt/day    We would like to see her back in 4-6 weeks to see how she is doing with consideration of increasing medications above as indicated (will request TTE if no improvement in symptoms to rule out structural heart disease).    A total of 40 minutes spent face-toface with greater than 50% of the time spent in counseling and coordinating cares of the issues above.     Please donot hesitate to contact me if you have any questions or concerns. Again, thank you for allowing me to participate in the care of your patient.    Le RODRIGUEZ MD  [facility] Division of Cardiology  Pager [phone]      Answers for HPI/ROS submitted by the patient on 9/16/2019   General Symptoms: Yes  Skin Symptoms: Yes  HENT Symptoms: Yes  EYE SYMPTOMS: Yes  HEART SYMPTOMS: Yes  LUNG SYMPTOMS: Yes  INTESTINAL SYMPTOMS: No  URINARY SYMPTOMS: Yes  GYNECOLOGIC SYMPTOMS: No  BREAST SYMPTOMS: No  SKELETAL SYMPTOMS: Yes  BLOOD SYMPTOMS: Yes  NERVOUS SYSTEM SYMPTOMS: Yes  MENTAL HEALTH SYMPTOMS: Yes  Fever:  No  Loss of appetite: Yes  Weight loss: No  Weight gain: No  Fatigue: Yes  Night sweats: No  Chills: No  Increased stress: Yes  Excessive hunger: No  Excessive thirst: No  Feeling hot or cold when others believe the temperature is normal: Yes  Loss of height: No  Post-operative complications: No  Surgical site pain: No  Hallucinations: No  Change in or Loss of Energy: Yes  Hyperactivity: No  Confusion: Yes  Changes in hair: No  Changes in moles/birth marks: No  Itching: No  Rashes: No  Changes in nails: No  Acne: No  Hair in places you don't want it: No  Change in facial hair: No  Warts: Yes  Non-healing sores: No  Scarring: No  Flaking of skin: No  Color changes of hands/feet in cold : Yes  Sun sensitivity: Yes  Skin thickening: No  Ear pain: No  Ear discharge: No  Hearing loss: No  Tinnitus: No  Nosebleeds: No  Congestion: No  Sinus pain: No  Trouble swallowing: No   Voice hoarseness: No  Mouth sores: Yes  Sore throat: Yes  Tooth pain: No  Gum tenderness: No  Bleeding gums: No  Change in taste: No  Change in sense of smell: No  Dry mouth: No  Hearing aid used: No  Neck lump: No  Eye pain: No  Vision loss: Yes  Dry eyes: No  Watery eyes: No  Eye bulging: No  Double vision: No  Flashing of lights: No  Spots: Yes  Floaters: No  Redness: No  Crossed eyes: No  Tunnel Vision: Yes  Yellowing of eyes: No  Eye irritation: No  Cough: No  Sputum or phlegm: No  Coughing up blood: No  Difficulty breating or shortness of breath: Yes  Snoring: No  Wheezing: No  Difficulty breathing on exertion: Yes  Nighttime Cough: No  Difficulty breathing when lying flat: Yes  Chest pain or pressure: Yes  Fast or irregular heartbeat: Yes  Pain in legs with walking: Yes  Trouble breathing while lying down: Yes  Fingers or toes appear blue: No  High blood pressure: No  Low blood pressure: No  Fainting: Yes  Murmurs: No  Pacemaker: No  Varicose veins: No  Edema or swelling: No  Wake up at night with shortness of breath: No  Light-headedness:  Yes  Exercise intolerance: Yes  Trouble holding urine or incontinence: Yes  Pain or burning: No  Trouble starting or stopping: No  Increased frequency of urination: No  Blood in urine: No  Decreased frequency of urination: No  Frequent nighttime urination: No  Flank pain: No  Difficulty emptying bladder: No  Back pain: Yes  Muscle aches: Yes  Neck pain: Yes  Swollen joints: No  Joint pain: Yes  Bone pain: No  Muscle cramps: Yes  Muscle weakness: Yes  Joint stiffness: Yes  Bone fracture: No  Anemia: No  Swollen glands: No  Easy bleeding or bruising: Yes  Trouble with coordination: Yes  Dizziness or trouble with balance: Yes  Fainting or black-out spells: Yes  Memory loss: Yes  Headache: Yes  Seizures: Yes  Speech problems: No  Tingling: Yes  Tremor: No  Weakness: Yes  Difficulty walking: Yes  Paralysis: Yes  Numbness: Yes  Nervous or Anxious: Yes  Depression: Yes  Trouble sleeping: No  Trouble thinking or concentrating: Yes  Mood changes: Yes  Panic attacks: Yes    Answers for HPI/ROS submitted by the patient on 9/16/2019   General Symptoms: Yes  Skin Symptoms: Yes  HENT Symptoms: Yes  EYE SYMPTOMS: Yes  HEART SYMPTOMS: Yes  LUNG SYMPTOMS: Yes  INTESTINAL SYMPTOMS: No  URINARY SYMPTOMS: Yes  GYNECOLOGIC SYMPTOMS: No  BREAST SYMPTOMS: No  SKELETAL SYMPTOMS: Yes  BLOOD SYMPTOMS: Yes  NERVOUS SYSTEM SYMPTOMS: Yes  MENTAL HEALTH SYMPTOMS: Yes  Fever: No  Loss of appetite: Yes  Weight loss: No  Weight gain: No  Fatigue: Yes  Night sweats: No  Chills: No  Increased stress: Yes  Excessive hunger: No  Excessive thirst: No  Feeling hot or cold when others believe the temperature is normal: Yes  Loss of height: No  Post-operative complications: No  Surgical site pain: No  Hallucinations: No  Change in or Loss of Energy: Yes  Hyperactivity: No  Confusion: Yes  Changes in hair: No  Changes in moles/birth marks: No  Itching: No  Rashes: No  Changes in nails: No  Acne: No  Hair in places you don't want it: No  Change in facial  hair: No  Warts: Yes  Non-healing sores: No  Scarring: No  Flaking of skin: No  Color changes of hands/feet in cold : Yes  Sun sensitivity: Yes  Skin thickening: No  Ear pain: No  Ear discharge: No  Hearing loss: No  Tinnitus: No  Nosebleeds: No  Congestion: No  Sinus pain: No  Trouble swallowing: No   Voice hoarseness: No  Mouth sores: Yes  Sore throat: Yes  Tooth pain: No  Gum tenderness: No  Bleeding gums: No  Change in taste: No  Change in sense of smell: No  Dry mouth: No  Hearing aid used: No  Neck lump: No  Eye pain: No  Vision loss: Yes  Dry eyes: No  Watery eyes: No  Eye bulging: No  Double vision: No  Flashing of lights: No  Spots: Yes  Floaters: No  Redness: No  Crossed eyes: No  Tunnel Vision: Yes  Yellowing of eyes: No  Eye irritation: No  Cough: No  Sputum or phlegm: No  Coughing up blood: No  Difficulty breating or shortness of breath: Yes  Snoring: No  Wheezing: No  Difficulty breathing on exertion: Yes  Nighttime Cough: No  Difficulty breathing when lying flat: Yes  Chest pain or pressure: Yes  Fast or irregular heartbeat: Yes  Pain in legs with walking: Yes  Trouble breathing while lying down: Yes  Fingers or toes appear blue: No  High blood pressure: No  Low blood pressure: No  Fainting: Yes  Murmurs: No  Pacemaker: No  Varicose veins: No  Edema or swelling: No  Wake up at night with shortness of breath: No  Light-headedness: Yes  Exercise intolerance: Yes  Trouble holding urine or incontinence: Yes  Pain or burning: No  Trouble starting or stopping: No  Increased frequency of urination: No  Blood in urine: No  Decreased frequency of urination: No  Frequent nighttime urination: No  Flank pain: No  Difficulty emptying bladder: No  Back pain: Yes  Muscle aches: Yes  Neck pain: Yes  Swollen joints: No  Joint pain: Yes  Bone pain: No  Muscle cramps: Yes  Muscle weakness: Yes  Joint stiffness: Yes  Bone fracture: No  Anemia: No  Swollen glands: No  Easy bleeding or bruising: Yes  Trouble with  coordination: Yes  Dizziness or trouble with balance: Yes  Fainting or black-out spells: Yes  Memory loss: Yes  Headache: Yes  Seizures: Yes  Speech problems: No  Tingling: Yes  Tremor: No  Weakness: Yes  Difficulty walking: Yes  Paralysis: Yes  Numbness: Yes  Nervous or Anxious: Yes  Depression: Yes  Trouble sleeping: No  Trouble thinking or concentrating: Yes  Mood changes: Yes  Panic attacks: Yes      Again, thank you for allowing me to participate in the care of your patient.      Sincerely,    Le Mead MD

## 2019-09-16 NOTE — LETTER
"9/16/2019      RE: Stephanie Villanueva  [address]  [facility] [address]       Dear Colleague,    Thank you for the opportunity to participate in the care of your patient, Stephanie Villanueva, at the [facility] HE[facility] at [facility]. Please see a copy of my visit note below.      Chief complaint: Dizziness and syncope    HPI: Ms. Stephanie Villanueva is a 19 year old female with PMH significant for POTS syndrome, complex psychiatric issues (borderline personality disorder, suicidal ideation, anorexia, major depression with psychosis, social anxiety disorder, autism spectrum disorder, panic disorder, dissociative disorder, PTSD). She presents to our clinic to establish case for her diagnosis of POTS (hypovolemic subtype) and to help reduce the frequency of \"syncope.\"    The patient presented to [facility] fibromyalgia and fatigue clinic for evaluation of widespread musculoskeletal pain, profound fatigue and sensory sensitivities over the last 9 months. She underwent extensive laboratory tests including autoimmune antibodies, EEG and tilt testing which was suggestive of POTS syndrome (hypovolemic subtype). Patient was recommended an exercise program. She has seen psychiatry, rheumatology (benign hypermobility syndrome), physical medicine/rehabilitation, physical therapy, and pulmonology (enuresis) for respective issues    On history taking today, she is accompanied by her mother who does the majority of the talking. Stephanie reports a history of depression and fatigue since she was 11. She reports doing relatively well until last year when she ran a half-marathon. She and her mother report after the half marathon she developed bilateral hip issues, along with widespread joint problems and pain, making it difficult to run. Stephanie returned to college for Fall semester, but returned home due to progressive and debilitating depression, fatigue, brain fog, dizziness, and recurrent \"syncope.\" She has " "chronic dizziness with standing. Her mother states the patient passes out every day, and it has been going on for almost a year. Her mother just wants Stephanie to be \"functional\" again, and wants her to get back to school. Stephanie denies breaking bones or significantly injuring herself during any \"syncopal\" event, but is adamant that she passed out and hit her head on the sidewalk just a few days ago causing a \"mild concussion.\"    I asked Stephanie to describe an episode of \"syncope,\" to gain a better understanding of what she means and feels. She describes feeling dizzy, and \"presyncopal\" and eventually her vision turns blurry. She subsequently looses the ability to control her body, but she is still able to hear what's going on around her (most of the time). She describes sometimes she cannot hear anything. In clinic today, we measured orthostatics which were negative. However, during the test, Stephanie had one of her episodes. She described not being able to control her body, but was able to hear what was going on in the room. She reported constant dizziness during the test. She has no particular relieving factors, although she did state during ECT treatments she would feel much higher energy levels after receiving 500cc-1000cc of normal saline. Otherwise, no relief nor improvement with any particular medication that she is on or tried (Fluorinef, desmopressin). She denies exertional chest pains. She has chronic dyspnea with any activity, and chronic multi-site pain. No PND nor orthopnea. Occasional palpitations.  Patient is currently on desvenlafaxine, fludrocortisone 0.1 mg, gabapentin 900 mg TID, and lorazepam 0.5 mg as needed. Denies new medications except florinef.  The patient's risk factor profile is: (-) HTN, (-) diabetes, (-) hyperlipidemia, (-) tobacco use, (-) family Hx CAD.     I have reviewed her EKG in clinic today which shows sinus rhythm with incomplete right bundle branch block, normal DE and QTc intervals. "  No preexcitation.  No ST-T wave changes.  Normal axis.    Medications, personal, family, and social history reviewed with patient and revised.    PAST MEDICAL HISTORY:  Past Medical History:   Diagnosis Date     Anxiety october 2013     CONTUSION OF left iliac crest 9/3/2006     Major depression october 2013     Nocturnal enuresis 10/4/2007    Currently resolved.     Salter-Perdomo Type I fracture of distal fibula with routine healing 1/25/2013     Uncomplicated asthma     sports induced       CURRENT MEDICATIONS:  Current Outpatient Medications   Medication Sig Dispense Refill     cyanocobalamin (VITAMIN B-12) 1000 MCG tablet Take 1,000 mcg by mouth daily       desvenlafaxine (PRISTIQ) 50 MG 24 hr tablet Take 1 tablet (50 mg) by mouth daily 30 tablet 0     fludrocortisone (FLORINEF) 0.1 MG tablet Take 1 tablet (0.1 mg) by mouth daily for 14 days 14 tablet 0     gabapentin (NEURONTIN) 300 MG capsule Take 3 capsules (900 mg) by mouth 3 times daily 270 capsule 0     hydrOXYzine (ATARAX) 25 MG tablet Take 1 tablet (25 mg) by mouth every 4 hours as needed for anxiety 120 tablet 1     ibuprofen (ADVIL/MOTRIN) 200 MG tablet Take 600 mg by mouth every 4 hours as needed for mild pain (for prevention of ECT headache and joint pain)       LORazepam (ATIVAN) 0.5 MG tablet Take 0.5 mg by mouth every 6 hours as needed        order for DME Equipment being ordered: walker 1 Device 0     multivitamin, therapeutic (THERA-VIT) TABS tablet Take 1 tablet by mouth daily         PAST SURGICAL HISTORY:  No past surgical history on file.    ALLERGIES:     Allergies   Allergen Reactions     No Known Drug Allergies        FAMILY HISTORY:  Family History   Problem Relation Age of Onset     Substance Abuse Maternal Grandfather         alcoholism     Depression Maternal Aunt      Substance Abuse Maternal Aunt         alcoholism     Hypothyroidism Maternal Aunt      Anxiety Disorder Brother      Hypothyroidism Maternal Grandmother   "        SOCIAL HISTORY:  Social History     Tobacco Use     Smoking status: Never Smoker     Smokeless tobacco: Never Used   Substance Use Topics     Alcohol use: Yes     Comment: sometimes     Drug use: Yes     Types: Marijuana       ROS:   Constitutional: No fever, chills, or sweats. Weight stable.   ENT: No visual disturbance, ear ache, epistaxis, sore throat.   Cardiovascular: As per HPI.   Respiratory: No cough, hemoptysis.    GI: No nausea, vomiting, hematemesis, melena, or hematochezia.   : No hematuria.   Integument: Negative.   Psychiatric: Negative.   Hematologic:  No easy bruising, no easy bleeding.  Neuro: Negative.   Endocrinology: No significant heat or cold intolerance   Musculoskeletal: No myalgia.    Exam:  Ht 1.778 m (5' 10\")   Wt 68 kg (150 lb)   SpO2 98%   BMI 21.52 kg/m     GENERAL APPEARANCE: alert and no distress  HEENT: no icterus, no central cyanosis  LYMPH/NECK: no adenopathy, no asymmetry, JVP not elevated, no carotid bruits.  RESPIRATORY: lungs clear to auscultation - no rales, rhonchi or wheezes, no use of accessory muscles, no retractions, respirations are unlabored, normal respiratory rate  CARDIOVASCULAR: regular rhythm, normal S1, S2, no S3 or S4 and no murmur, click or rub, precordium quiet with normal PMI.  GI: soft, non tender  EXTREMITIES: peripheral pulses normal, no edema  NEURO: alert, normal speech,and affect  VASC: Radial, dorsalis pedis and posterior tibialis pulses are normal in volumes and symmetric bilaterally.   SKIN: no ecchymoses, no rashes     I have reviewed the labs. [facility] medical records and personally reviewed the imaging below and made my comment in the assessment and plan.    Labs:  CBC RESULTS:   Lab Results   Component Value Date    WBC 6.0 09/09/2019    RBC 4.24 09/09/2019    HGB 12.8 09/09/2019    HCT 37.5 09/09/2019    MCV 88 09/09/2019    MCH 30.2 09/09/2019    MCHC 34.1 09/09/2019    RDW 12.4 09/09/2019     09/09/2019       BMP RESULTS:  Lab " "Results   Component Value Date     09/09/2019    POTASSIUM 3.9 09/09/2019    CHLORIDE 108 09/09/2019    CO2 29 09/09/2019    ANIONGAP 5 09/09/2019    GLC 85 09/09/2019    BUN 18 09/09/2019    CR 0.69 09/09/2019    GFRESTIMATED >90 09/09/2019    GFRESTBLACK >90 09/09/2019    BRITTANIE 9.2 09/09/2019     Orthostatic Vitals  9/16/2019  Date and Time Orthostatic BP Orthostatic Pulse Patient Position BP   Location Cuff Size   09/16/19 1657 118/78 94 Standing Standing for 1 minute Right arm Adult   Regular   09/16/19 1551 115/78 76 Standing Standing immediate Right arm Adult   Regular   09/16/19 1539 112/71 Per pt feeling dizzy 81 Supine Supine for 3 mins   Right arm Adult Regular      Autonomic reflex screen 7/31/2019 [facility]  Conclusion  There is no evidence of autonomic failure. There is evidence of symptomatic orthostatic tachycardia, which can be seen in deconditioning, dehydration, as a constitutional trait, in hyper-adrenergic states (including anxiety), and primary disorders of orthostatic tolerance.    Comments on pseudomotor test: QSART responses were normal at all sites  Comments on Valsalva maneuver: Heart rate response to Valsalva maneuver was normal.  Blood pressure and heart rate response to tilt: Patient was tilted for 10 minutes.  Orthostatic hypotension was not detected.  Heart rate response was excessive.  The patient reported dizziness and other symptoms.    EKG  - sinus arrhythmia, incomplete side right bundle branch block otherwise normal    Assessment and Plan:   Ms. Stephanie Villanueva is a 19 year old female with PMH significant for POTS syndrome, complex psychiatric issues (suicidal ideation, anorexia, major depression with psychosis, social anxiety disorder, autism spectrum disorder, panic disorder, dissociative disorder, PTSD). She presents to our clinic to establish case for her diagnosis of POTS (hypovolemic subtype) and to help reduce the frequency of \"syncope.\"    Stephanie had an episode of " "\"syncope\" during our clinic visit today during which we were checking orthostatics. Her BP remained stable (115 systolic) and heart rate remained stable increasing modestly from 76 bpm to 90 bpm. Her tilt table testing at [facility] suggests some mild underlying POTS (40 bpm increase over 10 minutes, no BP change). Interestingly her episode today (and many in the past as described) do not sound like true syncope, as she reports often being able to hear well what's going on during the episodes. Her lack of blood pressure change would also support more of a neurological dissociative picture.  Additionally, she has history of psychogenic seizures. She does endorse occasional episodes of what sound possibly as true syncope, for which the addition of midodrine could be trialed.     #POTS/syncope  - Initiate midodrine 2.5 mg bid for one week, then increase to 5 mg bid   - Stop Fluorinef 0.1 mg  - Salt tablet 2 gr bid for a week, then increase to 4 gr twice daily  - Continue hydration with 2 lt/day    We would like to see her back in 4-6 weeks to see how she is doing with consideration of increasing medications above as indicated (will request TTE if no improvement in symptoms to rule out structural heart disease).    A total of 40 minutes spent face-toface with greater than 50% of the time spent in counseling and coordinating cares of the issues above.     Please donot hesitate to contact me if you have any questions or concerns. Again, thank you for allowing me to participate in the care of your patient.    Le RODRIGUEZ MD  [facility] Division of Cardiology  Pager [phone]      Answers for HPI/ROS submitted by the patient on 9/16/2019   General Symptoms: Yes  Skin Symptoms: Yes  HENT Symptoms: Yes  EYE SYMPTOMS: Yes  HEART SYMPTOMS: Yes  LUNG SYMPTOMS: Yes  INTESTINAL SYMPTOMS: No  URINARY SYMPTOMS: Yes  GYNECOLOGIC SYMPTOMS: No  BREAST SYMPTOMS: No  SKELETAL SYMPTOMS: Yes  BLOOD SYMPTOMS: Yes  NERVOUS SYSTEM " SYMPTOMS: Yes  MENTAL HEALTH SYMPTOMS: Yes  Fever: No  Loss of appetite: Yes  Weight loss: No  Weight gain: No  Fatigue: Yes  Night sweats: No  Chills: No  Increased stress: Yes  Excessive hunger: No  Excessive thirst: No  Feeling hot or cold when others believe the temperature is normal: Yes  Loss of height: No  Post-operative complications: No  Surgical site pain: No  Hallucinations: No  Change in or Loss of Energy: Yes  Hyperactivity: No  Confusion: Yes  Changes in hair: No  Changes in moles/birth marks: No  Itching: No  Rashes: No  Changes in nails: No  Acne: No  Hair in places you don't want it: No  Change in facial hair: No  Warts: Yes  Non-healing sores: No  Scarring: No  Flaking of skin: No  Color changes of hands/feet in cold : Yes  Sun sensitivity: Yes  Skin thickening: No  Ear pain: No  Ear discharge: No  Hearing loss: No  Tinnitus: No  Nosebleeds: No  Congestion: No  Sinus pain: No  Trouble swallowing: No   Voice hoarseness: No  Mouth sores: Yes  Sore throat: Yes  Tooth pain: No  Gum tenderness: No  Bleeding gums: No  Change in taste: No  Change in sense of smell: No  Dry mouth: No  Hearing aid used: No  Neck lump: No  Eye pain: No  Vision loss: Yes  Dry eyes: No  Watery eyes: No  Eye bulging: No  Double vision: No  Flashing of lights: No  Spots: Yes  Floaters: No  Redness: No  Crossed eyes: No  Tunnel Vision: Yes  Yellowing of eyes: No  Eye irritation: No  Cough: No  Sputum or phlegm: No  Coughing up blood: No  Difficulty breating or shortness of breath: Yes  Snoring: No  Wheezing: No  Difficulty breathing on exertion: Yes  Nighttime Cough: No  Difficulty breathing when lying flat: Yes  Chest pain or pressure: Yes  Fast or irregular heartbeat: Yes  Pain in legs with walking: Yes  Trouble breathing while lying down: Yes  Fingers or toes appear blue: No  High blood pressure: No  Low blood pressure: No  Fainting: Yes  Murmurs: No  Pacemaker: No  Varicose veins: No  Edema or swelling: No  Wake up at night  with shortness of breath: No  Light-headedness: Yes  Exercise intolerance: Yes  Trouble holding urine or incontinence: Yes  Pain or burning: No  Trouble starting or stopping: No  Increased frequency of urination: No  Blood in urine: No  Decreased frequency of urination: No  Frequent nighttime urination: No  Flank pain: No  Difficulty emptying bladder: No  Back pain: Yes  Muscle aches: Yes  Neck pain: Yes  Swollen joints: No  Joint pain: Yes  Bone pain: No  Muscle cramps: Yes  Muscle weakness: Yes  Joint stiffness: Yes  Bone fracture: No  Anemia: No  Swollen glands: No  Easy bleeding or bruising: Yes  Trouble with coordination: Yes  Dizziness or trouble with balance: Yes  Fainting or black-out spells: Yes  Memory loss: Yes  Headache: Yes  Seizures: Yes  Speech problems: No  Tingling: Yes  Tremor: No  Weakness: Yes  Difficulty walking: Yes  Paralysis: Yes  Numbness: Yes  Nervous or Anxious: Yes  Depression: Yes  Trouble sleeping: No  Trouble thinking or concentrating: Yes  Mood changes: Yes  Panic attacks: Yes    Answers for HPI/ROS submitted by the patient on 9/16/2019   General Symptoms: Yes  Skin Symptoms: Yes  HENT Symptoms: Yes  EYE SYMPTOMS: Yes  HEART SYMPTOMS: Yes  LUNG SYMPTOMS: Yes  INTESTINAL SYMPTOMS: No  URINARY SYMPTOMS: Yes  GYNECOLOGIC SYMPTOMS: No  BREAST SYMPTOMS: No  SKELETAL SYMPTOMS: Yes  BLOOD SYMPTOMS: Yes  NERVOUS SYSTEM SYMPTOMS: Yes  MENTAL HEALTH SYMPTOMS: Yes  Fever: No  Loss of appetite: Yes  Weight loss: No  Weight gain: No  Fatigue: Yes  Night sweats: No  Chills: No  Increased stress: Yes  Excessive hunger: No  Excessive thirst: No  Feeling hot or cold when others believe the temperature is normal: Yes  Loss of height: No  Post-operative complications: No  Surgical site pain: No  Hallucinations: No  Change in or Loss of Energy: Yes  Hyperactivity: No  Confusion: Yes  Changes in hair: No  Changes in moles/birth marks: No  Itching: No  Rashes: No  Changes in nails: No  Acne: No  Hair in  places you don't want it: No  Change in facial hair: No  Warts: Yes  Non-healing sores: No  Scarring: No  Flaking of skin: No  Color changes of hands/feet in cold : Yes  Sun sensitivity: Yes  Skin thickening: No  Ear pain: No  Ear discharge: No  Hearing loss: No  Tinnitus: No  Nosebleeds: No  Congestion: No  Sinus pain: No  Trouble swallowing: No   Voice hoarseness: No  Mouth sores: Yes  Sore throat: Yes  Tooth pain: No  Gum tenderness: No  Bleeding gums: No  Change in taste: No  Change in sense of smell: No  Dry mouth: No  Hearing aid used: No  Neck lump: No  Eye pain: No  Vision loss: Yes  Dry eyes: No  Watery eyes: No  Eye bulging: No  Double vision: No  Flashing of lights: No  Spots: Yes  Floaters: No  Redness: No  Crossed eyes: No  Tunnel Vision: Yes  Yellowing of eyes: No  Eye irritation: No  Cough: No  Sputum or phlegm: No  Coughing up blood: No  Difficulty breating or shortness of breath: Yes  Snoring: No  Wheezing: No  Difficulty breathing on exertion: Yes  Nighttime Cough: No  Difficulty breathing when lying flat: Yes  Chest pain or pressure: Yes  Fast or irregular heartbeat: Yes  Pain in legs with walking: Yes  Trouble breathing while lying down: Yes  Fingers or toes appear blue: No  High blood pressure: No  Low blood pressure: No  Fainting: Yes  Murmurs: No  Pacemaker: No  Varicose veins: No  Edema or swelling: No  Wake up at night with shortness of breath: No  Light-headedness: Yes  Exercise intolerance: Yes  Trouble holding urine or incontinence: Yes  Pain or burning: No  Trouble starting or stopping: No  Increased frequency of urination: No  Blood in urine: No  Decreased frequency of urination: No  Frequent nighttime urination: No  Flank pain: No  Difficulty emptying bladder: No  Back pain: Yes  Muscle aches: Yes  Neck pain: Yes  Swollen joints: No  Joint pain: Yes  Bone pain: No  Muscle cramps: Yes  Muscle weakness: Yes  Joint stiffness: Yes  Bone fracture: No  Anemia: No  Swollen glands: No  Easy  bleeding or bruising: Yes  Trouble with coordination: Yes  Dizziness or trouble with balance: Yes  Fainting or black-out spells: Yes  Memory loss: Yes  Headache: Yes  Seizures: Yes  Speech problems: No  Tingling: Yes  Tremor: No  Weakness: Yes  Difficulty walking: Yes  Paralysis: Yes  Numbness: Yes  Nervous or Anxious: Yes  Depression: Yes  Trouble sleeping: No  Trouble thinking or concentrating: Yes  Mood changes: Yes  Panic attacks: Yes      Please do not hesitate to contact me if you have any questions/concerns.     Sincerely,     Le Mead MD

## 2019-09-16 NOTE — PATIENT INSTRUCTIONS
Patient Instructions:  Begin taking Midodrine 2.5 mg twice daily for one week, then increase to 5 mg twice daily. 2 separate prescriptions sent to pharmacy.  Begin taking salt tablets 2 grams twice daily for one week, then increase to 4 grams twice daily. 2 separate prescriptions sent to pharmacy.  Stop taking florinef.  Begin using waist high compression stockings whenever possible. 20-30 mmhg.  Continue drinking 100 oz of water daily as you have been.  Return in one month for clinic visit with Dr. Mead.      It was a pleasure to see you in the cardiology clinic today.    We are encouraging the use of [employer] to communicate with your Healthcare Provider.  If you have any questions, call  Terry Oliver LPN, at [phone].  Press Option #1 for the [facility], and then press Option #4 for nursing.  Cardiology Fax  : [phone]      If you have an urgent need after hours (8:00 am to 4:30 pm) please call [phone] and ask for the cardiology fellow on call.

## 2019-09-17 DIAGNOSIS — G90.A POTS (POSTURAL ORTHOSTATIC TACHYCARDIA SYNDROME): Primary | ICD-10-CM

## 2019-09-17 LAB — INTERPRETATION ECG - MUSE: NORMAL

## 2019-09-17 NOTE — PROGRESS NOTES
Order faxed to [facility] at [phone] and patient notified to present to them for fitting and insurance billing.

## 2019-09-25 ENCOUNTER — TELEPHONE (OUTPATIENT)
Dept: CARDIOLOGY | Facility: CLINIC | Age: 19
End: 2019-09-25

## 2019-09-26 ENCOUNTER — DOCUMENTATION ONLY (OUTPATIENT)
Dept: CARE COORDINATION | Facility: CLINIC | Age: 19
End: 2019-09-26

## 2019-10-03 ENCOUNTER — HEALTH MAINTENANCE LETTER (OUTPATIENT)
Age: 19
End: 2019-10-03

## 2019-12-18 NOTE — TELEPHONE ENCOUNTER
RECORDS RECEIVED FROM: Self   DATE RECEIVED: 1/31/20   NOTES (FOR ALL VISITS) STATUS DETAILS   OFFICE NOTE from referring provider N/A    OFFICE NOTE from other specialist [facility] Cardiology:  9/16/19   DISCHARGE SUMMARY from hospital Internal [facility]:  7/1/19-7/2/19   DISCHARGE REPORT from the ER Internal [facility]:  9/9/19 8/27/19   OPERATIVE REPORT N/A    MEDICATION LIST Internal    IMAGING  (FOR ALL VISITS)     EMG N/A    EEG Internal [facility]:  7/1/19-7/2/19   ECT N/A    MRI (HEAD, NECK, SPINE) [place] [facility]  [place] Brain 5/29/19   LUMBAR PUNCTURE N/A    TESFAYE Scan N/A    CT (HEAD, NECK, SPINE) [facility]:  CT Cervical Spine 9/9/19  CT Head 9/9/19      Action 12/18/19   Action Taken Imaging [place] [facility] [place]:  [place] Brain 5/29/19     Imaging Received  12/30/19 MV 3.42pm  [facility]   Image Type (x): Disc:   PACS: x   Exam Date/Name MRI Brain 5/29/19 Comments: images resolved in PACS

## 2020-01-07 NOTE — TELEPHONE ENCOUNTER
Forms never picked up. Shredded to protect PHI.       Thank you,  Jace Herzog S  Patient Rep.  [facility]

## 2020-01-31 ENCOUNTER — PRE VISIT (OUTPATIENT)
Dept: NEUROLOGY | Facility: CLINIC | Age: 20
End: 2020-01-31

## 2020-01-31 ENCOUNTER — OFFICE VISIT (OUTPATIENT)
Dept: NEUROLOGY | Facility: CLINIC | Age: 20
End: 2020-01-31
Payer: COMMERCIAL

## 2020-01-31 VITALS — DIASTOLIC BLOOD PRESSURE: 80 MMHG | OXYGEN SATURATION: 98 % | HEART RATE: 80 BPM | SYSTOLIC BLOOD PRESSURE: 119 MMHG

## 2020-01-31 DIAGNOSIS — G40.909 RECURRENT SEIZURES (H): Primary | ICD-10-CM

## 2020-01-31 RX ORDER — LORAZEPAM 1 MG/1
1 TABLET ORAL DAILY PRN
COMMUNITY
Start: 2020-01-20

## 2020-01-31 ASSESSMENT — ENCOUNTER SYMPTOMS
EXERCISE INTOLERANCE: 0
TREMORS: 0
BOWEL INCONTINENCE: 0
BACK PAIN: 0
SLEEP DISTURBANCES DUE TO BREATHING: 0
INSOMNIA: 0
NAUSEA: 1
MYALGIAS: 1
PANIC: 1
JOINT SWELLING: 0
TROUBLE SWALLOWING: 1
NAIL CHANGES: 0
SEIZURES: 1
DECREASED APPETITE: 0
POLYPHAGIA: 0
NECK PAIN: 1
POOR WOUND HEALING: 0
SYNCOPE: 1
LOSS OF CONSCIOUSNESS: 1
PALPITATIONS: 1
RECTAL PAIN: 0
JAUNDICE: 0
FEVER: 0
EYE IRRITATION: 0
CONSTIPATION: 0
SINUS CONGESTION: 0
DIZZINESS: 1
DIARRHEA: 0
BLOATING: 1
DISTURBANCES IN COORDINATION: 1
DECREASED CONCENTRATION: 1
PARALYSIS: 0
HYPERTENSION: 0
NIGHT SWEATS: 0
LIGHT-HEADEDNESS: 1
MUSCLE WEAKNESS: 1
HEADACHES: 1
STIFFNESS: 1
INCREASED ENERGY: 0
NECK MASS: 0
BLOOD IN STOOL: 0
WEIGHT LOSS: 0
MEMORY LOSS: 1
WEIGHT GAIN: 0
DEPRESSION: 1
ALTERED TEMPERATURE REGULATION: 1
NERVOUS/ANXIOUS: 1
MUSCLE CRAMPS: 1
SORE THROAT: 0
EYE PAIN: 0
SMELL DISTURBANCE: 0
ORTHOPNEA: 1
VOMITING: 0
HEARTBURN: 0
ARTHRALGIAS: 1
TINGLING: 1
SPEECH CHANGE: 1
SINUS PAIN: 0
LEG PAIN: 1
FATIGUE: 1
POLYDIPSIA: 0
WEAKNESS: 1
SKIN CHANGES: 0
ABDOMINAL PAIN: 1
HOARSE VOICE: 0
NUMBNESS: 0
CHILLS: 0
TASTE DISTURBANCE: 0
HALLUCINATIONS: 0
EYE WATERING: 0
HYPOTENSION: 0
EYE REDNESS: 0
DOUBLE VISION: 1

## 2020-01-31 ASSESSMENT — PAIN SCALES - GENERAL: PAINLEVEL: EXTREME PAIN (8)

## 2020-01-31 ASSESSMENT — PATIENT HEALTH QUESTIONNAIRE - PHQ9: SUM OF ALL RESPONSES TO PHQ QUESTIONS 1-9: 9
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
## 2020-02-10 ENCOUNTER — HOSPITAL ENCOUNTER (EMERGENCY)
Facility: CLINIC | Age: 20
Discharge: HOME | End: 2020-02-11
Attending: EMERGENCY MEDICINE | Admitting: EMERGENCY MEDICINE
Payer: COMMERCIAL

## 2020-02-10 ENCOUNTER — APPOINTMENT (OUTPATIENT)
Dept: CT IMAGING | Facility: CLINIC | Age: 20
End: 2020-02-10
Attending: EMERGENCY MEDICINE
Payer: COMMERCIAL

## 2020-02-10 DIAGNOSIS — R56.9 SEIZURE-LIKE ACTIVITY (H): ICD-10-CM

## 2020-02-10 LAB
ALBUMIN UR-MCNC: NEGATIVE MG/DL
AMPHETAMINES UR QL SCN: NEGATIVE
APPEARANCE UR: CLEAR
BARBITURATES UR QL: NEGATIVE
BASOPHILS # BLD AUTO: 0.1 10E9/L (ref 0–0.2)
BASOPHILS NFR BLD AUTO: 0.9 %
BENZODIAZ UR QL: POSITIVE
BILIRUB UR QL STRIP: NEGATIVE
CANNABINOIDS UR QL SCN: NEGATIVE
COCAINE UR QL: NEGATIVE
COLOR UR AUTO: ABNORMAL
DIFFERENTIAL METHOD BLD: NORMAL
EOSINOPHIL # BLD AUTO: 0.1 10E9/L (ref 0–0.7)
EOSINOPHIL NFR BLD AUTO: 1.2 %
ERYTHROCYTE [DISTWIDTH] IN BLOOD BY AUTOMATED COUNT: 12.3 % (ref 10–15)
ETHANOL UR QL SCN: NEGATIVE
GLUCOSE UR STRIP-MCNC: NEGATIVE MG/DL
HCG SERPL QL: NEGATIVE
HCT VFR BLD AUTO: 40.5 % (ref 35–47)
HGB BLD-MCNC: 13 G/DL (ref 11.7–15.7)
HGB UR QL STRIP: NEGATIVE
IMM GRANULOCYTES # BLD: 0 10E9/L (ref 0–0.4)
IMM GRANULOCYTES NFR BLD: 0.2 %
KETONES UR STRIP-MCNC: NEGATIVE MG/DL
LEUKOCYTE ESTERASE UR QL STRIP: NEGATIVE
LYMPHOCYTES # BLD AUTO: 2.4 10E9/L (ref 0.8–5.3)
LYMPHOCYTES NFR BLD AUTO: 42.1 %
MCH RBC QN AUTO: 29 PG (ref 26.5–33)
MCHC RBC AUTO-ENTMCNC: 32.1 G/DL (ref 31.5–36.5)
MCV RBC AUTO: 90 FL (ref 78–100)
MONOCYTES # BLD AUTO: 0.5 10E9/L (ref 0–1.3)
MONOCYTES NFR BLD AUTO: 8.6 %
MUCOUS THREADS #/AREA URNS LPF: PRESENT /LPF
NEUTROPHILS # BLD AUTO: 2.6 10E9/L (ref 1.6–8.3)
NEUTROPHILS NFR BLD AUTO: 47 %
NITRATE UR QL: NEGATIVE
NRBC # BLD AUTO: 0 10*3/UL
NRBC BLD AUTO-RTO: 0 /100
OPIATES UR QL SCN: NEGATIVE
PH UR STRIP: 7 PH (ref 5–7)
PLATELET # BLD AUTO: 282 10E9/L (ref 150–450)
RBC # BLD AUTO: 4.48 10E12/L (ref 3.8–5.2)
RBC #/AREA URNS AUTO: <1 /HPF (ref 0–2)
SOURCE: ABNORMAL
SP GR UR STRIP: 1.01 (ref 1–1.03)
SQUAMOUS #/AREA URNS AUTO: <1 /HPF (ref 0–1)
UROBILINOGEN UR STRIP-MCNC: NORMAL MG/DL (ref 0–2)
WBC # BLD AUTO: 5.6 10E9/L (ref 4–11)
WBC #/AREA URNS AUTO: 0 /HPF (ref 0–5)

## 2020-02-10 PROCEDURE — 85025 COMPLETE CBC W/AUTO DIFF WBC: CPT | Performed by: EMERGENCY MEDICINE

## 2020-02-10 PROCEDURE — 99285 EMERGENCY DEPT VISIT HI MDM: CPT | Mod: 25

## 2020-02-10 PROCEDURE — 93005 ELECTROCARDIOGRAM TRACING: CPT

## 2020-02-10 PROCEDURE — 80307 DRUG TEST PRSMV CHEM ANLYZR: CPT | Performed by: EMERGENCY MEDICINE

## 2020-02-10 PROCEDURE — 83690 ASSAY OF LIPASE: CPT | Performed by: EMERGENCY MEDICINE

## 2020-02-10 PROCEDURE — 25800030 ZZH RX IP 258 OP 636: Performed by: EMERGENCY MEDICINE

## 2020-02-10 PROCEDURE — 80320 DRUG SCREEN QUANTALCOHOLS: CPT | Performed by: EMERGENCY MEDICINE

## 2020-02-10 PROCEDURE — 93010 ELECTROCARDIOGRAM REPORT: CPT | Mod: Z6 | Performed by: EMERGENCY MEDICINE

## 2020-02-10 PROCEDURE — 80329 ANALGESICS NON-OPIOID 1 OR 2: CPT | Performed by: EMERGENCY MEDICINE

## 2020-02-10 PROCEDURE — 80053 COMPREHEN METABOLIC PANEL: CPT | Performed by: EMERGENCY MEDICINE

## 2020-02-10 PROCEDURE — 99285 EMERGENCY DEPT VISIT HI MDM: CPT | Mod: 25 | Performed by: EMERGENCY MEDICINE

## 2020-02-10 PROCEDURE — 70450 CT HEAD/BRAIN W/O DYE: CPT

## 2020-02-10 PROCEDURE — 96361 HYDRATE IV INFUSION ADD-ON: CPT

## 2020-02-10 PROCEDURE — 96360 HYDRATION IV INFUSION INIT: CPT

## 2020-02-10 PROCEDURE — 84703 CHORIONIC GONADOTROPIN ASSAY: CPT | Performed by: EMERGENCY MEDICINE

## 2020-02-10 PROCEDURE — 81001 URINALYSIS AUTO W/SCOPE: CPT | Mod: XU | Performed by: EMERGENCY MEDICINE

## 2020-02-10 RX ORDER — SODIUM CHLORIDE 9 MG/ML
1000 INJECTION, SOLUTION INTRAVENOUS CONTINUOUS
Status: DISCONTINUED | OUTPATIENT
Start: 2020-02-10 | End: 2020-02-11 | Stop reason: HOSPADM

## 2020-02-10 RX ADMIN — SODIUM CHLORIDE 1000 ML: 9 INJECTION, SOLUTION INTRAVENOUS at 22:32

## 2020-02-10 NOTE — ED AVS SNAPSHOT
[facility], [place], Emergency Department  [address] [address]  Phone:  [phone]                                    Stephanie Villanueva   MRN: [id number]    Department:  [facility], Emergency Department   Date of Visit:  2/10/2020           After Visit Summary Signature Page    I have received my discharge instructions, and my questions have been answered. I have discussed any challenges I see with this plan with the nurse or doctor.    ..........................................................................................................................................  Patient/Patient Representative Signature      ..........................................................................................................................................  Patient Representative Print Name and Relationship to Patient    ..................................................               ................................................  Date                                   Time    ..........................................................................................................................................  Reviewed by Signature/Title    ...................................................              ..............................................  Date                                               Time          22EPIC Rev 08/18

## 2020-02-11 VITALS
HEART RATE: 91 BPM | DIASTOLIC BLOOD PRESSURE: 63 MMHG | RESPIRATION RATE: 18 BRPM | SYSTOLIC BLOOD PRESSURE: 128 MMHG | OXYGEN SATURATION: 98 % | TEMPERATURE: 98.3 F

## 2020-02-11 LAB
ALBUMIN SERPL-MCNC: 4.1 G/DL (ref 3.4–5)
ALP SERPL-CCNC: 85 U/L (ref 40–150)
ALT SERPL W P-5'-P-CCNC: 34 U/L (ref 0–50)
ANION GAP SERPL CALCULATED.3IONS-SCNC: 5 MMOL/L (ref 3–14)
APAP SERPL-MCNC: <2 MG/L (ref 10–20)
AST SERPL W P-5'-P-CCNC: 29 U/L (ref 0–45)
BILIRUB SERPL-MCNC: 0.2 MG/DL (ref 0.2–1.3)
BUN SERPL-MCNC: 9 MG/DL (ref 7–30)
CALCIUM SERPL-MCNC: 9 MG/DL (ref 8.5–10.1)
CHLORIDE SERPL-SCNC: 110 MMOL/L (ref 94–109)
CO2 SERPL-SCNC: 26 MMOL/L (ref 20–32)
CREAT SERPL-MCNC: 0.62 MG/DL (ref 0.52–1.04)
ETHANOL SERPL-MCNC: <0.01 G/DL
GFR SERPL CREATININE-BSD FRML MDRD: >90 ML/MIN/{1.73_M2}
GLUCOSE SERPL-MCNC: 78 MG/DL (ref 70–99)
INTERPRETATION ECG - MUSE: NORMAL
LIPASE SERPL-CCNC: 139 U/L (ref 73–393)
POTASSIUM SERPL-SCNC: 4 MMOL/L (ref 3.4–5.3)
PROT SERPL-MCNC: 7.4 G/DL (ref 6.8–8.8)
SALICYLATES SERPL-MCNC: <2 MG/DL
SODIUM SERPL-SCNC: 140 MMOL/L (ref 133–144)

## 2020-02-11 NOTE — DISCHARGE INSTRUCTIONS
Please make an appointment to follow up with Your Primary Care Provider and Neurology Clinic (phone: [phone]) as soon as possible.    Return to the ED if you develop return of confusion or loss of consciousness, repeat seizure, fever, numbness, tingling, weakness, chest pain, shortness of breath, or any new or worsening concerns.

## 2020-02-11 NOTE — ED PROVIDER NOTES
[place] EMERGENCY DEPARTMENT ([facility])  February 10, 2020  History     Chief Complaint   Patient presents with     Seizures     The history is provided by medical records and a parent (Father). The history is limited by the condition of the patient.     Stephanie Villanueva is a 19 year old female with past medical history for asthma, depression, fibromyalgia, POTS, and pseudoseizures who presents to the Emergency Department by EMS for evaluation of altered mental status.  Patient's father reported that the patient had 2 episodes of seizures today. Patient's father states that he was not there to witness the first episode but reportedly showing up to the scene as soon as he got a call from his neighbor.  Per father, was informed by witnesses that she fell when she had the seizure but unsure of any head injuries. She had just been dropped off by a Lyft to her parents house when this occurred. Father states that on the ride here by EMS, the patient had another episode (witnessed) describing it as jerking of the whole body that includes the head, shoulder, feet, and legs.  He reports that this episode lasted about 3 minutes. She was reportedly given 5 mg IV versed by EMS. Patient's father suspected that her seizure-like activity may have been caused by bright lights as she started having seizures in the ambulance when they turned on the lights. Reportedly, her seizure like activity has involved bright lights before. States he is unsure if she bit her tongue during this episode as well.  Father reports she has been seen by neurologist and these were felt to be non epileptic and she is not on antiepileptic medication.  She is currently on antidepressants - Pristiq, gabapentin.  He adds that this has happened before with her last episode this past summer.  Father reports that the patient did have a concussion during the summer as well but states that she recovered well.  He states that she had not had any recent  cough, complaints of pain, or fever. States that she is not on contraception.  Reports that the patient has a history of self injurious behavior but denies of her performing any self harm recently.  He states that she has not used any illicit drugs or alcohol.    Patient's review, patient was seen here at [facility] in the ED 8/27/2019 where she had 2 nonepileptic seizure spells.  Of note, her EKG and labs were unremarkable.  She saw her neurologist on 31 January here at the .  Per review of this note she did have overnight EEG monitoring in July with 2 spells at that time with the EEG showing no clear correlations and these were considered nonepileptic events.  During this recent visit at the end of January they again felt that these were likely nonepileptic events and they are having issues with the family understanding the psychogenic pseudoseizures.  They were planning for outpatient EEG.    Past Medical History:   Diagnosis Date     Anxiety october 2013     CONTUSION OF left iliac crest 9/3/2006     Major depression october 2013     Nocturnal enuresis 10/4/2007    Currently resolved.     Salter-Perdomo Type I fracture of distal fibula with routine healing 1/25/2013     Uncomplicated asthma     sports induced     No past surgical history on file.    Family History   Problem Relation Age of Onset     Substance Abuse Maternal Grandfather         alcoholism     Depression Maternal Aunt      Substance Abuse Maternal Aunt         alcoholism     Hypothyroidism Maternal Aunt      Anxiety Disorder Brother      Hypothyroidism Maternal Grandmother      Social History     Tobacco Use     Smoking status: Never Smoker     Smokeless tobacco: Never Used   Substance Use Topics     Alcohol use: Yes     Comment: sometimes     No current facility-administered medications for this encounter.      Current Outpatient Medications   Medication     COMPRESSION STOCKINGS     cyanocobalamin (VITAMIN B-12) 1000 MCG tablet     desvenlafaxine  (PRISTIQ) 50 MG 24 hr tablet     gabapentin (NEURONTIN) 300 MG capsule     hydrOXYzine (ATARAX) 25 MG tablet     ibuprofen (ADVIL/MOTRIN) 200 MG tablet     LORazepam (ATIVAN) 0.5 MG tablet     LORazepam (ATIVAN) 1 MG tablet     midodrine (PROAMATINE) 2.5 MG tablet     midodrine (PROAMATINE) 5 MG tablet     multivitamin, therapeutic (THERA-VIT) TABS tablet     order for DME     sodium chloride 1 GM tablet     sodium chloride 1 GM tablet     Allergies   Allergen Reactions     No Known Drug Allergies      I have reviewed the Medications, Allergies, Past Medical and Surgical History, and Social History in the Epic system.    Review of Systems  Pertinent positives and negatives are documented in the HPI. All other systems reviewed and are negative.  Physical Exam   BP: (!) 143/107  Pulse: 91  Temp: 98.3  F (36.8  C)  Resp: 18  SpO2: 100 %      Physical Exam  Vitals signs reviewed.   Constitutional:       General: She is not in acute distress.     Appearance: She is well-developed.      Comments: Somnolent but will open eyes to voice and stimulation.  At this point not answering questions.  Will squeeze my hands bilaterally.   HENT:      Head: Normocephalic and atraumatic.      Comments: No scalp hematoma.  No hemotympanum bilaterally.  No septal hematoma.  Patient is able to open her mouth and I do not see any intraoral trauma.  No facial trauma noted.     Nose: Nose normal.      Mouth/Throat:      Mouth: Mucous membranes are moist.      Pharynx: No oropharyngeal exudate or posterior oropharyngeal erythema.   Eyes:      Extraocular Movements: Extraocular movements intact.      Conjunctiva/sclera: Conjunctivae normal.      Pupils: Pupils are equal, round, and reactive to light.   Neck:      Musculoskeletal: Normal range of motion and neck supple. No neck rigidity or muscular tenderness.   Cardiovascular:      Rate and Rhythm: Normal rate and regular rhythm.      Pulses: Normal pulses.      Heart sounds: Normal heart  sounds. No murmur.   Pulmonary:      Effort: Pulmonary effort is normal. No respiratory distress.      Breath sounds: Normal breath sounds. No wheezing or rales.   Chest:      Chest wall: No tenderness.   Abdominal:      General: Bowel sounds are normal. There is no distension.      Palpations: Abdomen is soft. There is no mass.      Tenderness: There is no abdominal tenderness. There is no guarding or rebound.   Musculoskeletal: Normal range of motion.         General: No swelling, tenderness or signs of injury.   Skin:     General: Skin is warm and dry.      Capillary Refill: Capillary refill takes less than 2 seconds.      Findings: No rash.   Neurological:      GCS: GCS eye subscore is 3. GCS verbal subscore is 1. GCS motor subscore is 6.      Deep Tendon Reflexes:      Reflex Scores:       Brachioradialis reflexes are 2+ on the right side and 2+ on the left side.       Patellar reflexes are 2+ on the right side and 2+ on the left side.     Comments: Patient is somnolent as she had just received IM Versed.  She will open her eyes to voice and stimulation.  She will squeeze my hands bilaterally.  No signs of bowel or bladder incontinence on exam.  No clonus noted.  No hyperreflexia noted.  Tone appears normal.  No sign of seizure activity.  No roving eye movements.  Pupils are 3 mm and reactive bilaterally.   Psychiatric:         Mood and Affect: Mood normal.         ED Course   9:19 PM  The patient was seen and examined by Myrtle Bernardo MD, in Room ED37.      Procedures             EKG Interpretation:      Interpreted by Myrtle Bernardo MD  Time reviewed: 10:41 pm  Symptoms at time of EKG: seizure like activity  Rhythm: normal sinus   Rate: normal  Axis: normal  Ectopy: none  Conduction: normal  ST Segments/ T Waves: No ST-T wave changes  Q Waves: none  Comparison to prior: Unchanged    Clinical Impression: No evidence of acute ischemia.  No WPW, LVH, right ventricular dysplasia, or signs of Brugada  syndrome.                 Labs Ordered and Resulted from Time of ED Arrival Up to the Time of Departure from the ED   ROUTINE UA WITH MICROSCOPIC - Abnormal; Notable for the following components:       Result Value    Mucous Urine Present (*)     All other components within normal limits   DRUG ABUSE SCREEN 6 CHEM DEP URINE ([facility]) - Abnormal; Notable for the following components:    Benzodiazepine Qual Urine Positive (*)     All other components within normal limits   COMPREHENSIVE METABOLIC PANEL - Abnormal; Notable for the following components:    Chloride 110 (*)     All other components within normal limits   HCG QUALITATIVE   CBC WITH PLATELETS DIFFERENTIAL   ALCOHOL ETHYL   LIPASE   SALICYLATE LEVEL            Assessments & Plan (with Medical Decision Making)   Patient presents after 2 episodes of seizure-like activity shortly prior to arrival.  1 of these episodes was witnessed by EMS and was reported to be full body shaking and unresponsiveness.  Thus they gave the patient 5 mg of IM Versed.  Patient here now is somnolent but will open her eyes to voice and stimulation and will follow commands by squeezing my hands bilaterally.  She volitionally was restricting my opening of her eyes on my initial evaluation.  I do feel that her somnolence is significantly related to the IM Versed that was given.  Per review of her chart she has had multiple similar seizure-like activity episodes and has been evaluated by neurology with overnight EEG monitoring having 2 similar spells that did not correlate to any evidence on the EEG of seizure activity.  This was felt to be nonepileptic pseudoseizures.  She does have an extensive psychiatric history as well.  I did contact the neurology team who came down to evaluate the patient.  There was report of a fall with this episode and thus we did obtain a CT of the head which was unremarkable.  We obtain laboratory studies which were also largely unremarkable as above.  Urine  pregnancy test is negative.  EKG is also without evidence of acute pathology or arrhythmia or any signs of Brugada syndrome, WPW, LVH, or right ventricular dysplasia.  Father reports that this episode is similar to her ongoing prior episodes.  Urine drug screen is positive for diazepam's only which were given to her prior to arrival.  Urinalysis is without UTI or hematuria.  Tylenol and salicylate levels are normal.  Alcohol level is also normal.  Vital signs are within normal limits here and she was febrile.  She was given IV fluids.  On reassessment she is now awake and alert and denying any complaints.  She did not have any focal deficits on reevaluation.  She denies any suicidal ideation or attempts to harm herself.  She states that this was a similar episode to her previous.  She is requesting to go home.  I spoke with the neurology team who evaluated the patient and discussed the plan with both parents as her mother had now arrived as well as the patient.  Neurology felt that the patient was stable for discharge to home and would not start any antiepileptic medication at this time as they again feel this is likely nonepileptic spells.  Patient and her family were in agreement with this plan.  Plan was for the patient to follow-up with neurology for outpatient EEG.  Parents were comfortable with this plan.  They were given return precautions including return of confusion or loss of consciousness, repeat seizure, fever, numbness, tingling, weakness, chest pain, shortness of breath, or any new or worsening concerns.  They voiced understanding and the patient was discharged home in improved condition in their care.    I have reviewed the nursing notes.    I have reviewed the findings, diagnosis, plan and need for follow up with the patient.    Discharge Medication List as of 2/11/2020 12:10 AM          Final diagnoses:   Seizure-like activity (H)   Christiano DUQUE am serving as a trained medical scribe to  document services personally performed by Myrtle Bernardo MD, based on the provider's statements to me.      I, Myrtle Bernardo MD, was physically present and have reviewed and verified the accuracy of this note documented by Christiano Claudio.      2/10/2020   [facility], [place], EMERGENCY DEPARTMENT     Myrtle Bernardo MD  02/16/20 [id number]

## 2020-02-11 NOTE — ED NOTES
Bed: ED37  Expected date:   Expected time:   Means of arrival:   Comments:  H416: 19 F  Multiple seizures, post ictal, 5 of versed given  Yellow

## 2020-02-11 NOTE — CONSULTS
[facility]  Neurology Consultation    Patient Name:  Stephanie Villanueva  MRN:  [id number]    :  2000  Date of Service:  February 10, 2020  Primary care provider:  Michelle Berumen      Neurology consultation service was asked to see Stephanie Villanueva by Dr. Daniel to evaluate for seizure like spell.    History of Present Illness:   Stephanie Villanueva is a 19 year old female with history of depression, personality disorder, POTS, fibromyalgia and nonepileptic spells who presents after a typical spell.  History is obtained from the patient's parents as she does not participate in the exam.  They report that for over a year now she has had at least twice weekly spells that seem to be provoked by light.  She will have generalized twitching or shaking involving the head, face, arms and legs.  Typically she is a little bit altered after the spells.  She has been wearing tinted lenses in her glasses, which seems to help.  Overall, she has been in her usual state of health, sleeping well, no sick symptoms.  Today she came home from visiting friends, and when she got out of her [place], she fell and had a typical spell.  This was witnessed by the [employer]  but not her parents.  [employer]  called EMS.  On arrival she was altered, they got her into the ambulance, and she had another event that lasted 3 minutes, for which she gave her Versed.  At this time, she is quite sleepy.    In review of the chart, it appears that the patient was admitted to our service in 2019 for similar issue.  At that time they reported that she had been having at least biweekly spells since .  The spells could last from 5 to 60 minutes.  They were at that time also provoked by light.  During that admission, she had 2 spells, the first was observed by the admitting physician, the second was observed on video EEG, both of which were nonepileptic.  The chart documents that the patient was not receptive to the diagnosis  of nonepileptic spells.  She was then seen by Dr. Camarillo in January 2020 who continues to suspect nonepileptic events, and plan for an EMU admission which will take place in 3 weeks.    Parents report that she has had no changes to her medications recently.  They have low suspicion for alcohol or drug use.  She is otherwise been in her usual state health.  The only question that the patient will answer for me is that she takes her Ativan PRN very infrequently, not more than once a week.    ROS  A 10-point ROS cannot be obtained due to patient participation.    PM  Past Medical History:   Diagnosis Date     Anxiety october 2013     CONTUSION OF left iliac crest 9/3/2006     Major depression october 2013     Nocturnal enuresis 10/4/2007    Currently resolved.     Salter-Perdomo Type I fracture of distal fibula with routine healing 1/25/2013     Uncomplicated asthma     sports induced     No past surgical history on file.    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  Skin: No rash or lesion.   Psych: Not participative    Neuro:  Mental status: Initially asleep, but then wakes for exam but does not participate. She will only confirm that she takes Ativan infrequently.  Cranial nerves: Eyes conjugate, PERRL, EOMI, blinks to threat, face symmetric.  Motor: Tone within normal. No atrophy or twitches. Arms fall to bed in controlled manner, avoids hitting her face.  Legs fall to bed symmetrically. Purposeful in the UEs.  Reflexes: Normoreflexic and symmetric biceps, patellar, and achilles. Toes down-going. No ankle clonus.  Sensory: Intact to pinch in all four.  Coordination: Cannot assess due to mental status  Gait: Cannot assess due to mental status    Investigations   All labs reviewed  BMP, CBC WNL     Impression and Recommendations  Stephanie Villanueva is a 19 year old female with depression, personality disorder, POTS, fibromyalgia and presumed nonepileptic spells who presents after a typical spell. She had two spells tonight, one witnessed by EMS who administered Versed. By history, this spell is consistent with the nonepileptic events she has dealt with for over one year. Neurologic exam is nonfocal, though it appears there is a volitional avoidance of the exam/examiner and functional inability to follow commands in the UEs. She does wake up to painful stimuli and can answer a question when encouraged. I expect that she will continue to clear from the Versed and be able to be discharged home. I would not recommend admission given that she has a plan in place for EMU admission in three weeks. I discussed this in detail with her parents who appear to understand.     Agree with basic labs, UDS, and head CT given reported fall. No AED at this time, will plan on EMU admission in March.     Thank you for involving neurology in the care of Stephanie Villanueva.  Please do not hesitate to call with questions/concerns.    This note was dictated with Ingris, please excuse any errors in dictation.     Patient was discussed with Dr. Ambrocio.    Thi Quesada PGY4  General Neurology Pager [phone]

## 2020-03-26 NOTE — ADDENDUM NOTE
Encounter addended by: Yaron El MD on: 4/1/2019 9:06 AM   Actions taken: Sign clinical note Health Maintenance Due   Topic Date Due   • Hepatitis B Vaccine (1 of 3 - 3-dose primary series) 2019   • IPV Vaccine (1 of 4 - 4-dose series) 02/22/2020   • HIB Vaccine (1 of 4 - Standard series) 02/22/2020   • Rotavirus Vaccine (1 of 3 - 3-dose series) 02/22/2020   • DTaP/Tdap/Td Vaccine (1 - DTaP) 02/22/2020   • Pneumococcal Vaccine 0-64 (1 of 4) 02/22/2020       Patient is due for the topics as listed above and wishes to proceed with them.     Vaccine Information Statement(s) was given today. This has been reviewed, questions answered, and verbal consent given by Parent for injection(s) and administration of Haemophilus Influenza type b (Hib), Pediarix, Pneumococcal Conjugate (PCV13) and Rotavirus.    Patient tolerated without incident. See immunization grid for documentation.

## 2020-04-02 ENCOUNTER — TELEPHONE (OUTPATIENT)
Dept: CARDIOLOGY | Facility: CLINIC | Age: 20
End: 2020-04-02

## 2020-04-02 NOTE — TELEPHONE ENCOUNTER
This document is supposed to be under  Cardiovascular not under [facility].    -Hermila RODRIGUEZ MA

## 2020-04-02 NOTE — TELEPHONE ENCOUNTER
I called patient and LVM for patient to callback at [phone] to schedule appointment.    -Hermila RODRIGUEZ MA

## 2020-04-09 ENCOUNTER — MYC MEDICAL ADVICE (OUTPATIENT)
Dept: FAMILY MEDICINE | Facility: CLINIC | Age: 20
End: 2020-04-09

## 2020-04-10 NOTE — TELEPHONE ENCOUNTER
Dr. Berumen- should we refer pt back to her HR department? Last I was told, we were not giving these kind of work letters.  DREW Stallworth

## 2020-04-16 ENCOUNTER — TELEPHONE (OUTPATIENT)
Dept: BEHAVIORAL HEALTH | Facility: CLINIC | Age: 20
End: 2020-04-16

## 2020-04-16 ENCOUNTER — HOSPITAL ENCOUNTER (INPATIENT)
Facility: CLINIC | Age: 20
LOS: 4 days | Discharge: HOME | DRG: 897 | End: 2020-04-20
Attending: PSYCHIATRY & NEUROLOGY | Admitting: PSYCHIATRY & NEUROLOGY
Payer: COMMERCIAL

## 2020-04-16 DIAGNOSIS — F12.90 MARIJUANA SMOKER: ICD-10-CM

## 2020-04-16 DIAGNOSIS — F25.9 SCHIZOAFFECTIVE DISORDER, CHRONIC CONDITION WITH ACUTE EXACERBATION (H): ICD-10-CM

## 2020-04-16 DIAGNOSIS — F45.0 SOMATIZATION DISORDER: ICD-10-CM

## 2020-04-16 DIAGNOSIS — F22 PARANOIA (H): ICD-10-CM

## 2020-04-16 DIAGNOSIS — F12.10 CANNABIS ABUSE, CONTINUOUS: ICD-10-CM

## 2020-04-16 DIAGNOSIS — E55.9 VITAMIN D DEFICIENCY: Primary | ICD-10-CM

## 2020-04-16 DIAGNOSIS — F29 PSYCHOSIS, UNSPECIFIED PSYCHOSIS TYPE (H): ICD-10-CM

## 2020-04-16 DIAGNOSIS — F60.3 BORDERLINE PERSONALITY DISORDER (H): Chronic | ICD-10-CM

## 2020-04-16 LAB
ALBUMIN SERPL-MCNC: 4.2 G/DL (ref 3.4–5)
ALP SERPL-CCNC: 80 U/L (ref 40–150)
ALT SERPL W P-5'-P-CCNC: 25 U/L (ref 0–50)
AMPHETAMINES UR QL SCN: NEGATIVE
ANION GAP SERPL CALCULATED.3IONS-SCNC: 8 MMOL/L (ref 3–14)
AST SERPL W P-5'-P-CCNC: 23 U/L (ref 0–45)
BARBITURATES UR QL: NEGATIVE
BASOPHILS # BLD AUTO: 0 10E9/L (ref 0–0.2)
BASOPHILS NFR BLD AUTO: 0.4 %
BENZODIAZ UR QL: NEGATIVE
BILIRUB SERPL-MCNC: 0.9 MG/DL (ref 0.2–1.3)
BUN SERPL-MCNC: 18 MG/DL (ref 7–30)
CALCIUM SERPL-MCNC: 9.4 MG/DL (ref 8.5–10.1)
CANNABINOIDS UR QL SCN: POSITIVE
CHLORIDE SERPL-SCNC: 108 MMOL/L (ref 94–109)
CO2 SERPL-SCNC: 25 MMOL/L (ref 20–32)
COCAINE UR QL: NEGATIVE
CREAT SERPL-MCNC: 0.67 MG/DL (ref 0.52–1.04)
DIFFERENTIAL METHOD BLD: NORMAL
EOSINOPHIL # BLD AUTO: 0.1 10E9/L (ref 0–0.7)
EOSINOPHIL NFR BLD AUTO: 1.1 %
ERYTHROCYTE [DISTWIDTH] IN BLOOD BY AUTOMATED COUNT: 12 % (ref 10–15)
ETHANOL UR QL SCN: NEGATIVE
GFR SERPL CREATININE-BSD FRML MDRD: >90 ML/MIN/{1.73_M2}
GLUCOSE SERPL-MCNC: 77 MG/DL (ref 70–99)
HCG UR QL: NEGATIVE
HCT VFR BLD AUTO: 41.9 % (ref 35–47)
HGB BLD-MCNC: 13.9 G/DL (ref 11.7–15.7)
IMM GRANULOCYTES # BLD: 0 10E9/L (ref 0–0.4)
IMM GRANULOCYTES NFR BLD: 0 %
LYMPHOCYTES # BLD AUTO: 2 10E9/L (ref 0.8–5.3)
LYMPHOCYTES NFR BLD AUTO: 41.6 %
MCH RBC QN AUTO: 30.1 PG (ref 26.5–33)
MCHC RBC AUTO-ENTMCNC: 33.2 G/DL (ref 31.5–36.5)
MCV RBC AUTO: 91 FL (ref 78–100)
MONOCYTES # BLD AUTO: 0.2 10E9/L (ref 0–1.3)
MONOCYTES NFR BLD AUTO: 5.1 %
NEUTROPHILS # BLD AUTO: 2.5 10E9/L (ref 1.6–8.3)
NEUTROPHILS NFR BLD AUTO: 51.8 %
NRBC # BLD AUTO: 0 10*3/UL
NRBC BLD AUTO-RTO: 0 /100
OPIATES UR QL SCN: NEGATIVE
PLATELET # BLD AUTO: 258 10E9/L (ref 150–450)
POTASSIUM SERPL-SCNC: 4.1 MMOL/L (ref 3.4–5.3)
PROT SERPL-MCNC: 7.2 G/DL (ref 6.8–8.8)
RBC # BLD AUTO: 4.62 10E12/L (ref 3.8–5.2)
SODIUM SERPL-SCNC: 141 MMOL/L (ref 133–144)
TSH SERPL DL<=0.005 MIU/L-ACNC: 1.47 MU/L (ref 0.4–4)
WBC # BLD AUTO: 4.7 10E9/L (ref 4–11)

## 2020-04-16 PROCEDURE — 25000132 ZZH RX MED GY IP 250 OP 250 PS 637: Performed by: PSYCHIATRY & NEUROLOGY

## 2020-04-16 PROCEDURE — 87491 CHLMYD TRACH DNA AMP PROBE: CPT | Performed by: PSYCHIATRY & NEUROLOGY

## 2020-04-16 PROCEDURE — 99285 EMERGENCY DEPT VISIT HI MDM: CPT | Mod: Z6 | Performed by: PSYCHIATRY & NEUROLOGY

## 2020-04-16 PROCEDURE — 87591 N.GONORRHOEAE DNA AMP PROB: CPT | Performed by: PSYCHIATRY & NEUROLOGY

## 2020-04-16 PROCEDURE — 81025 URINE PREGNANCY TEST: CPT | Performed by: PSYCHIATRY & NEUROLOGY

## 2020-04-16 PROCEDURE — 99285 EMERGENCY DEPT VISIT HI MDM: CPT | Mod: 25 | Performed by: PSYCHIATRY & NEUROLOGY

## 2020-04-16 PROCEDURE — 80320 DRUG SCREEN QUANTALCOHOLS: CPT | Performed by: PSYCHIATRY & NEUROLOGY

## 2020-04-16 PROCEDURE — 85025 COMPLETE CBC W/AUTO DIFF WBC: CPT | Performed by: PSYCHIATRY & NEUROLOGY

## 2020-04-16 PROCEDURE — 84443 ASSAY THYROID STIM HORMONE: CPT | Performed by: PSYCHIATRY & NEUROLOGY

## 2020-04-16 PROCEDURE — 80053 COMPREHEN METABOLIC PANEL: CPT | Performed by: PSYCHIATRY & NEUROLOGY

## 2020-04-16 PROCEDURE — 25000132 ZZH RX MED GY IP 250 OP 250 PS 637: Performed by: STUDENT IN AN ORGANIZED HEALTH CARE EDUCATION/TRAINING PROGRAM

## 2020-04-16 PROCEDURE — 90791 PSYCH DIAGNOSTIC EVALUATION: CPT

## 2020-04-16 PROCEDURE — 12400001 ZZH R&B MH UMMC

## 2020-04-16 PROCEDURE — 80307 DRUG TEST PRSMV CHEM ANLYZR: CPT | Performed by: PSYCHIATRY & NEUROLOGY

## 2020-04-16 RX ORDER — ALUMINA, MAGNESIA, AND SIMETHICONE 2400; 2400; 240 MG/30ML; MG/30ML; MG/30ML
30 SUSPENSION ORAL EVERY 4 HOURS PRN
Status: DISCONTINUED | OUTPATIENT
Start: 2020-04-16 | End: 2020-04-20 | Stop reason: HOSPADM

## 2020-04-16 RX ORDER — HYDROXYZINE HYDROCHLORIDE 25 MG/1
25-50 TABLET, FILM COATED ORAL
Status: DISCONTINUED | OUTPATIENT
Start: 2020-04-16 | End: 2020-04-20 | Stop reason: HOSPADM

## 2020-04-16 RX ORDER — OLANZAPINE 2.5 MG/1
2.5-1 TABLET, FILM COATED ORAL 3 TIMES DAILY PRN
Status: DISCONTINUED | OUTPATIENT
Start: 2020-04-16 | End: 2020-04-17

## 2020-04-16 RX ORDER — OLANZAPINE 5 MG/1
2.5-1 TABLET ORAL 3 TIMES DAILY PRN
Status: ON HOLD | COMMUNITY
Start: 2020-04-15 | End: 2020-04-20

## 2020-04-16 RX ORDER — ACETAMINOPHEN 325 MG/1
650 TABLET ORAL EVERY 4 HOURS PRN
Status: DISCONTINUED | OUTPATIENT
Start: 2020-04-16 | End: 2020-04-20 | Stop reason: HOSPADM

## 2020-04-16 RX ORDER — HYDROXYZINE HYDROCHLORIDE 25 MG/1
25-50 TABLET, FILM COATED ORAL
COMMUNITY
End: 2021-02-25

## 2020-04-16 RX ORDER — OLANZAPINE 10 MG/1
10 TABLET, ORALLY DISINTEGRATING ORAL ONCE
Status: DISCONTINUED | OUTPATIENT
Start: 2020-04-16 | End: 2020-04-16 | Stop reason: CLARIF

## 2020-04-16 RX ORDER — DESVENLAFAXINE 50 MG/1
150 TABLET, FILM COATED, EXTENDED RELEASE ORAL DAILY
Status: DISCONTINUED | OUTPATIENT
Start: 2020-04-17 | End: 2020-04-20 | Stop reason: HOSPADM

## 2020-04-16 RX ORDER — LORAZEPAM 1 MG/1
1 TABLET ORAL DAILY PRN
Status: DISCONTINUED | OUTPATIENT
Start: 2020-04-16 | End: 2020-04-20 | Stop reason: HOSPADM

## 2020-04-16 RX ORDER — GABAPENTIN 300 MG/1
900 CAPSULE ORAL 3 TIMES DAILY
Status: DISCONTINUED | OUTPATIENT
Start: 2020-04-16 | End: 2020-04-20 | Stop reason: HOSPADM

## 2020-04-16 RX ORDER — DESVENLAFAXINE 50 MG/1
150 TABLET, FILM COATED, EXTENDED RELEASE ORAL DAILY
COMMUNITY

## 2020-04-16 RX ADMIN — GABAPENTIN 900 MG: 300 CAPSULE ORAL at 21:01

## 2020-04-16 ASSESSMENT — ACTIVITIES OF DAILY LIVING (ADL)
TOILETING: 0-->INDEPENDENT
SWALLOWING: 0-->SWALLOWS FOODS/LIQUIDS WITHOUT DIFFICULTY
BATHING: 0-->INDEPENDENT
RETIRED_EATING: 0-->INDEPENDENT
TRANSFERRING: 0-->INDEPENDENT
AMBULATION: 0-->INDEPENDENT
RETIRED_COMMUNICATION: 0-->UNDERSTANDS/COMMUNICATES WITHOUT DIFFICULTY
COGNITION: 0 - NO COGNITION ISSUES REPORTED
DRESS: 0-->INDEPENDENT
NUMBER_OF_TIMES_PATIENT_HAS_FALLEN_WITHIN_LAST_SIX_MONTHS: 10
FALL_HISTORY_WITHIN_LAST_SIX_MONTHS: YES

## 2020-04-16 ASSESSMENT — ENCOUNTER SYMPTOMS
ACTIVITY CHANGE: 1
APPETITE CHANGE: 1
DECREASED CONCENTRATION: 1
SLEEP DISTURBANCE: 1
RESPIRATORY NEGATIVE: 1
GASTROINTESTINAL NEGATIVE: 1
CARDIOVASCULAR NEGATIVE: 1
NERVOUS/ANXIOUS: 1
HALLUCINATIONS: 1
NEUROLOGICAL NEGATIVE: 1
MUSCULOSKELETAL NEGATIVE: 1

## 2020-04-16 NOTE — ED NOTES
"As patient was being transported from the ED to [facility] patient fell in the hallway.  Security yelled for a nurse or doctor, this RN responded finding the patient lying on the floor.  The mother said that this is normal and that she has \"fainting spells.\"  Patient was assisted into a wheelchair and brought into her room in [facility].  Patient does not report any injuries from the fall.  Patient is sitting quietly in recliner and given a blanket.    "
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
## 2020-04-16 NOTE — ED PROVIDER NOTES
[facility] EMERGENCY DEPARTMENT ([facility])    4/16/20        History     Chief Complaint   Patient presents with     Hallucinations     recently used marijuana; auditory and visual hallucinations since tuesday; pt belives someone following; information provided by parent as pt refuses to talk     HPI  Stephanie Villanueva is a 20 year old female with a past medical history pertinent for borderline personality disorder, OCD, depression, childhood SI, POTS, and pseudoseizures who presents to the Emergency Department for for a mental health evaluation, brought in by mother. Patient reports her psychiatrist told her to come in. Patient reports having paranoia, notably that her parents are imposters and she has not been sleeping or eating due to her paranoia. She also admits to smoking THC to help get some sleep. She denies abusing it and only smoked a couple of times past week when I confronted her about mother's account that hse has been smoking a lot of THC. She denies it and denies using other drugs. She is taking her meds as prescribed. Patient is irritable and does not believe that THC contributes to her worse paranoia. She reports that she feels unsafe at home. She is willing to check herself into the hospital. She denies known exposure to known COVID-19 contacts. She has no acute medical concerns presently.    Per chart review, patient's mother had a telephone call yesterday concerned about paranoia and disorganized behavior in the patient for the past 2 days. She states the patient's friend recently disclosed the patient has been using THC heavily for the last month/daily. The patient has been doing unsafe things like walking her service dog outside barefoot, taking down all the mirrors in the house, not eating, and not sleeping much.     Please see DEC Crisis Assessment on 04/16/20 in Epic for further details.    I have reviewed the Medications, Allergies, Past Medical and Surgical History, and Social History  in the Epic system.    PAST MEDICAL HISTORY:   Past Medical History:   Diagnosis Date     Anxiety october 2013     CONTUSION OF left iliac crest 9/3/2006     Major depression october 2013     Nocturnal enuresis 10/4/2007    Currently resolved.     Salter-Perdomo Type I fracture of distal fibula with routine healing 1/25/2013     Uncomplicated asthma     sports induced       PAST SURGICAL HISTORY: History reviewed. No pertinent surgical history.    Past medical history, past surgical history, medications, and allergies were reviewed with the patient.     FAMILY HISTORY:   Family History   Problem Relation Age of Onset     Substance Abuse Maternal Grandfather         alcoholism     Depression Maternal Aunt      Substance Abuse Maternal Aunt         alcoholism     Hypothyroidism Maternal Aunt      Anxiety Disorder Brother      Hypothyroidism Maternal Grandmother        SOCIAL HISTORY:   Social History     Tobacco Use     Smoking status: Never Smoker     Smokeless tobacco: Never Used   Substance Use Topics     Alcohol use: Yes     Comment: sometimes     Social history was reviewed with the patient.       Patient's Medications   New Prescriptions    No medications on file   Previous Medications    COMPRESSION STOCKINGS    Wear as directed    CYANOCOBALAMIN (VITAMIN B-12) 1000 MCG TABLET    Take 1,000 mcg by mouth daily    DESVENLAFAXINE (PRISTIQ) 50 MG 24 HR TABLET    Take 1 tablet (50 mg) by mouth daily    GABAPENTIN (NEURONTIN) 300 MG CAPSULE    Take 3 capsules (900 mg) by mouth 3 times daily    HYDROXYZINE (ATARAX) 25 MG TABLET    Take 1 tablet (25 mg) by mouth every 4 hours as needed for anxiety    IBUPROFEN (ADVIL/MOTRIN) 200 MG TABLET    Take 600 mg by mouth every 4 hours as needed for mild pain (for prevention of ECT headache and joint pain)    LORAZEPAM (ATIVAN) 0.5 MG TABLET    Take 0.5 mg by mouth every 6 hours as needed     LORAZEPAM (ATIVAN) 1 MG TABLET    Take 1 tablet by mouth as needed    MIDODRINE  (PROAMATINE) 2.5 MG TABLET    Take 1 tablet (2.5 mg) by mouth 2 times daily For one week, then increase to 5 mg twice daily    MIDODRINE (PROAMATINE) 5 MG TABLET    Take 1 tablet (5 mg) by mouth 2 times daily    MULTIVITAMIN, THERAPEUTIC (THERA-VIT) TABS TABLET    Take 1 tablet by mouth daily    OLANZAPINE (ZYPREXA) 5 MG TABLET    Take 2.5-10 mg by mouth    ORDER FOR DME    Equipment being ordered: walker    SODIUM CHLORIDE 1 GM TABLET    Take 2 tablets (2 g) by mouth 2 times daily For one week, then increase to 4 grams twice daily    SODIUM CHLORIDE 1 GM TABLET    Take 4 tablets (4 g) by mouth 2 times daily   Modified Medications    No medications on file   Discontinued Medications    No medications on file          Allergies   Allergen Reactions     No Known Drug Allergies         Review of Systems   Constitutional: Positive for activity change and appetite change.   HENT: Negative.    Respiratory: Negative.    Cardiovascular: Negative.    Gastrointestinal: Negative.    Genitourinary: Negative.    Musculoskeletal: Negative.    Neurological: Negative.    Psychiatric/Behavioral: Positive for decreased concentration, hallucinations and sleep disturbance. Negative for suicidal ideas. The patient is nervous/anxious.    All other systems reviewed and are negative.        Physical Exam   BP: 117/85  Pulse: 91  Resp: 12  SpO2: 97 %      Physical Exam  Vitals signs and nursing note reviewed.   HENT:      Head: Normocephalic.      Nose: Nose normal.      Mouth/Throat:      Mouth: Mucous membranes are moist.   Eyes:      Pupils: Pupils are equal, round, and reactive to light.   Neck:      Musculoskeletal: Normal range of motion.   Cardiovascular:      Rate and Rhythm: Normal rate.   Pulmonary:      Effort: Pulmonary effort is normal.   Abdominal:      General: Abdomen is flat.   Musculoskeletal: Normal range of motion.   Skin:     General: Skin is warm.   Neurological:      General: No focal deficit present.      Mental  Status: She is alert.   Psychiatric:         Attention and Perception: She is inattentive.         Mood and Affect: Mood is anxious. Affect is blunt and inappropriate.         Speech: Speech is delayed and tangential.         Behavior: Behavior is slowed and withdrawn.         Thought Content: Thought content is paranoid and delusional.         Cognition and Memory: Cognition normal.         Judgment: Judgment is inappropriate.         ED Course        Procedures               No results found for this or any previous visit (from the past 24 hour(s)).  Medications   OLANZapine zydis (zyPREXA) ODT tab 10 mg (has no administration in time range)             Assessments & Plan (with Medical Decision Making)   Patient with paranoia and delusions that her parents are imposters. She has been avoiding sleep and eating. She has continued to smoke THC. She has limited insight and would benefit from an inpatient stabilization. She is voluntary. She is holdable if she changes her mind due to her impaired insight.    I have reviewed the nursing notes.    I have reviewed the findings, diagnosis, plan and need for follow up with the patient.    New Prescriptions    No medications on file       Final diagnoses:   Paranoia (H)   Marijuana smoker       4/16/2020   [facility], [place], EMERGENCY DEPARTMENT     Brian Frank MD  04/16/20 [id number]

## 2020-04-16 NOTE — ED NOTES
Triage nurse approved mother at bedside.  Charge nurse notified.  Charge talked to mother that upon removal from ED to [facility] she will have to wait in a different location.

## 2020-04-16 NOTE — PHARMACY-ADMISSION MEDICATION HISTORY
Admission medication history for the April 16, 2020 admission is complete.     Interview sources:  Care Everywhere ([facility] - Office Visit notes 4/16/2020, 4/15/2020), patient not interviewed as part of this medication history    Changes made to PTA medication list (reason)  Added:   - olanzapine 2.5-10 mg TID PRN  Deleted:   - lorazepam 0.5 mg q6h PRN (dose adjustment)  - midodrine 2.5 mg BID, midodrine 5 mg BID (notes from 1/2020 indicated this medication was discontinued per pt)  - sodium chloride 1 g tabs: 2 tablets BID, sodium chloride 1 g tabs: 4 tablets BID (notes from 1/2020 indicated this medication was discontinued per pt)  Changed:   - hydroxyzine (updated to reflect 25-50 mg HS PRN dosing per [facility])  - lorazepam (added daily PRN directions)  - desvenlafaxine 50 mg daily-->150 mg daily (per [facility])    Additional medication history information:   - Patient was not interviewed as part of this medication history due to enhanced contact precautions and patient's current condition. Please discuss last doses and OTC/non-prescription medication use that may not be reflected in the list below.    Per [facility] :  - lorazepam 1 mg tabs #15 for 15 days supply filled 4/15/2020  - gabapentin 300 mg caps #270 for 30 days supply last filled 4/1/2020    Prior to Admission medications    Medication Sig Last Dose Taking? Auth Provider   cyanocobalamin (VITAMIN B-12) 1000 MCG tablet Take 1,000 mcg by mouth daily  Yes Unknown, Entered By History   desvenlafaxine (PRISTIQ) 50 MG 24 hr tablet Take 150 mg by mouth daily 4/16/2020 Yes Unknown, Entered By History   gabapentin (NEURONTIN) 300 MG capsule Take 3 capsules (900 mg) by mouth 3 times daily 4/16/2020 Yes Jessica Hernández MD   hydrOXYzine (ATARAX) 25 MG tablet Take 25-50 mg by mouth nightly as needed (sleep)  Yes Unknown, Entered By History   LORazepam (ATIVAN) 1 MG tablet Take 1 tablet by mouth daily as needed for agitation  4/16/2020 Yes  Reported, Patient   OLANZapine (ZYPREXA) 5 MG tablet Take 2.5-10 mg by mouth 3 times daily as needed  4/16/2020 Yes Reported, Patient       Time spent: 30 minutes    Medication history completed by:   Luis Manuel Sena PharmD  [facility]  Emergency Department: [facility]com *[id number]

## 2020-04-17 LAB
C TRACH DNA SPEC QL NAA+PROBE: NEGATIVE
DEPRECATED CALCIDIOL+CALCIFEROL SERPL-MC: 17 UG/L (ref 20–75)
HCV AB SERPL QL IA: NONREACTIVE
HIV 1+2 AB+HIV1 P24 AG SERPL QL IA: NONREACTIVE
N GONORRHOEA DNA SPEC QL NAA+PROBE: NEGATIVE
SPECIMEN SOURCE: NORMAL
SPECIMEN SOURCE: NORMAL
VIT B12 SERPL-MCNC: 632 PG/ML (ref 193–986)

## 2020-04-17 PROCEDURE — 86780 TREPONEMA PALLIDUM: CPT | Performed by: PSYCHIATRY & NEUROLOGY

## 2020-04-17 PROCEDURE — 25000132 ZZH RX MED GY IP 250 OP 250 PS 637: Performed by: STUDENT IN AN ORGANIZED HEALTH CARE EDUCATION/TRAINING PROGRAM

## 2020-04-17 PROCEDURE — 87389 HIV-1 AG W/HIV-1&-2 AB AG IA: CPT | Performed by: PSYCHIATRY & NEUROLOGY

## 2020-04-17 PROCEDURE — 12400001 ZZH R&B MH UMMC

## 2020-04-17 PROCEDURE — 86803 HEPATITIS C AB TEST: CPT | Performed by: PSYCHIATRY & NEUROLOGY

## 2020-04-17 PROCEDURE — 99223 1ST HOSP IP/OBS HIGH 75: CPT | Mod: AI | Performed by: PSYCHIATRY & NEUROLOGY

## 2020-04-17 PROCEDURE — 82306 VITAMIN D 25 HYDROXY: CPT | Performed by: PSYCHIATRY & NEUROLOGY

## 2020-04-17 PROCEDURE — 36415 COLL VENOUS BLD VENIPUNCTURE: CPT | Performed by: PSYCHIATRY & NEUROLOGY

## 2020-04-17 PROCEDURE — 82607 VITAMIN B-12: CPT | Performed by: PSYCHIATRY & NEUROLOGY

## 2020-04-17 RX ORDER — OLANZAPINE 2.5 MG/1
2.5 TABLET, FILM COATED ORAL AT BEDTIME
Status: COMPLETED | OUTPATIENT
Start: 2020-04-17 | End: 2020-04-17

## 2020-04-17 RX ORDER — OLANZAPINE 5 MG/1
5 TABLET ORAL AT BEDTIME
Status: DISCONTINUED | OUTPATIENT
Start: 2020-04-18 | End: 2020-04-20

## 2020-04-17 RX ORDER — VITAMIN B COMPLEX
50 TABLET ORAL DAILY
Status: DISCONTINUED | OUTPATIENT
Start: 2020-04-17 | End: 2020-04-20 | Stop reason: HOSPADM

## 2020-04-17 RX ADMIN — DESVENLAFAXINE SUCCINATE 150 MG: 50 TABLET, EXTENDED RELEASE ORAL at 12:57

## 2020-04-17 RX ADMIN — GABAPENTIN 900 MG: 300 CAPSULE ORAL at 20:51

## 2020-04-17 RX ADMIN — MELATONIN 50 MCG: at 13:35

## 2020-04-17 RX ADMIN — GABAPENTIN 900 MG: 300 CAPSULE ORAL at 08:22

## 2020-04-17 RX ADMIN — GABAPENTIN 900 MG: 300 CAPSULE ORAL at 13:35

## 2020-04-17 RX ADMIN — OLANZAPINE 2.5 MG: 2.5 TABLET, FILM COATED ORAL at 20:51

## 2020-04-17 RX ADMIN — OLANZAPINE 2.5 MG: 2.5 TABLET, FILM COATED ORAL at 08:22

## 2020-04-17 ASSESSMENT — ACTIVITIES OF DAILY LIVING (ADL)
DRESS: SCRUBS (BEHAVIORAL HEALTH);INDEPENDENT
ORAL_HYGIENE: INDEPENDENT
HYGIENE/GROOMING: HANDWASHING;SHOWER;INDEPENDENT
LAUNDRY: WITH SUPERVISION

## 2020-04-17 NOTE — PROGRESS NOTES
Alejandra the supervisor stated that pts's mother can drop off pts sunglasses and  pt can wear them

## 2020-04-17 NOTE — PROGRESS NOTES
"Pt is 20 year old female. Pt was brought in by her mother for evaluation. Pt believes her family are imposters. Pt has hx of borderline personality disorder, OCD, depression, childhood SI, POTS, and pseudoseizures. Pt is very paranoid and thinks people are after her. Per report pt took mirrors at home because she thinks they are portals. Pt endorses passive SI with no plans. Pt reports hearing voices. Pt states she refers the voices as \"frog\". Pt also states she is having visual hallucinations which she says are peripheral ones. During admission interview pt kept looking behind her back suspiciously. Utox positive for cannabinoids. Pt admitted voluntarily.   "

## 2020-04-17 NOTE — PROGRESS NOTES
"    ----------------------------------------------------------------------------------------------------------  [facility], [place]   Psychiatric Progress Note  Hospital Day #1     Interim History:   The patient's care was discussed with the treatment team and chart notes were reviewed.    Sleep:  4.5 hours  Scheduled Medications: Took all  PRN Medications: Took none    Staff Report:  Patient was voluntarily admitted yesterday due to increased paranoia, disorganized behavior, peripheral visual hallucinations, and passive suicidal ideations. During the admission process, patient kept looking behind her with suspicion. There were no incidences overnight.      Patient Interview: Patient was interviewed in conference room via zoom. When she walked in, she looked terrified of the zoom set up on the computer and didn't want to sit directly facing the computer screen. She asked the nurse to move to the head of the table so she could sit on the side instead. She sat away from the computer and was observed to look away from the screen to the left and right during the interview.    Patient states that things got really \"bad\" in the past couple of days. She states that \"I became very paranoid and anxious\". She reports that she would walk her dog, \"edu\" without shoes because she felt that people were trying to track her. She thinks that they have been trying to track her for a while but she just noticed. She describes her anxiety as mostly racing thoughts that wouldn't slow down. She describes her mood now as \"closer to her baseline\". She states that her baseline mood is \"neutral\", not happy and not depressed. She endorses suicidal ideations and when she was asked if she had plans to carry them out, she answered \"sometimes\". Patient states that overnight she woke up, read two books, and fell back to sleep. She thinks that she slept for about 4-5 hours. She states that, \"I don't like to be with my " "thoughts\" so sleep is like an escape for her. Training her dog usually relaxes her enough to sleep. Patient endorses auditory hallucinations that have been happening since she was little, she calls the voice, \"Frog\" and chronic peripheral visual hallucinations which she describes as \"an outline of someone and sometimes, it's just a feeling\". She reports that she doesn't feel safe and is \"struggling\" with a low appetite now and is scared that the food on the unit has been poisoned by anything. The team tried to distract her from being fixated on the poison in food by asking her what her best meal was to see if it was provided on the hospital menu. Patient states that \"I have been here many times. I don't like anything on the menu\". She was encouraged to look through to see if there was any food item she could compromise on.    Patient reports that Olanzapine has been very sedating for her, so the team suggested scheduling it instead at night time, which the patient agreed to.Patient states that she really misses her dog and would like a picture of him. She also requested that her mom drop off her magnesium powder for her. When the team offered to prescribe a new one, She responded that it was a special brand and she wanted that. She was informed that we would try to get in touch with her mom to get a picture and the magnesium.      Per patient's mother interview:  Patient's mother, Deisy, states that a month ago she started to notice some gradual changes in the patient- declining to eat food cooked at home, exercising more, beingeverything happened really suddenly on Tuesday, when they went to get dog food, and the patient suddenly became scared of her. Patient stated that \"I don't trust you and I don't know who you are\". Patient's mother also noticed that patient wasn't able to speak in full sentences and kept backing away from members of her family. She was also very scared to use the phone, so the her mom called " "her psychiatrist who prescribed Olanzapine. Deisy reports that Olanzapine seemed to help patient feel better because she was able to better \"reach\" her.Mother reached out to Stephanie's friend who informed her that patient started using marijuana a month ago. She initially started using it every other day and gradually transitioned to using it daily. Mom states that patient was very anxious about the Covid pandemic and thinks that she stared to use Marijuana to cope.    Deisy states that patient had a diagnosis of depression and anxiety since she was 13 years old and she was doing fine until her second semester at college. She states that patient is bright and a high achiever who maintained a 4.0 GPA in college. During the winter break of 2018, patient \"fell into a depressed slump\" and only got out of it after ECT. She  reports that in the summer of 2019, patient started to experience headaches, fainting spells, and difficulty with walking. 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 Patient was taken to see a functional Neurologist who recommended Magnesium supplements and did a form of EMDR  Which patient really benefited from. Patient's mom states that she is ready to help in any way that might help Stephanie feel better.      The risks, benefits, alternatives and side effects of any medication changes have been discussed and are understood by the patient and other caregivers.    Review of systems:     ROS was negative unless noted above.          Allergies:     Allergies   Allergen Reactions     No Known Drug Allergies             Psychiatric Examination:   BP (P) 120/81 (BP Location: Right arm)   Pulse (P) 90   Temp 98.4  F (36.9  C) (Oral)   Resp (P) 16   SpO2 98%   Weight is 0 lbs 0 oz  There is no height or weight on file to calculate BMI.    Appearance:  awake, alert, moderately grromed and casually dressed  Attitude:  " evasive and guarded  Eye Contact:  poor , patient kept looking from side to side like she was being spoken to from the side.  Mood:  anxious, sad  and scared.  Affect:  looks terrified, mood congruent, intensity is flat and fixed mobility  Speech:  clear, coherent and paucity of speech  Psychomotor Behavior:  fidgeting and physical agitation, no evidence of tardive dyskinesia, dystonia, or tics,   Thought Process:  evidence of thought blocking present and tangental  Associations:  no loose associations  Thought Content:  passive suicidal ideation present, auditory hallucinations present, visual hallucinations present and patient appears to be responding to internal stimuli  Insight:  limited  Judgment:  limited  Oriented to:  Not formally assessed  Attention Span and Concentration:  limited  Recent and Remote Memory:  Not assessed  Language: Able to read and write  Fund of Knowledge: appropriate  Muscle Strength and Tone: normal  Gait and Station: Normal         Labs:     Results for orders placed or performed during the hospital encounter of 04/16/20 (from the past 24 hour(s))   Comprehensive metabolic panel   Result Value Ref Range    Sodium 141 133 - 144 mmol/L    Potassium 4.1 3.4 - 5.3 mmol/L    Chloride 108 94 - 109 mmol/L    Carbon Dioxide 25 20 - 32 mmol/L    Anion Gap 8 3 - 14 mmol/L    Glucose 77 70 - 99 mg/dL    Urea Nitrogen 18 7 - 30 mg/dL    Creatinine 0.67 0.52 - 1.04 mg/dL    GFR Estimate >90 >60 mL/min/[1.73_m2]    GFR Estimate If Black >90 >60 mL/min/[1.73_m2]    Calcium 9.4 8.5 - 10.1 mg/dL    Bilirubin Total 0.9 0.2 - 1.3 mg/dL    Albumin 4.2 3.4 - 5.0 g/dL    Protein Total 7.2 6.8 - 8.8 g/dL    Alkaline Phosphatase 80 40 - 150 U/L    ALT 25 0 - 50 U/L    AST 23 0 - 45 U/L   TSH with free T4 reflex   Result Value Ref Range    TSH 1.47 0.40 - 4.00 mU/L   CBC with platelets differential   Result Value Ref Range    WBC 4.7 4.0 - 11.0 10e9/L    RBC Count 4.62 3.8 - 5.2 10e12/L    Hemoglobin 13.9 11.7  - 15.7 g/dL    Hematocrit 41.9 35.0 - 47.0 %    MCV 91 78 - 100 fl    MCH 30.1 26.5 - 33.0 pg    MCHC 33.2 31.5 - 36.5 g/dL    RDW 12.0 10.0 - 15.0 %    Platelet Count 258 150 - 450 10e9/L    Diff Method Automated Method     % Neutrophils 51.8 %    % Lymphocytes 41.6 %    % Monocytes 5.1 %    % Eosinophils 1.1 %    % Basophils 0.4 %    % Immature Granulocytes 0.0 %    Nucleated RBCs 0 0 /100    Absolute Neutrophil 2.5 1.6 - 8.3 10e9/L    Absolute Lymphocytes 2.0 0.8 - 5.3 10e9/L    Absolute Monocytes 0.2 0.0 - 1.3 10e9/L    Absolute Eosinophils 0.1 0.0 - 0.7 10e9/L    Absolute Basophils 0.0 0.0 - 0.2 10e9/L    Abs Immature Granulocytes 0.0 0 - 0.4 10e9/L    Absolute Nucleated RBC 0.0    Drug abuse screen 6 urine (tox)   Result Value Ref Range    Amphetamine Qual Urine Negative NEG^Negative    Barbiturates Qual Urine Negative NEG^Negative    Benzodiazepine Qual Urine Negative NEG^Negative    Cannabinoids Qual Urine Positive (A) NEG^Negative    Cocaine Qual Urine Negative NEG^Negative    Ethanol Qual Urine Negative NEG^Negative    Opiates Qualitative Urine Negative NEG^Negative   HCG qualitative urine   Result Value Ref Range    HCG Qual Urine Negative NEG^Negative   Neisseria gonorrhoea PCR    Specimen: Urine   Result Value Ref Range    Specimen Descrip Urine     N Gonorrhea PCR Negative NEG^Negative   Chlamydia trachomatis PCR    Specimen: Urine   Result Value Ref Range    Specimen Description Urine     Chlamydia Trachomatis PCR Negative NEG^Negative   HIV Antigen Antibody Combo   Result Value Ref Range    HIV Antigen Antibody Combo Nonreactive NR^Nonreactive       Vitamin D   Result Value Ref Range    Vitamin D Deficiency screening 17 (L) 20 - 75 ug/L   Hepatitis C Screen Reflex to HCV RNA Quant and Genotype   Result Value Ref Range    Hepatitis C Antibody Nonreactive NR^Nonreactive   Vitamin B12   Result Value Ref Range    Vitamin B12 632 193 - 986 pg/mL            Assessment    Principal Diagnosis:   # 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    Secondary psychiatric diagnoses of concern this admission:   - Cannabis use disorder, unspecified severity   - post traumatic stress disorder   - Generalized anxiety disorder  - Autism spectrum disorder by history   - Anorexia Nervousa by history   - Obsessessive compulsive disorder by history   - Conversion disorder by history     Diagnostic Impression:   Stephanie Villanueva is a 20 year old female with prior psychiatric diagnoses of major depressive disorder, bipolar disorder, borderline personality, generalized anxiety, posttraumatic stress, obsessive-compulsive, autism spectrum, anorexia nervousa, conversion and somatization disorders who presents with increasing paranoia, reports of psychotic phenomenology, mood instability and frequent dissociation in the context of stress related to covid 19 pandemic, chronic mental health issues, decreased engagement in outpatient care and increased frequency of cannabis use. Her last psychiatric hospitalization was in March 2019.  Current psychosocial stressors include body image, trauma, chronic mental health issues and job loss and the Covid 19 pandemic which she has been coping with by using substances and withdrawing.  She has continued to be adherent with her medications. Patient's support system includes family, outpatient team and peers.  Substance use does appear to be playing a contributing role in the patient's presentation.  There is genetic loading for mood and CD. Medical history does appear to be significant for fibromyalgia, chronic pain and POTS disease.  The MSE at admission was notable for a patient who is well groomed, initially soft spoken, and guarded with some disorganization of thoughts all of which improved as the interview progressed. She intermittently appeared paranoid though did not appear internally distracted. There is no evidence for SI, HI or dami at this time. She denied self injurious  behaviors.   Given her recent increased use of mood-altering substance like  cannabis this raises suspicion for substance induced psychosis vs mood disorder with psychosis.      Hospital course:   Stephanie Villanueva was admitted to station 20 as a voluntary patient. At admission, a safe and therapeutic milieu was initiated and her home medications were continued with Olazapine switched to a scheduled dose at bedtime instead of as needed.    Discontinued Medications (& Rationale):  None    Medical course:  None    Plan   Today's changes:  -Schedule Olanzapine 5 mg at bedtime  -Continue PTA Magnesium powder supplement  -Allow patient to have sunglasses on unit to block of light triggers for spells.    Psychotropic Medications:  Scheduled Meds:  Pristiq  24 hr tablet 150 mg daily  Gabapentin 900 mg TID  Olanzapine 5 mg QHS    PRN Meds:  Ativan 1 mg daily PRN  Hydroxyzine 25-50 mg at bedtime PRN for sleep       Patient will be treated in therapeutic milieu with appropriate individual and group therapies as described.      Medical diagnoses to be addressed this admission:    None    Chronic Medical Diagnosis:  #POTS  #Non-epileptic seizures  -Continue PTA Magnesium powder supplement    Data: CBC,CMP,TSH wnl, HCG: negative, Drug screen: positive for Cannabis  Consults: None    Legal Status: Voluntary    Safety Assessment:   Behavioral Orders   Procedures     Code 1 - Restrict to Unit     Fall precautions     Routine Programming     As clinically indicated     Seizure precautions     Self Injury Precaution     Status 15     Every 15 minutes.     Suicide precautions     Patients on Suicide Precautions should have a Combination Diet ordered that includes a Diet selection(s) AND a Behavioral Tray selection for Safe Tray - with utensils, or Safe Tray - NO utensils         Disposition: Pending stabilization & development of a safe discharge plan.    Dickson Torres MD, MPH  PGY-1 Psychiatry Resident      Attestation:  4/17/20  I have  reviewed the resident admit note and confirmed by my exam today the HPI, past psych, PMH, ROS, meds, allergies, family and social histories.  I have also reviewed all available labs and vital signs.  I, Shayne Sue, saw and evaluated the patient with the resident physician.  I agree with the findings and plan of care as documented in the resident note.  I have reviewed all labs and vital signs.
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
## 2020-04-17 NOTE — PLAN OF CARE
BEHAVIORAL TEAM DISCUSSION    Participants:  Dr. Sue, Dr. Torres, Dr. Wyman, Saray Rivera RN, Padmini Jenkins MA.LP    Anticipated length of stay:   7-10 days    Continued Stay Criteria/Rationale:   Psychosis, inability to care for self    Medical/Physical:   H/O Pseudo-seizures  Fibromyalgia    Precautions:   Behavioral Orders   Procedures     Code 1 - Restrict to Unit     Fall precautions     Routine Programming     As clinically indicated     Seizure precautions     Self Injury Precaution     Status 15     Every 15 minutes.     Suicide precautions     Patients on Suicide Precautions should have a Combination Diet ordered that includes a Diet selection(s) AND a Behavioral Tray selection for Safe Tray - with utensils, or Safe Tray - NO utensils       Plan:  Patient will have ongoing psychiatric assessment.  Medications will be reviewed and adjusted per MD as indicated.  Outpatient providers will be contacted for care coordination.  Hospital staff will provide a safe environment and a therapeutic milieu. Patient will be encouraged to participate in unit groups and activities.   CTC will continue to assess needs and  ensure appropriate follow up care is in place.       Rationale for change in precautions or plan:   No change in plan/precautions

## 2020-04-17 NOTE — PLAN OF CARE
Problem: General Plan of Care (Inpatient Behavioral)  Goal: Individualization/Patient Specific Goal (IP Behavioral)  Description: The patient and/or their representative will achieve their patient-specific goals related to the plan of care.    The patient-specific goals include:  Flowsheets (Taken 4/17/2020 8887)  Patient Strengths:   Engagement in hobbies, sports, arts, clubs   Stable and supportive family   Utilized support systems   Involved in community   Financial stability   Stable housing   Has vocational interests i.e., hobbies   Adherent to medication regime  Note: Patient's goals:  Not feel paranoid- feel safe    Plan for admission:  1. Stabilization of mood disorder /thought d/o symptoms  2. Safe with self  3. Medication management per MD's  4. Marijuana use addressed  5. Coordination of care with outpatient providers, family  6. Psychiatric follow up care in place

## 2020-04-17 NOTE — PROGRESS NOTES
"Pt was isolated in her room for the entire shift reading books. Pt did not eat breakfast or lunch. Pt affect appeared tense and her mood appeared anxious. Pt endorsed auditory  Hallucinations. Pt reported \"the voices are telling me to self harm\". Pt appeared guarded and withholding information when staff asked how she would harm herself. Pt agreed to be safe in the hospital.            04/17/20 1429   Behavioral Health   Hallucinations auditory   Thinking delusional;paranoid   Orientation person: oriented;place: oriented;date: oriented;time: oriented   Memory baseline memory   Insight poor   Judgement impaired   Eye Contact at examiner   Affect tense   Mood anxious   Physical Appearance/Attire appears stated age;attire appropriate to age and situation   Hygiene neglected grooming - unclean body, hair, teeth   Suicidality other (see comments)  (Denies)   1. Wish to be Dead (Recent) No   2. Non-Specific Active Suicidal Thoughts (Recent) No   3. Active Sucidal Ideation with any Methods (Not Plan) Without Intent to Act (Recent) No   4. Active Suicidal Ideation with Some Intent to Act, Without Specific Plan (Recent) No   5. Active Suicidal Ideation with Specific Plan and Intent (Recent) No   Self Injury other (see comment)  (None Observed)   Activity withdrawn;isolative   Speech clear;coherent   Medication Sensitivity no stated side effects;no observed side effects   Psychomotor / Gait balanced;steady   Coping/Psychosocial   Verbalized Emotional State anxiety   Activities of Daily Living   Hygiene/Grooming handwashing;shower;independent   Oral Hygiene independent   Dress scrubs (behavioral health);independent   Laundry with supervision   Room Organization independent     "

## 2020-04-17 NOTE — PROGRESS NOTES
Diagnosis/Procedure:   Patient Active Problem List   Diagnosis     Major depression     Generalized anxiety disorder     Social anxiety disorder     Disorder of thyroid     Self-injurious behavior     Deliberate self-cutting     Suicide attempt (H)     Mild intermittent asthma     Iron deficiency anemia     BAILEY (generalized anxiety disorder)     Borderline personality disorder (H)     Social phobia     Panic disorder with agoraphobia     Elevated TSH     OCD (obsessive compulsive disorder)     Nocturnal enuresis     Overdose     Depression     Severe recurrent major depression without psychotic features (H)     Suicidal ideation     Spell of altered consciousness     Psychosis (H)       Work Completed:                          The patient's past history was discussed with the                           treatment team and chart notes were reviewed.                             Patient met with team. Initial psycho-social                                     assessment, treatment plan goals and team                                  meeting completed.    Discharge plan or goal:   Return home when stable. Consider day treatment referral                Barriers to discharge:               Current psychosis- medication management

## 2020-04-17 NOTE — PROGRESS NOTES
"CLINICAL NUTRITION SERVICES - ASSESSMENT NOTE     Nutrition Prescription    RECOMMENDATIONS FOR MDs/PROVIDERS TO ORDER:  Obtain admit height and weight   Offer packaged items with meals and snacks    Malnutrition Status:    Unable to determine due to no NFPE completed, no admit weight taken    Recommendations already ordered by Registered Dietitian (RD):  Allow write ins: tofu and peanut butter and jelly sandwiches     Future/Additional Recommendations:  Monitor intakes and weight  Follow up re snacks if accepted by pt     REASON FOR ASSESSMENT  Stephanie Villanueva is a 20 year old female assessed by the dietitian for Patient/Family Request-\"patient with history of anorexia nervousa, prior treatment at [facility]. Reports increased restriction in the past 2 months. Reports she would like help with this.\"    PMH significant for borderline personality disorder, OCD, depression, anxiety, PTSD, autism spectrum, anorexia nervosa, POTS and pseudoseizures admitted for hallucinations.   NUTRITION HISTORY  Unable to obtain in-person nutrition history or nutrition focused physical assessment (NFPA) from patient to limit exposure and to minimize use of PPE during COVID-19 pandemic. Spoke to pt over the phone. Reports decreased appetite for 2 months d/t restricting 2/2 stress. Pt reports possible weight loss but unsure. Stated UBW ~148-150#, no admit weight to assess changes. Of note pt follows a vegan diet and reports lactose intolerance. Eats beans and tofu at home for protein.     CURRENT NUTRITION ORDERS  Diet: Vegan  Intake/Tolerance: Reports continued poor intakes since admit. Worried about food being contaminated. Unable to offer kosher trays or supplements (both are packaged) as they are not vegan. Attempted to offer other packaged food items however pt reports being too stressed to talk about food at this time.  Unwilling to talk about preferences/snacks at this time.     LABS  Labs reviewed    MEDICATIONS  Medications " "reviewed:  PRN: maalox, MOM      ANTHROPOMETRICS  Height: 177.8 cm (5'10\")-from 9/16/19   Most Recent Weight:   67.5 kg (148 lb 12.8 oz)-from 12/31/19  IBW: 68.2 kg  BMI:21.4kg/m^2,  Normal BMI (using most recent height/weight)   Weight History: WXO=323-634# per pt report  Wt Readings from Last 10 Encounters:   12/31/19 67.5 kg (148 lb 12.8oz)   09/16/19 68 kg (150 lb)   09/11/19 65.8 kg (145 lb)   09/09/19 65.8 kg (145 lb)   08/26/19 66 kg (145 lb 9.6 oz)   08/13/19 66 kg (145 lb 9.6 oz)   07/01/19 64.4 kg (142 lb)   07/01/19 64.9 kg (143 lb)   06/06/19 64.3 kg (141 lb 11.2 oz)   03/24/19 64.7 kg (142 lb 10.2 oz)   03/13/19 62.1 kg (137 lb)     Dosing Weight: 67.5 kg (most recent weight available)    ASSESSED NUTRITION NEEDS  Estimated Energy Needs: 9670-7262 kcals/day (25 - 30 kcals/kg)  Justification: Maintenance  Estimated Protein Needs: 54-68 grams protein/day (0.8 - 1 grams of pro/kg)  Justification: Maintenance  Estimated Fluid Needs: 2824-4859 mL/day (1 mL/kcal)   Justification: Maintenance or Per provider pending fluid status    PHYSICAL FINDINGS  See malnutrition section below.  Unable to assess     MALNUTRITION  % Intake: Decreased intake does not meet criteria  % Weight Loss: Unable to assess, no admit wt taken  Subcutaneous Fat Loss: Unable to assess  Muscle Loss: Unable to assess  Fluid Accumulation/Edema: None noted  Malnutrition Diagnosis: Unable to determine due to no NFPE completed, no admit weight taken    NUTRITION DIAGNOSIS  Inadequate oral intake related to loss of appetite 2/2 stress and fear of eating \"contaminated\" food as evidenced by pt report and documented intakes      INTERVENTIONS  Implementation  Nutrition Education: Patient declined   Offer packaged items with meals and snacks    Allow write ins: tofu and peanut butter and jelly sandwiches     Goals  Patient to consume % of nutritionally adequate meal trays TID, or the equivalent with supplements/snacks.   "   Monitoring/Evaluation  Progress toward goals will be monitored and evaluated per protocol.    Brissa Gleason MS, RD, LDN  Unit Pager [phone]

## 2020-04-17 NOTE — PROGRESS NOTES
Pt was standing in from of desk and dropped to floor.Pt vitals taken.  Pt did not hit her head.  Denied any injuries.  Asked pt what happed.  She said was her POTS.  Pt currently laying in bed.

## 2020-04-17 NOTE — PROGRESS NOTES
04/17/20 1247   General Information   Has Not Attended OT as of: 04/17/20     OT staff met with pt this morning to establish therapeutic rapport. Appeared anxious and guarded - hypervigilant; quick to startle by environmental noises. Minimal eye contact and short responses. Reviewed the role of occupational therapy and explained the value of having them involved in their treatment plan including options to meet current needs/self-identified goals. Continue to assess.     Naz Escobar OT on 4/17/2020 at 12:53 PM

## 2020-04-17 NOTE — PROGRESS NOTES
INITIAL PSYCHOSOCIAL ASSESSMENT   I have reviewed the chart met with the patient, and developed Care Plan.  Information for assessment was obtained from: Patient/patient chart    Presenting Problem:   The patient is a 20 The patient is a 20 year old female with prior psychiatric diagnoses of major depressive, bipolar disorder, borderline personality, generalized anxiety, posttraumatic stress, obsessive-compulsive, autism spectrum, anorexia nervousa, admitted from the  ED due to concern for increased disorganization, paranoia, visual and auditory hallucinations as well as disrupted sleep in the context of stress related to Covid 19 pandemic, cannabis use and chronic mental health issues. Over the past couple of days patient has had increased disorganization and paranoia as evident by the patient not recognizing her family and thinking they are impostors and removing all the mirrors on the walls claiming they are portals to other worlds.  She acknowledged having visual and auditory hallucinations and claimed that others are following her with the intention of harming her.   The following areas have been assessed:  History of Mental Health and Chemical Dependency:  Patient has had numerous previous (~12) psychiatric admission to [facility]- going back to 2013- most recently in 3/2019.     Previous diagnoses are:  BPD, BAILEY, MDD, and OCD, Anorexia and ASD.  Patient has a h/o 3-4 past suicide attempts via right eye- most recently 1/2019.  She has a significant  past h/o SIB- cutting - none recently    Patient has been using marijuana daily-5-6 x/day.  She denies any other substance use. No history of CD treatment.        Family Description (Constellation, Family Psychiatric History):  Patient was born/raised in [place] in an intact family. Father is , mother is .  Patient is the eldest of 3 with younger sister, brother. 14 yo sister is transgender - female to male.  Pt lives with her parents and a 17 year ols  brother and a 12 year old sister.  She reports no family history of substance abuse problems or mental health problems.     Significant Life Events (Illness, Abuse, Trauma, Death):  Patient denies any history of trauma, or abuse.     Living Situation:  Patient lives with her parents and 2 siblings.     Educational Background:  High School Graduate, [employer].  Patient started college at [employer] in [place] in Fall 2018. Attended for 1.5 years.      Occupational History:  Patient is presently not employed, Patient had been working as a  at a [employer] prior to Covid-19. Previously has worked  as a .     Financial Status:  Parents have been financially supportive     Legal Issues:  None     Ethnic/Cultural Issues:  / female     Spiritual Orientation:  Lutheran      Service History:  None     Social Functioning (organization, interests):  Enjoys rock climbing and Running.     Current Health Care Providers  Medication Management: Ashley Elias CNP @ [facility] - [phone]  Therapist: Ranjana Sun- [phone]  Primary Care: Provider: Dr. Jose Rodríguez [address] [address] [phone]     Social Service Assessment/Plan:  Patient will have ongoing psychiatric assessment.  Medications will be reviewed and adjusted per MD as indicated.  Outpatient providers will be contacted for care coordination.  Hospital staff will provide a safe environment and a therapeutic milieu. Patient will be encouraged to participate in unit groups and activities.   CTC will continue to assess needs and  ensure appropriate follow up care is in place.

## 2020-04-18 LAB — T PALLIDUM AB SER QL: NONREACTIVE

## 2020-04-18 PROCEDURE — 25000132 ZZH RX MED GY IP 250 OP 250 PS 637: Performed by: STUDENT IN AN ORGANIZED HEALTH CARE EDUCATION/TRAINING PROGRAM

## 2020-04-18 PROCEDURE — 12400001 ZZH R&B MH UMMC

## 2020-04-18 RX ADMIN — GABAPENTIN 900 MG: 300 CAPSULE ORAL at 08:56

## 2020-04-18 RX ADMIN — OLANZAPINE 5 MG: 5 TABLET, FILM COATED ORAL at 20:22

## 2020-04-18 RX ADMIN — MELATONIN 50 MCG: at 08:56

## 2020-04-18 RX ADMIN — GABAPENTIN 900 MG: 300 CAPSULE ORAL at 20:22

## 2020-04-18 RX ADMIN — GABAPENTIN 900 MG: 300 CAPSULE ORAL at 14:34

## 2020-04-18 RX ADMIN — LORAZEPAM 1 MG: 1 TABLET ORAL at 12:56

## 2020-04-18 RX ADMIN — DESVENLAFAXINE SUCCINATE 150 MG: 50 TABLET, EXTENDED RELEASE ORAL at 14:34

## 2020-04-18 ASSESSMENT — ACTIVITIES OF DAILY LIVING (ADL)
HYGIENE/GROOMING: INDEPENDENT
ORAL_HYGIENE: INDEPENDENT
LAUNDRY: WITH SUPERVISION
DRESS: STREET CLOTHES

## 2020-04-18 NOTE — PROGRESS NOTES
04/18/20 1503   Significant Event   Significant Event Other (see comments)  (Shift Summary)   Patient had a quiet shift.    Patient did not require seclusion/restraints to manage behavior.    Stephanie Villanueva did not participate in groups and was not visible in the milieu.    Visitors during this shift included none.     Other information about this shift: Pt. mentioned that she did some scratching on her hand. She also mentioned that she was afraid of people on the unit, thinking that they are out to get her.  She was hearing voices. She did not eat her breakfast and lunch. She only had a cup of water with a lemon in it. She did take a shower. She was mostly isolative in her room reading a book.

## 2020-04-18 NOTE — PROGRESS NOTES
"After receiving a phone call, returned to room and place seizure mats in front of the window and barricaded the door with another seizure mat.  Found sitting in fetal postion on the floor rocking back and forth, crying with rambling conversation and hyper ventilating.  Making statements \"I'm scared\", \" they were here to get me\" \"they were bloody\", \"cover the mirror\". At times would stop mumbling and hit left side of her head stating \" stop, stop\", then intensely look around her room.  Medicated with Lorazepam 1 mg. After approximately 10 minutes was able to calm self down.  States \"I'm just going to read now\". I currently sitting on the floor in front of her bathroom reading.   "

## 2020-04-18 NOTE — PROGRESS NOTES
Brief nutrition note  Pt requesting to speak with RD. Requesting boost with meals. Informed pt that Boost is not vegan, pt ok with this and wishes to receive one boost (chocolate or vanilla) TID.    Brissa Gleason MS, RD, LDN  Unit Pager [phone]

## 2020-04-18 NOTE — PROGRESS NOTES
"Pt was isolative to her room reading the majority of the shift. She declined to come out to groups. She did not eat dinner and drank approximately 8 ounce of water. She told writer that she is not eating because she feels that it is \"posion\". She rates her depression at 5/10 and her anxiety 9/10.   "

## 2020-04-19 PROCEDURE — 25000132 ZZH RX MED GY IP 250 OP 250 PS 637: Performed by: STUDENT IN AN ORGANIZED HEALTH CARE EDUCATION/TRAINING PROGRAM

## 2020-04-19 PROCEDURE — 12400001 ZZH R&B MH UMMC

## 2020-04-19 RX ADMIN — GABAPENTIN 900 MG: 300 CAPSULE ORAL at 08:38

## 2020-04-19 RX ADMIN — GABAPENTIN 900 MG: 300 CAPSULE ORAL at 20:36

## 2020-04-19 RX ADMIN — LORAZEPAM 1 MG: 1 TABLET ORAL at 12:17

## 2020-04-19 RX ADMIN — GABAPENTIN 900 MG: 300 CAPSULE ORAL at 14:44

## 2020-04-19 RX ADMIN — MELATONIN 50 MCG: at 08:38

## 2020-04-19 RX ADMIN — DESVENLAFAXINE SUCCINATE 150 MG: 50 TABLET, EXTENDED RELEASE ORAL at 14:44

## 2020-04-19 RX ADMIN — OLANZAPINE 5 MG: 5 TABLET, FILM COATED ORAL at 20:37

## 2020-04-19 ASSESSMENT — ACTIVITIES OF DAILY LIVING (ADL)
DRESS: STREET CLOTHES;INDEPENDENT
LAUNDRY: WITH SUPERVISION
HYGIENE/GROOMING: INDEPENDENT
ORAL_HYGIENE: INDEPENDENT

## 2020-04-19 NOTE — PROGRESS NOTES
Patient was having 4pm vitals taken in her room. Psych  yelled down the hallway for patient's nurse (this writer) to come assist in patient's room. When nurse arrived in the room, patient was sitting on the floor after reportedly falling according to psych . Nurse and psych associate assisted patient to her feet and helped her sit on the edge of her bed and took her vitals (see associated flow sheet values). Patient did not hit head. Patient was no injured. Patient has history of POTS. Patient returned to sitting on bed and reading book.

## 2020-04-20 ENCOUNTER — HOSPITAL ENCOUNTER (INPATIENT)
Facility: CLINIC | Age: 20
LOS: 2 days | Discharge: HOME | DRG: 883 | End: 2020-04-23
Attending: INTERNAL MEDICINE | Admitting: INTERNAL MEDICINE
Payer: COMMERCIAL

## 2020-04-20 VITALS
OXYGEN SATURATION: 98 % | DIASTOLIC BLOOD PRESSURE: 78 MMHG | RESPIRATION RATE: 16 BRPM | TEMPERATURE: 98.3 F | SYSTOLIC BLOOD PRESSURE: 118 MMHG | HEART RATE: 125 BPM

## 2020-04-20 PROBLEM — E16.2 HYPOGLYCEMIA: Status: ACTIVE | Noted: 2020-04-20

## 2020-04-20 LAB
GLUCOSE BLDC GLUCOMTR-MCNC: 37 MG/DL (ref 70–99)
GLUCOSE BLDC GLUCOMTR-MCNC: 42 MG/DL (ref 70–99)
GLUCOSE BLDC GLUCOMTR-MCNC: 44 MG/DL (ref 70–99)
GLUCOSE BLDC GLUCOMTR-MCNC: 47 MG/DL (ref 70–99)
GLUCOSE BLDC GLUCOMTR-MCNC: 59 MG/DL (ref 70–99)
GLUCOSE BLDC GLUCOMTR-MCNC: 60 MG/DL (ref 70–99)
GLUCOSE BLDC GLUCOMTR-MCNC: 68 MG/DL (ref 70–99)
GLUCOSE BLDC GLUCOMTR-MCNC: 77 MG/DL (ref 70–99)
GLUCOSE BLDC GLUCOMTR-MCNC: 84 MG/DL (ref 70–99)
GLUCOSE BLDC GLUCOMTR-MCNC: 94 MG/DL (ref 70–99)
GLUCOSE SERPL-MCNC: 51 MG/DL (ref 70–99)

## 2020-04-20 PROCEDURE — 99219 ZZC INITIAL OBSERVATION CARE,LEVL II: CPT | Performed by: INTERNAL MEDICINE

## 2020-04-20 PROCEDURE — 82947 ASSAY GLUCOSE BLOOD QUANT: CPT | Performed by: STUDENT IN AN ORGANIZED HEALTH CARE EDUCATION/TRAINING PROGRAM

## 2020-04-20 PROCEDURE — 00000146 ZZHCL STATISTIC GLUCOSE BY METER IP

## 2020-04-20 PROCEDURE — 25000128 H RX IP 250 OP 636: Performed by: STUDENT IN AN ORGANIZED HEALTH CARE EDUCATION/TRAINING PROGRAM

## 2020-04-20 PROCEDURE — 36415 COLL VENOUS BLD VENIPUNCTURE: CPT | Performed by: STUDENT IN AN ORGANIZED HEALTH CARE EDUCATION/TRAINING PROGRAM

## 2020-04-20 PROCEDURE — 25800030 ZZH RX IP 258 OP 636: Performed by: INTERNAL MEDICINE

## 2020-04-20 PROCEDURE — G0378 HOSPITAL OBSERVATION PER HR: HCPCS

## 2020-04-20 PROCEDURE — 25000132 ZZH RX MED GY IP 250 OP 250 PS 637: Performed by: STUDENT IN AN ORGANIZED HEALTH CARE EDUCATION/TRAINING PROGRAM

## 2020-04-20 PROCEDURE — G0177 OPPS/PHP; TRAIN & EDUC SERV: HCPCS

## 2020-04-20 PROCEDURE — 99232 SBSQ HOSP IP/OBS MODERATE 35: CPT | Mod: GC | Performed by: PSYCHIATRY & NEUROLOGY

## 2020-04-20 RX ORDER — LIDOCAINE 40 MG/G
CREAM TOPICAL
Status: DISCONTINUED | OUTPATIENT
Start: 2020-04-20 | End: 2020-04-20 | Stop reason: HOSPADM

## 2020-04-20 RX ORDER — CHOLECALCIFEROL (VITAMIN D3) 50 MCG
50 TABLET ORAL DAILY
Start: 2020-04-21 | End: 2021-02-25

## 2020-04-20 RX ORDER — OLANZAPINE 10 MG/1
10 TABLET ORAL AT BEDTIME
Start: 2020-04-20

## 2020-04-20 RX ORDER — NICOTINE POLACRILEX 4 MG
15-30 LOZENGE BUCCAL
Status: DISCONTINUED | OUTPATIENT
Start: 2020-04-20 | End: 2020-04-20 | Stop reason: HOSPADM

## 2020-04-20 RX ORDER — DEXTROSE MONOHYDRATE 25 G/50ML
25-50 INJECTION, SOLUTION INTRAVENOUS
Status: DISCONTINUED | OUTPATIENT
Start: 2020-04-20 | End: 2020-04-20 | Stop reason: HOSPADM

## 2020-04-20 RX ORDER — DEXTROSE MONOHYDRATE 25 G/50ML
25-50 INJECTION, SOLUTION INTRAVENOUS
Status: DISCONTINUED | OUTPATIENT
Start: 2020-04-20 | End: 2020-04-23 | Stop reason: HOSPADM

## 2020-04-20 RX ORDER — NALOXONE HYDROCHLORIDE 0.4 MG/ML
.1-.4 INJECTION, SOLUTION INTRAMUSCULAR; INTRAVENOUS; SUBCUTANEOUS
Status: DISCONTINUED | OUTPATIENT
Start: 2020-04-20 | End: 2020-04-23 | Stop reason: HOSPADM

## 2020-04-20 RX ORDER — NICOTINE POLACRILEX 4 MG
15-30 LOZENGE BUCCAL
Status: DISCONTINUED | OUTPATIENT
Start: 2020-04-20 | End: 2020-04-23 | Stop reason: HOSPADM

## 2020-04-20 RX ORDER — DESVENLAFAXINE 50 MG/1
150 TABLET, FILM COATED, EXTENDED RELEASE ORAL DAILY
Status: DISCONTINUED | OUTPATIENT
Start: 2020-04-21 | End: 2020-04-23 | Stop reason: HOSPADM

## 2020-04-20 RX ORDER — OLANZAPINE 10 MG/1
10 TABLET ORAL AT BEDTIME
Status: DISCONTINUED | OUTPATIENT
Start: 2020-04-20 | End: 2020-04-20 | Stop reason: HOSPADM

## 2020-04-20 RX ORDER — ONDANSETRON 2 MG/ML
4 INJECTION INTRAMUSCULAR; INTRAVENOUS EVERY 6 HOURS PRN
Status: DISCONTINUED | OUTPATIENT
Start: 2020-04-20 | End: 2020-04-23 | Stop reason: HOSPADM

## 2020-04-20 RX ORDER — DEXTROSE MONOHYDRATE 50 MG/ML
INJECTION, SOLUTION INTRAVENOUS CONTINUOUS
Status: DISCONTINUED | OUTPATIENT
Start: 2020-04-20 | End: 2020-04-20 | Stop reason: HOSPADM

## 2020-04-20 RX ORDER — GABAPENTIN 300 MG/1
900 CAPSULE ORAL 3 TIMES DAILY
Status: DISCONTINUED | OUTPATIENT
Start: 2020-04-21 | End: 2020-04-23 | Stop reason: HOSPADM

## 2020-04-20 RX ORDER — HYDROXYZINE HYDROCHLORIDE 25 MG/1
25-50 TABLET, FILM COATED ORAL
Status: DISCONTINUED | OUTPATIENT
Start: 2020-04-20 | End: 2020-04-23 | Stop reason: HOSPADM

## 2020-04-20 RX ORDER — DEXTROSE MONOHYDRATE 50 MG/ML
INJECTION, SOLUTION INTRAVENOUS CONTINUOUS
Status: DISCONTINUED | OUTPATIENT
Start: 2020-04-20 | End: 2020-04-21

## 2020-04-20 RX ORDER — OLANZAPINE 5 MG/1
10 TABLET ORAL AT BEDTIME
Status: DISCONTINUED | OUTPATIENT
Start: 2020-04-21 | End: 2020-04-23 | Stop reason: HOSPADM

## 2020-04-20 RX ORDER — LORAZEPAM 1 MG/1
1 TABLET ORAL DAILY PRN
Status: DISCONTINUED | OUTPATIENT
Start: 2020-04-20 | End: 2020-04-23 | Stop reason: HOSPADM

## 2020-04-20 RX ORDER — ONDANSETRON 4 MG/1
4 TABLET, ORALLY DISINTEGRATING ORAL EVERY 6 HOURS PRN
Status: DISCONTINUED | OUTPATIENT
Start: 2020-04-20 | End: 2020-04-23 | Stop reason: HOSPADM

## 2020-04-20 RX ORDER — VITAMIN B COMPLEX
50 TABLET ORAL DAILY
Status: DISCONTINUED | OUTPATIENT
Start: 2020-04-21 | End: 2020-04-23 | Stop reason: HOSPADM

## 2020-04-20 RX ADMIN — GABAPENTIN 900 MG: 300 CAPSULE ORAL at 14:51

## 2020-04-20 RX ADMIN — GABAPENTIN 900 MG: 300 CAPSULE ORAL at 09:08

## 2020-04-20 RX ADMIN — DEXTROSE MONOHYDRATE: 50 INJECTION, SOLUTION INTRAVENOUS at 22:29

## 2020-04-20 RX ADMIN — MELATONIN 50 MCG: at 09:08

## 2020-04-20 RX ADMIN — OLANZAPINE 10 MG: 10 TABLET, FILM COATED ORAL at 21:58

## 2020-04-20 RX ADMIN — DESVENLAFAXINE SUCCINATE 150 MG: 50 TABLET, EXTENDED RELEASE ORAL at 14:51

## 2020-04-20 RX ADMIN — GABAPENTIN 900 MG: 300 CAPSULE ORAL at 21:58

## 2020-04-20 RX ADMIN — DEXTROSE MONOHYDRATE: 50 INJECTION, SOLUTION INTRAVENOUS at 21:26

## 2020-04-20 RX ADMIN — GLUCAGON HYDROCHLORIDE 1 MG: KIT at 17:15

## 2020-04-20 RX ADMIN — GLUCAGON HYDROCHLORIDE 1 MG: KIT at 16:29

## 2020-04-20 NOTE — PROGRESS NOTES
04/20/20 1347   General Information   Date Initially Attended OT 04/20/20     Attended 1/3 occupational therapy groups offered this date. Overall content, somewhat guarded, with restricted affect. Appeared less paranoid/hypervigilant compared to last week.   Multi-step creative expressions craft for increased motivation, coping with symptoms, socialization, and clinical observation. Pt Response: I to initiate, make decisions, sequence and adjust to workspace demands as needed. Utilized verbal and visual instructions for direction. Demonstrated adequate focus, planning, attention to detail, and problem solving. Continue to assess.      OT staff will meet with pt to review the role of occupational therapy and explain the value of having them involved in their treatment plan including options to meet current needs/self-identified goals. As group attendance is established,  continued clinical observations will be made and Pt will be given self-assessment to inform OT initial assessment.

## 2020-04-20 NOTE — PLAN OF CARE
"Pt was mostly isolative and withdrawn to room most of evening shift. She sat up or laid in bed reading books. She did emerge from her room several times for requests such as bacitracin for scratches on her arms, to take/make phone calls to her family members, and to request hair conditioner be brought from her home due to scalp sensitivity to unit conditioner.     She did not socialize with other patients while out of her room, and when she spoke with nursing staff, she spoke minimally and softly, with limited eye contact. She often appeared anxious and on edge. She stated that she spends most of her time reading because it helps keep\"the voices and paranoia\" away. She also shared that talking about the voices and paranoid thoughts makes them worse for her and increases her anxiety.     She requested her HS medications around 8:40pm and returned to her room to read for the rest of the shift.   "

## 2020-04-20 NOTE — PROGRESS NOTES
The pt was withdrawn and isolative. She didn't eat breakfast, but came out and looked at her lunch but didn't eat. She did shower, and is reading in her room. She is guarded and appears paranoid. SHe denies SI and hallucinations, but when she denies SIB urges, she avoided all eye contact with this writer. She did attend a group in the morning, but was otherwise unsocial. She didn't spend any time out of her room outside of meeting with the teram and attending to ADLS.     04/20/20 1353   Behavioral Health   Hallucinations denies / not responding to hallucinations   Thinking paranoid;distractable   Orientation person: oriented;place: oriented;date: oriented;time: oriented   Memory baseline memory   Insight poor   Judgement impaired   Eye Contact out of corner of eyes   Affect blunted, flat;tense   Mood anxious   Physical Appearance/Attire appears stated age   Hygiene well groomed   Suicidality other (see comments)  (pt denies)   1. Wish to be Dead (Recent) No   2. Non-Specific Active Suicidal Thoughts (Recent) No   Self Injury other (see comment)  (pt denies, but does so guardedly without eye contact)   Elopement   (nothing to report)   Activity isolative;withdrawn   Speech clear;coherent   Medication Sensitivity no stated side effects;no observed side effects

## 2020-04-20 NOTE — PROGRESS NOTES
"    ----------------------------------------------------------------------------------------------------------  [facility], [place]   Psychiatric Progress Note  Hospital Day #4     Interim History:   The patient's care was discussed with the treatment team and chart notes were reviewed.    Sleep: 7 hours (04/20/20 0600)  Scheduled Medications: Took all  PRN Medications: Took none    Staff Report:  During the weekend, patient stayed mostly in her room reading and declined to eat moist of her meals.  Patient made statements like \"I am scared\", \"They were here to get me\" and would hit her head many times trying to get the voices to stop. She had to be given 1 mg Lorazepam to help her feel calmer. Patient reported making scratches on her hand and requested bacitracin ointment for them. Patient constantly appears anxious and reports that reading helps her to cope with the voices she hears in her head.    Patient Interview: Patient was interviewed in conference room via zoom. When she walked in, she looked less anxious and terrified of the computer. She states that she is okay and that her night was okay too. She describes her mood as \"neutral\" and reported an episode she had over the weekend where she wasn't able to differentiate what was reality from a delusion. She reports that Lorazepam was helpful for that episode as it helped to calm her down. She states that Olanzapine has been helpful for the voices she hears and it helps her thoughts to feel clearer. She denies any medication side effects. She states that she has not been eating because \"she is scared that it would add more stress and lead to more psychotic symptoms\". Patient declined eating sealed foods or taking a boost drink. Close to the end of the interview, patient asks, \"why is everything happening\" and the team explained that different things ranging from substance use, stressors, and medication non-adherence could induce the " "symptoms she was experiencing. She also wanted to know when she would be able to go home since she was running out of [employer] books to read. Patient was encouraged to ask the unit for more books which she didn't seem willing to do so she was encouraged to bring the issue up with the team when she had read through all the [employer] books. Patient asked to see her to dog and was informed that the team could help set up a  Video conference call with her family so she could see her dog. Patient denies suicidal ideations.      The risks, benefits, alternatives and side effects of any medication changes have been discussed and are understood by the patient and other caregivers.    Review of systems:     ROS was negative unless noted above.          Allergies:     Allergies   Allergen Reactions     No Known Drug Allergies             Psychiatric Examination:   /75 (BP Location: Left arm)   Pulse 120   Temp 98.1  F (36.7  C) (Oral)   Resp 16   SpO2 98%   Weight is 0 lbs 0 oz  There is no height or weight on file to calculate BMI.    Appearance:  awake, alert, moderately grromed and casually dressed wearing tinted glasses  Attitude:  evasive and guarded  Eye Contact:  Fair   Mood:  \"Neutral\"  Affect:  anxious, mood congruent, intensity is flat and fixed mobility  Speech:  clear, coherent and paucity of speech  Psychomotor Behavior:  no evidence of tardive dyskinesia, dystonia, or tics,   Thought Process:  evidence of thought blocking present and tangental  Associations:  no loose associations  Thought Content:  no suicidal ideation present, auditory hallucinations present, visual hallucinations present   Insight:  limited  Judgment:  limited  Oriented to:  Not formally assessed  Attention Span and Concentration:  limited  Recent and Remote Memory:  Not assessed  Language: Able to read and write  Fund of Knowledge: appropriate  Muscle Strength and Tone: normal  Gait and Station: Normal         Labs:     No " results found for this or any previous visit (from the past 24 hour(s)).         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 with psychotic features    Secondary psychiatric diagnoses of concern this admission:   - Cannabis use disorder, unspecified severity   - post traumatic stress disorder   - Generalized anxiety disorder  - Autism spectrum disorder by history   - Anorexia Nervousa by history   - Obsessessive compulsive disorder by history   - Conversion disorder by history     Diagnostic Impression:   Stephanie Villanueva is a 20 year old female with prior psychiatric diagnoses of major depressive disorder, bipolar disorder, borderline personality, generalized anxiety, posttraumatic stress, obsessive-compulsive, autism spectrum, anorexia nervousa, conversion and somatization disorders who presents with increasing paranoia, reports of psychotic phenomenology, mood instability and frequent dissociation in the context of stress related to covid 19 pandemic, chronic mental health issues, decreased engagement in outpatient care and increased frequency of cannabis use. Her last psychiatric hospitalization was in March 2019.  Current psychosocial stressors include body image, trauma, chronic mental health issues and job loss and the Covid 19 pandemic which she has been coping with by using substances and withdrawing.  She has continued to be adherent with her medications. Patient's support system includes family, outpatient team and peers.  Substance use does appear to be playing a contributing role in the patient's presentation.  There is genetic loading for mood and CD. Medical history does appear to be significant for fibromyalgia, chronic pain and POTS disease.  The MSE at admission was notable for a patient who is well groomed, initially soft spoken, and guarded with some disorganization of thoughts all of which improved as the interview progressed. She intermittently appeared  paranoid though did not appear internally distracted. There was no evidence for SI, HI or dami at this time. Given patient's recent increased use of mood-altering substance like  Cannabis, this raises suspicion for substance induced psychosis vs mood disorder with psychosis.      Hospital course:   Stephanie Villanueva was admitted to [facility] as a voluntary patient. At admission, a safe and therapeutic milieu was initiated and her home medications were continued with Olazapine switched to a scheduled dose of 5 mg at bedtime instead of a PRN dose. After 48 hours on 5 mg dose with no side effects, dose was increased to 10 mg.    Discontinued Medications (& Rationale):  None    Medical course:  None    Plan   Today's changes:  -Increase PO Olanzapine to 10 mg at bedtime (from 5 mg)    Psychotropic Medications:  Scheduled Meds:  Pristiq  24 hr tablet 150 mg daily  Gabapentin 900 mg TID  Olanzapine 5 mg QHS    PRN Meds:  Ativan 1 mg daily PRN  Hydroxyzine 25-50 mg at bedtime PRN       Patient will be treated in therapeutic milieu with appropriate individual and group therapies as described.      Medical diagnoses to be addressed this admission:    None    Chronic Medical Diagnosis:  #POTS  #Non-epileptic seizures  -Continue PTA Magnesium powder supplement    Data: CBC,CMP,TSH wnl, HCG: negative, Drug screen: positive for Cannabis  Consults: None    Legal Status: Voluntary    Safety Assessment:   Behavioral Orders   Procedures     Code 1 - Restrict to Unit     Fall precautions     Routine Programming     As clinically indicated     Seizure precautions     Self Injury Precaution     Status 15     Every 15 minutes.     Suicide precautions     Patients on Suicide Precautions should have a Combination Diet ordered that includes a Diet selection(s) AND a Behavioral Tray selection for Safe Tray - with utensils, or Safe Tray - NO utensils         Disposition: Pending stabilization & development of a safe discharge plan.    The  patient was seen and evaluated with attending, Dr. Shayne Sue.    Dickson Torres MD, MPH  PGY-1 Psychiatry Resident    ATTENDING:  I, Shayne Sue, saw and evaluated the patient with the resident physician.  I agree with the findings and plan of care as documented in the resident note.  I have reviewed all labs and vital signs.

## 2020-04-20 NOTE — DISCHARGE INSTRUCTIONS
Behavioral Discharge Planning and Instructions    Summary:   You were admitted to [facility] on     with worsening        under the care of Dr. Sue.  You met with Dr. Sue and his team daily for ongoing psychiatric assessment and medication management.  You had opportunities to participate in therapeutic groups on the unit.   At this time you report your mood is stabilizing and you report you are not having thoughts or intent to harm yourself or others. You will be discharged home and will resume care with your outpatient providers.    Disposition:      Main Diagnosis:       Major Treatments, Procedures and Findings:   Medications were  managed throughout your stay. An internal medicine consult was completed during your stay. You had the opportunity to participate in treatment programming while on the unit including occupational therapy, mental health support and education and spiritual services.     Symptoms to Report:   Please report if you are experiencing increased aggression and/or confusion, problematic loss of sleep, worsening mood, or thoughts of suicide to your treatment team or notify your primary provider.   IF THE SYMPTOMS YOU ARE EXPERIENCING ARE A MEDICAL EMERGENCY, CALL 911 IMMEDIATELY    Lifestyle Adjustment:   1. Adjust your lifestyle to get enough sleep, relaxation, exercise and good nutrition.  Continue to develop healthy coping skills to decrease stress and promote a healthy and sober lifestyle.  2. Abstain from all substances of abuse.  3. Take medications as prescribed.  Please work with your doctor to discuss any concerns you have with your medications or side effects you may be experiencing.  4. Follow up with appointments as scheduled.        Psychiatry Follow-up:   Appointment:Ashley Elias [address] [id number] [phone]      Resources:       General Medication Instructions:   See your medication sheet(s) for instructions.   Take  all medicines as directed.  Make no changes unless your doctor suggests them.   Go to all your doctor visits.  Be sure to have all your required lab tests. This way, your medicines can be refilled on time.  Do not use any drugs not prescribed by your doctor.    The treatment team has appreciated the opportunity to work with you.  We wish you the best in the future.    If you have any questions or concerns our unit number is [phone].

## 2020-04-20 NOTE — PROGRESS NOTES
Diagnosis/Procedure:   Patient Active Problem List   Diagnosis     Major depression     Generalized anxiety disorder     Social anxiety disorder     Disorder of thyroid     Self-injurious behavior     Deliberate self-cutting     Suicide attempt (H)     Mild intermittent asthma     Iron deficiency anemia     BAILEY (generalized anxiety disorder)     Borderline personality disorder (H)     Social phobia     Panic disorder with agoraphobia     Elevated TSH     OCD (obsessive compulsive disorder)     Nocturnal enuresis     Overdose     Depression     Severe recurrent major depression without psychotic features (H)     Suicidal ideation     Spell of altered consciousness     Psychosis (H)         Work Completed:                          The patient's care was discussed with the treatment team and chart                                   notes were reviewed.  Patient met with team this morning. She continues                          to exhibit psychotic sx's.  Has remained paranoid, isolative. Patient                                    declined referral for [facility]      Discharge plan or goal:   Home when stable                Barriers to discharge:               Minimal improvement as yet.  Medications continue to be                            Monitorded/adjusted per MD's

## 2020-04-21 LAB
ANION GAP SERPL CALCULATED.3IONS-SCNC: 8 MMOL/L (ref 3–14)
BASOPHILS # BLD AUTO: 0 10E9/L (ref 0–0.2)
BASOPHILS NFR BLD AUTO: 0.9 %
BUN SERPL-MCNC: 16 MG/DL (ref 7–30)
CALCIUM SERPL-MCNC: 9 MG/DL (ref 8.5–10.1)
CHLORIDE SERPL-SCNC: 105 MMOL/L (ref 94–109)
CO2 SERPL-SCNC: 25 MMOL/L (ref 20–32)
CREAT SERPL-MCNC: 0.64 MG/DL (ref 0.52–1.04)
DIFFERENTIAL METHOD BLD: NORMAL
EOSINOPHIL # BLD AUTO: 0.1 10E9/L (ref 0–0.7)
EOSINOPHIL NFR BLD AUTO: 1.5 %
ERYTHROCYTE [DISTWIDTH] IN BLOOD BY AUTOMATED COUNT: 11.7 % (ref 10–15)
GFR SERPL CREATININE-BSD FRML MDRD: >90 ML/MIN/{1.73_M2}
GLUCOSE BLDC GLUCOMTR-MCNC: 100 MG/DL (ref 70–99)
GLUCOSE BLDC GLUCOMTR-MCNC: 106 MG/DL (ref 70–99)
GLUCOSE BLDC GLUCOMTR-MCNC: 112 MG/DL (ref 70–99)
GLUCOSE BLDC GLUCOMTR-MCNC: 73 MG/DL (ref 70–99)
GLUCOSE BLDC GLUCOMTR-MCNC: 75 MG/DL (ref 70–99)
GLUCOSE BLDC GLUCOMTR-MCNC: 91 MG/DL (ref 70–99)
GLUCOSE BLDC GLUCOMTR-MCNC: 95 MG/DL (ref 70–99)
GLUCOSE BLDC GLUCOMTR-MCNC: 95 MG/DL (ref 70–99)
GLUCOSE BLDC GLUCOMTR-MCNC: 96 MG/DL (ref 70–99)
GLUCOSE BLDC GLUCOMTR-MCNC: 98 MG/DL (ref 70–99)
GLUCOSE SERPL-MCNC: 93 MG/DL (ref 70–99)
HCT VFR BLD AUTO: 40.4 % (ref 35–47)
HGB BLD-MCNC: 14 G/DL (ref 11.7–15.7)
IMM GRANULOCYTES # BLD: 0 10E9/L (ref 0–0.4)
IMM GRANULOCYTES NFR BLD: 0.2 %
LYMPHOCYTES # BLD AUTO: 2.2 10E9/L (ref 0.8–5.3)
LYMPHOCYTES NFR BLD AUTO: 46.9 %
MCH RBC QN AUTO: 30.3 PG (ref 26.5–33)
MCHC RBC AUTO-ENTMCNC: 34.7 G/DL (ref 31.5–36.5)
MCV RBC AUTO: 87 FL (ref 78–100)
MONOCYTES # BLD AUTO: 0.4 10E9/L (ref 0–1.3)
MONOCYTES NFR BLD AUTO: 7.6 %
NEUTROPHILS # BLD AUTO: 2 10E9/L (ref 1.6–8.3)
NEUTROPHILS NFR BLD AUTO: 42.9 %
NRBC # BLD AUTO: 0 10*3/UL
NRBC BLD AUTO-RTO: 0 /100
PLATELET # BLD AUTO: 260 10E9/L (ref 150–450)
POTASSIUM SERPL-SCNC: 3.5 MMOL/L (ref 3.4–5.3)
RBC # BLD AUTO: 4.62 10E12/L (ref 3.8–5.2)
SODIUM SERPL-SCNC: 138 MMOL/L (ref 133–144)
WBC # BLD AUTO: 4.6 10E9/L (ref 4–11)

## 2020-04-21 PROCEDURE — 25000132 ZZH RX MED GY IP 250 OP 250 PS 637: Performed by: INTERNAL MEDICINE

## 2020-04-21 PROCEDURE — 85025 COMPLETE CBC W/AUTO DIFF WBC: CPT | Performed by: INTERNAL MEDICINE

## 2020-04-21 PROCEDURE — 99207 ZZC CDG-CODE CATEGORY CHANGED: CPT | Performed by: INTERNAL MEDICINE

## 2020-04-21 PROCEDURE — 96360 HYDRATION IV INFUSION INIT: CPT

## 2020-04-21 PROCEDURE — 00000146 ZZHCL STATISTIC GLUCOSE BY METER IP

## 2020-04-21 PROCEDURE — 36415 COLL VENOUS BLD VENIPUNCTURE: CPT | Performed by: INTERNAL MEDICINE

## 2020-04-21 PROCEDURE — 96361 HYDRATE IV INFUSION ADD-ON: CPT

## 2020-04-21 PROCEDURE — 80048 BASIC METABOLIC PNL TOTAL CA: CPT | Performed by: INTERNAL MEDICINE

## 2020-04-21 PROCEDURE — G0378 HOSPITAL OBSERVATION PER HR: HCPCS

## 2020-04-21 PROCEDURE — 25800030 ZZH RX IP 258 OP 636: Performed by: INTERNAL MEDICINE

## 2020-04-21 PROCEDURE — 12000001 ZZH R&B MED SURG/OB UMMC

## 2020-04-21 PROCEDURE — 99232 SBSQ HOSP IP/OBS MODERATE 35: CPT | Performed by: INTERNAL MEDICINE

## 2020-04-21 RX ADMIN — GABAPENTIN 900 MG: 300 CAPSULE ORAL at 09:12

## 2020-04-21 RX ADMIN — GABAPENTIN 900 MG: 300 CAPSULE ORAL at 14:02

## 2020-04-21 RX ADMIN — DESVENLAFAXINE SUCCINATE 150 MG: 50 TABLET, EXTENDED RELEASE ORAL at 14:03

## 2020-04-21 RX ADMIN — MELATONIN 50 MCG: at 09:12

## 2020-04-21 RX ADMIN — DEXTROSE AND SODIUM CHLORIDE: 5; 900 INJECTION, SOLUTION INTRAVENOUS at 17:22

## 2020-04-21 RX ADMIN — OLANZAPINE 10 MG: 5 TABLET, FILM COATED ORAL at 20:37

## 2020-04-21 RX ADMIN — DEXTROSE AND SODIUM CHLORIDE: 5; 900 INJECTION, SOLUTION INTRAVENOUS at 04:35

## 2020-04-21 RX ADMIN — GABAPENTIN 900 MG: 300 CAPSULE ORAL at 20:36

## 2020-04-21 ASSESSMENT — COLUMBIA-SUICIDE SEVERITY RATING SCALE - C-SSRS
2. HAVE YOU ACTUALLY HAD ANY THOUGHTS OF KILLING YOURSELF IN THE PAST MONTH?: NO
1. IN THE PAST MONTH, HAVE YOU WISHED YOU WERE DEAD OR WISHED YOU COULD GO TO SLEEP AND NOT WAKE UP?: NO

## 2020-04-21 ASSESSMENT — ACTIVITIES OF DAILY LIVING (ADL)
BATHING: 0-->INDEPENDENT
AMBULATION: 0-->INDEPENDENT
TRANSFERRING: 0-->INDEPENDENT
NUMBER_OF_TIMES_PATIENT_HAS_FALLEN_WITHIN_LAST_SIX_MONTHS: 10
SWALLOWING: 0-->SWALLOWS FOODS/LIQUIDS WITHOUT DIFFICULTY
RETIRED_COMMUNICATION: 0-->UNDERSTANDS/COMMUNICATES WITHOUT DIFFICULTY
FALL_HISTORY_WITHIN_LAST_SIX_MONTHS: YES
RETIRED_EATING: 0-->INDEPENDENT
TOILETING: 0-->INDEPENDENT
DRESS: 0-->INDEPENDENT
COGNITION: 0 - NO COGNITION ISSUES REPORTED

## 2020-04-21 NOTE — CONSULTS
"  [facility], [place]   Initial Psychiatric Consult   Consult date: April 21, 2020         Reason for Consult, requesting source:    Mood disorder  Requesting source: Giovani Bethea        HPI:     Ms. Stephanie Villanueva is a 20-year-old female who was admitted to medicine on 4/20/20 in the setting of food refusal and hypoglycemia. She was transferred from [facility] inpatient psychiatry, where she was initially admitted for increased disorganization, paranoia, visual and auditory hallucinations, as well as disrupted sleep in the context of stress related to COVID-19 pandemic. Patient has a history of at least 4 suicide attempts and 12 prior psychiatric hospitalizations at [place]. Psychiatric diagnoses have included major depressive disorder, borderline personality disorder, generalized anxiety disorder, posttraumatic stress disorder, obsessive-compulsive disorder, anorexia nervosa, conversion disorder, and somatization disorder. Psychiatry is consulted today for ongoing management.     Per review of records from inpatient psychiatry stay, patient had been refusing to eat, stating that she was paranoid about the food being \"poisoned.\" She spent a great deal of her stay withdrawn to her room, participating in very few groups, reading [facility] books. She had instances of scratching on her hand. She made comments about feeling \"scared\" because \"they were here to get me.\" She was using cannabis prior to admission and there was a question of substance-induced psychosis. On 4/20, she developed an episode of hypoglycemia. Per nursing documentation: \"At change of shift, pt s blood glucose was 42. Pt was offered orange juice, apple juic and food. Pt stated she would drink one orange juice. Her blood glucose was at 51. Later recheck, it was 37. Attending resident physician was notified. Hypoglycemic protocol was ordered. Pt accepted the injection instead. After the injection, pt s glucose was 59. " "Another dose was given per order, pt s blood glucose was 84. Pt continued to decline food or drink. Recheck blood glucose was 68. Pt came out from her bath room and told this writer that she felt nauseated and dizzy. She stated she did not feel good. She lowered herself to the floor. It was witnessed by this writer. Pt did not have any injury. Her B/P was 118/78, P/125.\"    On interview today, patient is somewhat irritable upon this writer's arrival and asked to clarify my role. Patient discusses that over the last two months, she has experienced a \"severe relapse\" of her anorexia. She reports that it has slowly become harder to eat and says \"it feels impossible. I can't believe I have to eat every day for the rest of my life.\" She reports a low appetite and does not get hungry. She reports she has not been eating because \"it is the only thing I can control.\" She also reports that it is difficult to drink fluids. When asked what makes it difficult to drink fluids, she again mentions that it's one of the only things she can control. She then says \"fine, I'll drink some water,\" and then proceeds to take a sip of water.     She reports that olanzapine has helped her sleep better. In addition, she says it has helped with her \"delusions, voices, and paranoia.\" When asked what her delusions are like, she says \"I feel like people are collecting information, and that mirrors are portals and if I look into a mirror, I dissociate. I also feel like people are following me and everyone is in disguise.\" In addition, she reports a history of auditory hallucinations, which she reports are now \"much quieter.\" She also reports a history of visual hallucinations which are currently resolved. She reports resolution of all psychotic symptoms currently and reports her thinking is now much more organized and clear with olanzapine. Mood is \"okay\" today. She reports some anxiety however it is currently manageable. She reports that when she " "uses cannabis, she feels that it helps with her anxiety. She denies that cannabis leads to more paranoia or psychotic symptoms. She reports \"passive suicidal thoughts,\" stating that \"if my eating disorder kills me, it kills me.\"    At this point, she is not interested in returning to psych. She continues to refuse to eat, stating she is not hungry. She says that she sometimes gets self-harm urges when she has to eat, but denies any current self-harm urges. Discuss her history of treatment at the [facility] for eating disorders. She says she would be interested in returning to that program.         Past Psychiatric History:     Per psychiatry H&P on 4/16/20:    \"Prior diagnoses/history: Patient has had mental health issues since age 13  previous psychiatric diagnoses include major depressive, bipolar disorder, borderline personality, generalized anxiety, posttraumatic stress, obsessive-compulsive, autism spectrum (diagnosed at age 17), anorexia nervousa, conversion and somatization disorders.    Hospitalizations: 12 prior at [facility] (first was in 2013 when she was 13 years old for panic attack, selective mutism, SIB and reported AH). Most recently in March of 2019.   Per chart review, started [facility] DBT around July 2019. She has been to day treatment twice, regular DBT x 2 years ([facility] where she was in residential first 6 months and then outpatient for the second 8 months). Also reported completing outpatient and inpatient at [facility] for anorexia nervousa.      Court Committments: None per review of [facility]      Suicide attempts: Yes, at least 4. 3-4 via intentional medication overdose, most recently in January 2019.      Self-injurious behavior: Started at age 12, cutting with razor blade. Denies any recent SIB     Guns: Denies access      Violence: None per patient report      ECT: Yes -with Dr. El at [facility] - 14 rounds between Feb-March 2019, had 9 rounds then 5 round. First round was " "helpful, second less so.      TMS: None per patient report      Past medications:   Per chart review:   Aripiprazole (up to 15 mg daily)  Bupropion  Clomipramine (up to 150 mg daily)  Clonazepam  Clonidine  Desvenlafaxine - Pristique (current medication) - 150 mg XR   Duloxetine (up to 60 mg daily)  Fluoxetine (max dose 40 mg daily)  Gabapentin - current medication - 900 mg TID, with one additional 900 mg dose prn for anxiety   Lithium (max dose 900 mg daily) - found this helpful  Lorazepam - current medication - she thinks 1 mg PRN - takes 1-2 times per week \"not super often. I always forget about it.\"   lurasidone (up to 40 mg daily)  Modafinil  Olanzapine  Quetiapine  Sertraline  Trazodone  Venlafaxine  Ziprasidone     Of note, she had  [employer] PGx test + MTHFR (appears to be similar to [employer] testing) completed at [place] in July 2019 (see results in Care Everywhere)\"        Substance Use and History:   Per psychiatry H&P on 4/16/20:    \"Alcohol: Denies use      Nicotine: Denies use      Illicit Substances: Cannabis use. Denies any other prior illlicit substance use outside of medications that were prescribed to her (denies misuse of these medications). Reports she had a negative reaction to adderral when 17 (Was prescibed). Denies current or past use of cocaine, amphetamines, opiates/heroin, benzos, inhalents, kratom or other substances.      Chemical Dependency Treatment: Denies\"        Past Medical History:   PAST MEDICAL HISTORY:   Past Medical History:   Diagnosis Date     Anxiety october 2013     CONTUSION OF left iliac crest 9/3/2006     Major depression october 2013     Nocturnal enuresis 10/4/2007    Currently resolved.     Salter-Perdomo Type I fracture of distal fibula with routine healing 1/25/2013     Uncomplicated asthma     sports induced     History of fibromyalgia, chronic pain, chronic fatigue and POTS disease, psychogenic seizures, concussion in September 2019 which is suspected to have " resulted in a TBI.     PAST SURGICAL HISTORY: No past surgical history on file.          Family History:   FAMILY HISTORY:   Family History   Problem Relation Age of Onset     Substance Abuse Maternal Grandfather         alcoholism     Depression Maternal Aunt      Substance Abuse Maternal Aunt         alcoholism     Hypothyroidism Maternal Aunt      Anxiety Disorder Brother      Hypothyroidism Maternal Grandmother      Maternal aunt with depression issues. No other known psychiatric family history. No family history of suicides.      Maternal grandpa with alcohol use issues         Social History:   Patient grew up in [place]. Lives with her family who is supportive. Patient's father is [place] citizen and mother is white. Has 18-year-old brother and 13-year-old brother (who is transgender, female to male). Graduated high school and attended 1.5 years at [employer] in [employer] [facility] where she was studying criminality and psychology before dropping out due to MH issues. She was in and out of treatments during high school. Not currently involved in work or other activities. 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          Physical ROS:   The patient endorsed low appetite. The remainder of 10-point review of systems was negative except as noted in HPI.         PTA Medications:     Medications Prior to Admission   Medication Sig Dispense Refill Last Dose     COMPRESSION STOCKINGS Wear as directed 3 each 0      desvenlafaxine (PRISTIQ) 50 MG 24 hr tablet Take 150 mg by mouth daily        gabapentin (NEURONTIN) 300 MG capsule Take 3 capsules (900 mg) by mouth 3 times daily 270 capsule 0      hydrOXYzine (ATARAX) 25 MG tablet Take 25-50 mg by mouth nightly as needed (sleep)        LORazepam (ATIVAN) 1 MG tablet Take 1 tablet by mouth daily as needed for agitation         OLANZapine (ZYPREXA) 10 MG tablet Take 1 tablet (10 mg) by mouth At Bedtime        Vitamin D3  (CHOLECALCIFEROL) 2000 units (50 mcg) tablet Take 1 tablet (50 mcg) by mouth daily             Allergies:     Allergies   Allergen Reactions     No Known Drug Allergies           Labs:     Recent Results (from the past 48 hour(s))   Glucose by meter    Collection Time: 04/20/20  3:23 PM   Result Value Ref Range    Glucose 42 (LL) 70 - 99 mg/dL   Glucose    Collection Time: 04/20/20  3:44 PM   Result Value Ref Range    Glucose 51 (L) 70 - 99 mg/dL   Glucose by meter    Collection Time: 04/20/20  4:05 PM   Result Value Ref Range    Glucose 37 (LL) 70 - 99 mg/dL   Glucose by meter    Collection Time: 04/20/20  4:47 PM   Result Value Ref Range    Glucose 59 (L) 70 - 99 mg/dL   Glucose by meter    Collection Time: 04/20/20  5:45 PM   Result Value Ref Range    Glucose 84 70 - 99 mg/dL   Glucose by meter    Collection Time: 04/20/20  6:19 PM   Result Value Ref Range    Glucose 68 (L) 70 - 99 mg/dL   Glucose by meter    Collection Time: 04/20/20  6:53 PM   Result Value Ref Range    Glucose 60 (L) 70 - 99 mg/dL   Glucose by meter    Collection Time: 04/20/20  7:42 PM   Result Value Ref Range    Glucose 44 (LL) 70 - 99 mg/dL   Glucose by meter    Collection Time: 04/20/20  8:35 PM   Result Value Ref Range    Glucose 47 (LL) 70 - 99 mg/dL   Glucose by meter    Collection Time: 04/20/20 10:27 PM   Result Value Ref Range    Glucose 77 70 - 99 mg/dL   Glucose by meter    Collection Time: 04/20/20 11:33 PM   Result Value Ref Range    Glucose 94 70 - 99 mg/dL   Glucose by meter    Collection Time: 04/21/20 12:33 AM   Result Value Ref Range    Glucose 95 70 - 99 mg/dL   Glucose by meter    Collection Time: 04/21/20  1:38 AM   Result Value Ref Range    Glucose 100 (H) 70 - 99 mg/dL   Glucose by meter    Collection Time: 04/21/20  2:34 AM   Result Value Ref Range    Glucose 112 (H) 70 - 99 mg/dL   Glucose by meter    Collection Time: 04/21/20  3:59 AM   Result Value Ref Range    Glucose 106 (H) 70 - 99 mg/dL   Basic metabolic panel     Collection Time: 04/21/20  5:54 AM   Result Value Ref Range    Sodium 138 133 - 144 mmol/L    Potassium 3.5 3.4 - 5.3 mmol/L    Chloride 105 94 - 109 mmol/L    Carbon Dioxide 25 20 - 32 mmol/L    Anion Gap 8 3 - 14 mmol/L    Glucose 93 70 - 99 mg/dL    Urea Nitrogen 16 7 - 30 mg/dL    Creatinine 0.64 0.52 - 1.04 mg/dL    GFR Estimate >90 >60 mL/min/[1.73_m2]    GFR Estimate If Black >90 >60 mL/min/[1.73_m2]    Calcium 9.0 8.5 - 10.1 mg/dL   CBC with platelets differential    Collection Time: 04/21/20  5:54 AM   Result Value Ref Range    WBC 4.6 4.0 - 11.0 10e9/L    RBC Count 4.62 3.8 - 5.2 10e12/L    Hemoglobin 14.0 11.7 - 15.7 g/dL    Hematocrit 40.4 35.0 - 47.0 %    MCV 87 78 - 100 fl    MCH 30.3 26.5 - 33.0 pg    MCHC 34.7 31.5 - 36.5 g/dL    RDW 11.7 10.0 - 15.0 %    Platelet Count 260 150 - 450 10e9/L    Diff Method Automated Method     % Neutrophils 42.9 %    % Lymphocytes 46.9 %    % Monocytes 7.6 %    % Eosinophils 1.5 %    % Basophils 0.9 %    % Immature Granulocytes 0.2 %    Nucleated RBCs 0 0 /100    Absolute Neutrophil 2.0 1.6 - 8.3 10e9/L    Absolute Lymphocytes 2.2 0.8 - 5.3 10e9/L    Absolute Monocytes 0.4 0.0 - 1.3 10e9/L    Absolute Eosinophils 0.1 0.0 - 0.7 10e9/L    Absolute Basophils 0.0 0.0 - 0.2 10e9/L    Abs Immature Granulocytes 0.0 0 - 0.4 10e9/L    Absolute Nucleated RBC 0.0           Physical and Psychiatric Examination:     BP 93/57   Pulse 79   Temp 96.7  F (35.9  C)   Resp 16   SpO2 97%   Weight is 0 lbs 0 oz  There is no height or weight on file to calculate BMI.    Physical Exam:  I have reviewed the physical exam as documented by by the medical team and agree with findings and assessment and have no additional findings to add at this time.    Mental Status Exam:    Appearance: age-appearing, casually dressed, lying in hospital bed, in no acute distress  Behavior: initially guarded, then more forthcoming. Calm and cooperative  Orientation: alert and oriented to time, place and  "person  Movements: no abnormal or involuntary movements noted on exam  Mood: \"fine\"  Affect: full-range, mood-congruent, stable  Speech: Normal rate, rhythm, tone  Memory: grossly intact based on recall of recent and remote events  Fund of knowledge: average for development based on word choice and education  Concentration: attentive to interview  Thought process and content: linear and coherent. Reports auditory hallucinations, which are currently \"quiet.\" Denies command AH. Denies VH. Reports resolution of prior paranoia and delusions as listed in HPI. Does not appear to respond to internal stimuli.  Insight: limited  Judgement: limited  Safety: passive thoughts of death, stating that if \"my eating disorder kills me, it kills me.\" Denies any specific plans or intent to end her life. Denies current urges to self-injure. Denies homicidal ideation           DSM-5 Diagnosis:   #1 Unspecified psychosis, resolved  #2 Borderline personality disorder  #3 Unspecified depressive disorder  #4 Anorexia Nervosa, by history  #5 Generalized anxiety disorder, by history  #6 PTSD, by history  #7 Cannabis use disorder          Assessment:   Stephanie Villanueva is a 20-year-old female currently admitted to medicine after transfer from inpatient psychiatry after developing an episode of hypoglycemia in the context of food refusal. While on psychiatry patient was reporting that her food refusal was due to paranoia about the food being poisoned. Today, she is reporting that she has not been eating because it is the one thing she has control over. As discussed above, she is currently reporting resolution of her previously reported paranoia and hallucinations. She feels that the olanzapine has been helpful. Agree with documentation from psychiatry H&P that: \"given her struggles with connecting to her emotions and historically poor insight, it is possible that she conceptualizes her thoughts and feelings by attributing them to an external " "source.\"    Ms. Villanueva is currently reporting resolution of suicidal thinking. She has no current self-harm urges. She does report some self-harm urges if she is forced to eat. No self-injurious behavior today. She continues to endorse a low appetite and has not wanted to eat. Discussed her willingness to consider the [facility]. She agrees to an intake evaluation with them. I called the number listed on their website ([phone]) and provided them with patient's contact information and insurance information. Patient agrees to an intake assessment, which was scheduled for 4/22/20 at 10:00. They indicated that there is currently no wait list and she would be able to get into the residential program quickly if she qualifies. Will await recommendations from the [facility].     Plan discussed with attending, Dr. Bethea.           Summary of Recommendations:   1) Recommend to continue Pristiq 150 mg daily, gabapentin 900 mg tid, and olanzapine 10 mg at bedtime.     2) Recommend inpatient eating disorder treatment. Patient has an intake appointment with the [facility] by telephone 4/22/20 at 10:00 am. They will call the phone in her room.     3) Recommend to discontinue 1:1 and 72 hour hold. Patient does not need to return to inpatient psychiatry. If medically stable, she could discharge home to await eating disorder treatment.     4) Page this provider at [phone] with additional questions or concerns.          "

## 2020-04-21 NOTE — PLAN OF CARE
Observation goals:  1100:  -vital signs normal or at patient baseline: Yes  - Hypoglycemia is treated, glucose is stable: In process  1300:  -vital signs normal or at patient baseline: Yes  - Hypoglycemia is treated, glucose is stable: In process  1500:  -vital signs normal or at patient baseline: Yes  - Hypoglycemia is treated, glucose is stable: In process

## 2020-04-21 NOTE — PROGRESS NOTES
Social Work Services Progress Note  Brief note from rounds and chart review:    Pt transferred from station 20, pt refusing to eat-blood sugars dropped. Pt has history of 12 prior psych admissions and 4 suicide attempts.     Diagnosis: major depressive disorder, borderline personality disorder, generalized anxiety disorder, posttraumatic stress disorder, obsessive-compulsive disorder, anorexia nervosa, conversion disorder, and somatization disorder.     Psych thinking pt could maybe go to the [facility] for treatment of eating disorder. Per notes yesterday, pt was offered a referral to the [facility] but declined.    Psych and provider to discuss potential transfer back to station 20.     JOSÉ Womack  [facility]o/Med/Surg [facility] Adult ED  Phone: [phone] Pager: [phone]

## 2020-04-21 NOTE — PROGRESS NOTES
Admit to inpatient order placed, per Dr Bethea.     Amrik Olivera MD   Hospitalist (Internal Medicine)  Pager: [phone].

## 2020-04-21 NOTE — H&P
[facility]  HISTORY AND PHYSICAL   Chief Complaint       History of Present Illness       Stephanie Villanueva is 20 year old year old female with hx of Major depression, bipolar disorder, borderline personality, BIALEY, PTSD, OCD, Autism spectrum, anorexia nervosa, conversion disorder, and somatization disorder was admitted to inpatient psych on 04/16. She was getting treated for principal diagnosis of substance induced psychosis vs Mood disorder with psychotic feature    Medicine was called for persistent hypoglycemia despite giving glucagon injection.   Per RN, her Blood glucose was as low as 37. She refused to eat or drink anything. So she was given Glucagon injection. Her blood glucose improved to 68. Then she felt dizzy, and her body slowly gave up. She never lost consciousness. She did not hit her body or head to floor or walls.     At my arrival, patient is sitting on comfort pose, and reading book. She is very reluctant to talk. She reports she feels dizzy. She denies any nausea, vomiting, chest pain or shortness of breath. On questions, she just tells go away. She was advised to eat or drink CHO containing fluid/food but refuses to eat. She reports I cannot eat, and I don't want to eat. I advised her that if she is not able to eat or drink then we have to give her dextrose through IV. She refuses to be transferred. I spoke with psych team about situation. [facility] floor unable to monitor patient closely on dextrose. They plan to put her on hold, and transfer.   I have spoken with patient placement, and notified them.              Past Medical History     Past Medical History:   Diagnosis Date     Anxiety october 2013     CONTUSION OF left iliac crest 9/3/2006     Major depression october 2013     Nocturnal enuresis 10/4/2007    Currently resolved.     Salter-Perdomo Type I fracture of distal fibula with routine healing 1/25/2013     Uncomplicated asthma     sports induced    Reviewed no  changed     Past Surgical History     History reviewed. No pertinent surgical history.     Social History     Social History     Socioeconomic History     Marital status: Single     Spouse name: Not on file     Number of children: Not on file     Years of education: Not on file     Highest education level: Not on file   Occupational History     Not on file   Social Needs     Financial resource strain: Not on file     Food insecurity     Worry: Not on file     Inability: Not on file     Transportation needs     Medical: Not on file     Non-medical: Not on file   Tobacco Use     Smoking status: Never Smoker     Smokeless tobacco: Never Used   Substance and Sexual Activity     Alcohol use: Yes     Comment: sometimes     Drug use: Yes     Types: Marijuana     Sexual activity: Yes     Partners: Female, Male   Lifestyle     Physical activity     Days per week: Not on file     Minutes per session: Not on file     Stress: Not on file   Relationships     Social connections     Talks on phone: Not on file     Gets together: Not on file     Attends Islam service: Not on file     Active member of club or organization: Not on file     Attends meetings of clubs or organizations: Not on file     Relationship status: Not on file     Intimate partner violence     Fear of current or ex partner: Not on file     Emotionally abused: Not on file     Physically abused: Not on file     Forced sexual activity: Not on file   Other Topics Concern     Parent/sibling w/ CABG, MI or angioplasty before 65F 55M? Not Asked   Social History Narrative     Not on file      Reviewed no change    Family History     Family History   Problem Relation Age of Onset     Substance Abuse Maternal Grandfather         alcoholism     Depression Maternal Aunt      Substance Abuse Maternal Aunt         alcoholism     Hypothyroidism Maternal Aunt      Anxiety Disorder Brother      Hypothyroidism Maternal Grandmother       Reviewed no changed    Medications  "    Reviewed and medication reconciliation done.  Current Facility-Administered Medications   Medication     acetaminophen (TYLENOL) tablet 650 mg     alum & mag hydroxide-simethicone (MAALOX  ES) suspension 30 mL     desvenlafaxine (PRISTIQ) 24 hr tablet 150 mg     glucose gel 15-30 g    Or     dextrose 50 % injection 25-50 mL    Or     glucagon injection 1 mg     gabapentin (NEURONTIN) capsule 900 mg     hydrOXYzine (ATARAX) tablet 25-50 mg     LORazepam (ATIVAN) tablet 1 mg     magnesium hydroxide (MILK OF MAGNESIA) suspension 30 mL     NONFORMULARY     OLANZapine (zyPREXA) tablet 10 mg     Vitamin D3 (CHOLECALCIFEROL) 25 mcg (1000 units) tablet 50 mcg          Review of Systems     10 point  review of systems negative, except for what is mentioned above      Physical Exam     General:   Vital signs:    Blood pressure 118/78, pulse 125, temperature 98.3  F (36.8  C), temperature source Oral, resp. rate 16, SpO2 98 %, not currently breastfeeding.  Estimated body mass index is 21.52 kg/m  as calculated from the following:    Height as of 9/16/19: 1.778 m (5' 10\").    Weight as of 9/16/19: 68 kg (150 lb).    No intake or output data in the 24 hours ending 04/20/20 1912 Constitutional:     Eye: No icterus, no pallor  Mouth/ENT: Dry oral mucosa  Cardiovascular: S1, S2 normal  Respiratory: B/L CTA  GI: Soft, NT, BS+  : No moffett's catheter  Neurology: Alert, awake, and oriented. No tremors  Psych: Mood irritable. Reluctant to talk  MSK: No pretibial edema.  Integumentary:   Heme/Lymph/Imm:               Laboratory/Imaging Studies     I have reviewed  laboratory and imaging studies in the Epic. Pertinent findings are as below    CMP  Recent Labs   Lab 04/20/20  1544 04/16/20  1618   NA  --  141   POTASSIUM  --  4.1   CHLORIDE  --  108   CO2  --  25   ANIONGAP  --  8   GLC 51* 77   BUN  --  18   CR  --  0.67   GFRESTIMATED  --  >90   GFRESTBLACK  --  >90   BRITTANIE  --  9.4   PROTTOTAL  --  7.2   ALBUMIN  --  4.2   BILITOTAL "  --  0.9   ALKPHOS  --  80   AST  --  23   ALT  --  25     CBC  Recent Labs   Lab 04/16/20  1618   WBC 4.7   RBC 4.62   HGB 13.9   HCT 41.9   MCV 91   MCH 30.1   MCHC 33.2   RDW 12.0        INRNo lab results found in last 7 days.      Impression/Plan       20 year old year old female with hx of Major depression, bipolar disorder, borderline personality, BAILEY, PTSD, OCD, Autism spectrum, anorexia nervosa, conversion disorder, and somatization disorder was admitted to inpatient psych on 04/16. She was getting treated for principal diagnosis of substance induced psychosis vs Mood disorder with psychotic feature  Transferred to medicine for persistent hypoglycemia d/t poor po intake.       # Hypoglycemia: Most likely d/t poor CHO intake in the setting of anorexia nervosa. Blood glucose as low as 37. Now improved to 60's.  She was given Glucagon twice. Per report, patient has not been eating for last few days. She has not lost conciousness.   She is refusing to eat any food/fluid at this time. So she will be transferred to medicine floor for Dextrose infusion and blood glucose monitoring.   Advised RN to obtain IV line, and initiate Dextrose 5 % infusion.   - D5W infusion  - Check blood sugar every hour for next 6 hours, then decide if frequency could be lowered  - Hypoglycemia protocol    Addendum: Discussed with psychiatry team. As patient has ongoing psych issues, and hypoglycemia could be life threatening issue if untreated we will place her in 72 hour hold to transfer to medicine     # Behavioral and Mental health problem:  Major depression, bipolar disorder, borderline personality, BAILEY, PTSD, OCD, Autism spectrum, anorexia nervosa, conversion disorder, and somatization disorder Currently she was  getting treated for principal diagnosis of substance induced psychosis vs Mood disorder with psychotic feature  Currently on Desvenlafaxine, Gabapentin, Olanzapine, Ativan PRN  - Continue current regimen  - Suicide  precaution  - Bedside attendant 1:1  - Psych consult    # Hx of POTS  # Hx of Non-epileptic seizures  - Monitor     # FEN: Vegan diet, and Boost plus with meals  # Lines &Tubes: PIV  # Prophylaxis: Ambulate  # Code status: Full code    Disposition Plan   Expected discharge in 1-2 days to prior living arrangement once hypoglycemia is treated, and blood glucose stabilizes.     Entered: Giovani Bethea 04/20/2020, 7:50 PM

## 2020-04-21 NOTE — PROGRESS NOTES
"Cross Cover Note:     This writer spoke with the patient over the phone to do a capacity assessment regarding the patient's ability to decline medical care despite the potentially life threatening nature of her hypoglycemia if she continues to decline to eat or drink anything or to obtain treatment. Please see nursing and medicine provider's notes for further details regarding patient's symptoms and medical concerns.     This writer asked the patient what her understanding of the current situation regarding her blood sugar and the recommended treatment. Patient stated she did not understand the question. Writer then explained to the patient that her blood sugar is getting dangerously low and that the recommended treatment for this is that she eat or drink something. I explained that without this there could be serious consequences including death. She then stated \"So you want me to drink an apple juice?\" I stated \"yes, it might have to be more than just one apple juice though if your blood sugar does not go up.\"     She then stated 3-4 times in a row \"that it is too much.\" Writer asked \"what about it is too much?\" Patient stated \"it's just too much.\" Writer asked \"can you explain to me what you mean by it being too much?\" She stated \"it's just too much. Do you think I'm trying to be difficult and that I like this?\" Writer explained that I am trying to understand her perspective and reasoning for not wanting to get the recommended treatment.     At this point, the patient ended the phone call and declined to speak any further with this writer.     Per chart review, previously today during her meeting with her primary psychiatry team she stated she has not been eating because \"she is scared that it would add more stress and lead to more psychotic symptoms\". Patient declined eating sealed foods or taking a boost drink when offered today.      Based on the above information, the patient does not have capacity to decline " treatment for hypoglycemia at this time. Her condition is too unstable to be managed on the psychiatric unit at this time and discharge with transfer to medicine will be arranged. Discussed with the medicine team and they will place the patient on a 72 hour hold which is their protocol for when someone does not have capacity to decline care.       Jennifer Rodriguez MD  Psychiatry PGY-2 On Call Resident

## 2020-04-21 NOTE — PROGRESS NOTES
At change of shift, pt s blood glucose was 42. Pt was offered orange juice, apple juic and food. Pt stated she would drink one orange juice. Her blood glucose was at 51. Later recheck, it was 37. Attending resident physician was notified. Hypoglycemic protocol was ordered. Pt accepted the injection instead. After the injection, pt s glucose was 59. Another dose was given per order, pt s blood glucose was 84. Pt continued to decline food or drink. Recheck blood glucose was 68. Pt came out from her bath room and told this writer that she felt nauseated and dizzy. She stated she did not feel good. She lowered herself to the floor. It was witnessed by this writer. Pt did not have any injury. Her B/P was 118/78, P/125. On call resident physician was notified and In house hospitalist Dr. Bethea was here to see pt. At 7:45 pm, pt s blood glucose was 44. Med flyer was paged to come to the unit to start IV fluid. Pt agreed to have IV fluid. Currently, she was waiting for medical bed for further treatment for hypoglycemic. She was able to ambulate to the interview room to talk to Dr. Rodriguez. Her gaits were steady.  Report was given to  charge nurse. This writer had explained to pt that her attending physician would like her to have IV fluid to prevent further drop in blood glucose and for hydration. Pt did not have any oral fluid at all. Pt agreed to have IV fluid. IV was started and D5 infusing per order. Pt was stable currently. She was reading in bed. Last blood glucose was 47 at 8:45 pm. Pt is asymptomatic except feeling tired. Pt had called her mother prior transferring to . Med flyer RN, , Psy associate escorted pt to  in w/c. Pt was on 72 hours hold and suicidal precaution. HS medication were given to pt.   All pt's belongings were returned to her.

## 2020-04-21 NOTE — PLAN OF CARE
Pt arrived to unit from 20N @ 2130.  VS: VSS.    O2: Room air >90%.    Output: Voiding without difficulty.    Last BM: Unsure.    Activity: Up independently in room. Sitter at bedside for 72 hour hold.     Skin: Various cuts and scabs on bilateral arms.    Pain: Denies.   CMS: Intact.    Dressing: None.    Diet: Vegan diet. Pt not wanting to eat/drink- pt has anorexia nervosa.   LDA: PIV continuously infusing D5%. At 0430 continuous fluids switched to D5% with NaCl at 75ml.   Equipment: Personal belongings and sitter.    Plan: TBD. BG checks Q1HR X6 hours. MD will be notified once 6 hours is complete. BG checks now Q4H starting at 0430.    Additional Info: Flat affect but very pleasant. Denies any SI.  72 hour hold/1:1 at bedside.   Pt felt dizzy prior to arrival to unit but states she feels better.   Informed sitter X3 of where/when to chart on pt.     Observation Goals:   7682-0378:  -vital signs normal or at patient baseline: met.  - Hypoglycemia is treated, glucose is stable: in process.   1156-4243:  -vital signs normal or at patient baseline: met.  - Hypoglycemia is treated, glucose is stable: in process.   8409-5303:  -vital signs normal or at patient baseline: met.  - Hypoglycemia is treated, glucose is stable: in process.   0325-9574:  -vital signs normal or at patient baseline: met.  - Hypoglycemia is treated, glucose is stable: in process.   3569-0517:   -vital signs normal or at patient baseline: met.  - Hypoglycemia is treated, glucose is stable: in process.

## 2020-04-21 NOTE — PLAN OF CARE
VS: VSS.    O2: Room air >90%.    Output: Voiding without difficulty.    Last BM: Unsure.    Activity: Up independently in room. Sitter at bedside for 72 hour hold.     Skin: Various cuts and scabs on bilateral arms.    Pain: Denies.   CMS: Intact.    Dressing: None.    Diet: Vegan diet. Pt not wanting to eat/drink- pt has anorexia nervosa.   LDA: PIV continuously infusing D5% with NaCl at 75ml.   Equipment: Personal belongings and sitter.    Plan: TBD. BG checks Q4 HR. Last BG 78   Additional Info: Flat affect, Denies any SI.  72 hour hold/1:1 at bedside.       Observation Goals:   8316-0086:  -vital signs normal or at patient baseline: met.  - Hypoglycemia is treated, glucose is stable: in process.     Observation Goals:   5787-7536  -vital signs normal or at patient baseline: met.  - Hypoglycemia is treated, glucose is stable although borderline low: in process.

## 2020-04-21 NOTE — PROGRESS NOTES
"[facility], [place]   Internal Medicine Daily Note           Interval History/Events     Overnight events reviewed  Reports doing well this morning  No nausea, vomiting this AM  No lightheadedness or dizziness  No fever, chills.        Review of Systems        4 point ROS including Respiratory, CV, GI and , other than that noted above is negative      Medications   I have reviewed current medications  in the \"current medication\" section of Epic.  Relevant changes include:     Physical Exam   General:       Vital signs:    Blood pressure 93/57, pulse 79, temperature 96.7  F (35.9  C), resp. rate 16, SpO2 97 %, not currently breastfeeding.  Estimated body mass index is 21.52 kg/m  as calculated from the following:    Height as of 9/16/19: 1.778 m (5' 10\").    Weight as of 9/16/19: 68 kg (150 lb).    No intake or output data in the 24 hours ending 04/21/20 0955     Constitutional: Laying in bed in no acute distress  Eye: No icterus  Mouth/ENT: Normal oral mucosa  Cardiovascular: S1, S2 normal  Respiratory: B/L CTA  GI: Soft, NT, BS+  : No moffett's catheter  Neurology: Alert, awake,and oriented  Psych: Mood stable  MSK: No pretibial edema  Integumentary:   Heme/Lymph/Imm:      Laboratory and Imaging Studies     I have reviewed  laboratory and imaging studies in the Epic. Pertinent findings are as below:    BMP  Recent Labs   Lab 04/21/20  0554 04/20/20  1544 04/16/20  1618     --  141   POTASSIUM 3.5  --  4.1   CHLORIDE 105  --  108   BRITTANIE 9.0  --  9.4   CO2 25  --  25   BUN 16  --  18   CR 0.64  --  0.67   GLC 93 51* 77     CBC  Recent Labs   Lab 04/21/20  0554 04/16/20  1618   WBC 4.6 4.7   RBC 4.62 4.62   HGB 14.0 13.9   HCT 40.4 41.9   MCV 87 91   MCH 30.3 30.1   MCHC 34.7 33.2   RDW 11.7 12.0    258     INRNo lab results found in last 7 days.  LFTs  Recent Labs   Lab 04/16/20  1618   ALKPHOS 80   AST 23   ALT 25   BILITOTAL 0.9   PROTTOTAL 7.2   ALBUMIN 4.2      PANCNo " lab results found in last 7 days.        Impression/Plan      20 year old year old female with hx of Major depression, bipolar disorder, borderline personality, BAILEY, PTSD, OCD, Autism spectrum, anorexia nervosa, conversion disorder, and somatization disorder was admitted to inpatient psych on 04/16. She was getting treated for principal diagnosis of substance induced psychosis vs Mood disorder with psychotic feature  Transferred to medicine for persistent hypoglycemia d/t poor po intake.         # Hypoglycemia: Most likely d/t poor CHO intake in the setting of anorexia nervosa. Blood glucose as low as 37. Now improved to 60's.  She was given Glucagon twice. Per report, patient has not been eating for last few days. She has not lost conciousness.   She is refusing to eat any food/fluid at this time. So she was transferred to medicine floor for Dextrose infusion and blood glucose monitoring.   With initiation of D5W infusion, patient's blood glucose has improved. It is now in 100's. However patient still has not eaten food this AM.   - Continue D 5W 1/2 NS infusion until patient is able to take PO well.   - Check blood sugar every 4-6 hours   - Hypoglycemia protocol       # Behavioral and Mental health problem:  Major depression, bipolar disorder, borderline personality, BAILEY, PTSD, OCD, Autism spectrum, anorexia nervosa, conversion disorder, and somatization disorder Currently she was  getting treated for principal diagnosis of substance induced psychosis vs Mood disorder with psychotic feature  Currently on Desvenlafaxine, Gabapentin, Olanzapine, Ativan PRN  Discussed with Psychiatry. Advised looking for [facility] for eating disorder treatment. Will speak with .   - Continue current regimen  - Suicide precaution  - Bedside attendant 1:1  - Psych consult     # Hx of POTS  # Hx of Non-epileptic seizures  - Monitor      # FEN: Vegan diet, and Boost plus with meals  # Lines &Tubes: PIV  # Prophylaxis:  Ambulate  # Code status: Full code     Disposition Plan   Expected discharge in 1-2 days to prior living arrangement once hypoglycemia is treated, and blood glucose stabilizes.     Entered: Giovani Bethea 04/20/2020, 7:50 PM                                     Pt's care was discussed with bedside RN, patient and  during Care Team Rounds.               Giovani Bethea MD  Hospitalist ( Internal medicine)  Pager: [phone]

## 2020-04-21 NOTE — PROGRESS NOTES
04/20/20 2051   Patient Belongings   Did you bring any home meds/supplements to the hospital?  No   Patient Belongings locker   Patient Belongings Put in Hospital Secure Location (Security or Locker, etc.) none   Belongings Search Yes   Clothing Search Yes   Second Staff TRICIA     Patient locker-Personal care items, bag, shoes, laptop bag, laptop , sandles.      With Patient-cloths, books.      Security-nothing     A               Admission:  I am responsible for any personal items that are not sent to the safe or pharmacy.  [facility] is not responsible for loss, theft or damage of any property in my possession.    Signature:  _________________________________ Date: _______  Time: _____                                              Staff Signature:  ____________________________ Date: ________  Time: _____      2nd Staff person, if patient is unable/unwilling to sign:    Signature: ________________________________ Date: ________  Time: _____     Discharge:  [facility] has returned all of my personal belongings:    Signature: _________________________________ Date: ________  Time: _____                                          Staff Signature:  ____________________________ Date: ________  Time: _____

## 2020-04-21 NOTE — DISCHARGE SUMMARY
"    Psychiatric Discharge Summary    Hospital Day #4    Stephanie Villanueva MRN# [id number]   Age: 20 year old YOB: 2000     Date of Admission:  4/16/2020  Date of Discharge:  4/20/2020 10:13 PM  Admitting Physician:  Shayne Sue MD  Discharge Physician:  Eric Salinas MD         Event Leading to Hospitalization:   \"    Stephanie Villanueva is a 20 year old female with prior psychiatric diagnoses of major depressive, bipolar disorder, borderline personality, generalized anxiety, posttraumatic stress, obsessive-compulsive, autism spectrum, anorexia nervousa, conversion and somatization disorders admitted from the  [facility] ED on 4/16/20 due to concern for increased disorganization, paranoia, visual and auditory hallucinations as well as disrupted sleep in the context of stress related to Covid 19 pandemic, cannabis use and chronic mental health issues.      Per ED Notes:   Stephanie Villanueva was brought to the ED by her mother on 4/16/20 at the directive of her outpatient psychiatric provider after a telehealth appointment on the same day.  Over the past couple of days patient has had increased disorganization and paranoia as evident by the patient not recognizing her family and thinking they are impostors and removing all the mirrors on the walls claiming they are portals to other worlds.  She acknowledged having visual and auditory hallucinations and claimed that others are following her with the intention of harming her. Per ED notes, mom stated that one of the patients friends recently disclosed to her that Stephanie has been using cannabis heavily/daily for the past month.      In the ED, patient was cooperative though became irritated with the provider when asked about discrepency between her reported cannabis use and what her mother reported. The patient had a fall when being transported from the general to the behavioral ED \"Security yelled for a nurse or doctor, RN responded finding the patient lying on " "the floor.  The mother said that this is normal and that she has \"fainting spells.\"  Patient was assisted into a wheelchair and brought into her room in [facility].  Patient does not report any injuries from the fall.\"     She was medically cleared for admission to inpatient psychiatric unit.     Per patient report:    She reports that \"I have been having paranoia and visual hallucinations\" and states that \"walking in my socks they can't trap me. I don't trust anyone.\" She reports not feeling safe at home due to concerns that \"my family might not be real\" and \"they might be imposters.\" Although later she reported having a panic attack today in response to her mom not being able to come to the psych unit and she also requested for her mom to bring her a dietary supplement from home for her.  She also states that \"when I am walking with my service dog I have to hide sometimes because the shadow people are trying to trap me.\"  She reports that she has been \"hearing voices for a long time\" and that it has been worse over the past week. She reports that she first heard \"the thought\" when she was 14, and elaborated that \"I call the voice thought.\"   She feels that things may be worse due to \"psychotic features from other times before... I've had stuff like this other times.\" She feels that these symptoms are worse when she is \"very depressed\" but \"this time I do not feel as depressed as I have in the past.\" She reports that her mood has been \"up and down\" and \"has been cycling. That's why my other psychiatrist wanted me to go up on my desvenlafaxine\" due to having \"very intense episodes of intrusive thoughts and feeling suicidal but right now I'm feeling more close to my baseline.\" SI thoughts are distressing to her. Reports that last intense SI was about 1 week ago. She reports she has been taking her medications and knew them off the top of her head, including that her Pristiq dose was \"increased from 100 to 150 mg about 2 " "weeks ago.\" She feels stress has been increased due to the pandemic and losing her job due to this.      Regarding substances, she reports \"only using marijuana.\" She uses a \"oney 5-6 times at night 4-5 days per week to help with sleep.\" States she stopped using 4 days ago.       Her primary goal for this hospitalization is to \"fix the paranoia and the voices, make them manageable\" and also that \"my eating disorder symptoms have been worse and I want help with those too.\"       See Admission note by Shayne Sue MD on 4/16/20 for additional details.          Diagnoses:   Principal Diagnosis:   # Substance induced psychosis vs. Mood disorder with psychotic features     Secondary psychiatric diagnoses of concern this admission:   - Cannabis use disorder, unspecified severity   - post traumatic stress disorder   - Generalized anxiety disorder  - Autism spectrum disorder by history   - Anorexia Nervousa by history   - Obsessessive compulsive disorder by history   - Conversion disorder by history     Diagnostic Impression:   Stephanie Villanueva is a 20 year old female with prior psychiatric diagnoses of major depressive disorder, bipolar disorder, borderline personality, generalized anxiety, posttraumatic stress, obsessive-compulsive, autism spectrum, anorexia nervousa, conversion and somatization disorders who presented with increasing paranoia, reports of psychotic phenomenology, mood instability and frequent dissociation in the context of stress related to covid 19 pandemic, chronic mental health issues, decreased engagement in outpatient care and increased frequency of cannabis use. Her last psychiatric hospitalization was in March 2019.  Current psychosocial stressors include body image, trauma, chronic mental health issues, job loss and the Covid 19 pandemic which she has been coping with by using substances and withdrawing.  She has continued to be adherent with her medications. Patient's support system " includes family, outpatient team and peers.  Substance use does appear to be playing a contributing role in the patient's presentation.  There is genetic loading for mood and CD. Medical history does appear to be significant for fibromyalgia, chronic pain and POTS disease.  The MSE at admission was notable for a patient who is well groomed, initially soft spoken, and guarded with some disorganization of thoughts all of which improved as the interview progressed. She intermittently appeared paranoid though did not appear internally distracted. There was no evidence for SI, HI or dami at the time of admission. Given patient's recent increased use of mood-altering substance like cannabis, this raises suspicion for substance induced psychosis vs mood disorder with psychosis.         Consults:   None          Hospital Course:   Psychiatric Course:  Stephanie Villanueva was admitted to station 20 as a voluntary patient. At admission, a safe and therapeutic milieu was initiated and her home medications including Pristiq, gabapentin, olanzapinewere continued with Olazapine switched to a scheduled dose of 5 mg at bedtime instead of a PRN dose. After 48 hours on 5 mg dose with no side effects, dose was increased to 10 mg. Patient was transferred to internal medicine for medical stabilization with the plan to return to psychiatry for further stabilization. Internal medicine team placed the patient on a 72 hour hold due to the patient declining medical care and not having capacity to do so.     Medical Course:  Patient was medically cleared in the ED prior to admission. She had minimal PO intake and on day 4 of her hospitalization the evening of 4/20 she developed significant hypoglycemia with blood glucose as low as 37 and the patient becoming symptomatic (nauseated, dizzy, not feeling well, tachycardic). Patient was given glucagon which only temporarily improved her blood glucose. Medicine evaluated the patient and decision was made  to transfer her for further monitoring and stabilization. The team did not feel this could be done safely on the psych unit.     Stephanie was transfered to internal medicine for stabilization. Her bed can be held for up to 72 hours with plan to transfer her back to our unit once she is stabilized medically.          Discharge Medications:     Current Discharge Medication List      START taking these medications    Details   Vitamin D3 (CHOLECALCIFEROL) 2000 units (50 mcg) tablet Take 1 tablet (50 mcg) by mouth daily  Qty:      Associated Diagnoses: Vitamin D deficiency         CONTINUE these medications which have CHANGED    Details   OLANZapine (ZYPREXA) 10 MG tablet Take 1 tablet (10 mg) by mouth At Bedtime  Qty:      Associated Diagnoses: Psychosis, unspecified psychosis type (H)         CONTINUE these medications which have NOT CHANGED    Details   COMPRESSION STOCKINGS Wear as directed  Qty: 3 each, Refills: 0    Comments: 20-30 mmhg  Associated Diagnoses: POTS (postural orthostatic tachycardia syndrome)      desvenlafaxine (PRISTIQ) 50 MG 24 hr tablet Take 150 mg by mouth daily      gabapentin (NEURONTIN) 300 MG capsule Take 3 capsules (900 mg) by mouth 3 times daily  Qty: 270 capsule, Refills: 0    Associated Diagnoses: Severe recurrent major depression without psychotic features (H)      hydrOXYzine (ATARAX) 25 MG tablet Take 25-50 mg by mouth nightly as needed (sleep)      LORazepam (ATIVAN) 1 MG tablet Take 1 tablet by mouth daily as needed for agitation          STOP taking these medications       cyanocobalamin (VITAMIN B-12) 1000 MCG tablet Comments:   Reason for Stopping:                    Psychiatric Examination:   Writer spoke with the patient via telephone however did not see her face to face. Please see progress note from today for a full MSE.     Attitude:  evasive and slightly uncooperative  Eye Contact:  unable to assess  Mood:  anxious  Affect:  mood congruent  Speech:  clear,  coherent  Psychomotor Behavior:  unable to assess  Thought Process:  linear  Associations:  no loose associations  Thought Content:  no evidence of suicidal ideation or homicidal ideation and paranoid  Insight:  poor  Judgment:  poor  Oriented to:  time, person, and place  Attention Span and Concentration:  limited  Recent and Remote Memory:  fair  Language: not formally assessed. Speaks English without difficulty  Fund of Knowledge: appropriate  Muscle Strength and Tone: Not observed  Gait and Station: Not observed         Discharge Plan:   Patient was transferred to internal medicine for medical stabilization with plan to return to psychiatry afterwards.     Pt was discussed with my attending physician, Eric Salinas MD.   Jennifer Rodriguez MD  PGY-2 Resident    Attestation:          Appendix A: All Labs This Admission:     Results for orders placed or performed during the hospital encounter of 04/16/20   Drug abuse screen 6 urine (tox)     Status: Abnormal   Result Value Ref Range    Amphetamine Qual Urine Negative NEG^Negative    Barbiturates Qual Urine Negative NEG^Negative    Benzodiazepine Qual Urine Negative NEG^Negative    Cannabinoids Qual Urine Positive (A) NEG^Negative    Cocaine Qual Urine Negative NEG^Negative    Ethanol Qual Urine Negative NEG^Negative    Opiates Qualitative Urine Negative NEG^Negative   HCG qualitative urine     Status: None   Result Value Ref Range    HCG Qual Urine Negative NEG^Negative   Comprehensive metabolic panel     Status: None   Result Value Ref Range    Sodium 141 133 - 144 mmol/L    Potassium 4.1 3.4 - 5.3 mmol/L    Chloride 108 94 - 109 mmol/L    Carbon Dioxide 25 20 - 32 mmol/L    Anion Gap 8 3 - 14 mmol/L    Glucose 77 70 - 99 mg/dL    Urea Nitrogen 18 7 - 30 mg/dL    Creatinine 0.67 0.52 - 1.04 mg/dL    GFR Estimate >90 >60 mL/min/[1.73_m2]    GFR Estimate If Black >90 >60 mL/min/[1.73_m2]    Calcium 9.4 8.5 - 10.1 mg/dL    Bilirubin Total 0.9 0.2 - 1.3 mg/dL     Albumin 4.2 3.4 - 5.0 g/dL    Protein Total 7.2 6.8 - 8.8 g/dL    Alkaline Phosphatase 80 40 - 150 U/L    ALT 25 0 - 50 U/L    AST 23 0 - 45 U/L   TSH with free T4 reflex     Status: None   Result Value Ref Range    TSH 1.47 0.40 - 4.00 mU/L   CBC with platelets differential     Status: None   Result Value Ref Range    WBC 4.7 4.0 - 11.0 10e9/L    RBC Count 4.62 3.8 - 5.2 10e12/L    Hemoglobin 13.9 11.7 - 15.7 g/dL    Hematocrit 41.9 35.0 - 47.0 %    MCV 91 78 - 100 fl    MCH 30.1 26.5 - 33.0 pg    MCHC 33.2 31.5 - 36.5 g/dL    RDW 12.0 10.0 - 15.0 %    Platelet Count 258 150 - 450 10e9/L    Diff Method Automated Method     % Neutrophils 51.8 %    % Lymphocytes 41.6 %    % Monocytes 5.1 %    % Eosinophils 1.1 %    % Basophils 0.4 %    % Immature Granulocytes 0.0 %    Nucleated RBCs 0 0 /100    Absolute Neutrophil 2.5 1.6 - 8.3 10e9/L    Absolute Lymphocytes 2.0 0.8 - 5.3 10e9/L    Absolute Monocytes 0.2 0.0 - 1.3 10e9/L    Absolute Eosinophils 0.1 0.0 - 0.7 10e9/L    Absolute Basophils 0.0 0.0 - 0.2 10e9/L    Abs Immature Granulocytes 0.0 0 - 0.4 10e9/L    Absolute Nucleated RBC 0.0    Treponema Abs w Reflex to RPR and Titer     Status: None   Result Value Ref Range    Treponema Antibodies Nonreactive NR^Nonreactive   HIV Antigen Antibody Combo     Status: None   Result Value Ref Range    HIV Antigen Antibody Combo Nonreactive NR^Nonreactive       Vitamin D     Status: Abnormal   Result Value Ref Range    Vitamin D Deficiency screening 17 (L) 20 - 75 ug/L   Hepatitis C Screen Reflex to HCV RNA Quant and Genotype     Status: None   Result Value Ref Range    Hepatitis C Antibody Nonreactive NR^Nonreactive   Vitamin B12     Status: None   Result Value Ref Range    Vitamin B12 632 193 - 986 pg/mL   Glucose     Status: Abnormal   Result Value Ref Range    Glucose 51 (L) 70 - 99 mg/dL   Glucose by meter     Status: Abnormal   Result Value Ref Range    Glucose 42 (LL) 70 - 99 mg/dL   Glucose by meter     Status: Abnormal    Result Value Ref Range    Glucose 37 (LL) 70 - 99 mg/dL   Glucose by meter     Status: Abnormal   Result Value Ref Range    Glucose 59 (L) 70 - 99 mg/dL   Glucose by meter     Status: None   Result Value Ref Range    Glucose 84 70 - 99 mg/dL   Glucose by meter     Status: Abnormal   Result Value Ref Range    Glucose 68 (L) 70 - 99 mg/dL   Glucose by meter     Status: Abnormal   Result Value Ref Range    Glucose 60 (L) 70 - 99 mg/dL   Glucose by meter     Status: Abnormal   Result Value Ref Range    Glucose 44 (LL) 70 - 99 mg/dL   Neisseria gonorrhoea PCR     Status: None    Specimen: Urine   Result Value Ref Range    Specimen Descrip Urine     N Gonorrhea PCR Negative NEG^Negative   Chlamydia trachomatis PCR     Status: None    Specimen: Urine   Result Value Ref Range    Specimen Description Urine     Chlamydia Trachomatis PCR Negative NEG^Negative   '

## 2020-04-22 LAB
GLUCOSE BLDC GLUCOMTR-MCNC: 115 MG/DL (ref 70–99)
GLUCOSE BLDC GLUCOMTR-MCNC: 80 MG/DL (ref 70–99)
GLUCOSE BLDC GLUCOMTR-MCNC: 81 MG/DL (ref 70–99)
GLUCOSE BLDC GLUCOMTR-MCNC: 85 MG/DL (ref 70–99)
GLUCOSE BLDC GLUCOMTR-MCNC: 86 MG/DL (ref 70–99)
GLUCOSE BLDC GLUCOMTR-MCNC: 91 MG/DL (ref 70–99)
GLUCOSE BLDC GLUCOMTR-MCNC: 92 MG/DL (ref 70–99)
GLUCOSE BLDC GLUCOMTR-MCNC: 92 MG/DL (ref 70–99)

## 2020-04-22 PROCEDURE — 00000146 ZZHCL STATISTIC GLUCOSE BY METER IP

## 2020-04-22 PROCEDURE — 12000001 ZZH R&B MED SURG/OB UMMC

## 2020-04-22 PROCEDURE — 25800030 ZZH RX IP 258 OP 636: Performed by: INTERNAL MEDICINE

## 2020-04-22 PROCEDURE — 99232 SBSQ HOSP IP/OBS MODERATE 35: CPT | Performed by: INTERNAL MEDICINE

## 2020-04-22 PROCEDURE — 25000132 ZZH RX MED GY IP 250 OP 250 PS 637: Performed by: INTERNAL MEDICINE

## 2020-04-22 RX ADMIN — LORAZEPAM 1 MG: 1 TABLET ORAL at 15:29

## 2020-04-22 RX ADMIN — GABAPENTIN 900 MG: 300 CAPSULE ORAL at 15:25

## 2020-04-22 RX ADMIN — DEXTROSE AND SODIUM CHLORIDE: 5; 900 INJECTION, SOLUTION INTRAVENOUS at 07:11

## 2020-04-22 RX ADMIN — OLANZAPINE 10 MG: 5 TABLET, FILM COATED ORAL at 20:09

## 2020-04-22 RX ADMIN — MELATONIN 50 MCG: at 09:00

## 2020-04-22 RX ADMIN — GABAPENTIN 900 MG: 300 CAPSULE ORAL at 20:08

## 2020-04-22 RX ADMIN — DESVENLAFAXINE SUCCINATE 150 MG: 50 TABLET, EXTENDED RELEASE ORAL at 15:26

## 2020-04-22 RX ADMIN — GABAPENTIN 900 MG: 300 CAPSULE ORAL at 09:00

## 2020-04-22 NOTE — CONSULTS
"    Psychiatry Consultation; Follow up              Reason for Consult, requesting source:    Follow-up visit  Requesting source: Giovani Bethea            Interim history:    Ms. Stephanie Villanueva is a 20-year-old female who was admitted to medicine on 4/20/20 in the setting of food refusal and hypoglycemia. She was transferred from [facility] inpatient psychiatry, where she was initially admitted for increased disorganization, paranoia, visual and auditory hallucinations, as well as disrupted sleep in the context of stress related to COVID-19 pandemic. She is seen for psychiatric follow-up today.     Ms. Villanueva is seen seated in her bed this morning, face-timing with her mother. She reports having completed the telephone assessment with the [facility] at 10:00 am. She reports that she now has a Zoom meeting scheduled with the medical doctor tomorrow and that the wait list is about 1 week.     In terms of her mood, she reports feeling \"good\" today. She slept well last night with olanzapine. She is complaining of some anxiety, endorsing worry about her blood glucose checks as well as being able to maintain stable blood sugars. She is drinking some juice and also ate an apple sauce this morning. This writer was in the room for most recent blood glucose check and the value was 81. Patient was again encouraged to drink a boost for protein so that her blood sugars remain more stable.     She denies any current delusions, paranoia, or hallucinations. She says she worries about recurrence of these symptoms, stating that \"stress\" tends to lead to more of these symptoms. She reports that eating is a stressor for her, however currently the prior psychotic symptoms are under good control.     She denies any current suicidal ideation. Denies any current self-harm urges. She was encouraged to continue practicing her DBT skills. She is hoping that her mother can bring in some markers so that she can do mandalas and practice her " "mindfulness skills.          Medications:     Current Facility-Administered Medications   Medication     desvenlafaxine (PRISTIQ) 24 hr tablet 150 mg     dextrose 5% and 0.9% NaCl infusion     glucose gel 15-30 g    Or     dextrose 50 % injection 25-50 mL    Or     glucagon injection 1 mg     gabapentin (NEURONTIN) capsule 900 mg     hydrOXYzine (ATARAX) tablet 25-50 mg     LORazepam (ATIVAN) tablet 1 mg     melatonin tablet 1 mg     naloxone (NARCAN) injection 0.1-0.4 mg     OLANZapine (zyPREXA) tablet 10 mg     ondansetron (ZOFRAN-ODT) ODT tab 4 mg    Or     ondansetron (ZOFRAN) injection 4 mg     Vitamin D3 (CHOLECALCIFEROL) 25 mcg (1000 units) tablet 50 mcg              MSE:     Appearance: age-appearing, casually dressed, seated in bed, in no acute distress  Behavior: cooperative, pleasant and calm  Orientation: alert and oriented to time, place and person  Movements: no abnormal or involuntary movements noted, no tremors  Mood: \"good\"  Affect: euthymic, mood-congruent, stable  Speech: Normal rate, rhythm, tone  Memory: no impairment in immediate, recent, or remote memory  Intellectual capacity: Average for development based on word choice and education  Concentration: attentive to interview  Thought process and content: linear and goal-directed. Denies auditory or visual hallucinations. No delusions or paranoia endorsed or elicited on exam. Does not appear to respond to internal stimuli.   Insight: Limited  Judgement: fair  Safety: denies current suicidal ideation or self-harm urges. Denies homicidal ideation.       Vital signs:  Temp: 97.3  F (36.3  C) Temp src: Oral BP: 119/76 Pulse: 78   Resp: 16 SpO2: 100 % O2 Device: None (Room air)        Estimated body mass index is 21.52 kg/m  as calculated from the following:    Height as of 9/16/19: 1.778 m (5' 10\").    Weight as of 9/16/19: 68 kg (150 lb).              DSM-5 Diagnosis:   #1 Unspecified psychosis, resolved  #2 Borderline personality disorder  #3 " Unspecified depressive disorder  #4 Anorexia Nervosa, by history  #5 Generalized anxiety disorder, by history  #6 PTSD, by history  #7 Cannabis use disorder          Assessment:     Ms. Villanueva is seen for psychiatric follow-up today.     She completed her telephone assessment with the [facility] this morning and reports that the wait list is about 1 week. She says she will now have a Zoom meeting with the medical doctor from the program tomorrow. She would like to discharge home to await eating disorder treatment when she is medically stable. She is working on increasing her PO intake. Her IV fluids were disconnected this morning and we are continuing to monitor blood glucose.     Her psychiatric symptoms are currently stable. She is reporting a stable mood. Denies any symptoms of psychosis. Denies suicidal thinking. It would be reasonable to discontinue 1:1 sitter and 72 hour hold, which was placed when she was transferred to medicine from psychiatry.    In speaking with Dr. Bethea, it appears that inpatient psychiatry staff was recommending pursuit of commitment. Due to the fact that patient is now voluntarily willing to go to CD treatment, there is no need to commit her.    Would recommend continuing current psychotropic medication regimen. When she is medically stable, she can discharge home to await inpatient eating disorders treatment at the [facility].           Summary of Recommendations:   1) Recommend to continue current psychotropic medication regimen    2) Recommend to discontinue 1:1 and 72 hour hold    3) When patient is medically stable, recommend discharge to home to await inpatient eating disorders treatment    4) Patient should follow-up with outpatient psychiatry upon return home    5) Please re-consult psychiatry as needed or page this provider at [phone] with additional questions or concerns.

## 2020-04-22 NOTE — PHARMACY-ADMISSION MEDICATION HISTORY
Please see Admission Medication History completed on 4/16/20 under previous encounter at [facility] for information regarding prior to admission medications.    Luisa Gu, PharmD, BCPS

## 2020-04-22 NOTE — PROGRESS NOTES
Patient BG after 4 hours of drinking cranberry and eating apple sauce was 81. Patient promised to drink boost within 4 hours and BG will be check after 4 hours. Will continue to monitor patient.
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
## 2020-04-22 NOTE — PROGRESS NOTES
"[facility], [place]   Internal Medicine Daily Note           Interval History/Events     Overnight events reviewed  Reports doing okay this morning  She is not eating except for some apple sauce yesterday afternoon. No dinner last night. Has not eaten any breakfast so far  Grazyna lightheadedness or dizziness         Review of Systems        4 point ROS including Respiratory, CV, GI and , other than that noted above is negative      Medications   I have reviewed current medications  in the \"current medication\" section of Epic.  Relevant changes include:     Physical Exam   General:       Vital signs:    Blood pressure 119/76, pulse 78, temperature 97.3  F (36.3  C), temperature source Oral, resp. rate 16, SpO2 100 %, not currently breastfeeding.  Estimated body mass index is 21.52 kg/m  as calculated from the following:    Height as of 9/16/19: 1.778 m (5' 10\").    Weight as of 9/16/19: 68 kg (150 lb).    No intake or output data in the 24 hours ending 04/21/20 0955     Constitutional: Laying in bed in no acute distress  Eye: No icterus  Mouth/ENT: Normal oral mucosa  Cardiovascular: S1, S2 normal  Respiratory: B/L CTA  GI: Soft, NT, BS+  : No moffett's catheter  Neurology: Alert, awake,and oriented  Psych: Mood stable  MSK: No pretibial edema  Integumentary:   Heme/Lymph/Imm:      Laboratory and Imaging Studies     I have reviewed  laboratory and imaging studies in the Epic. Pertinent findings are as below:    BMP  Recent Labs   Lab 04/21/20  0554 04/20/20  1544 04/16/20  1618     --  141   POTASSIUM 3.5  --  4.1   CHLORIDE 105  --  108   BRITTANIE 9.0  --  9.4   CO2 25  --  25   BUN 16  --  18   CR 0.64  --  0.67   GLC 93 51* 77     CBC  Recent Labs   Lab 04/21/20  0554 04/16/20  1618   WBC 4.6 4.7   RBC 4.62 4.62   HGB 14.0 13.9   HCT 40.4 41.9   MCV 87 91   MCH 30.3 30.1   MCHC 34.7 33.2   RDW 11.7 12.0    258     INRNo lab results found in last 7 days.  LFTs  Recent Labs "   Lab 04/16/20  1618   ALKPHOS 80   AST 23   ALT 25   BILITOTAL 0.9   PROTTOTAL 7.2   ALBUMIN 4.2      PANCNo lab results found in last 7 days.        Impression/Plan      20 year old year old female with hx of Major depression, bipolar disorder, borderline personality, BAILEY, PTSD, OCD, Autism spectrum, anorexia nervosa, conversion disorder, and somatization disorder was admitted to inpatient psych on 04/16. She was getting treated for principal diagnosis of substance induced psychosis vs Mood disorder with psychotic feature  Transferred to medicine for persistent hypoglycemia d/t poor po intake.         # Hypoglycemia: Most likely d/t poor CHO intake in the setting of anorexia nervosa. Blood glucose as low as 37. Now improved to 60's.  She was given Glucagon twice. Per report, patient has not been eating for last few days. She has not lost conciousness.   She is refusing to eat any food/fluid at this time. So she was transferred to medicine floor for Dextrose infusion and blood glucose monitoring.   With initiation of D5W infusion, patient's blood glucose has improved.  However patient still is not eating. I had prolonged chat about need to take foods, to get off IV fluids. Encouraged her to eat whatever food she likes to start with. Patient seems little more motivated to eat  - Continue D 5W 1/2 NS infusion until patient is able to take PO well.   - Check blood sugar every 4-6 hours   - Hypoglycemia protocol       # Behavioral and Mental health problem:  Major depression, bipolar disorder, borderline personality, BAILEY, PTSD, OCD, Autism spectrum, anorexia nervosa, conversion disorder, and somatization disorder Currently she was  getting treated for principal diagnosis of substance induced psychosis vs Mood disorder with psychotic feature  Currently on Desvenlafaxine, Gabapentin, Olanzapine, Ativan PRN  Discussed with Psychiatry on 04/21, and 04/22. Patient has phone interview with [facility] at 10 AM on 04/22.  Advised to keep Bedside attendant, and hold till then.   - Continue current regimen  - Suicide precaution  - Bedside attendant 1:1  - Psych consult    Addendum: Discussed with Psych on 04/22. Patient had phone interview with [facility]. She needs to talk with Medical director via Video conference. Psych advise to discontinue 1:1 and hold for now, no plan for commitment. Okay to discharge home, if patient is stable from medical stand point.      # Hx of POTS  # Hx of Non-epileptic seizures  - Monitor      # FEN: Vegan diet, and Boost plus with meals  # Lines &Tubes: PIV  # Prophylaxis: Ambulate  # Code status: Full code     Disposition Plan   Expected discharge in 1-2 days to prior living arrangement once hypoglycemia is treated, and blood glucose stabilizes.     Entered: Giovani Bethea 04/20/2020, 7:50 PM                                     Pt's care was discussed with bedside RN, patient and  during Care Team Rounds.               Giovani Bethea MD  Hospitalist ( Internal medicine)  Pager: [phone]

## 2020-04-23 ENCOUNTER — PATIENT OUTREACH (OUTPATIENT)
Dept: CARE COORDINATION | Facility: CLINIC | Age: 20
End: 2020-04-23

## 2020-04-23 VITALS
DIASTOLIC BLOOD PRESSURE: 69 MMHG | HEART RATE: 92 BPM | SYSTOLIC BLOOD PRESSURE: 115 MMHG | TEMPERATURE: 96.8 F | OXYGEN SATURATION: 98 % | RESPIRATION RATE: 16 BRPM

## 2020-04-23 LAB
GLUCOSE BLDC GLUCOMTR-MCNC: 115 MG/DL (ref 70–99)
GLUCOSE BLDC GLUCOMTR-MCNC: 82 MG/DL (ref 70–99)

## 2020-04-23 PROCEDURE — 99232 SBSQ HOSP IP/OBS MODERATE 35: CPT | Performed by: INTERNAL MEDICINE

## 2020-04-23 PROCEDURE — 25000132 ZZH RX MED GY IP 250 OP 250 PS 637: Performed by: INTERNAL MEDICINE

## 2020-04-23 PROCEDURE — 00000146 ZZHCL STATISTIC GLUCOSE BY METER IP

## 2020-04-23 RX ADMIN — MELATONIN 50 MCG: at 08:24

## 2020-04-23 RX ADMIN — GABAPENTIN 900 MG: 300 CAPSULE ORAL at 08:24

## 2020-04-23 NOTE — PLAN OF CARE
/82 (BP Location: Left arm)   Pulse 80   Temp 98.4  F (36.9  C) (Oral)   Resp 16   SpO2 100%    BGs 82 and 115.Apple juice on bedside,drinking small portions.Up ad patricia.Denies dizziness/lightheadedness.PIV line R forearm,,saline locked.Up ad patricia.Denies problems voiding.Reported had  BM yesterday.Plan discharge  home then to [facility].

## 2020-04-23 NOTE — DISCHARGE SUMMARY
[facility], [place]  Hospitalist Discharge Summary      Date of Admission:  4/20/2020  Date of Discharge:  4/23/2020  Discharging Provider: Shannon Gates    Discharge Diagnoses   Hypoglycemia 2/2 poor intake and anorexia nervosa  Behavioral and mental health history: Major depression, bipolar disorder, borderline personality, BAILEY, PTSD, OCD, Autism spectrum, anorexia nervosa, conversion disorder, somatization disorder  History of POTS  History of Non-epileptic seizures          Discharge Disposition   Discharged to home  Condition at discharge: Stable      Hospital Course     Hypoglycemia 2/2 poor PO intake in setting of anorexia nervosa  Was hospitalized inpatient psychiatry, but refusing any PO intake and blood glucose as low as 37, so transferred to medicine. Blood glucose improved with glucagon x2, D5W infusion, and encouraged to increase PO intake. D5W infusion discontinued in AM of 4/22 and blood glucose remained stable in 80s-100s.     Behavior and Mental Health Problems: Major depression, bipolar disorder, borderline personality, BAILEY, PTSD, OCD, Autism spectrum, anorexia nervosa, conversion disorder, and somatization disorder.   Was hospitalized inpatient for treatment of substance induced psychosis vs. Mood disorder with psychotic feature. Psychiatry consulted with recommendations below. Plans to voluntarily pursue treatment at [facility].   - Psychiatry consulted and recommended:   - Continue current psychotropic medications: desvenlafaxine, gabapentin, olanzapine, and ativan  PRN   - Discontinue 1:1 and 72 hour hold   - Ok to discharge home when medically stable   - Follow-up with outpatient psychiatry once returned to home    History of POTS  History of Non-epileptic seizures  Stable during hospitalization.      Consultations This Hospital Stay   PSYCHIATRY IP CONSULT  PSYCHIATRY IP CONSULT  MEDICATION HISTORY IP PHARMACY CONSULT    Code Status   Full Code    Time Spent on  this Encounter   I, Shannon Gates MD, personally saw the patient today and spent greater than 30 minutes discharging this patient.       Leann Shaffer, MS3  [facility], [place]  ______________________________________________________________________    Physical Exam   Vital Signs: Temp: 96.8  F (36  C) Temp src: Oral BP: 115/69 Pulse: 92   Resp: 16 SpO2: 98 % O2 Device: None (Room air)    Weight: 0 lbs 0 oz  Constitutional: awake, alert, cooperative, no apparent distress, and appears stated age  Hematologic / Lymphatic: no cervical lymphadenopathy  Respiratory: No increased work of breathing, good air exchange, clear to auscultation bilaterally, no crackles or wheezing  Cardiovascular: Normal apical impulse, regular rate and rhythm, normal S1 and S2, no S3 or S4, and no murmur noted  GI: No scars, normal bowel sounds, soft, non-distended, non-tender, no masses palpated, no hepatosplenomegally       Primary Care Physician   Michelle Berumen    Discharge Orders      Reason for your hospital stay    You were admitted from psychiatry to medicine due to low blood sugars as you were not eating . You were given fluids and had psychiatry consultation and will follow up with [facility]     Adult [facility] Follow-up and recommended labs and tests    Follow up with primary care provider, Michelle Berumen, within 7 days for hospital follow- up.    [facility] as instructed     Appointments on [place] and/or [facility] (with [facility] or [facility] provider or service). Call [phone] if you haven't heard regarding these appointments within 7 days of discharge.     Activity    Your activity upon discharge: activity as tolerated     Diet    Follow this diet upon discharge: Orders Placed This Encounter      Snacks/Supplements Adult: Boost Plus; With Meals      Vegan Diet       Significant Results and Procedures   Most Recent 3 BMP's:  Recent Labs   Lab Test 04/21/20  0554 04/20/20  1545  04/16/20  1618 02/10/20  2117     --  141 140   POTASSIUM 3.5  --  4.1 4.0   CHLORIDE 105  --  108 110*   CO2 25  --  25 26   BUN 16  --  18 9   CR 0.64  --  0.67 0.62   ANIONGAP 8  --  8 5   BRITTANIE 9.0  --  9.4 9.0   GLC 93 51* 77 78   ,   Results for orders placed or performed during the hospital encounter of 02/10/20   Head CT w/o contrast    Narrative    EXAM: CT HEAD W/O CONTRAST  LOCATION: [facility]  DATE/TIME: 2/10/2020 11:08 PM    INDICATION: Seizure, fall, altered mental status  COMPARISON: 09/09/2019 head CT  TECHNIQUE: Routine without IV contrast. Multiplanar reformats. Dose reduction techniques were used.    FINDINGS:  INTRACRANIAL CONTENTS: No intracranial hemorrhage, extraaxial collection, or mass effect.  No CT evidence of acute infarct. Normal parenchymal attenuation. Normal ventricles and sulci.     VISUALIZED ORBITS/SINUSES/MASTOIDS: No intraorbital abnormality. Mild mucosal thickening scattered about the paranasal sinuses. No middle ear or mastoid effusion.    BONES/SOFT TISSUES: No acute abnormality.      Impression    IMPRESSION:  1.  No acute intracranial process.       Discharge Medications   Discharge Medication List as of 4/23/2020  9:49 AM      CONTINUE these medications which have NOT CHANGED    Details   COMPRESSION STOCKINGS Wear as directed, Disp-3 each, R-0, Local [id number] mmhg      desvenlafaxine (PRISTIQ) 50 MG 24 hr tablet Take 150 mg by mouth daily, Historical      gabapentin (NEURONTIN) 300 MG capsule Take 3 capsules (900 mg) by mouth 3 times daily, Disp-270 capsule, R-0, E-Prescribe      hydrOXYzine (ATARAX) 25 MG tablet Take 25-50 mg by mouth nightly as needed (sleep), Historical      LORazepam (ATIVAN) 1 MG tablet Take 1 tablet by mouth daily as needed for agitation , Historical      OLANZapine (ZYPREXA) 10 MG tablet Take 1 tablet (10 mg) by mouth At Bedtime, No Print Out      Vitamin D3 (CHOLECALCIFEROL) 2000 units (50 mcg) tablet Take 1 tablet (50 mcg) by  mouth daily, No Print Out           Allergies   Allergies   Allergen Reactions     No Known Drug Allergies

## 2020-04-23 NOTE — PLAN OF CARE
VS: /66 (BP Location: Right arm)   Pulse 97   Temp 97.3  F (36.3  C) (Oral)   Resp 16   SpO2 98%    O2: >90% on RA.   Output: Spontaneously in the toilet.   Last BM: Pt unable to recall.   Activity: Independent in the room.   Skin: Scars from cutting on both arms.   Pain: Denies.   CMS: Intact.   Dressing: None.   Diet: Regular diet, poor appetites. BG 85 at 2000   LDA: None.   Equipment: Call light, computer at bedside.   Plan: discharge when medically stable.   Additional Info: Pt asked for two apple juice. However didn't not drink.

## 2020-04-23 NOTE — PHARMACY - DISCHARGE MEDICATION RECONCILIATION
"Pharmacy Discharge Medication Reconciliation  The SOC Discharge Team has been consulted to provide discharge summary and discharge medication reconciliation assistance for this patient. The following includes a summary of medications that were reconciled for discharge and medications that still require final clinical decision by the provider.     Current discharge plan: Home   Approximate discharge date: 4/23  Patient's preferred pharmacy: [facility] - unable to confirm otherwise    Medications still requiring physician discharge medication reconciliation/clinical decision  - None    Medications reconciled as \"PRESCRIBE\" that are NEW or MODIFIED this hospitalization  - None    Medications reconciled as \"RESUME\" at discharge   - All PTA medications were resumed at the same doses and directions unless otherwise noted    Medications reconciled as \"DISCONTINUE\" at discharge  - All protocol medications, IV medications, and PRN medications not in use were discontinued unless otherwise noted    Please note:   *All OTC medications are not E-prescribed unless otherwise noted. Please review and E-prescribe if patient requests.   *Patient's orders were routed to preferred pharmacy (see above). If unable to determine, will route to [facility].   *Inpatient medication use was evaluated for discharge medication reconciliation. PRN medications with infrequent use or no use within the last 48 hours or medications ordered through an orderset or protocol that are not appropriate for discharge were not prescribed.      Thank you for the opportunity to care for this patient    Ofe Sandoval  Pharmacy Student  [phone]        "

## 2020-04-23 NOTE — PLAN OF CARE
Hand off from Dr Bethea  Per previous plan patient could discontinue to home today if BG stable .  Psychiatry did not recommend commitment or readmission to psychiatry as she is voluntarily going to pursue treatment at [facility].  Her mom was notified of the plan and was in agreement.  Social work was involved  patient is to discharge home today  she does not require any medication refills

## 2020-04-23 NOTE — PLAN OF CARE
VS: VSS - BG medically stable    O2: >90% on room air    Output: Voiding spontaneously and hydrating well - fluids encouraged   Last BM: 4/22/2020   Activity: Up ad patricia    Up for meals? N/a    Skin: CDI ex scars on arms    Pain: Pt denies    CMS: Intact    Dressing: N/a    Diet: Regular    LDA: PIV removed prior to discharge    Equipment: Pt discharged with belongings. No medications needed for discharge. Spoke with mom on plan for discharge and mom picked up pt. Plan to have treatment at [facility] week of may fourth but [facility] will follow up with pt. Pt filled out release of information for the [facility] to receive info from [facility]. Pt  discharge instructions reviewed and pt stated understanding and no further questions.    Plan: See above - NST walked pt to [facility] and made mom picked up    Additional Info: Cristel from [facility] gave update on plan and faxed most recent progress note and release of information form over to [facility].

## 2020-04-24 ENCOUNTER — PATIENT OUTREACH (OUTPATIENT)
Dept: CARE COORDINATION | Facility: CLINIC | Age: 20
End: 2020-04-24

## 2020-04-24 NOTE — PROGRESS NOTES
Clinic Care Coordination Contact  [facility]/Voicemail       Clinical Data: Care Coordinator Outreach  Outreach attempted x 1.  Left message on patient's voicemail with call back information and requested return call.  Plan: Care Coordinator will try to reach patient again in 1-2 business days.

## 2020-04-24 NOTE — LETTER
April 27, 2020      Stephanie Villanueva  [address] [place] [id number]        Dear Stephanie,                                                                         You were recently referred to the [facility]'s Clinic Care Coordination service.  This is a service offered through your Primary Care Clinic which can help you access resources and services in regard to your health and well-being goals. The clinic Community Health Worker has placed two calls to you to discuss the nature of this service and to offer enrollment.  If you are interested in learning more about Clinic Care Coordination, please call your Primary Care Clinic's Community Health Worker, Alyssa, at [phone].                                                    Sincerely,                                                                              MISSY Shah                                                                                          Clinic Care Coordination                                                  [facility]

## 2020-04-27 NOTE — PROGRESS NOTES
Community Health Worker called and left a message for the patient.  If the patient is returning my call, please transfer the patient to [facility] at ext. [phone].   Patient has been mailed a unreachable letter and was provided with CHW contact information if they are interested in accessing Clinic Care Coordination.  Order for Care Management has been closed, no further outreach will be done at this time and patient can be re-referred.

## 2020-05-20 ENCOUNTER — AMBULATORY - HEALTHEAST (OUTPATIENT)
Dept: BEHAVIORAL HEALTH | Facility: CLINIC | Age: 20
End: 2020-05-20

## 2020-05-20 ENCOUNTER — TELEPHONE (OUTPATIENT)
Dept: BEHAVIORAL HEALTH | Facility: CLINIC | Age: 20
End: 2020-05-20

## 2020-05-20 ENCOUNTER — HOSPITAL ENCOUNTER (EMERGENCY)
Facility: CLINIC | Age: 20
Discharge: PSYCHIATRIC OR SUBSTANCE USE FACILITY | End: 2020-05-21
Attending: FAMILY MEDICINE | Admitting: FAMILY MEDICINE
Payer: COMMERCIAL

## 2020-05-20 DIAGNOSIS — F60.3 BORDERLINE PERSONALITY DISORDER (H): Chronic | ICD-10-CM

## 2020-05-20 DIAGNOSIS — F41.1 GENERALIZED ANXIETY DISORDER: Chronic | ICD-10-CM

## 2020-05-20 DIAGNOSIS — R45.851 SUICIDAL IDEATION: ICD-10-CM

## 2020-05-20 LAB
ALBUMIN SERPL-MCNC: 3.8 G/DL (ref 3.4–5)
ALP SERPL-CCNC: 70 U/L (ref 40–150)
ALT SERPL W P-5'-P-CCNC: 33 U/L (ref 0–50)
AMPHETAMINES UR QL SCN: NEGATIVE
ANION GAP SERPL CALCULATED.3IONS-SCNC: 3 MMOL/L (ref 3–14)
AST SERPL W P-5'-P-CCNC: 32 U/L (ref 0–45)
BARBITURATES UR QL: NEGATIVE
BASOPHILS # BLD AUTO: 0 10E9/L (ref 0–0.2)
BASOPHILS NFR BLD AUTO: 0.4 %
BENZODIAZ UR QL: NEGATIVE
BILIRUB SERPL-MCNC: 0.3 MG/DL (ref 0.2–1.3)
BUN SERPL-MCNC: 16 MG/DL (ref 7–30)
CALCIUM SERPL-MCNC: 9 MG/DL (ref 8.5–10.1)
CANNABINOIDS UR QL SCN: NEGATIVE
CHLORIDE SERPL-SCNC: 111 MMOL/L (ref 94–109)
CO2 SERPL-SCNC: 28 MMOL/L (ref 20–32)
COCAINE UR QL: NEGATIVE
CREAT SERPL-MCNC: 0.78 MG/DL (ref 0.52–1.04)
DIFFERENTIAL METHOD BLD: NORMAL
EOSINOPHIL # BLD AUTO: 0.1 10E9/L (ref 0–0.7)
EOSINOPHIL NFR BLD AUTO: 1.6 %
ERYTHROCYTE [DISTWIDTH] IN BLOOD BY AUTOMATED COUNT: 12.1 % (ref 10–15)
ETHANOL UR QL SCN: NEGATIVE
GFR SERPL CREATININE-BSD FRML MDRD: >90 ML/MIN/{1.73_M2}
GLUCOSE SERPL-MCNC: 92 MG/DL (ref 70–99)
HCG UR QL: NEGATIVE
HCT VFR BLD AUTO: 35.8 % (ref 35–47)
HGB BLD-MCNC: 11.9 G/DL (ref 11.7–15.7)
IMM GRANULOCYTES # BLD: 0 10E9/L (ref 0–0.4)
IMM GRANULOCYTES NFR BLD: 0.2 %
LIPASE SERPL-CCNC: 137 U/L (ref 73–393)
LYMPHOCYTES # BLD AUTO: 2.1 10E9/L (ref 0.8–5.3)
LYMPHOCYTES NFR BLD AUTO: 38 %
MCH RBC QN AUTO: 30.2 PG (ref 26.5–33)
MCHC RBC AUTO-ENTMCNC: 33.2 G/DL (ref 31.5–36.5)
MCV RBC AUTO: 91 FL (ref 78–100)
MONOCYTES # BLD AUTO: 0.5 10E9/L (ref 0–1.3)
MONOCYTES NFR BLD AUTO: 8.7 %
NEUTROPHILS # BLD AUTO: 2.8 10E9/L (ref 1.6–8.3)
NEUTROPHILS NFR BLD AUTO: 51.1 %
NRBC # BLD AUTO: 0 10*3/UL
NRBC BLD AUTO-RTO: 0 /100
OPIATES UR QL SCN: NEGATIVE
PLATELET # BLD AUTO: 206 10E9/L (ref 150–450)
POTASSIUM SERPL-SCNC: 3.7 MMOL/L (ref 3.4–5.3)
PROT SERPL-MCNC: 6.7 G/DL (ref 6.8–8.8)
RBC # BLD AUTO: 3.94 10E12/L (ref 3.8–5.2)
SODIUM SERPL-SCNC: 142 MMOL/L (ref 133–144)
TSH SERPL DL<=0.005 MIU/L-ACNC: 2.04 MU/L (ref 0.4–4)
WBC # BLD AUTO: 5.5 10E9/L (ref 4–11)

## 2020-05-20 PROCEDURE — 87635 SARS-COV-2 COVID-19 AMP PRB: CPT | Performed by: FAMILY MEDICINE

## 2020-05-20 PROCEDURE — 81025 URINE PREGNANCY TEST: CPT | Performed by: FAMILY MEDICINE

## 2020-05-20 PROCEDURE — 90791 PSYCH DIAGNOSTIC EVALUATION: CPT

## 2020-05-20 PROCEDURE — 83690 ASSAY OF LIPASE: CPT | Performed by: FAMILY MEDICINE

## 2020-05-20 PROCEDURE — 80307 DRUG TEST PRSMV CHEM ANLYZR: CPT | Performed by: FAMILY MEDICINE

## 2020-05-20 PROCEDURE — 80053 COMPREHEN METABOLIC PANEL: CPT | Performed by: FAMILY MEDICINE

## 2020-05-20 PROCEDURE — 85025 COMPLETE CBC W/AUTO DIFF WBC: CPT | Performed by: FAMILY MEDICINE

## 2020-05-20 PROCEDURE — 84443 ASSAY THYROID STIM HORMONE: CPT | Performed by: FAMILY MEDICINE

## 2020-05-20 PROCEDURE — 99283 EMERGENCY DEPT VISIT LOW MDM: CPT | Mod: Z6 | Performed by: FAMILY MEDICINE

## 2020-05-20 PROCEDURE — 80320 DRUG SCREEN QUANTALCOHOLS: CPT | Performed by: FAMILY MEDICINE

## 2020-05-20 PROCEDURE — 99285 EMERGENCY DEPT VISIT HI MDM: CPT | Mod: 25 | Performed by: FAMILY MEDICINE

## 2020-05-20 ASSESSMENT — ENCOUNTER SYMPTOMS
DYSPHORIC MOOD: 1
NERVOUS/ANXIOUS: 1
HALLUCINATIONS: 1

## 2020-05-21 ENCOUNTER — TRANSFERRED RECORDS (OUTPATIENT)
Dept: HEALTH INFORMATION MANAGEMENT | Facility: CLINIC | Age: 20
End: 2020-05-21

## 2020-05-21 VITALS
DIASTOLIC BLOOD PRESSURE: 60 MMHG | TEMPERATURE: 97.1 F | HEART RATE: 69 BPM | SYSTOLIC BLOOD PRESSURE: 98 MMHG | OXYGEN SATURATION: 95 % | RESPIRATION RATE: 14 BRPM

## 2020-05-21 LAB
SARS-COV-2 RNA SPEC QL NAA+PROBE: NOT DETECTED
SPECIMEN SOURCE: NORMAL

## 2020-05-21 NOTE — ED PROVIDER NOTES
[facility] [place] ([facility])    5/20/20        History     Chief Complaint   Patient presents with     Suicidal     Patient has plan to overdose on medications. Patient has attempted suicide in the past with overdosing on medications.      The history is provided by the patient and medical records.     Stephanie Villanueva is a 20 year old female with a past medical history significant for depression, anxiety, borderline personality disorder, bipolar disorder, PTSD, OCD, autism spectrum, anorexia nervosa, conversion disorder, somatization disorder, POTS, asthma, chronic fatigue, hyper myalgia, and nonepileptic seizures who presents here to the Emergency Department due to suicidal ideations.  Patient reports that she is suicidal with a plan to overdose on her medications.  Patient states that her mother has her medications locked up at home.  Patient states that she cannot be safe at home and will be unsafe if she is not in the hospital.  Patient is seeking admission.  Patient was previously in the [facility] 3 weeks ago but left after filing a complaint about not getting the service she wanted.  Patient denies drug use but states she smokes cigarettes occasionally.  Patient is her own guardian but lives with her parents.  She states she has auditory hallucinations but has not had any in the past 1 month.  Patient has been dealing with mental health issues since the age of 13.    I have reviewed the Medications, Allergies, Past Medical and Surgical History, and Social History in the [employer] system.  PAST MEDICAL HISTORY:   Past Medical History:   Diagnosis Date     Anxiety october 2013     CONTUSION OF left iliac crest 9/3/2006     Major depression october 2013     Nocturnal enuresis 10/4/2007    Currently resolved.     Salter-Perdomo Type I fracture of distal fibula with routine healing 1/25/2013     Uncomplicated asthma     sports induced       PAST SURGICAL HISTORY: History reviewed. No pertinent  surgical history.    Past medical history, past surgical history, medications, and allergies were reviewed with the patient. Additional pertinent items: None    FAMILY HISTORY:   Family History   Problem Relation Age of Onset     Substance Abuse Maternal Grandfather         alcoholism     Depression Maternal Aunt      Substance Abuse Maternal Aunt         alcoholism     Hypothyroidism Maternal Aunt      Anxiety Disorder Brother      Hypothyroidism Maternal Grandmother        SOCIAL HISTORY:   Social History     Tobacco Use     Smoking status: Current Some Day Smoker     Packs/day: 0.25     Smokeless tobacco: Never Used   Substance Use Topics     Alcohol use: Not Currently     Comment: sometimes     Social history was reviewed with the patient. Additional pertinent items: None      Patient's Medications   New Prescriptions    No medications on file   Previous Medications    COMPRESSION STOCKINGS    Wear as directed    DESVENLAFAXINE (PRISTIQ) 50 MG 24 HR TABLET    Take 150 mg by mouth daily    GABAPENTIN (NEURONTIN) 300 MG CAPSULE    Take 3 capsules (900 mg) by mouth 3 times daily    HYDROXYZINE (ATARAX) 25 MG TABLET    Take 25-50 mg by mouth nightly as needed (sleep)    LORAZEPAM (ATIVAN) 1 MG TABLET    Take 1 tablet by mouth daily as needed for agitation     OLANZAPINE (ZYPREXA) 10 MG TABLET    Take 1 tablet (10 mg) by mouth At Bedtime    VITAMIN D3 (CHOLECALCIFEROL) 2000 UNITS (50 MCG) TABLET    Take 1 tablet (50 mcg) by mouth daily   Modified Medications    No medications on file   Discontinued Medications    No medications on file          Allergies   Allergen Reactions     No Known Drug Allergies         Review of Systems   Psychiatric/Behavioral: Positive for behavioral problems, dysphoric mood, hallucinations and suicidal ideas. The patient is nervous/anxious.    All other systems reviewed and are negative.    Physical Exam   BP: 121/82  Pulse: 127  Temp: 98.2  F (36.8  C)  Resp: 14  Weight: (refused)  SpO2:  97 %      Physical Exam  Constitutional:       General: She is not in acute distress.     Appearance: She is not diaphoretic.   HENT:      Head: Atraumatic.      Mouth/Throat:      Pharynx: No oropharyngeal exudate.   Eyes:      General: No scleral icterus.     Pupils: Pupils are equal, round, and reactive to light.   Cardiovascular:      Heart sounds: Normal heart sounds.   Pulmonary:      Effort: No respiratory distress.      Breath sounds: Normal breath sounds.   Abdominal:      General: Bowel sounds are normal.      Palpations: Abdomen is soft.      Tenderness: There is no abdominal tenderness.   Musculoskeletal:         General: No tenderness.   Skin:     General: Skin is warm.      Findings: No rash.   Neurological:      General: No focal deficit present.      Mental Status: She is oriented to person, place, and time.      Cranial Nerves: No cranial nerve deficit.      Sensory: No sensory deficit.      Motor: No weakness.      Coordination: Coordination normal.   Psychiatric:         Mood and Affect: Mood is anxious and depressed. Affect is flat.         Behavior: Behavior is slowed.         Thought Content: Thought content includes suicidal ideation. Thought content includes suicidal plan.         Judgment: Judgment is impulsive.         ED Course        Procedures        Patient was seen and evaluated by the  please refer to their documentation in the note section of the epic chart dated 5/20/2020      Assessments & Plan (with Medical Decision Making)       I have reviewed the nursing notes.    I have reviewed the findings, diagnosis, plan and need for follow up with the patient.    Patient with history of depression anxiety now presenting with increased suicidal ideation she also has a past history of borderline personality disorder however at this time is had marked increase for self-harm and suicide she will need to be admitted to locked psychiatric unit for further stabilization and treatment  unfortunately there was no adult bed available at [facility] and patient will be transported to [facility] under the care of Dr. Rios.    Final diagnoses:   Suicidal ideation   Borderline personality disorder (H)   Generalized anxiety disorder     IChon, am serving as a trained medical scribe to document services personally performed by Marcos Smith MD, based on the provider's statements to me.   Marcos DUQUE MD, was physically present and have reviewed and verified the accuracy of this note documented by Chon Pacheco.    5/20/2020   [facility], EMERGENCY DEPARTMENT     Marcos Smith MD  05/20/20 9275

## 2020-05-21 NOTE — TELEPHONE ENCOUNTER
S: Alondra Vaz [facility]), 20/F, DANY w plan to overdose    825pm initiated, requested @ 755pm     B: SI w plan to overdose on medication, mom has locked all pts meds up  Mom doesn't feel safe w pt in the home, pt has hx of   Pt at [facility] the last three weeks, pt was endorsing SI and that she wasn't ready to leave, pt reports she is unhappy about that - stressor   Med compliant   Mom reports pt has increased anxiety and paranoia in the evenings, but the med she takes helps calm her down   POTS disease, fibromyalgia - medical concerns   Suspected TBI (sept 2019)  Dx MDD, schizoaffective d/o, borderline personality, somatiziation d/o   Anorexia     Medically cleared, eating, drinking, ambulating independenty  Patient cleared and ready for behavioral bed placement: Yes   No COVID concerns, no symptoms,     A: Voluntary    R: 5500/Rios    924pm - Christianson called, pt was transferred to medical last admission, he would like to make sure he reviews her labs before ordering the covid test on the pt. He reported he would keep an eye on the labs, and order once he knew everything was ok   927pm - Intake called to double check the pt is medically cleared, and should move forward with admission. Sarah on the phone, will call intake back when available   936pm - Sarah, pt is not medically, will notify when pt is medically cleared and intake is to move forward   1044pm - Christianson called, pt is medically cleared and ready to move forward   1048pm - Cherelle accepts the pt, needs the chart and labs to be in care everywhere so he can see it when the pt arrives on the unit   Pt placed in que   1059pm - unit charge notified  1102pm - [facility] notified    Chart information faxed to the unit

## 2020-05-21 NOTE — ED NOTES
ED to Behavioral Floor Handoff    SITUATION  Stephanie Villanueva is a 20 year old female who speaks English and lives in a home with family members The patient arrived in the ED by private car from home with a complaint of Suicidal (Patient has plan to overdose on medications. Patient has attempted suicide in the past with overdosing on medications. )  .The patient's current symptoms started/worsened 1 week(s) ago and during this time the symptoms have increased.   In the ED, pt was diagnosed with   Final diagnoses:   Suicidal ideation   Borderline personality disorder (H)   Generalized anxiety disorder        Initial vitals were: BP: 121/82  Pulse: 127  Temp: 98.2  F (36.8  C)  Resp: 14  Weight: (refused)  SpO2: 97 %   --------  Is the patient diabetic? No   If yes, last blood glucose? --     If yes, was this treated in the ED? --  --------  Is the patient inebriated (ETOH) No or Impaired on other substances? No  MSSA done? N/A  Last MSSA score: --    Were withdrawal symptoms treated? N/A  Does the patient have a seizure history? No. If yes, date of most recent seizure--  --------  Is the patient patient experiencing suicidal ideation? SI with a plan to OD.     Homicidal ideation? denies current or recent homicidal ideation or behaviors.    Self-injurious behavior/urges? denies current or recent self injurious behavior or ideation.  ------  Was pt aggressive in the ED No  Was a code called No  Is the pt now cooperative? Yes  -------  Meds given in ED: Medications - No data to display   Family present during ED course? No  Family currently present? No    BACKGROUND    Does the patient have a cognitive impairment or developmental disability? No  Allergies:   Allergies   Allergen Reactions     No Known Drug Allergies    .   Social demographics are   Social History     Socioeconomic History     Marital status: Single     Spouse name: None     Number of children: None     Years of education: None     Highest education level:  None   Occupational History     None   Social Needs     Financial resource strain: None     Food insecurity     Worry: None     Inability: None     Transportation needs     Medical: None     Non-medical: None   Tobacco Use     Smoking status: Current Some Day Smoker     Packs/day: 0.25     Smokeless tobacco: Never Used   Substance and Sexual Activity     Alcohol use: Not Currently     Comment: sometimes     Drug use: Not Currently     Types: Marijuana     Sexual activity: Yes     Partners: Female, Male   Lifestyle     Physical activity     Days per week: None     Minutes per session: None     Stress: None   Relationships     Social connections     Talks on phone: None     Gets together: None     Attends Anabaptism service: None     Active member of club or organization: None     Attends meetings of clubs or organizations: None     Relationship status: None     Intimate partner violence     Fear of current or ex partner: None     Emotionally abused: None     Physically abused: None     Forced sexual activity: None   Other Topics Concern     Parent/sibling w/ CABG, MI or angioplasty before 65F 55M? Not Asked   Social History Narrative     None        ASSESSMENT  Labs results Labs Ordered and Resulted from Time of ED Arrival Up to the Time of Departure from the ED - No data to display   Imaging Studies: No results found for this or any previous visit (from the past 24 hour(s)).   Most recent vital signs /82   Pulse 127   Temp 98.2  F (36.8  C) (Tympanic)   Resp 14   SpO2 97%    Abnormal labs/tests/findings requiring intervention:---   Pain control: pt had none  Nausea control: pt had none    RECOMMENDATION  Are any infection precautions needed (MRSA, VRE, etc.)? No If yes, what infection? --  ---  Does the patient have mobility issues? independently. If yes, what device does the pt use? ---  ---  Is patient on 72 hour hold or commitment? No If on 72 hour hold, have hold and rights been given to patient?  N/A  Are admitting orders written if after 10 p.m. ?N/A  Tasks needing to be completed:---     EM MORENO, DREW    [phone] [place] [facility]   [phone] [facility]

## 2020-05-26 ENCOUNTER — PATIENT OUTREACH (OUTPATIENT)
Dept: CARE COORDINATION | Facility: CLINIC | Age: 20
End: 2020-05-26

## 2020-05-26 NOTE — PROGRESS NOTES
Clinic Care Coordination Contact  [facility]/Voicemail    Clinical Data: Care Coordinator Outreach  Outreach attempted x 1.  Left message on patient's voicemail with call back information and requested return call.  Plan: Care Coordinator will try to reach patient again in 1-2 business days.

## 2020-05-26 NOTE — LETTER
May 27, 2020    Dear Stephanie,                                                                         You were recently referred to the [facility]'s Clinic Care Coordination service.  This is a service offered through your Primary Care Clinic which can help you access resources and services in regard to your health and well-being goals. The clinic Community Health Worker has placed two calls to you to discuss the nature of this service and to offer enrollment.  If you are interested in learning more about Clinic Care Coordination, please call your Primary Care Clinic's Community Health Worker, Alyssa, at [phone].                                                    Sincerely,                                                                              MISSY Shah                                                                                          Clinic Care Coordination                                                  [facility] [place] [facility]

## 2020-05-27 NOTE — PROGRESS NOTES
Community Health Worker called and left a message for the patient.  If the patient is returning my call, please transfer the patient to [facility] at ext. [phone].   Patient has been mailed a unreachable letter and was provided with CHW contact information if they are interested in accessing Clinic Care Coordination.  Order for Care Management has been closed, no further outreach will be done at this time and patient can be re-referred.

## 2020-06-17 ENCOUNTER — TRANSFERRED RECORDS (OUTPATIENT)
Dept: HEALTH INFORMATION MANAGEMENT | Facility: CLINIC | Age: 20
End: 2020-06-17

## 2020-06-17 LAB
ALT SERPL-CCNC: 91 IU/L (ref 8–45)
AST SERPL-CCNC: 59 IU/L (ref 2–40)
CREAT SERPL-MCNC: 0.77 MG/DL (ref 0.57–1.11)
GFR SERPL CREATININE-BSD FRML MDRD: >60 ML/MIN/1.73M2
GLUCOSE SERPL-MCNC: 87 MG/DL (ref 65–100)
POTASSIUM SERPL-SCNC: 4.5 MMOL/L (ref 3.5–5)

## 2020-11-07 ENCOUNTER — HEALTH MAINTENANCE LETTER (OUTPATIENT)
Age: 20
End: 2020-11-07

## 2020-11-20 NOTE — MR AVS SNAPSHOT
"              After Visit Summary   7/10/2018    Stephanie Villanueva    MRN: [id number]           Patient Information     Date Of Birth          2000        Visit Information        Provider Department      7/10/2018 2:30 PM Bradley Hopper DO [facility]        Today's Diagnoses     Right foot pain    -  1       Follow-ups after your visit        Who to contact     Please call your clinic at [phone] to:    Ask questions about your health    Make or cancel appointments    Discuss your medicines    Learn about your test results    Speak to your doctor            Additional Information About Your Visit        MyChart Information     [employer] is an electronic gateway that provides easy, online access to your medical records. With [employer], you can request a clinic appointment, read your test results, renew a prescription or communicate with your care team.     To sign up for [employer] visit the website at [email or web address]   You will be asked to enter the access code listed below, as well as some personal information. Please follow the directions to create your username and password.     Your access code is: [email or web address]  Expires: 2018  5:49 PM     Your access code will  in 90 days. If you need help or a new code, please contact your [facility] or call [phone] for assistance.        Care EveryWhere ID     This is your Care EveryWhere ID. This could be used by other organizations to access your [facility] medical records  [id number]        Your Vitals Were     Height BMI (Body Mass Index)                1.774 m (5' 9.84\") 19.03 kg/m2           Blood Pressure from Last 3 Encounters:   18 106/67   18 110/68   17 108/64    Weight from Last 3 Encounters:   07/10/18 59.9 kg (132 lb) (58 %)*   18 60.1 kg (132 lb 6.4 oz) (60 %)*   18 59 kg (130 lb) (55 %)*     * Growth percentiles are based on CDC 2-20 Years " [place] INPATIENT ENCOUNTER   DAILY PROGRESS NOTE    ADMISSION DATE:  11/17/2020  DATE:  11/20/2020  CURRENT HOSPITAL DAY:  Hospital Day: 4  ATTENDING PHYSICIAN:  Jason Lo MD  CODE STATUS:  Full Resuscitation    CHIEF COMPLAINT:  Abdominal pain    ACTIVE PROBLEMS:    Patient Active Problem List   Diagnosis   â¢ Family history of BRCA1 gene positive   â¢ BRCA1 negative   â¢ Monoallelic mutation of LEXY gene   â¢ Intramural leiomyoma of uterus = 1cm   â¢ Simple ovarian cyst L = 20mm   â¢ Acute pancreatitis   â¢ Transaminitis   â¢ Postablative hypothyroidism   â¢ Blood pressure elevated without history of HTN   â¢ Adenomyosis   â¢ Seasonal allergies   â¢ Environmental allergies   â¢ Eosinophilia   â¢ Moderate persistent asthma with acute exacerbation       INTERVAL HISTORY:     April L Shannon Phillip is a 43year old female patient admitted with Acute pancreatitis [K85.90]  Elevated LFTs [R79.89]  Acute pancreatitis, unspecified complication status, unspecified pancreatitis type [K85.90]. Patient endorses 3/10 abdominal pain with sitting but 7/10 pain with movement. Started clear liquid diet last evening and tolerated without increased pain. Hoping to advance to full liquid diet today. Denies nausea. Passing gas overnight but no bowel movement yet. Has been up and walking. HPI obtained from initial consultation: This is a 28-year-old female with previous medical history of asthma, hypothyroidism, fibroids, dysplasia of cervix,Â ADHD, acute pancreatitis 2017 without known etiology. Â In 2017 patient presented with abdominal pain. She underwent right upper quadrant ultrasound with no cholelithiasis or evidence of acute cholecystitis. Â With continuing abdominal pain the next day patient had MRI MRCP. MRCP without evidence of acute pancreatitis, no obvious mass or evidence of cholelithiasis or choledocholithiasis. No evidence of pancreas divisum. At that time consultation notes patient had 1-2 glasses of wine 5 nights a week.  Â At "that time patient as well had elevated total bilirubin to 3.5 which resolved down to 1.8. Â Immunoglobulin G subclass 4 normal at 25. 8. Â Â 11/17/2020 patient presented to emergency department acute onset of abdominal pain. Â CT abdomen pelvis with contrast noted to have acute edematous interstitial pancreatitis and hepatic steatosis. Â Noting biliary tree unremarkable. Patient reports acute onset of abdominal pain at 6:00 a.m. Hettie Lank Â Quickly the pain increased. Â She says this pain feels different than in 2017. She had nausea and emesis this morning. Â She has not eaten anything today. Â She has had no prior abdominal surgeries. Â She takes no blood thinning medicine. Â She is not on any new medications including over-the-counter herbal supplementations. Â Rare NSAID use. Â She denies smoking drinking or illicit drug use. Â She does say she had to cocktails last night, but this is unusual. Â Most of her abdominal tenderness is at her lower rib flank, not in her abdomen. Â No family history of colon cancer. 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 HISTORIES:     I have reviewed the past medical history, family history, social history, medications and allergies listed in the medical record as obtained by my nursing staff and support staff and agree with their documentation.     OBJECTIVE:    VITAL SIGNS:     Vital Last Value 24 Hour Range   Temperature 97.6 Â°F (36.4 Â°C) (11/20/20 0447) Temp  Min: 97.6 Â°F (36.4 Â°C)  Max: 98.4 Â°F (36.9 Â°C)   Pulse 80 (11/20/20 0447) Pulse  Min: 80  Max: 91   Respiratory 20 (11/20/20 1216) Resp  Min: 20  Max: 20   Non-Invasive  Blood Pressure 114/82 (11/20/20 0447) BP  Min: 112/78  Max: 116/78   Pulse Oximetry 97 % (11/20/20 0447) SpO2  Min: 97 %  Max: 98 %     Vital Today Admitted   Weight 69.4 kg (11/20/20 0447) Weight: 65.9 kg (11/17/20 1614)   Height N/A Height: 5' 4.57"" (164 cm) (11/17/20 1614)   BMI N/A BMI " data.                 Today's Medication Changes          These changes are accurate as of 7/10/18 11:59 PM.  If you have any questions, ask your nurse or doctor.               These medicines have changed or have updated prescriptions.        Dose/Directions    * lithium 450 MG CR tablet   Commonly known as:  ESKALITH   This may have changed:  Another medication with the same name was changed. Make sure you understand how and when to take each.   Used for:  Major depressive disorder, recurrent, severe without psychotic features (H), BAILEY (generalized anxiety disorder)        Dose:  450 mg   Take 1 tablet (450 mg) by mouth 2 times daily   Quantity:  60 tablet   Refills:  0       * lithium 150 MG capsule   This may have changed:  when to take this   Used for:  Major depressive disorder, recurrent, severe without psychotic features (H)        Dose:  150 mg   Take 1 capsule (150 mg) by mouth At Bedtime   Quantity:  30 capsule   Refills:  0       * Notice:  This list has 2 medication(s) that are the same as other medications prescribed for you. Read the directions carefully, and ask your doctor or other care provider to review them with you.             Primary Care Provider Office Phone # Fax #    Jose Rodríguez [phone] [address] [address]        Equal Access to Services     TEVIN [facility] Hospital[facility]AB[facility] : Hadii antony esteban Soelizabeth, waaxda lutana, qaybta kaalmada franklin, mathew silva. So [facility] [phone].    ATENCIÓN: Si habla español, tiene a austin disposición servicios gratuitos de asistencia lingüística. Sharmila al [phone].    We comply with applicable federal civil rights laws and [place] laws. We do not discriminate on the basis of race, color, national origin, age, disability, sex, sexual orientation, or gender identity.            Thank you!     Thank you for choosing [facility]  for your care. Our goal is  (Calculated): 24.5 (11/17/20 1614)     INTAKE/OUTPUT:      Intake/Output Summary (Last 24 hours) at 11/20/2020 1305  Last data filed at 11/20/2020 1225  Gross per 24 hour   Intake 5088.01 ml   Output 4700 ml   Net 388.01 ml         PHYSICAL EXAM:    General: Adult female, NAD. Neurologic: Alert and oriented. Normal mood, affect, speech. No gross tremor. Skin: Warm and dry, normal turgor. No jaundice noted,  HEENT: Normocephalic. Very minimal scleral icterus noted. Moist mucus membranes. External nose normal to inspection. Respiratory: Respiratory effort normal.   Cardiovascular: Regular rate and rhythm. No murmur appreciated. Extremities: No swelling or tenderness in bilateral lower extremities. Gastrointestinal:  Soft, nondistended abdomen. Minimal tenderness to palpation in all 4 abdominal quadrants. Normal bowel sounds. No hepatomegaly or splenomegaly.     LABORATORY DATA:    Lab Results   Component Value Date    SODIUM 136 11/20/2020    SODIUM 134 (L) 11/19/2020    POTASSIUM 3.7 11/20/2020    POTASSIUM 3.8 11/20/2020    CHLORIDE 102 11/20/2020    CHLORIDE 100 11/19/2020    CO2 27 11/20/2020    CO2 27 11/19/2020    BUN 3 (L) 11/20/2020    BUN 4 (L) 11/19/2020    CREATININE 0.55 11/20/2020    CREATININE 0.57 11/19/2020    GLUCOSE 79 11/20/2020    GLUCOSE 85 11/19/2020     Lab Results   Component Value Date    WBC 11.6 (H) 11/20/2020    WBC 13.8 (H) 11/19/2020    HCT 36.3 11/20/2020    HCT 39.3 11/19/2020    HGB 12.4 11/20/2020    HGB 13.3 11/19/2020     11/20/2020     11/19/2020     Lab Results   Component Value Date    GLUCOSE 79 11/20/2020    GLUCOSE 85 11/19/2020    SODIUM 136 11/20/2020    SODIUM 134 (L) 11/19/2020    POTASSIUM 3.7 11/20/2020    POTASSIUM 3.8 11/20/2020    CHLORIDE 102 11/20/2020    CHLORIDE 100 11/19/2020    BUN 3 (L) 11/20/2020    BUN 4 (L) 11/19/2020    CREATININE 0.55 11/20/2020    CREATININE 0.57 11/19/2020    CALCIUM 8.2 (L) 11/20/2020    CALCIUM 8.1 (L) 11/19/2020 always to provide you with excellent care. Hearing back from our patients is one way we can continue to improve our services. Please take a few minutes to complete the written survey that you may receive in the mail after your visit with us. Thank you!             Your Updated Medication List - Protect others around you: Learn how to safely use, store and throw away your medicines at www.disposemymeds.org.          This list is accurate as of 7/10/18 11:59 PM.  Always use your most recent med list.                   Brand Name Dispense Instructions for use Diagnosis    albuterol 108 (90 Base) MCG/ACT Inhaler    PROAIR HFA/PROVENTIL HFA/VENTOLIN HFA    1 Inhaler    Inhale 2 puffs into the lungs every 4 hours as needed for shortness of breath / dyspnea or wheezing    Mild intermittent asthma       DESMOPRESSIN ACETATE PO      Take 0.6 mg by mouth At Bedtime Reported on 5/21/2017        diclofenac 75 MG EC tablet    VOLTAREN    30 tablet    Take 1 tablet (75 mg) by mouth 2 times daily as needed for moderate pain    Patellofemoral pain syndrome of right knee       gabapentin 300 MG capsule    NEURONTIN    120 capsule    Take 2 capsules (600 mg) by mouth 2 times daily    BAILEY (generalized anxiety disorder)       LACTAID PO      Take 9,000 Units by mouth 3 times daily as needed for indigestion (with meals)        LAMOTRIGINE PO      Take 100 mg by mouth daily    Severe single current episode of major depressive disorder, without psychotic features (H)       * lithium 450 MG CR tablet    ESKALITH    60 tablet    Take 1 tablet (450 mg) by mouth 2 times daily    Major depressive disorder, recurrent, severe without psychotic features (H), BAILEY (generalized anxiety disorder)       * lithium 150 MG capsule     30 capsule    Take 1 capsule (150 mg) by mouth At Bedtime    Major depressive disorder, recurrent, severe without psychotic features (H)       MULTIVITAMIN ADULT PO      Reported on 5/21/2017        polyethylene glycol  ALBUMIN 2.6 (L) 11/20/2020    ALBUMIN 2.7 (L) 11/19/2020    AST 41 (H) 11/20/2020    AST 44 (H) 11/19/2020    ALKPT 64 11/20/2020    ALKPT 61 11/19/2020    GPT 40 11/20/2020    GPT 49 11/19/2020    ANIONGAP 11 11/20/2020    ANIONGAP 10 11/19/2020    BCRAT 5 (L) 11/20/2020    BCRAT 7 11/19/2020    GLOB 3.3 11/20/2020    GLOB 3.4 11/19/2020    AGR 0.8 (L) 11/20/2020    AGR 0.8 (L) 11/19/2020     CHOLESTEROL (mg/dL)   Date Value   04/19/2019 236 (H)        IMAGING STUDIES:    Imaging studies reviewed. CT a/p with contrast 11/17/20  IMPRESSION:  1. Findings consistent with acute edematous interstitial pancreatitis. 2. Diffuse hepatic steatosis. MRI MRCP 10/27/17  IMPRESSION:  1. Mild to moderate amount of edema/inflammation surrounding the pancreas,  consistent with acute pancreatitis. There is no obvious mass or evidence  for cholelithiasis or choledocholithiasis. There is no evidence for  pancreas divisum. 2.  Hepatic steatosis with mild hepatomegaly. Â   ENDOSCOPY:  None prior. ASSESSMENT:    40-year-old female with previous medical history of asthma, hypothyroidism, fibroids, dysplasia of cervix,Â ADHD, acute pancreatitis 2017 without known etiology presenting with acute onset abdominal pain, concern for pancreatitis. 1. Acute pancreatitis             - CT on admission with acute edematous interstitial pancreatitis associated with elevated lipase (3,109). - Liver enzymes elevated, but baseline with exception of worsening total bili. No evidence of biliary dilation noted on CT.              - Second occurrence of pancreatitis (1st was 2017). Work-up at that time with normal RUQ US (no evidence of cholelithiasis, choledocholithiasis, CBD dilation, or acute cholecystitis). Follow-up MRCP noted pancreatitis but did not identify an etiology. No pancreatic divisum noted.              - Immunoglobulin subclass 4 normal. No family history of pancreatitis/pancreatic powder    MIRALAX/GLYCOLAX     Take 1 capful by mouth daily as needed Reported on 5/21/2017        Vitamin D (Cholecalciferol) 1000 units Tabs     60 tablet    Take 2,000 Units by mouth daily    Anxiety       * Notice:  This list has 2 medication(s) that are the same as other medications prescribed for you. Read the directions carefully, and ask your doctor or other care provider to review them with you.       cancer. No new medications. TG WNL. No gallstones identified.             - Etiology likely idiopathic vs alcohol-induced. Had 4 alcoholic drinks Saturday prior to admission. Â   2. Chronic liver enzyme elevation             - Chronic AST, ALT, and total bili elevation.              - No biliary dilation noted on previous or current imaging.                    - Likely secondary to known diffuse hepatic steatosis. However, with rising total bili, need to exclude sludge/microlithiasis, pancreatic inflammation compressing on biliary system, vs other etiology of enzyme elevation. Â   3. Hypothyroidism             - on Synthroid. ANTIPLATELET / ANTICOAGULATION:  MEDICATION:  As per primary/cardiology Enoxaparin    PLAN:    1. Given continued rise in total bilirubin, recommend MRCP to rule out any concerning etiologies. 2. Continue to trend liver enzymes with daily CMP. 3. Given improvement in pain, okay to advance to full liquid diet for lunch. If continues to tolerate, can advance to soft/bland diet for dinner. Advised patient to monitor pain with eating and if pain is increasing, back off diet. 4. Continue supportive management of pancreatitis with fluids, pain management, prn antiemetics, and slow diet advancement as above. 5. Alcohol cessation again encouraged. Thank you for involving us in the care of this patient. Dr. Houston Levy is on call this weekend. Please contact us with any questions or concerns. Moi Anne PA-C  [facility]   [phone]    ATTENDING ADDENDUM  I saw and evaluated the patient. I discussed the management with the PA. I reviewed the PA's note and agree with the documented findings. Clinically improving.   If MRCP negative and patient tolerating transitional diet then can be discharged home tomorrow    Williams Jc MD

## 2020-12-30 ENCOUNTER — TELEPHONE (OUTPATIENT)
Dept: PLASTIC SURGERY | Facility: CLINIC | Age: 20
End: 2020-12-30

## 2020-12-30 DIAGNOSIS — F64.0 GENDER DYSPHORIA IN ADOLESCENT AND ADULT: Primary | ICD-10-CM

## 2020-12-30 NOTE — TELEPHONE ENCOUNTER
[facility]:  Care Coordination Note     SITUATION   Pt (Stephanie, they/them)  is a 20 year old adult who is receiving support for:  Care Team  .    BACKGROUND     Pt called to set up a top surgery consultation. Discussed process with pt, completed intake, scheduled with Dr. Bass 7/6/21.     ASSESSMENT     Surgery              [facility] Assessment  [facility] ([facility]) Enrollment: Enrolled  Patient has a therapist: No  Letter of support #1: Requested  Surgery being considered: Yes  Mastectomy: Yes    Pt reports smoking cigarettes regularly. Explained to pt that surgeon will discuss nicotine use with them during the consultation. Pt denies diabetes. Pt reports that they are starting HRT shortly. No previous gender confirming surgeries reported. Pt is not currently working with therapist. Writer to send referrals to pt.       PLAN          Nursing Interventions:  [facility] assessment completed    Follow-up plan:      1. Writer to send pt therapist referrals    2. Obtain MONIQUE Glass

## 2021-02-25 ENCOUNTER — OFFICE VISIT (OUTPATIENT)
Dept: FAMILY MEDICINE | Facility: OTHER | Age: 21
End: 2021-02-25
Attending: FAMILY MEDICINE
Payer: COMMERCIAL

## 2021-02-25 VITALS
HEART RATE: 107 BPM | RESPIRATION RATE: 24 BRPM | TEMPERATURE: 98.6 F | SYSTOLIC BLOOD PRESSURE: 124 MMHG | BODY MASS INDEX: 20.92 KG/M2 | DIASTOLIC BLOOD PRESSURE: 70 MMHG | HEIGHT: 71 IN | OXYGEN SATURATION: 100 %

## 2021-02-25 DIAGNOSIS — M25.511 ACUTE PAIN OF RIGHT SHOULDER: Primary | ICD-10-CM

## 2021-02-25 PROCEDURE — 99203 OFFICE O/P NEW LOW 30 MIN: CPT | Performed by: FAMILY MEDICINE

## 2021-02-25 RX ORDER — DEXTROAMPHETAMINE SACCHARATE, AMPHETAMINE ASPARTATE MONOHYDRATE, DEXTROAMPHETAMINE SULFATE AND AMPHETAMINE SULFATE 5; 5; 5; 5 MG/1; MG/1; MG/1; MG/1
20 CAPSULE, EXTENDED RELEASE ORAL DAILY
COMMUNITY
Start: 2021-02-25

## 2021-02-25 RX ORDER — TESTOSTERONE 2 MG/D
1 PATCH TRANSDERMAL DAILY
COMMUNITY
Start: 2021-01-21

## 2021-02-25 RX ORDER — ETODOLAC 500 MG
500 TABLET ORAL 2 TIMES DAILY
Qty: 30 TABLET | Refills: 0 | Status: SHIPPED | OUTPATIENT
Start: 2021-02-25

## 2021-02-25 RX ORDER — LITHIUM CARBONATE 300 MG/1
300 TABLET, FILM COATED, EXTENDED RELEASE ORAL 2 TIMES DAILY
COMMUNITY
Start: 2021-02-25

## 2021-02-25 RX ORDER — DEXTROAMPHETAMINE SACCHARATE, AMPHETAMINE ASPARTATE, DEXTROAMPHETAMINE SULFATE AND AMPHETAMINE SULFATE 2.5; 2.5; 2.5; 2.5 MG/1; MG/1; MG/1; MG/1
10 TABLET ORAL DAILY
COMMUNITY
Start: 2021-02-25

## 2021-02-25 ASSESSMENT — PAIN SCALES - GENERAL: PAINLEVEL: SEVERE PAIN (7)

## 2021-02-25 NOTE — PROGRESS NOTES
SUBJECTIVE:   Stephanie Villanueva is a 21 year old adult who presents to clinic today for the following health issues: Right shoulder pain    Patient has a 2-day history of right shoulder pain.  Recently she was at a friend's cabin baking carrying wood.  She has been digging into the frozen ground.  States it fairly quickly became sore.  She is rating the pain as a 7 out of 10.  Sore to reach behind her back above her head.  She reports also very sore with lifting.  No previous history of shoulder pain in the past        Patient Active Problem List    Diagnosis Date Noted     Hypoglycemia 04/20/2020     Priority: Medium     Psychosis (H) 04/16/2020     Priority: Medium     Spell of altered consciousness 07/01/2019     Priority: Medium     Suicidal ideation 03/19/2019     Priority: Medium     Severe recurrent major depression without psychotic features (H) 01/21/2019     Priority: Medium     Depression 01/19/2019     Priority: Medium     Overdose 12/29/2016     Priority: Medium     OCD (obsessive compulsive disorder) 06/08/2016     Priority: Medium     Borderline personality disorder (H) 01/06/2016     Priority: Medium     Social phobia 01/06/2016     Priority: Medium     Panic disorder with agoraphobia 01/06/2016     Priority: Medium     Elevated TSH 01/06/2016     Priority: Medium     BAILEY (generalized anxiety disorder) 12/09/2015     Priority: Medium     Mild intermittent asthma 05/08/2015     Priority: Medium     Exercise induced       Iron deficiency anemia 05/08/2015     Priority: Medium     Suicide attempt (H) 01/27/2015     Priority: Medium     If patient is admitted:  1. Must go to [facility] or [facility] only - she does not want teaching team (too overwhelming)  2. Needs to start on 1:1, in scrubs only, with no personal/home items until cleared by team  3. Patient has history of sneaking razors on the unit, including in books, stuffed animals, shoes - all items must be thoroughly searched  4. Becomes  "dysregulated in group settings and after contact with family  5. What works? Sarcasm, allowing her to have some control in who her assigned staff is, sensory room       Deliberate self-cutting 09/05/2014     Priority: Medium     Self-injurious behavior 09/04/2014     Priority: Medium     Disorder of thyroid 01/12/2014     Priority: Medium     Problem list name updated by automated process. Provider to review       Major depression 11/15/2013     Priority: Medium     Generalized anxiety disorder 11/15/2013     Priority: Medium     Social anxiety disorder 11/15/2013     Priority: Medium     Nocturnal enuresis 10/04/2007     Priority: Medium     Currently resolved.         Review of Systems     OBJECTIVE:     /70   Pulse 107   Temp 98.6  F (37  C)   Resp 24   Ht 1.803 m (5' 11\")   SpO2 100%   BMI 20.92 kg/m    Body mass index is 20.92 kg/m .  Physical Exam  Constitutional:       Appearance: Normal appearance.   Musculoskeletal:      Comments: Her range of motion is limited by discomfort.  She has lost internal rotation associated with discomfort.  Abduction is limited to about 120 degrees.  Week with external rotation   Neurological:      Mental Status: Ndolo R Elate is alert.         Diagnostic Test Results:  none     ASSESSMENT/PLAN:         1. Acute pain of right shoulder  I suspect a rotator cuff tendinitis.  Start Lodine.  She is on lithium and advised if she should take it more than a week she might want to get a lithium check.  Follow-up if no improvement.  Avoid sling    - etodolac (LODINE) 500 MG tablet; Take 1 tablet (500 mg) by mouth 2 times daily  Dispense: 30 tablet; Refill: 0      Lester Byrd MD  [facility]  "

## 2021-02-25 NOTE — NURSING NOTE
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 Medication Reconciliation: complete.    Radha Shaffer LPN  2/25/2021 11:29 AM

## 2021-03-16 ENCOUNTER — TELEPHONE (OUTPATIENT)
Dept: NEUROLOGY | Facility: CLINIC | Age: 21
End: 2021-03-16

## 2021-04-11 NOTE — TELEPHONE ENCOUNTER
FUTURE VISIT INFORMATION      FUTURE VISIT INFORMATION:    Date: 7.6.21    Time: 3 PM    Location: [facility]  REFERRAL INFORMATION:    Referring provider:  AGNIESZKA    Referring providers clinic:  [facility]    Reason for visit/diagnosis  TOP surgery    RECORDS REQUESTED FROM:       *No records to gather

## 2021-06-08 NOTE — PROGRESS NOTES
S: Aolndra Vaz [facility], 20/F, DANY w plan to overdose     825pm initiated, requested @ 755pm      B: SI w plan to overdose on medication, mom has locked all pts meds up  Mom doesn't feel safe w pt in the home, pt has hx of   Pt at [facility] the last three weeks, pt was endorsing SI and that she wasn't ready to leave, pt reports she is unhappy about that - stressor   Med compliant   Mom reports pt has increased anxiety and paranoia in the evenings, but the med she takes helps calm her down   POTS disease, fibromyalgia - medical concerns   Suspected TBI (sept 2019)  Dx MDD, schizoaffective d/o, borderline personality, somatiziation d/o   Anorexia      Medically cleared, eating, drinking, ambulating independenty  Patient cleared and ready for behavioral bed placement: Yes   No COVID concerns, no symptoms,      A: Voluntary     R: 5500/Rios     924pm - Christianson called, pt was transferred to medical last admission, he would like to make sure he reviews her labs before ordering the covid test on the pt. He reported he would keep an eye on the labs, and order once he knew everything was ok   927pm - Intake called to double check the pt is medically cleared, and should move forward with admission. Sarah on the phone, will call intake back when available   936pm - Sarah, pt is not medically, will notify when pt is medically cleared and intake is to move forward   1044pm - Christianson called, pt is medically cleared and ready to move forward   1048pm - Cherelle accepts the pt, needs the chart and labs to be in care everywhere so he can see it when the pt arrives on the unit   Pt placed in que   1059pm - unit charge notified  1102pm - [facility] notified     Chart information faxed to the unit

## 2021-07-06 ENCOUNTER — OFFICE VISIT (OUTPATIENT)
Dept: PLASTIC SURGERY | Facility: CLINIC | Age: 21
End: 2021-07-06
Attending: SURGERY
Payer: COMMERCIAL

## 2021-07-06 ENCOUNTER — PRE VISIT (OUTPATIENT)
Dept: PLASTIC SURGERY | Facility: CLINIC | Age: 21
End: 2021-07-06

## 2021-07-06 VITALS — OXYGEN SATURATION: 99 % | HEART RATE: 91 BPM | SYSTOLIC BLOOD PRESSURE: 133 MMHG | DIASTOLIC BLOOD PRESSURE: 87 MMHG

## 2021-07-06 DIAGNOSIS — F64.0 GENDER DYSPHORIA IN ADOLESCENT AND ADULT: Primary | ICD-10-CM

## 2021-07-06 DIAGNOSIS — F84.0 AUTISM SPECTRUM: ICD-10-CM

## 2021-07-06 DIAGNOSIS — G90.A POTS (POSTURAL ORTHOSTATIC TACHYCARDIA SYNDROME): ICD-10-CM

## 2021-07-06 DIAGNOSIS — F90.9 ATTENTION DEFICIT HYPERACTIVITY DISORDER (ADHD), UNSPECIFIED ADHD TYPE: ICD-10-CM

## 2021-07-06 PROCEDURE — 99204 OFFICE O/P NEW MOD 45 MIN: CPT | Performed by: SURGERY

## 2021-07-06 ASSESSMENT — PAIN SCALES - GENERAL: PAINLEVEL: NO PAIN (0)

## 2021-07-06 NOTE — LETTER
7/6/2021       RE: Stephanie Villanueva  [address] [address]     Dear Colleague,    Thank you for referring your patient, Stephanie Villanueva, to the [facility] [place] at [facility]. Please see a copy of my visit note below.    PLASTICS NEW TOP   HPI: This is a 21 year old biological female who identifies as nonbinary with a history of gender dysphoria, ASD, and POTS who presents today with their partner Laura (he/him) for a consultation for top surgery. Their pronouns are they/them and their preferred name is Stephanie. They were referred by a previous patient of [facility] and made their appointment 6 months ago. Their therapist is Matt Mcgarry at [facility], who they have been seeing for about 5 months. Matt is willing to write the patient a letter of support. They have been on testosterone for 6 months, administered by [employer] (patch initially, now [employer]). They have been living their chosen gender identity/role for about 4 years. They do not bind. No breast lumps, skin puckering, nipple drainage, or other breast problems. No history of breast imaging, including mammogram or breast ultrasound.     Patient has [employer] primary insurance and [employer] secondary insurance.     Medical Hx: Gender dysphoria. No history of asthma, diabetes mellitus, or GERD. No bleeding, clotting, healing or scarring problems.  POTS - well-managed. Sees a functional neurologist.   Anxiety and Depression - takes Lithium, Desvenlafazine, Gabapentin and Lorazepam PRN  ADHD- takes Adderall  Autism Spectrum Disorder.   EDS? No formal diagnosis but many symptoms.  Insomnia.    Surgical Hx:   ECT - no complications with anesthesia, apart from nausea.  3rd molar extraction     Family Hx: No family history of breast or ovarian cancer.  Father has diabetes mellitus.     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 Partner Laura lives separately but will take care of patient postop. Smoking status: Smokes 1/3rd pack of cigarettes Qday. Also smoked cannabis for anxiety and sleep, usually every day. Alcohol use: Minimal Diet: Vegan. Eats beans, nuts and tofu for protein. Lactose intolerant. Caffeine: unsure of intake. Exercise: Minimal. Sleep: Averages 8 hours. Insomnia.     Patient was informed that they must stop consuming nicotine at least 1 month pre and postop.     PE: General: Height: 5'11\" Weight: Unknown.    Chest: Nice upper chest contour.   Grade 1.5 nipple ptosis. Left breast is about 300g. Right breast is about 400g. Indistinct division between the two.   IMFs situated about 4 cms below the pec muscle.   Both IMFs situated equally low.  Minimal lateral thoracic rolls.   No anterior axillary folds.   No lymphadenopathy or masses.   Thick self harm scars on right outer arm and lower legs (left and right medial near ankle). Also present on both forearms.   Photos taken with consent.     A&P: 21 year old biological female who identifies as nonbinary who is a good candidate for gender affirming top surgery with one of the following: bilateral simple mastectomy vs. breast reduction, +/- possible nipple graft reconstruction. They will most likely need a bilateral simple mastectomy with nipple graft reconstruction depending on intraoperative findings and the patient's desired outcome. The patient is interested in nipple grafts. (possible medial nipple placement) The patient will need a pre-op mammogram in addition to an H&P from their PCP.     Patient states that they do feel strongly about retaining nipple sensation. We discussed how the keyhole technique would not work with their anatomy because their nipples are too low on their chest and the more tissue we remove, the higher the odds of nipple death. After this discussion " they are more open to nipple grafts but would like to have them pierced. I informed the patient that it was at the discretion of the  but I would suggest they wait a year postop to get them pierced. I also informed them that there would be minimal projection of the central nipple itself.     Patient will continue to consider their options and will bring me a picture of the look they are going for so that we can determine if this is an achievable appearance for them. It does not sound like they are interested in retaining any breast mound.     They did not meet with our Transgender Coordinator but they will be in contact via [employer] to discuss communications with staff and timeline. They did receive an introductory folder of information and contact numbers/names. Any further discussion of risks and complications will be reviewed during the pre-op visit.    Patient accepts the risks of this procedure and would like to proceed with surgery.  Once we receive their therapist letter of support and mammogram results we will initiate the prior authorization process.     According to [place] Case Law and [facility] standards of care, with an appropriate letter of support from a mental health provider, top surgery/mastectomy is medically necessary for the treatment of gender dysphoria.     Total time = 45 minutes, spent on the date of encounter doing chart review, history and physical, dressing changes, documentation, patient education, and any further activity as noted above.     This note was prepared on behalf of Soumya Bass MD by Eli Garay, a trained medical scribe, based on my observations and the provider's statements to me.         Again, thank you for allowing me to participate in the care of your patient.      Sincerely,    Soumya Bass MD

## 2021-07-06 NOTE — NURSING NOTE
Chief Complaint   Patient presents with     New Patient     top surgery       Vitals:    07/06/21 1518   BP: 133/87   BP Location: Left arm   Patient Position: Sitting   Cuff Size: Adult Regular   Pulse: 91   SpO2: 99%       There is no height or weight on file to calculate BMI.          JAY MCKOY EMT

## 2021-07-06 NOTE — PROGRESS NOTES
PLASTICS NEW TOP   HPI: This is a 21 year old biological female who identifies as nonbinary with a history of gender dysphoria, ASD, and POTS who presents today with their partner Laura (he/him) for a consultation for top surgery. Their pronouns are they/them and their preferred name is Stephanie. They were referred by a previous patient of mine and made their appointment 6 months ago. Their therapist is Mtat Mcgarry at [facility], who they have been seeing for about 5 months. Matt is willing to write the patient a letter of support. They have been on testosterone for 6 months, administered by [employer] (patch initially, now [employer]). They have been living their chosen gender identity/role for about 4 years. They do not bind. No breast lumps, skin puckering, nipple drainage, or other breast problems. No history of breast imaging, including mammogram or breast ultrasound.     Patient has [employer] primary insurance and [employer] secondary insurance.     Medical Hx: Gender dysphoria. No history of asthma, diabetes mellitus, or GERD. No bleeding, clotting, healing or scarring problems.  POTS - well-managed. Sees a functional neurologist.   Anxiety and Depression - takes Lithium, Desvenlafazine, Gabapentin and Lorazepam PRN  ADHD- takes Adderall  Autism Spectrum Disorder.   EDS? No formal diagnosis but many symptoms.  Insomnia.    Surgical Hx:   ECT - no complications with anesthesia, apart from nausea.  3rd molar extraction     Family Hx: No family history of breast or ovarian cancer.  Father has diabetes mellitus.     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 Partner Laura lives separately but will take care of patient postop. Smoking status: Smokes 1/3rd pack of cigarettes Qday. Also smoked cannabis for anxiety and sleep, usually every day. Alcohol use: Minimal Diet: Vegan. Eats  "beans, nuts and tofu for protein. Lactose intolerant. Caffeine: unsure of intake. Exercise: Minimal. Sleep: Averages 8 hours. Insomnia.     Patient was informed that they must stop consuming nicotine at least 1 month pre and postop.     PE: General: Height: 5'11\" Weight: Unknown.    Chest: Nice upper chest contour.   Grade 1.5 nipple ptosis. Left breast is about 300g. Right breast is about 400g. Indistinct division between the two.   IMFs situated about 4 cms below the pec muscle.   Both IMFs situated equally low.  Minimal lateral thoracic rolls.   No anterior axillary folds.   No lymphadenopathy or masses.   Thick self harm scars on right outer arm and lower legs (left and right medial near ankle). Also present on both forearms.   Photos taken with consent.     A&P: 21 year old biological female who identifies as nonbinary who is a good candidate for gender affirming top surgery with one of the following: bilateral simple mastectomy vs. breast reduction, +/- possible nipple graft reconstruction. They will most likely need a bilateral simple mastectomy with nipple graft reconstruction depending on intraoperative findings and the patient's desired outcome. The patient is interested in nipple grafts. (possible medial nipple placement) The patient will need a pre-op mammogram in addition to an H&P from their PCP.     Patient states that they do feel strongly about retaining nipple sensation. We discussed how the keyhole technique would not work with their anatomy because their nipples are too low on their chest and the more tissue we remove, the higher the odds of nipple death. After this discussion they are more open to nipple grafts but would like to have them pierced. I informed the patient that it was at the discretion of the  but I would suggest they wait a year postop to get them pierced. I also informed them that there would be minimal projection of the central nipple itself.     Patient will continue to " consider their options and will bring me a picture of the look they are going for so that we can determine if this is an achievable appearance for them. It does not sound like they are interested in retaining any breast mound.     They did not meet with our Transgender Coordinator but they will be in contact via [employer] to discuss communications with staff and timeline. They did receive an introductory folder of information and contact numbers/names. Any further discussion of risks and complications will be reviewed during the pre-op visit.    Patient accepts the risks of this procedure and would like to proceed with surgery.  Once we receive their therapist letter of support and mammogram results we will initiate the prior authorization process.     According to [place] Case Law and [facility] standards of care, with an appropriate letter of support from a mental health provider, top surgery/mastectomy is medically necessary for the treatment of gender dysphoria.     Total time = 45 minutes, spent on the date of encounter doing chart review, history and physical, dressing changes, documentation, patient education, and any further activity as noted above.     This note was prepared on behalf of Soumya Bass MD by Eli Garay, a trained medical scribe, based on my observations and the provider's statements to me.

## 2021-07-30 ENCOUNTER — HOSPITAL ENCOUNTER (EMERGENCY)
Facility: CLINIC | Age: 21
Discharge: HOME | End: 2021-07-30
Attending: EMERGENCY MEDICINE | Admitting: EMERGENCY MEDICINE
Payer: COMMERCIAL

## 2021-07-30 VITALS
HEART RATE: 65 BPM | SYSTOLIC BLOOD PRESSURE: 92 MMHG | OXYGEN SATURATION: 100 % | TEMPERATURE: 97.9 F | RESPIRATION RATE: 16 BRPM | DIASTOLIC BLOOD PRESSURE: 52 MMHG

## 2021-07-30 DIAGNOSIS — R55 SYNCOPE, UNSPECIFIED SYNCOPE TYPE: ICD-10-CM

## 2021-07-30 LAB
ALBUMIN SERPL-MCNC: 4.8 G/DL (ref 3.4–5)
ALP SERPL-CCNC: 94 U/L (ref 40–150)
ALT SERPL W P-5'-P-CCNC: 42 U/L (ref 0–70)
ANION GAP SERPL CALCULATED.3IONS-SCNC: 5 MMOL/L (ref 3–14)
APAP SERPL-MCNC: <2 MG/L (ref 10–30)
AST SERPL W P-5'-P-CCNC: 36 U/L (ref 0–45)
ATRIAL RATE - MUSE: 76 BPM
BASOPHILS # BLD AUTO: 0.1 10E3/UL (ref 0–0.2)
BASOPHILS NFR BLD AUTO: 1 %
BILIRUB SERPL-MCNC: 0.7 MG/DL (ref 0.2–1.3)
BUN SERPL-MCNC: 7 MG/DL (ref 7–30)
CALCIUM SERPL-MCNC: 9.6 MG/DL (ref 8.5–10.1)
CHLORIDE BLD-SCNC: 104 MMOL/L (ref 94–109)
CO2 SERPL-SCNC: 28 MMOL/L (ref 20–32)
CREAT SERPL-MCNC: 0.83 MG/DL (ref 0.52–1.25)
DIASTOLIC BLOOD PRESSURE - MUSE: NORMAL MMHG
EOSINOPHIL # BLD AUTO: 0.1 10E3/UL (ref 0–0.7)
EOSINOPHIL NFR BLD AUTO: 2 %
ERYTHROCYTE [DISTWIDTH] IN BLOOD BY AUTOMATED COUNT: 12.1 % (ref 10–15)
GFR SERPL CREATININE-BSD FRML MDRD: >90 ML/MIN/1.73M2
GLUCOSE BLD-MCNC: 81 MG/DL (ref 70–99)
GLUCOSE BLDC GLUCOMTR-MCNC: 75 MG/DL (ref 70–99)
HCT VFR BLD AUTO: 47.7 % (ref 35–53)
HGB BLD-MCNC: 15.5 G/DL (ref 11.7–17.7)
IMM GRANULOCYTES # BLD: 0 10E3/UL
IMM GRANULOCYTES NFR BLD: 0 %
INTERPRETATION ECG - MUSE: NORMAL
LACTATE SERPL-SCNC: 0.6 MMOL/L (ref 0.7–2)
LYMPHOCYTES # BLD AUTO: 2.8 10E3/UL (ref 0.8–5.3)
LYMPHOCYTES NFR BLD AUTO: 31 %
MCH RBC QN AUTO: 29.4 PG (ref 26.5–33)
MCHC RBC AUTO-ENTMCNC: 32.5 G/DL (ref 31.5–36.5)
MCV RBC AUTO: 90 FL (ref 78–100)
MONOCYTES # BLD AUTO: 0.5 10E3/UL (ref 0–1.3)
MONOCYTES NFR BLD AUTO: 5 %
NEUTROPHILS # BLD AUTO: 5.4 10E3/UL (ref 1.6–8.3)
NEUTROPHILS NFR BLD AUTO: 61 %
NRBC # BLD AUTO: 0 10E3/UL
NRBC BLD AUTO-RTO: 0 /100
P AXIS - MUSE: 60 DEGREES
PLATELET # BLD AUTO: 341 10E3/UL (ref 150–450)
POTASSIUM BLD-SCNC: 4 MMOL/L (ref 3.4–5.3)
PR INTERVAL - MUSE: 152 MS
PROT SERPL-MCNC: 8.5 G/DL (ref 6.8–8.8)
QRS DURATION - MUSE: 78 MS
QT - MUSE: 360 MS
QTC - MUSE: 405 MS
R AXIS - MUSE: 56 DEGREES
RBC # BLD AUTO: 5.28 10E6/UL (ref 3.8–5.9)
SALICYLATES SERPL-MCNC: <2 MG/DL
SODIUM SERPL-SCNC: 137 MMOL/L (ref 133–144)
SYSTOLIC BLOOD PRESSURE - MUSE: NORMAL MMHG
T AXIS - MUSE: 38 DEGREES
TROPONIN I SERPL-MCNC: <0.015 UG/L (ref 0–0.04)
VENTRICULAR RATE- MUSE: 76 BPM
WBC # BLD AUTO: 8.8 10E3/UL (ref 4–11)

## 2021-07-30 PROCEDURE — 80143 DRUG ASSAY ACETAMINOPHEN: CPT | Performed by: EMERGENCY MEDICINE

## 2021-07-30 PROCEDURE — 80179 DRUG ASSAY SALICYLATE: CPT | Performed by: EMERGENCY MEDICINE

## 2021-07-30 PROCEDURE — 99284 EMERGENCY DEPT VISIT MOD MDM: CPT | Mod: 25 | Performed by: EMERGENCY MEDICINE

## 2021-07-30 PROCEDURE — 85025 COMPLETE CBC W/AUTO DIFF WBC: CPT | Performed by: EMERGENCY MEDICINE

## 2021-07-30 PROCEDURE — 93010 ELECTROCARDIOGRAM REPORT: CPT | Performed by: EMERGENCY MEDICINE

## 2021-07-30 PROCEDURE — 84484 ASSAY OF TROPONIN QUANT: CPT | Performed by: EMERGENCY MEDICINE

## 2021-07-30 PROCEDURE — 36415 COLL VENOUS BLD VENIPUNCTURE: CPT | Performed by: EMERGENCY MEDICINE

## 2021-07-30 PROCEDURE — 99284 EMERGENCY DEPT VISIT MOD MDM: CPT | Performed by: EMERGENCY MEDICINE

## 2021-07-30 PROCEDURE — 93005 ELECTROCARDIOGRAM TRACING: CPT | Performed by: EMERGENCY MEDICINE

## 2021-07-30 PROCEDURE — 83605 ASSAY OF LACTIC ACID: CPT | Performed by: EMERGENCY MEDICINE

## 2021-07-30 PROCEDURE — 82040 ASSAY OF SERUM ALBUMIN: CPT | Performed by: EMERGENCY MEDICINE

## 2021-07-30 RX ORDER — SODIUM CHLORIDE 9 MG/ML
INJECTION, SOLUTION INTRAVENOUS CONTINUOUS
Status: DISCONTINUED | OUTPATIENT
Start: 2021-07-30 | End: 2021-07-30 | Stop reason: HOSPADM

## 2021-07-30 NOTE — ED TRIAGE NOTES
Pt fainted 3 times in 1 hour. BG 75 upon admission. Before last episode pt reported to partner they felt hypoglycemic. Has not ate since 1000 today.

## 2021-07-30 NOTE — ED PROVIDER NOTES
"ED Provider Note  [facility]      History     Chief Complaint   Patient presents with     Syncope     Pt fainted 3 times in 1 hour     HPI  Stephanie Villanueva is a 21 year old adult who has a past medical history of depression, Yumiko, borderline personality disorder, pseudoseizures, pots presenting with syncope versus seizure-like activity.  Patient had 3 episodes today that lasted around 2 minutes.  No shaking, tongue biting or incontinence noted by partner.  The partner was present for all events.  Patient has not eaten today.  Has had recent hallucinations, the partner states that the patient is \"psychotic \"right now.  The partner supported the patient to the ground every time this happened, no head injuries.  Patient currently is slower to respond and is not answering questions.  Partner denies overdose, drug or alcohol use.    After short time, patient more alert.  Patient is answering questions appropriately.  Denies any pain.  Says this is similar to episodes in the past typically after do not eat much.    Past Medical and Surgical History, Medications, Allergies, and Social History were reviewed in the electronic medical record. Review with the patient was attempted but limited due to altered mental status.       Review of Systems  A complete review of systems was attempted but limited due to altered mental status.    Physical Exam   BP: 131/87  Pulse: 85  Temp: 99.1  F (37.3  C)  Resp: 16  Physical Exam  Physical Exam   Constitutional: Eyes open, alert, appears in no distress   HENT:   Head: Normocephalic and atraumatic.   Neck: Normal range of motion. No nuchal rigidity.  Pulmonary/Chest: Effort normal. No respiratory distress. Clear lungs bilaterally.  Cardiac: No murmurs, rubs, gallops. RRR.  Abdominal: Abdomen soft, nontender, nondistended. No rebound tenderness.  MSK: Long bones without deformity or evidence of trauma  Neurological: alert alert and nods or shakes head to " questions.  Moving all extremities on command.  Cannot assess sensation at this point.  No clonus.  No nystagmus.  Pupils 3 mm reactive bilaterally.  Skin: Skin is warm and dry. No diaphoresis.  Psychiatric:  Flat affect.    ED Course      Procedures            EKG Interpretation:      Interpreted by Kan Encarnacion MD  Time reviewed: 0045  Symptoms at time of EKG: syncope   Rhythm: normal sinus   Rate: normal  Axis: normal  Ectopy: none  Conduction: normal  ST Segments/ T Waves: No ST-T wave changes  Q Waves: none  Comparison to prior: Unchanged    Clinical Impression: normal EKG. No WPW, HOCM, ARVD, prolonged QTc, block, brugada    Results for orders placed or performed during the hospital encounter of 07/30/21   Comprehensive metabolic panel     Status: Normal   Result Value Ref Range    Sodium 137 133 - 144 mmol/L    Potassium 4.0 3.4 - 5.3 mmol/L    Chloride 104 94 - 109 mmol/L    Carbon Dioxide (CO2) 28 20 - 32 mmol/L    Anion Gap 5 3 - 14 mmol/L    Urea Nitrogen 7 7 - 30 mg/dL    Creatinine 0.83 0.52 - 1.25 mg/dL    Calcium 9.6 8.5 - 10.1 mg/dL    Glucose 81 70 - 99 mg/dL    Alkaline Phosphatase 94 40 - 150 U/L    AST 36 0 - 45 U/L    ALT 42 0 - 70 U/L    Protein Total 8.5 6.8 - 8.8 g/dL    Albumin 4.8 3.4 - 5.0 g/dL    Bilirubin Total 0.7 0.2 - 1.3 mg/dL    GFR Estimate >90 >60 mL/min/1.73m2    Narrative    The sex of this patient cannot be reliably determined based on discrepancies in demographics (legal sex, sex assigned at birth, gender identity).  Both male and female reference ranges are provided where applicable.  Careful evaluation of the patient s results as compared to the gender specific reference intervals is required in this setting.    Lactic acid whole blood     Status: Abnormal   Result Value Ref Range    Lactic Acid 0.6 (L) 0.7 - 2.0 mmol/L   Troponin I     Status: Normal   Result Value Ref Range    Troponin I <0.015 0.000 - 0.045 ug/L   Acetaminophen level     Status: Abnormal   Result  Value Ref Range    Acetaminophen <2 (L) 10 - 30 mg/L   Salicylate level     Status: Normal   Result Value Ref Range    Salicylate <2 <20 mg/dL   Glucose by meter     Status: Normal   Result Value Ref Range    GLUCOSE BY METER POCT 75 70 - 99 mg/dL   CBC with platelets and differential     Status: None   Result Value Ref Range    WBC Count 8.8 4.0 - 11.0 10e3/uL    RBC Count 5.28 3.80 - 5.90 10e6/uL    Hemoglobin 15.5 11.7 - 17.7 g/dL    Hematocrit 47.7 35.0 - 53.0 %    MCV 90 78 - 100 fL    MCH 29.4 26.5 - 33.0 pg    MCHC 32.5 31.5 - 36.5 g/dL    RDW 12.1 10.0 - 15.0 %    Platelet Count 341 150 - 450 10e3/uL    % Neutrophils 61 %    % Lymphocytes 31 %    % Monocytes 5 %    % Eosinophils 2 %    % Basophils 1 %    % Immature Granulocytes 0 %    NRBCs per 100 WBC 0 <1 /100    Absolute Neutrophils 5.4 1.6 - 8.3 10e3/uL    Absolute Lymphocytes 2.8 0.8 - 5.3 10e3/uL    Absolute Monocytes 0.5 0.0 - 1.3 10e3/uL    Absolute Eosinophils 0.1 0.0 - 0.7 10e3/uL    Absolute Basophils 0.1 0.0 - 0.2 10e3/uL    Absolute Immature Granulocytes 0.0 <=0.0 10e3/uL    Absolute NRBCs 0.0 10e3/uL    Narrative    The sex of this patient cannot be reliably determined based on discrepancies in demographics (legal sex, sex assigned at birth, gender identity).  Both male and female reference ranges are provided where applicable.  Careful evaluation of the patient s results as compared to the gender specific reference intervals is required in this setting.    CBC with platelets differential     Status: None    Narrative    The following orders were created for panel order CBC with platelets differential.  Procedure                               Abnormality         Status                     ---------                               -----------         ------                     CBC with platelets and d...[[id number]]                      Final result                 Please view results for these tests on the individual orders.          No results  found for any visits on 07/30/21.  Medications   0.9% sodium chloride BOLUS (has no administration in time range)     Followed by   sodium chloride 0.9% infusion (has no administration in time range)        Assessments & Plan (with Medical Decision Making)   MDM  Patient presenting with syncopal versus seizure-like episode.  They have had multiple of these episodes in the past.  Vitals here are stable.  EKG is reassuring and normal, unchanged from prior.  No suicidal ideation.  Patient has no symptoms of psychosis.  Neurologic exam is normal.  Vitals have remained stable throughout observation.  Patient able to tolerate p.o. intake.  No evidence of dehydration.  No evidence of meningitis or encephalitis.  No evidence of stroke or intracranial hemorrhage.  Patient is largely asymptomatic.  The patient be discharged with follow-up with primary care provider.  Tox work-up is negative.  No suspicion for overdose after history taken with partner and parents.  Patient denies suicidal, homicidal ideation prior to discharge.    Repeat evaluation: Patient wanted to rest prior to discharge. Able to ambulate without difficulty without symptoms. Neurologic exam is normal on repeat. They do not want to wait for urine tests, [facility]. Patient seems to be at baseline according to family and partner. This does sound similar to multiple prior episodes. The patient will be discharged with follow-up with primary care provider. They have no further concerns. They do not want a mental health evaluation at this point. Patient has had EEG in the past with nonepileptiform results.  I have reviewed the nursing notes. I have reviewed the findings, diagnosis, plan and need for follow up with the patient.    New Prescriptions    No medications on file       Final diagnoses:   Syncope, unspecified syncope type       --  Kan Encarnacion  [facility] EMERGENCY DEPARTMENT  7/30/2021     Kan Encarnacion,  MD  07/30/21 0315       Kan Encarnacion MD  07/30/21 0317

## 2021-07-30 NOTE — DISCHARGE INSTRUCTIONS
Please make an appointment to follow up with Your Primary Care Provider as soon as possible to discuss your symptoms.    Take your medications as prescribed.    You can check your urine results online in [employer]    Your work-up today including labs and EKG are reassuring

## 2021-08-02 ENCOUNTER — PATIENT OUTREACH (OUTPATIENT)
Dept: FAMILY MEDICINE | Facility: CLINIC | Age: 21
End: 2021-08-02

## 2021-08-03 NOTE — TELEPHONE ENCOUNTER
ED / Discharge Outreach Protocol    Patient Contact    Attempt # 1    Was call answered?  No.  Left message on voicemail with information to call me back.    Shruthi Burdick RN

## 2021-09-05 ENCOUNTER — HEALTH MAINTENANCE LETTER (OUTPATIENT)
Age: 21
End: 2021-09-05

## 2021-09-14 ENCOUNTER — OFFICE VISIT (OUTPATIENT)
Dept: ORTHOPEDICS | Facility: CLINIC | Age: 21
End: 2021-09-14
Payer: COMMERCIAL

## 2021-09-14 VITALS — BODY MASS INDEX: 19.97 KG/M2 | HEIGHT: 71 IN

## 2021-09-14 DIAGNOSIS — M77.8 RIGHT WRIST TENDONITIS: ICD-10-CM

## 2021-09-14 DIAGNOSIS — G56.11 PRONATOR SYNDROME OF RIGHT UPPER EXTREMITY: ICD-10-CM

## 2021-09-14 DIAGNOSIS — G56.01 CARPAL TUNNEL SYNDROME OF RIGHT WRIST: Primary | ICD-10-CM

## 2021-09-14 PROCEDURE — 99203 OFFICE O/P NEW LOW 30 MIN: CPT | Performed by: FAMILY MEDICINE

## 2021-09-14 NOTE — LETTER
MEDICAL NOTE  2021     Seen today: yes    Patient:  Stephanie Villanueva  :   2000  MRN:     [id number]  Physician: AVIVA ROGERS may return to work on Date: 9/15/2021.      The next clinic appointment is scheduled for (date/time) 2021.    Patient limitations:  NO TYPING/LIFTING WITH R MARIO Rogers MD  2021  4:36 PM

## 2021-09-14 NOTE — LETTER
9/14/2021      RE: Stephanie Villanueva  [address] [address]       ASSESSMENT/PLAN:    (G56.01) Carpal tunnel syndrome of right wrist  (primary encounter diagnosis)  Comment: exam consistent w/ carpal tunnel as well as pronator syndrome and flexor tendonitis; wrist splint, hand therapy, topical nsaid; precautions given; note for work written; f/u in 2 wks; if no better, would consider guided cortisone injection  Plan: HAND THERAPY Occupational Therapy or Physical         Therapy, diclofenac (VOLTAREN) 1 % topical gel          (G56.11) Pronator syndrome of right upper extremity  Comment: see above  Plan: HAND THERAPY Occupational Therapy or Physical         Therapy, diclofenac (VOLTAREN) 1 % topical gel          (M77.8) Right wrist tendonitis  Comment: see above  Plan: HAND THERAPY Occupational Therapy or Physical         Therapy, diclofenac (VOLTAREN) 1 % topical gel            Toney Rogers MD  September 14, 2021  4:36 PM        Pt is a 21 year old adult here today for:     Right Wrist/ Hand pain:   Location: Wrist pain   Duration: 2 weeks   Trauma/ Fall? No trauma, pain after cleaning for long periods of time   Swelling? Slight swelling in wrist   Numbness/ Tingling? Yes, 1st-3rd finger constant   Weakness? Yes, difficulty gripping objects   Imaging? None   Treatment? Ice, heat, ibuprofen, tylenol, tiger balm       Past Medical History:   Diagnosis Date     Anxiety october 2013     CONTUSION OF left iliac crest 9/3/2006     Major depression october 2013     Nocturnal enuresis 10/4/2007    Currently resolved.     Salter-Perdomo Type I fracture of distal fibula with routine healing 1/25/2013     Uncomplicated asthma     sports induced      No past surgical history on file.   Current Outpatient Medications   Medication Sig Dispense Refill     amphetamine-dextroamphetamine (ADDERALL XR) 20 MG 24 hr capsule Take 20 mg by mouth daily       amphetamine-dextroamphetamine (ADDERALL) 10 MG tablet Take 10 mg by  "mouth daily       ANDRODERM 2 MG/24HR 24 hr patch Apply 1 patch topically daily       desvenlafaxine (PRISTIQ) 50 MG 24 hr tablet Take 150 mg by mouth daily       etodolac (LODINE) 500 MG tablet Take 1 tablet (500 mg) by mouth 2 times daily 30 tablet 0     gabapentin (NEURONTIN) 300 MG capsule Take 3 capsules (900 mg) by mouth 3 times daily 270 capsule 0     levonorgestrel (MIRENA) 20 MCG/24HR IUD 1 each by Intrauterine route Placed 2017       lithium ER (LITHOBID) 300 MG CR tablet Take 300 mg by mouth 2 times daily       LORazepam (ATIVAN) 1 MG tablet Take 1 tablet by mouth daily as needed for agitation        OLANZapine (ZYPREXA) 10 MG tablet Take 1 tablet (10 mg) by mouth At Bedtime        Allergies   Allergen Reactions     No Known Drug Allergies       ROS:   Gen- no fevers/chills   Rheum - no morning stiffness   Derm - no rash/ redness   Neuro - see HPI   Remainder of ROS negative.     Exam:   Ht 1.803 m (5' 11\")   BMI 19.97 kg/m         R WRIST/HAND:   Inspection: Swelling - No; Atrophy - No   Sensation: intact in median, radial, ulnar distribution; + hyperesthesias along entire median nerve distribution from proximal forearm to hand  ROM:   Wrist: flexion- limited/ extension- full/ pronation- full - painful/ supination- full;   radial deviation - full; ulnar deviation- full   Hand: Full flexion at PIP/DIP, finger abduction/ adduction.   Strength: +weakness w/    Bony tenderness:   Wrist: Carpal bones: No; Snuffbox: No   Hand: Metacarpals: No; Phalanges: NO   Tendons: FDS/FDP/Extensor mechanism intact   Maneuvers: POS Tinel's/Phalen/ Median compression test; Tinel's also positive in forearm; -Finkelstein  Other: No nodules palpated in palmar aspect.           Toney Rogers MD    "

## 2021-09-14 NOTE — PROGRESS NOTES
ASSESSMENT/PLAN:    (G56.01) Carpal tunnel syndrome of right wrist  (primary encounter diagnosis)  Comment: exam consistent w/ carpal tunnel as well as pronator syndrome and flexor tendonitis; wrist splint, hand therapy, topical nsaid; precautions given; note for work written; f/u in 2 wks; if no better, would consider guided cortisone injection  Plan: HAND THERAPY Occupational Therapy or Physical         Therapy, diclofenac (VOLTAREN) 1 % topical gel          (G56.11) Pronator syndrome of right upper extremity  Comment: see above  Plan: HAND THERAPY Occupational Therapy or Physical         Therapy, diclofenac (VOLTAREN) 1 % topical gel          (M77.8) Right wrist tendonitis  Comment: see above  Plan: HAND THERAPY Occupational Therapy or Physical         Therapy, diclofenac (VOLTAREN) 1 % topical gel            Toney Rogers MD  September 14, 2021  4:36 PM        Pt is a 21 year old adult here today for:     Right Wrist/ Hand pain:   Location: Wrist pain   Duration: 2 weeks   Trauma/ Fall? No trauma, pain after cleaning for long periods of time   Swelling? Slight swelling in wrist   Numbness/ Tingling? Yes, 1st-3rd finger constant   Weakness? Yes, difficulty gripping objects   Imaging? None   Treatment? Ice, heat, ibuprofen, tylenol, tiger balm       Past Medical History:   Diagnosis Date     Anxiety october 2013     CONTUSION OF left iliac crest 9/3/2006     Major depression october 2013     Nocturnal enuresis 10/4/2007    Currently resolved.     Salter-Perdomo Type I fracture of distal fibula with routine healing 1/25/2013     Uncomplicated asthma     sports induced      No past surgical history on file.   Current Outpatient Medications   Medication Sig Dispense Refill     amphetamine-dextroamphetamine (ADDERALL XR) 20 MG 24 hr capsule Take 20 mg by mouth daily       amphetamine-dextroamphetamine (ADDERALL) 10 MG tablet Take 10 mg by mouth daily       ANDRODERM 2 MG/24HR 24 hr patch Apply 1 patch topically daily    "    desvenlafaxine (PRISTIQ) 50 MG 24 hr tablet Take 150 mg by mouth daily       etodolac (LODINE) 500 MG tablet Take 1 tablet (500 mg) by mouth 2 times daily 30 tablet 0     gabapentin (NEURONTIN) 300 MG capsule Take 3 capsules (900 mg) by mouth 3 times daily 270 capsule 0     levonorgestrel (MIRENA) 20 MCG/24HR IUD 1 each by Intrauterine route Placed 2017       lithium ER (LITHOBID) 300 MG CR tablet Take 300 mg by mouth 2 times daily       LORazepam (ATIVAN) 1 MG tablet Take 1 tablet by mouth daily as needed for agitation        OLANZapine (ZYPREXA) 10 MG tablet Take 1 tablet (10 mg) by mouth At Bedtime        Allergies   Allergen Reactions     No Known Drug Allergies       ROS:   Gen- no fevers/chills   Rheum - no morning stiffness   Derm - no rash/ redness   Neuro - see HPI   Remainder of ROS negative.     Exam:   Ht 1.803 m (5' 11\")   BMI 19.97 kg/m         R WRIST/HAND:   Inspection: Swelling - No; Atrophy - No   Sensation: intact in median, radial, ulnar distribution; + hyperesthesias along entire median nerve distribution from proximal forearm to hand  ROM:   Wrist: flexion- limited/ extension- full/ pronation- full - painful/ supination- full;   radial deviation - full; ulnar deviation- full   Hand: Full flexion at PIP/DIP, finger abduction/ adduction.   Strength: +weakness w/    Bony tenderness:   Wrist: Carpal bones: No; Snuffbox: No   Hand: Metacarpals: No; Phalanges: NO   Tendons: FDS/FDP/Extensor mechanism intact   Maneuvers: POS Tinel's/Phalen/ Median compression test; Tinel's also positive in forearm; -Finkelstein  Other: No nodules palpated in palmar aspect.       "

## 2021-09-23 ENCOUNTER — THERAPY VISIT (OUTPATIENT)
Dept: OCCUPATIONAL THERAPY | Facility: CLINIC | Age: 21
End: 2021-09-23
Attending: FAMILY MEDICINE
Payer: COMMERCIAL

## 2021-09-23 DIAGNOSIS — M77.8 RIGHT WRIST TENDONITIS: ICD-10-CM

## 2021-09-23 DIAGNOSIS — G56.11 PRONATOR SYNDROME OF RIGHT UPPER EXTREMITY: ICD-10-CM

## 2021-09-23 DIAGNOSIS — G56.01 CARPAL TUNNEL SYNDROME OF RIGHT WRIST: ICD-10-CM

## 2021-09-23 PROCEDURE — 97165 OT EVAL LOW COMPLEX 30 MIN: CPT | Mod: GO | Performed by: OCCUPATIONAL THERAPIST

## 2021-09-23 PROCEDURE — 97110 THERAPEUTIC EXERCISES: CPT | Mod: GO | Performed by: OCCUPATIONAL THERAPIST

## 2021-09-23 PROCEDURE — 97760 ORTHOTIC MGMT&TRAING 1ST ENC: CPT | Mod: GO | Performed by: OCCUPATIONAL THERAPIST

## 2021-09-23 NOTE — PROGRESS NOTES
Hand Therapy Initial Evaluation  Current Date:  9/23/2021    Subjective:  Stephanie Villanueva is a 21 year old right hand dominant adult.    Diagnosis: R CTS, pronator syndrome, wrist tendonitis  DOI:  ~1 month    Patient reports symptoms of pain, stiffness/loss of motion, weakness/loss of strength, numbness and tingling, and decreased dexterity of the Right hand and forearm which occurred due to repetitive use with intense cleaning. Since onset symptoms are gradually getting worse. Special tests:  none.  Previous treatment: OTC brace, tiger balm, ice, massage, tendon glides. General health as reported by patient is fair.  Pertinent medical history includes: Concussions/Dizziness, Depression, Mental Illness, Seizures, POTS, fibro, smoking.  Medical allergies: none.  Surgical history: none.  Medication history: Anti-depressants, Anti-seizure, Hormone Replacement.    Occupational Profile Information:  Current occupation is   Currently working in normal job without restrictions  Job Tasks: Computer Work, Prolonged Sitting  Prior functional level:  no limitations  Barriers include:none  Mobility: No difficulty  Transportation: drives  Leisure activities/hobbies: reading, gardening    Objective:  Pain Level (Scale 0-10):   9/23/2021   At Rest 5   With Use 8     Pain Description:  Date 9/23/2021   Location Begins in R hand and radiates into forearm, and from elbow radiates up to shoulder and neck   Pain Quality Aching, Dull, Miserable, Nagging, Penetrating, Shooting, Throbbing and Tingling   Frequency constant     Pain is worst  daytime   Exacerbated by  driving, doing dishes, typing, repetitive use, use with weight, fine motor tasks   Relieved by cold, rest and tiger balm   Progression Gradually worsening     Posture  Normal    Edema  None    Sensation  Decreased Median Nerve distribution per pt report and Intermittently    ROM WFL, but pain present with wrist ext/flex and rotation  Pain Report:  0-10/10    Special Tests  Pain Report:  0-10/10   9/23/2021   Median Nerve Compression at Pronator + immediately   Carpal Compression Test--Durkan Test (30 sec) + immediately   Abdul Test for Lumbrical Incursion  (fist x 30 secs) No paresthesias, but pain present in dorsal forearm at 10 sec   Tinels at Carpal Tunnel +   Phalens + at 11 sec and pain present in elbow     Neural Tension Testing  MNT: Median Neurodynamic Test (based on DS Chalo's ULNT)   9/23/2021   0-5 Scale NT due to pain   Position:   0/5: Arm across abdomen in coronal plane  1/5: Depress shoulder, ER to neutral Abd shoulder to 45 degrees  2/5: ER shoulder to end range, keep elbow at 90 degrees  3/5: Extend elbow to 0 degrees  4/5: Fully supinate forearm  5/5: Extend wrist, fingers and thumb  Notes:    (+) indicates beyond grade level but less than [facility] to next level    (-) indicates over [facility] to level    S1  onset/change of patient's symptoms    S2 definite stop point based on patient's discomfort level    Strength   (Measured in pounds)  Pain Report: 0-10/10   9/23/2021 9/23/2021   Trials L R   1  2  3 58 20   Average 58 20     Lat Pinch 9/23/2021 9/23/2021   Trials L R   1  2  3 18 6   Average 18 6     3 Pt Pinch 9/23/2021 9/23/2021   Trials L R   1  2  3 18 3   Average 18 3     Assessment:  Patient presents with symptoms consistent with diagnosis of right Carpal Tunnel Syndrome, Pronator Syndrome, and Wrist Tendonitis, with conservative intervention.     Patient's limitations or Problem List includes:  Pain, Weakness, Sensory disturbance, Decreased , Decreased pinch, Decreased coordination and Tightness in musculature of the right wrist and hand which interferes with the patient's ability to perform Self Care Tasks (dressing, eating, bathing), Recreational Activities and Household Chores as compared to previous level of function.    Rehab Potential:  Excellent - Return to full activity, no limitations    Patient will benefit from  skilled Occupational Therapy to increase flexibility, overall strength,  strength, pinch strength and sensation and decrease pain to return to previous activity level and resume normal daily tasks and to reach their rehab potential.    Barriers to Learning:  No barrier    Communication Issues:  Patient appears to be able to clearly communicate and understand verbal and written communication and follow directions correctly.    Chart Review: Chart Review and Brief history including review of medical and/or therapy records relating to the presenting problem    Identified Performance Deficits: dressing, health management and maintenance, home establishment and management, meal preparation and cleanup, shopping, sleep and leisure activities    Assessment of Occupational Performance:  5 or more Performance Deficits    Clinical Decision Making (Complexity): Low complexity    Treatment Explanation:  The following has been discussed with the patient:  RX ordered/plan of care  Anticipated outcomes  Possible risks and side effects    Plan:  Frequency:  1 X week, once daily  Duration:  for 6 weeks    Treatment Plan:    Modalities:    US   Therapeutic Exercise:    AROM, PROM, Tendon Gliding, Blocking, Place and Hold, Contract Relax, Isotonics and Isometrics  Neuromuscular re-ed:   Nerve Gliding, Coordination/Dexterity, Sensory re-education, Kinesthetic Training, Proprioceptive Training and Kinesiotaping  Manual Techniques:   Coordination/Dexterity, Joint mobilization and Myofascial release  Orthotic Fabrication:    Static and Forearm based  Self Care:    Self Care Tasks and Ergonomic Considerations    Discharge Plan:  Achieve all LTG.  Independent in home treatment program.  Reach maximal therapeutic benefit.    Home Exercise Program:  Tendon gliding  Blocking  Proximal Median Nerve Glides  Wrist in neutral Orthosis at night    Next Visit:  Check fit of orthosis  MFR  Passive median nerve glides

## 2021-09-27 PROBLEM — G56.01 CARPAL TUNNEL SYNDROME OF RIGHT WRIST: Status: ACTIVE | Noted: 2021-09-27

## 2021-09-27 PROBLEM — G56.11 PRONATOR SYNDROME OF RIGHT UPPER EXTREMITY: Status: ACTIVE | Noted: 2021-09-27

## 2021-09-27 PROBLEM — M77.8 RIGHT WRIST TENDONITIS: Status: ACTIVE | Noted: 2021-09-27

## 2021-10-28 ENCOUNTER — TELEPHONE (OUTPATIENT)
Dept: ORTHOPEDICS | Facility: CLINIC | Age: 21
End: 2021-10-28

## 2021-10-28 NOTE — TELEPHONE ENCOUNTER
Attempted to call a few times during clinic. Call tree was very long and was unable to follow-through.    Will attempt tomorrow.     - Tj Jacobson ATC

## 2021-10-28 NOTE — TELEPHONE ENCOUNTER
[facility] Call Center    Phone Message    May a detailed message be left on voicemail: yes     Reason for Call: Other: Carlota Nurse  with [facility] calling regarding if the patient comes in again they are able to provide support for patients care plan.     Action Taken: Other:  sports med    Travel Screening: Not Applicable

## 2021-11-04 PROBLEM — G56.01 CARPAL TUNNEL SYNDROME OF RIGHT WRIST: Status: RESOLVED | Noted: 2021-09-27 | Resolved: 2021-11-04

## 2021-11-04 PROBLEM — M77.8 RIGHT WRIST TENDONITIS: Status: RESOLVED | Noted: 2021-09-27 | Resolved: 2021-11-04

## 2021-11-04 PROBLEM — G56.11 PRONATOR SYNDROME OF RIGHT UPPER EXTREMITY: Status: RESOLVED | Noted: 2021-09-27 | Resolved: 2021-11-04

## 2021-11-05 NOTE — TELEPHONE ENCOUNTER
None of the call center options are related to speaking to nurse . I tried to go through the scheduling option and waited on hold for 10 minutes, then was unable to find anyone who knew how to pass me through to the correct person.  Unable to leave message.     - Tj Jacobson, ATC

## 2021-12-26 ENCOUNTER — HEALTH MAINTENANCE LETTER (OUTPATIENT)
Age: 21
End: 2021-12-26

## 2022-03-24 ENCOUNTER — HOME INFUSION (PRE-WILLOW HOME INFUSION) (OUTPATIENT)
Dept: PHARMACY | Facility: CLINIC | Age: 22
End: 2022-03-24

## 2022-03-25 ENCOUNTER — HOME INFUSION (PRE-WILLOW HOME INFUSION) (OUTPATIENT)
Dept: PHARMACY | Facility: CLINIC | Age: 22
End: 2022-03-25

## 2022-03-26 ENCOUNTER — HOME INFUSION (PRE-WILLOW HOME INFUSION) (OUTPATIENT)
Dept: PHARMACY | Facility: CLINIC | Age: 22
End: 2022-03-26
Payer: COMMERCIAL

## 2022-03-28 ENCOUNTER — HOME INFUSION (PRE-WILLOW HOME INFUSION) (OUTPATIENT)
Dept: PHARMACY | Facility: CLINIC | Age: 22
End: 2022-03-28

## 2022-03-30 ENCOUNTER — HOME INFUSION (PRE-WILLOW HOME INFUSION) (OUTPATIENT)
Dept: PHARMACY | Facility: CLINIC | Age: 22
End: 2022-03-30

## 2022-04-04 ENCOUNTER — HOME INFUSION (PRE-WILLOW HOME INFUSION) (OUTPATIENT)
Dept: PHARMACY | Facility: CLINIC | Age: 22
End: 2022-04-04

## 2022-04-08 ENCOUNTER — HOME INFUSION (PRE-WILLOW HOME INFUSION) (OUTPATIENT)
Dept: PHARMACY | Facility: CLINIC | Age: 22
End: 2022-04-08
Payer: COMMERCIAL

## 2022-04-11 ENCOUNTER — HOME INFUSION (PRE-WILLOW HOME INFUSION) (OUTPATIENT)
Dept: PHARMACY | Facility: CLINIC | Age: 22
End: 2022-04-11

## 2022-04-12 NOTE — PROGRESS NOTES
This is a recent snapshot of the patient's [facility] medical record.  For current drug dose and complete information and questions, call [phone] or In Basket pool, [facility] home infusion ([id number])  CSN Number:  [id number]

## 2022-04-13 ENCOUNTER — HOSPITAL ENCOUNTER (EMERGENCY)
Facility: CLINIC | Age: 22
Discharge: HOME | End: 2022-04-13
Attending: FAMILY MEDICINE | Admitting: FAMILY MEDICINE
Payer: COMMERCIAL

## 2022-04-13 ENCOUNTER — APPOINTMENT (OUTPATIENT)
Dept: CT IMAGING | Facility: CLINIC | Age: 22
End: 2022-04-13
Attending: FAMILY MEDICINE
Payer: COMMERCIAL

## 2022-04-13 VITALS
TEMPERATURE: 97.9 F | DIASTOLIC BLOOD PRESSURE: 69 MMHG | BODY MASS INDEX: 25.1 KG/M2 | SYSTOLIC BLOOD PRESSURE: 108 MMHG | OXYGEN SATURATION: 98 % | HEART RATE: 70 BPM | WEIGHT: 180 LBS | RESPIRATION RATE: 16 BRPM

## 2022-04-13 DIAGNOSIS — K59.00 CONSTIPATION, UNSPECIFIED CONSTIPATION TYPE: ICD-10-CM

## 2022-04-13 DIAGNOSIS — R10.13 ABDOMINAL PAIN, EPIGASTRIC: ICD-10-CM

## 2022-04-13 DIAGNOSIS — K29.70 GASTRITIS WITHOUT BLEEDING, UNSPECIFIED CHRONICITY, UNSPECIFIED GASTRITIS TYPE: ICD-10-CM

## 2022-04-13 LAB
ALBUMIN SERPL-MCNC: 3.8 G/DL (ref 3.4–5)
ALBUMIN UR-MCNC: NEGATIVE MG/DL
ALP SERPL-CCNC: 84 U/L (ref 40–150)
ALT SERPL W P-5'-P-CCNC: 25 U/L (ref 0–70)
ANION GAP SERPL CALCULATED.3IONS-SCNC: 10 MMOL/L (ref 3–14)
APPEARANCE UR: CLEAR
AST SERPL W P-5'-P-CCNC: 17 U/L (ref 0–45)
BASOPHILS # BLD AUTO: 0.1 10E3/UL (ref 0–0.2)
BASOPHILS NFR BLD AUTO: 1 %
BILIRUB SERPL-MCNC: 0.6 MG/DL (ref 0.2–1.3)
BILIRUB UR QL STRIP: NEGATIVE
BUN SERPL-MCNC: 10 MG/DL (ref 7–30)
CALCIUM SERPL-MCNC: 9.9 MG/DL (ref 8.5–10.1)
CHLORIDE BLD-SCNC: 107 MMOL/L (ref 94–109)
CO2 SERPL-SCNC: 22 MMOL/L (ref 20–32)
COLOR UR AUTO: ABNORMAL
CREAT SERPL-MCNC: 0.71 MG/DL (ref 0.52–1.25)
EOSINOPHIL # BLD AUTO: 0.1 10E3/UL (ref 0–0.7)
EOSINOPHIL NFR BLD AUTO: 1 %
ERYTHROCYTE [DISTWIDTH] IN BLOOD BY AUTOMATED COUNT: 12.3 % (ref 10–15)
GFR SERPL CREATININE-BSD FRML MDRD: >90 ML/MIN/1.73M2
GLUCOSE BLD-MCNC: 81 MG/DL (ref 70–99)
GLUCOSE UR STRIP-MCNC: NEGATIVE MG/DL
HCT VFR BLD AUTO: 37.2 % (ref 35–53)
HGB BLD-MCNC: 12.7 G/DL (ref 11.7–17.7)
HGB UR QL STRIP: NEGATIVE
IMM GRANULOCYTES # BLD: 0 10E3/UL
IMM GRANULOCYTES NFR BLD: 0 %
KETONES UR STRIP-MCNC: 100 MG/DL
LEUKOCYTE ESTERASE UR QL STRIP: NEGATIVE
LIPASE SERPL-CCNC: 148 U/L (ref 73–393)
LITHIUM SERPL-SCNC: 0.3 MMOL/L
LYMPHOCYTES # BLD AUTO: 2 10E3/UL (ref 0.8–5.3)
LYMPHOCYTES NFR BLD AUTO: 24 %
MCH RBC QN AUTO: 29.7 PG (ref 26.5–33)
MCHC RBC AUTO-ENTMCNC: 34.1 G/DL (ref 31.5–36.5)
MCV RBC AUTO: 87 FL (ref 78–100)
MONOCYTES # BLD AUTO: 0.5 10E3/UL (ref 0–1.3)
MONOCYTES NFR BLD AUTO: 6 %
MUCOUS THREADS #/AREA URNS LPF: PRESENT /LPF
NEUTROPHILS # BLD AUTO: 5.8 10E3/UL (ref 1.6–8.3)
NEUTROPHILS NFR BLD AUTO: 68 %
NITRATE UR QL: NEGATIVE
NRBC # BLD AUTO: 0 10E3/UL
NRBC BLD AUTO-RTO: 0 /100
PH UR STRIP: 6.5 [PH] (ref 5–7)
PLATELET # BLD AUTO: 428 10E3/UL (ref 150–450)
POTASSIUM BLD-SCNC: 4 MMOL/L (ref 3.4–5.3)
PROT SERPL-MCNC: 7.5 G/DL (ref 6.8–8.8)
RBC # BLD AUTO: 4.28 10E6/UL (ref 3.8–5.9)
RBC URINE: 1 /HPF
SODIUM SERPL-SCNC: 139 MMOL/L (ref 133–144)
SP GR UR STRIP: 1.01 (ref 1–1.03)
SQUAMOUS EPITHELIAL: 3 /HPF
UROBILINOGEN UR STRIP-MCNC: NORMAL MG/DL
WBC # BLD AUTO: 8.5 10E3/UL (ref 4–11)
WBC URINE: 1 /HPF

## 2022-04-13 PROCEDURE — 83690 ASSAY OF LIPASE: CPT | Performed by: FAMILY MEDICINE

## 2022-04-13 PROCEDURE — 99285 EMERGENCY DEPT VISIT HI MDM: CPT | Performed by: FAMILY MEDICINE

## 2022-04-13 PROCEDURE — 80053 COMPREHEN METABOLIC PANEL: CPT | Performed by: FAMILY MEDICINE

## 2022-04-13 PROCEDURE — 81001 URINALYSIS AUTO W/SCOPE: CPT | Performed by: FAMILY MEDICINE

## 2022-04-13 PROCEDURE — 80178 ASSAY OF LITHIUM: CPT | Performed by: FAMILY MEDICINE

## 2022-04-13 PROCEDURE — 258N000003 HC RX IP 258 OP 636: Performed by: FAMILY MEDICINE

## 2022-04-13 PROCEDURE — 250N000011 HC RX IP 250 OP 636: Performed by: FAMILY MEDICINE

## 2022-04-13 PROCEDURE — 250N000009 HC RX 250: Performed by: FAMILY MEDICINE

## 2022-04-13 PROCEDURE — 96361 HYDRATE IV INFUSION ADD-ON: CPT | Performed by: FAMILY MEDICINE

## 2022-04-13 PROCEDURE — 96374 THER/PROPH/DIAG INJ IV PUSH: CPT | Mod: 59 | Performed by: FAMILY MEDICINE

## 2022-04-13 PROCEDURE — 99285 EMERGENCY DEPT VISIT HI MDM: CPT | Mod: 25 | Performed by: FAMILY MEDICINE

## 2022-04-13 PROCEDURE — 85025 COMPLETE CBC W/AUTO DIFF WBC: CPT | Performed by: FAMILY MEDICINE

## 2022-04-13 PROCEDURE — 74177 CT ABD & PELVIS W/CONTRAST: CPT

## 2022-04-13 PROCEDURE — 36415 COLL VENOUS BLD VENIPUNCTURE: CPT | Performed by: FAMILY MEDICINE

## 2022-04-13 RX ORDER — HEPARIN SODIUM (PORCINE) LOCK FLUSH IV SOLN 100 UNIT/ML 100 UNIT/ML
5-10 SOLUTION INTRAVENOUS
Status: DISCONTINUED | OUTPATIENT
Start: 2022-04-13 | End: 2022-04-13 | Stop reason: HOSPADM

## 2022-04-13 RX ORDER — HEPARIN SODIUM,PORCINE 10 UNIT/ML
5-10 VIAL (ML) INTRAVENOUS EVERY 24 HOURS
Status: DISCONTINUED | OUTPATIENT
Start: 2022-04-13 | End: 2022-04-13 | Stop reason: HOSPADM

## 2022-04-13 RX ORDER — HEPARIN SODIUM,PORCINE 10 UNIT/ML
5-10 VIAL (ML) INTRAVENOUS
Status: DISCONTINUED | OUTPATIENT
Start: 2022-04-13 | End: 2022-04-13 | Stop reason: HOSPADM

## 2022-04-13 RX ORDER — SODIUM CHLORIDE 9 MG/ML
INJECTION, SOLUTION INTRAVENOUS CONTINUOUS
Status: DISCONTINUED | OUTPATIENT
Start: 2022-04-13 | End: 2022-04-13 | Stop reason: HOSPADM

## 2022-04-13 RX ORDER — HEPARIN SODIUM (PORCINE) LOCK FLUSH IV SOLN 100 UNIT/ML 100 UNIT/ML
100 SOLUTION INTRAVENOUS ONCE
Status: DISCONTINUED | OUTPATIENT
Start: 2022-04-13 | End: 2022-04-13

## 2022-04-13 RX ORDER — IOPAMIDOL 755 MG/ML
100 INJECTION, SOLUTION INTRAVASCULAR ONCE
Status: COMPLETED | OUTPATIENT
Start: 2022-04-13 | End: 2022-04-13

## 2022-04-13 RX ORDER — ONDANSETRON 2 MG/ML
4 INJECTION INTRAMUSCULAR; INTRAVENOUS ONCE
Status: COMPLETED | OUTPATIENT
Start: 2022-04-13 | End: 2022-04-13

## 2022-04-13 RX ORDER — ONDANSETRON 4 MG/1
4 TABLET, ORALLY DISINTEGRATING ORAL EVERY 8 HOURS PRN
Qty: 10 TABLET | Refills: 0 | Status: SHIPPED | OUTPATIENT
Start: 2022-04-13 | End: 2022-04-16

## 2022-04-13 RX ADMIN — SODIUM CHLORIDE 1000 ML: 9 INJECTION, SOLUTION INTRAVENOUS at 16:22

## 2022-04-13 RX ADMIN — SODIUM CHLORIDE 62 ML: 9 INJECTION, SOLUTION INTRAVENOUS at 19:26

## 2022-04-13 RX ADMIN — IOPAMIDOL 89 ML: 755 INJECTION, SOLUTION INTRAVENOUS at 19:25

## 2022-04-13 RX ADMIN — ONDANSETRON 4 MG: 2 INJECTION INTRAMUSCULAR; INTRAVENOUS at 16:21

## 2022-04-13 RX ADMIN — HEPARIN, PORCINE (PF) 10 UNIT/ML INTRAVENOUS SYRINGE 5 ML: at 20:02

## 2022-04-13 ASSESSMENT — ENCOUNTER SYMPTOMS
FATIGUE: 1
FEVER: 1
MUSCULOSKELETAL NEGATIVE: 1
VOMITING: 1
DIARRHEA: 0
ABDOMINAL DISTENTION: 0
CONSTIPATION: 0
RESPIRATORY NEGATIVE: 1
BLOOD IN STOOL: 0
ABDOMINAL PAIN: 1
NAUSEA: 1
CARDIOVASCULAR NEGATIVE: 1

## 2022-04-13 NOTE — DISCHARGE INSTRUCTIONS
Discharge to home with plans to follow-up with your outpatient provider later this week if continued discomfort otherwise treat nausea with Zofran start Prilosec to decrease acid production in the stomach and follow-up with primary MD next week regardless. Your urine does not show any sign of infection. Your CT shows constipation which may be responsible for your symptoms.  You need to take the miralax every single day (ideally twice daily for 1 week) to help with the constipation.  Drink plenty of fluids to keep hydrated and help with the constipation.

## 2022-04-13 NOTE — ED PROVIDER NOTES
ED Provider Note  [facility]      History     Chief Complaint   Patient presents with     Abdominal Pain     Episode gastric pain and nausea for the past week, no appetite, feels weak; history of POTS     HPI  Stephanie Villanueva is a 22 year old nonbinary adult who uses pronouns they them, and a extensive medical history including psychiatric comorbidities with BPD, anxiety, major depression with suicide attempts, PTSD, as well as a Russell, episodes of hypoglycemia and elevated TSH, among others who presents today with 1 week of acute onset abdominal pain with nausea and vomiting.    Patient underwent a bilateral mastectomy 2 weeks ago without complications.  Reports that incision has been healing well and pain is been under control.  About a week ago they started noticing an upper abdominal pain that was associated with fullness of the esophagus along with nausea and vomiting that has prevented them from being able to eat and drink consistently for the entire time.  Over the last few days they have started doing behind on their medications.  The only thing that the recall that makes it worse is eating and drinking.  The only thing that helps make it better is lying down.  If not trialed over-the-counter medicine occasions for this including antacids and PPIs.    Per notes, patient had some abdominal pain earlier in February this year, and was seen by an allergist.  Celiac's testing was negative.  Patient reports that they have some gluten sensitivity, but that this is different notably due to the nausea vomiting.    Patient is sexually active, but denies any possibility of contact with semen.    Past Medical History  Past Medical History:   Diagnosis Date     Anxiety october 2013     CONTUSION OF left iliac crest 9/3/2006     Major depression october 2013     Nocturnal enuresis 10/4/2007    Currently resolved.     Salter-Perdomo Type I fracture of distal fibula with routine healing 1/25/2013      Uncomplicated asthma     sports induced     History reviewed. No pertinent surgical history.  omeprazole (PRILOSEC) 20 MG DR capsule  ondansetron (ZOFRAN ODT) 4 MG ODT tab  amphetamine-dextroamphetamine (ADDERALL XR) 20 MG 24 hr capsule  amphetamine-dextroamphetamine (ADDERALL) 10 MG tablet  ANDRODERM 2 MG/24HR 24 hr patch  desvenlafaxine (PRISTIQ) 50 MG 24 hr tablet  diclofenac (VOLTAREN) 1 % topical gel  etodolac (LODINE) 500 MG tablet  gabapentin (NEURONTIN) 300 MG capsule  levonorgestrel (MIRENA) 20 MCG/24HR IUD  lithium ER (LITHOBID) 300 MG CR tablet  LORazepam (ATIVAN) 1 MG tablet  OLANZapine (ZYPREXA) 10 MG tablet      Allergies   Allergen Reactions     No Known Drug Allergies      Family History  Family History   Problem Relation Age of Onset     Substance Abuse Maternal Grandfather         alcoholism     Depression Maternal Aunt      Substance Abuse Maternal Aunt         alcoholism     Hypothyroidism Maternal Aunt      Anxiety Disorder Brother      Hypothyroidism Maternal Grandmother      Social History   Social History     Tobacco Use     Smoking status: Current Some Day Smoker     Packs/day: 0.25     Smokeless tobacco: Never Used   Substance Use Topics     Alcohol use: Not Currently     Comment: sometimes     Drug use: Not Currently     Types: Marijuana      Past medical history, past surgical history, medications, allergies, family history, and social history were reviewed with the patient. No additional pertinent items.       Review of Systems   Constitutional: Positive for fatigue and fever.   HENT: Negative.    Respiratory: Negative.    Cardiovascular: Negative.    Gastrointestinal: Positive for abdominal pain, nausea and vomiting. Negative for abdominal distention, blood in stool, constipation and diarrhea.   Genitourinary: Negative.    Musculoskeletal: Negative.      A complete review of systems was performed with pertinent positives and negatives noted in the HPI, and all other systems  negative.    Nursing note and vitals reviewed.  Physical Exam   BP: 112/76  Pulse: 93  Temp: 97.9  F (36.6  C)  Resp: 16  Weight: 81.6 kg (180 lb)  SpO2: 99 %  Physical Exam  Constitutional:       General: Stephanie Villanueva is not in acute distress.     Appearance: Stephanie Villanueva is normal weight. Stephanie Villanueva is not ill-appearing, toxic-appearing or diaphoretic.      Comments: Quiet and soft-spoken.  Slow speech.   HENT:      Head: Normocephalic.      Nose: No congestion or rhinorrhea.      Mouth/Throat:      Mouth: Mucous membranes are dry.   Eyes:      Conjunctiva/sclera: Conjunctivae normal.   Cardiovascular:      Rate and Rhythm: Normal rate.   Pulmonary:      Effort: Pulmonary effort is normal.      Breath sounds: Normal breath sounds.   Abdominal:      General: Abdomen is flat. Bowel sounds are normal. There is no distension.      Palpations: Abdomen is soft. There is no mass.      Tenderness: There is abdominal tenderness. There is no right CVA tenderness, left CVA tenderness, guarding or rebound.      Hernia: No hernia is present.   Musculoskeletal:         General: Normal range of motion.   Skin:     General: Skin is warm.   Neurological:      Mental Status: Stephanie Villanueva is alert.   Psychiatric:      Comments: Patient's speech and thought process appears to be slowed.  Content is slightly depressed and mood referring to all the bad things that happened them.       ED Course      Procedures    The medical record was reviewed and interpreted.  Current labs reviewed and interpreted.  Current images reviewed and interpreted: CT of the abdomen is pending at this time.              Results for orders placed or performed during the hospital encounter of 04/13/22   Comprehensive metabolic panel     Status: Normal   Result Value Ref Range    Sodium 139 133 - 144 mmol/L    Potassium 4.0 3.4 - 5.3 mmol/L    Chloride 107 94 - 109 mmol/L    Carbon Dioxide (CO2) 22 20 - 32 mmol/L    Anion Gap 10 3 - 14 mmol/L    Urea  Nitrogen 10 7 - 30 mg/dL    Creatinine 0.71 0.52 - 1.25 mg/dL    Calcium 9.9 8.5 - 10.1 mg/dL    Glucose 81 70 - 99 mg/dL    Alkaline Phosphatase 84 40 - 150 U/L    AST 17 0 - 45 U/L    ALT 25 0 - 70 U/L    Protein Total 7.5 6.8 - 8.8 g/dL    Albumin 3.8 3.4 - 5.0 g/dL    Bilirubin Total 0.6 0.2 - 1.3 mg/dL    GFR Estimate >90 >60 mL/min/1.73m2    Narrative    The sex of this patient cannot be reliably determined based on discrepancies in demographics (legal sex, sex assigned at birth, gender identity).  Both male and female reference ranges are provided where applicable.  Careful evaluation of the patient s results as compared to the gender specific reference intervals is required in this setting.    Lipase     Status: Normal   Result Value Ref Range    Lipase 148 73 - 393 U/L   CBC with platelets and differential     Status: None   Result Value Ref Range    WBC Count 8.5 4.0 - 11.0 10e3/uL    RBC Count 4.28 3.80 - 5.90 10e6/uL    Hemoglobin 12.7 11.7 - 17.7 g/dL    Hematocrit 37.2 35.0 - 53.0 %    MCV 87 78 - 100 fL    MCH 29.7 26.5 - 33.0 pg    MCHC 34.1 31.5 - 36.5 g/dL    RDW 12.3 10.0 - 15.0 %    Platelet Count 428 150 - 450 10e3/uL    % Neutrophils 68 %    % Lymphocytes 24 %    % Monocytes 6 %    % Eosinophils 1 %    % Basophils 1 %    % Immature Granulocytes 0 %    NRBCs per 100 WBC 0 <1 /100    Absolute Neutrophils 5.8 1.6 - 8.3 10e3/uL    Absolute Lymphocytes 2.0 0.8 - 5.3 10e3/uL    Absolute Monocytes 0.5 0.0 - 1.3 10e3/uL    Absolute Eosinophils 0.1 0.0 - 0.7 10e3/uL    Absolute Basophils 0.1 0.0 - 0.2 10e3/uL    Absolute Immature Granulocytes 0.0 <=0.4 10e3/uL    Absolute NRBCs 0.0 10e3/uL    Narrative    The sex of this patient cannot be reliably determined based on discrepancies in demographics (legal sex, sex assigned at birth, gender identity).  Both male and female reference ranges are provided where applicable.  Careful evaluation of the patient s results as compared to the gender specific  reference intervals is required in this setting.    CBC with platelets differential     Status: None    Narrative    The following orders were created for panel order CBC with platelets differential.  Procedure                               Abnormality         Status                     ---------                               -----------         ------                     CBC with platelets and d...[[id number]]                      Final result                 Please view results for these tests on the individual orders.     Medications   0.9% sodium chloride BOLUS (1,000 mLs Intravenous New Bag 4/13/22 1622)     Followed by   sodium chloride 0.9% infusion (has no administration in time range)   iopamidol (ISOVUE-370) solution 100 mL (has no administration in time range)   sodium chloride 0.9 % bag 100mL for CT scan flush use (has no administration in time range)   ondansetron (ZOFRAN) injection 4 mg (4 mg Intravenous Given 4/13/22 1621)        Assessments & Plan (with Medical Decision Making)   22-year-old nonbinary patient uses pronouns they them with complex mental health history and recent bilateral mastectomy here today with 1 week of abdominal pain, nausea, and vomiting.    # Abdominal Pain  # Subjective Fever, Nausea and Vomiting  # Immunocompromised on Steroids for POTS  # Recent Bilateral Mastectomy  Not entirely self-evident as to the cause of the symptoms.  Differential remains broad, especially in the setting of potential Erler Danlos syndrome and immunocompromise secondary to steroids.  At the top of the list is some form of GI tract irritation, possibly secondary to recent intubation, or viral infection, following distress of the recent surgery.  Reassured that the patient is hemodynamically stable and afebrile at this point, lowering concern for infection.  Recent surgical incision remains clean, dry, and without erythema or increased warmth, or signs of discoloration or tautness to suggest an  underlying hematoma that is spread or started in the abdomen.  Physical exam for appendicitis and gallbladder etiology was negative, but remains on the differential.  Although unlikely and inconsistent with exam, vascular etiologies including AAA should be ruled out with posterior earlier Dalo syndrome.  Also unlikely there is a urinary infection, but this can be ruled out with a UA.  -Abdominal CT to review vasculature and organs.  -CMP and lipase to assess hepatic, pancreatic, and gallbladder function.  -CBC to assess overall status and for anemia.  -Zofran for nausea.  -Fluids for rehydration.  -UA to assess for UTI    I have reviewed the nursing notes. I have reviewed the findings, diagnosis, plan and need for follow up with the patient.    New Prescriptions    OMEPRAZOLE (PRILOSEC) 20 MG DR CAPSULE    Take 1 capsule (20 mg) by mouth daily    ONDANSETRON (ZOFRAN ODT) 4 MG ODT TAB    Take 1 tablet (4 mg) by mouth every 8 hours as needed for nausea     Patient with abdominal pain potentially gastritis at this time CT is pending as is a lithium level patient was signed out to Dr. Rodriguez who will follow up on those results I discussed the potential for starting Prilosec and using Zofran patient understands the need for close follow-up.        Final diagnoses:   Abdominal pain, epigastric   Gastritis without bleeding, unspecified chronicity, unspecified gastritis type       --  Marcos Smith  [facility] EMERGENCY DEPARTMENT  4/13/2022     Marcos Smith MD  04/13/22 5041

## 2022-04-13 NOTE — PROGRESS NOTES
This is a recent snapshot of the patient's [facility] medical record.  For current drug dose and complete information and questions, call [phone] or In Basket pool, fv home infusion ([id number])  CSN Number:  [id number]

## 2022-04-14 ENCOUNTER — HOME INFUSION (PRE-WILLOW HOME INFUSION) (OUTPATIENT)
Dept: PHARMACY | Facility: CLINIC | Age: 22
End: 2022-04-14
Payer: COMMERCIAL

## 2022-04-14 NOTE — ED NOTES
CT contacted the unit stating we would need to reaccess the patient with a power port in order to perform the CT. We are currently attempting to reaccess. Dr. Smith is aware of the delay.  
Patient states they can not provide a urine sample at this time. I-stat cartridge issue prevented creatinine to be reported. Discussed with Dr. Smith who states we can await a serum creatinine from [facility].  
Patient was signed out to me at change of shift by Dr. Castle, please see his note for full details.  Briefly, at change of shift, we were pending lithium level, UA, and CT scan.  CT scan demonstrates evidence of constipation.  There is abundant stool throughout the colon but no inflammatory focus.  There is a small right ovarian cyst or dominant follicle.  No suspicious adnexal finding.  Do not suspect that the ovarian cyst is contributing to symptoms as the patient apparently has had exacerbation of symptoms with eating and drinking, which is not consistent with a gynecologic finding.  More suspicious for constipation or reflux/heartburn.  The plan per Dr. Castle was to start PPIs and Zofran as needed.  He has printed out prescriptions for them.  I will advise the patient to use MiraLAX.  Patient reports they were using MiraLAX until a few days ago and then stopped, I will advised that they restart.  They can also start using something like senna S instead.    Lithium level is 0.3 which is actually subtherapeutic.  Clearly not contributing to symptoms.  I have advised the patient to take her medications regularly in order to prevent worsening of mental health due to subtherapeutic lithium level.    UA shows no e/o UTI, has some ketones.  Patient has received IV fluids.    Will discharge at this time with instructions to follow-up with primary clinic.  Prescriptions for Zofran and Prilosec will be given.  Patient already has MiraLAX at home and we did advise patient to take this regularly.     Suzan Rodriguez MD  04/13/22 2039    
Report given to RN's Lauren.  
11-Aug-2021 07:27

## 2022-04-15 ENCOUNTER — HOME INFUSION (PRE-WILLOW HOME INFUSION) (OUTPATIENT)
Dept: PHARMACY | Facility: CLINIC | Age: 22
End: 2022-04-15
Payer: COMMERCIAL

## 2022-04-18 ENCOUNTER — HOME INFUSION (PRE-WILLOW HOME INFUSION) (OUTPATIENT)
Dept: PHARMACY | Facility: CLINIC | Age: 22
End: 2022-04-18
Payer: COMMERCIAL

## 2022-04-19 NOTE — PROGRESS NOTES
This is a recent snapshot of the patient's [facility] medical record.  For current drug dose and complete information and questions, call [phone] or In Basket pool, fv home infusion ([id number])  CSN Number:  [id number]

## 2022-04-20 NOTE — PROGRESS NOTES
This is a recent snapshot of the patient's [facility] medical record.  For current drug dose and complete information and questions, call [phone] or In Basket pool, fv home infusion ([id number])  CSN Number:  [id number]

## 2022-04-26 ENCOUNTER — HOME INFUSION (PRE-WILLOW HOME INFUSION) (OUTPATIENT)
Dept: PHARMACY | Facility: CLINIC | Age: 22
End: 2022-04-26

## 2022-05-03 ENCOUNTER — HOME INFUSION (PRE-WILLOW HOME INFUSION) (OUTPATIENT)
Dept: PHARMACY | Facility: CLINIC | Age: 22
End: 2022-05-03
Payer: COMMERCIAL

## 2022-05-04 NOTE — PROGRESS NOTES
This is a recent snapshot of the patient's [facility] medical record.  For current drug dose and complete information and questions, call [phone] or In Basket pool, fv home infusion ([id number])  CSN Number:  [id number]

## 2022-05-20 NOTE — PROGRESS NOTES
This is a recent snapshot of the patient's [facility] medical record.  For current drug dose and complete information and questions, call [phone] or In Basket pool, fv home infusion ([id number])  CSN Number:  [id number]

## 2022-05-20 NOTE — PROGRESS NOTES
This is a recent snapshot of the patient's [facility] medical record.  For current drug dose and complete information and questions, call [phone] or In Basket pool, fv home infusion ([id number])  CSN Number:  [id number]

## 2022-05-24 ENCOUNTER — HOME INFUSION (PRE-WILLOW HOME INFUSION) (OUTPATIENT)
Dept: PHARMACY | Facility: CLINIC | Age: 22
End: 2022-05-24
Payer: COMMERCIAL

## 2022-05-25 NOTE — PROGRESS NOTES
This is a recent snapshot of the patient's [facility] medical record.  For current drug dose and complete information and questions, call [phone] or In Basket pool, fv home infusion ([id number])  CSN Number:  [id number]

## 2022-06-01 ENCOUNTER — HOME INFUSION (PRE-WILLOW HOME INFUSION) (OUTPATIENT)
Dept: PHARMACY | Facility: CLINIC | Age: 22
End: 2022-06-01
Payer: COMMERCIAL

## 2022-06-03 ENCOUNTER — HOME INFUSION (PRE-WILLOW HOME INFUSION) (OUTPATIENT)
Dept: PHARMACY | Facility: CLINIC | Age: 22
End: 2022-06-03
Payer: COMMERCIAL

## 2022-06-06 NOTE — PROGRESS NOTES
This is a recent snapshot of the patient's [facility] medical record.  For current drug dose and complete information and questions, call [phone] or In Basket pool, fv home infusion ([id number])  CSN Number:  [id number]

## 2022-06-07 NOTE — PROGRESS NOTES
This is a recent snapshot of the patient's [facility] medical record.  For current drug dose and complete information and questions, call [phone] or In [facility] pool, fv home infusion ([id number])  CSN Number:  [id number]

## 2022-06-09 ENCOUNTER — HOME INFUSION (PRE-WILLOW HOME INFUSION) (OUTPATIENT)
Dept: PHARMACY | Facility: CLINIC | Age: 22
End: 2022-06-09
Payer: COMMERCIAL

## 2022-06-13 ENCOUNTER — HOME INFUSION (PRE-WILLOW HOME INFUSION) (OUTPATIENT)
Dept: PHARMACY | Facility: CLINIC | Age: 22
End: 2022-06-13
Payer: COMMERCIAL

## 2022-06-14 NOTE — PROGRESS NOTES
This is a recent snapshot of the patient's [facility] medical record.  For current drug dose and complete information and questions, call [phone] or In [facility] pool, fv home infusion ([id number])  CSN Number:  [id number]

## 2022-06-15 ENCOUNTER — HOME INFUSION (PRE-WILLOW HOME INFUSION) (OUTPATIENT)
Dept: PHARMACY | Facility: CLINIC | Age: 22
End: 2022-06-15

## 2022-06-16 NOTE — PROGRESS NOTES
This is a recent snapshot of the patient's [facility] medical record.  For current drug dose and complete information and questions, call [phone] or In Basket pool, fv home infusion ([id number])  CSN Number:  [id number]

## 2022-06-21 ENCOUNTER — HOME INFUSION (PRE-WILLOW HOME INFUSION) (OUTPATIENT)
Dept: PHARMACY | Facility: CLINIC | Age: 22
End: 2022-06-21

## 2022-06-23 ENCOUNTER — HOME INFUSION (PRE-WILLOW HOME INFUSION) (OUTPATIENT)
Dept: PHARMACY | Facility: CLINIC | Age: 22
End: 2022-06-23

## 2022-06-24 NOTE — TELEPHONE ENCOUNTER
Called pt, no answer; left message informing pt I was calling to remind pt of his in office EAWV on 6/27/22 and to return my call if he had any questions; left my name and number    S: Ashley, calling from  clinic, NP. #[phone]. Caller gives collateral information on pt that will present to [facility] ED for MH assessment.     B: 21 YO female seen by tele-health today; mom called clinic yesterday concerned abougt paranoia and disorganized behavior for 2 days. Pt was refusing to speak with clinician yesterday but was able to get pt to speak with her today. Pt doesn't believe her mother is her real mother, feels a frog is speaking to her, states frog comes / goes but has been present since her childhood. Pt is seeing people / shadows, pt reports she uses THC but very little.   Mom confirmed that pt's friend recently disclosed to mother pt has been using THC heavily for the last month / daily. Pt has been doing unsafe things like walking service dog barefoot outside, taking down all the mirrors in house, not eating, not sleeping much. Responding to internal stimuli, laughing inappropriately, looking off into space. Pt was prescribed a low dose of Zyprexa, started yesterday 2.5 MG because pt reported increased fatigue with higher dose. Pt slept last night, was agreeable to interview today which she wasn't agreeable to yesterday. Long hx of childhood SI, hx of ECT, hx with Dr. El. Hx of multiple IP MH admissions. No hx of commitments. No known hx of aggression or assault. Chronic medical: POTS, non epileptic seizures, chronic fatigue, mild asthma. Please note: pt has complex care plan hx.    A: Mom will attempt to transport pt to the ED. If pt not agreeable, EMS will be called.    R: Placed in ED expected tab; please evaluate needs when pt arrives and call above provider if additional information needed.

## 2022-06-27 ENCOUNTER — HOME INFUSION (PRE-WILLOW HOME INFUSION) (OUTPATIENT)
Dept: PHARMACY | Facility: CLINIC | Age: 22
End: 2022-06-27

## 2022-06-27 NOTE — PROGRESS NOTES
This is a recent snapshot of the patient's [facility] medical record.  For current drug dose and complete information and questions, call [phone] or In Basket pool, fv home infusion ([id number])  CSN Number:  [id number]

## 2022-06-30 NOTE — PROGRESS NOTES
This is a recent snapshot of the patient's [facility] medical record.  For current drug dose and complete information and questions, call [phone] or In Basket pool, fv home infusion ([id number])  CSN Number:  [id number]

## 2022-07-07 ENCOUNTER — HOME INFUSION (PRE-WILLOW HOME INFUSION) (OUTPATIENT)
Dept: PHARMACY | Facility: CLINIC | Age: 22
End: 2022-07-07

## 2022-07-11 ENCOUNTER — HOME INFUSION (PRE-WILLOW HOME INFUSION) (OUTPATIENT)
Dept: PHARMACY | Facility: CLINIC | Age: 22
End: 2022-07-11

## 2022-07-11 NOTE — PROGRESS NOTES
This is a recent snapshot of the patient's [facility] medical record.  For current drug dose and complete information and questions, call [phone] or In Basket pool, fv home infusion ([id number])  CSN Number:  [id number]

## 2022-07-21 ENCOUNTER — HOME INFUSION (PRE-WILLOW HOME INFUSION) (OUTPATIENT)
Dept: PHARMACY | Facility: CLINIC | Age: 22
End: 2022-07-21

## 2022-07-28 NOTE — PROGRESS NOTES
This is a recent snapshot of the patient's [facility] medical record.  For current drug dose and complete information and questions, call [phone] or In Basket pool, fv home infusion ([id number])  CSN Number:  [id number]

## 2022-07-29 ENCOUNTER — HOME INFUSION (PRE-WILLOW HOME INFUSION) (OUTPATIENT)
Dept: PHARMACY | Facility: CLINIC | Age: 22
End: 2022-07-29

## 2022-08-01 NOTE — PROGRESS NOTES
This is a recent snapshot of the patient's [facility] medical record.  For current drug dose and complete information and questions, call [phone] or In Basket pool, fv home infusion ([id number])  CSN Number:  [id number]

## 2022-08-03 ENCOUNTER — HOME INFUSION (PRE-WILLOW HOME INFUSION) (OUTPATIENT)
Dept: PHARMACY | Facility: CLINIC | Age: 22
End: 2022-08-03

## 2022-08-04 NOTE — PROGRESS NOTES
This is a recent snapshot of the patient's [facility] medical record.  For current drug dose and complete information and questions, call [phone] or In Basket pool, fv home infusion ([id number])  CSN Number:  [id number]

## 2022-08-10 ENCOUNTER — HOME INFUSION (PRE-WILLOW HOME INFUSION) (OUTPATIENT)
Dept: PHARMACY | Facility: CLINIC | Age: 22
End: 2022-08-10

## 2022-08-11 ENCOUNTER — HOME INFUSION (PRE-WILLOW HOME INFUSION) (OUTPATIENT)
Dept: PHARMACY | Facility: CLINIC | Age: 22
End: 2022-08-11

## 2022-08-17 ENCOUNTER — HOME INFUSION (PRE-WILLOW HOME INFUSION) (OUTPATIENT)
Dept: PHARMACY | Facility: CLINIC | Age: 22
End: 2022-08-17

## 2022-08-18 ENCOUNTER — HOME INFUSION (PRE-WILLOW HOME INFUSION) (OUTPATIENT)
Dept: PHARMACY | Facility: CLINIC | Age: 22
End: 2022-08-18

## 2022-08-18 NOTE — PROGRESS NOTES
This is a recent snapshot of the patient's [facility] medical record.  For current drug dose and complete information and questions, call [phone] or In [facility] pool, fv home infusion ([id number])  CSN Number:  [id number]

## 2022-08-26 ENCOUNTER — HOME INFUSION (PRE-WILLOW HOME INFUSION) (OUTPATIENT)
Dept: PHARMACY | Facility: CLINIC | Age: 22
End: 2022-08-26

## 2022-08-26 NOTE — PROGRESS NOTES
This is a recent snapshot of the patient's [facility] medical record.  For current drug dose and complete information and questions, call [phone] or In Basket pool, fv home infusion ([id number])  CSN Number:  [id number]

## 2022-08-29 NOTE — PROGRESS NOTES
This is a recent snapshot of the patient's [facility] medical record.  For current drug dose and complete information and questions, call [phone] or In Basket pool, fv home infusion ([id number])  CSN Number:  [id number]

## 2022-08-30 ENCOUNTER — HOME INFUSION (PRE-WILLOW HOME INFUSION) (OUTPATIENT)
Dept: PHARMACY | Facility: CLINIC | Age: 22
End: 2022-08-30

## 2022-08-31 ENCOUNTER — HOME INFUSION (PRE-WILLOW HOME INFUSION) (OUTPATIENT)
Dept: PHARMACY | Facility: CLINIC | Age: 22
End: 2022-08-31

## 2022-08-31 NOTE — PROGRESS NOTES
This is a recent snapshot of the patient's [facility] medical record.  For current drug dose and complete information and questions, call [phone] or In Basket pool, fv home infusion ([id number])  CSN Number:  [id number]

## 2022-09-06 NOTE — PROGRESS NOTES
This is a recent snapshot of the patient's [facility] medical record.  For current drug dose and complete information and questions, call [phone] or In Basket pool, fv home infusion ([id number])  CSN Number:  [id number]

## 2022-09-09 ENCOUNTER — HOME INFUSION (PRE-WILLOW HOME INFUSION) (OUTPATIENT)
Dept: PHARMACY | Facility: CLINIC | Age: 22
End: 2022-09-09

## 2022-09-12 NOTE — PROGRESS NOTES
This is a recent snapshot of the patient's [facility] medical record.  For current drug dose and complete information and questions, call [phone] or In Basket pool, fv home infusion ([id number])  CSN Number:  [id number]

## 2022-09-15 ENCOUNTER — OFFICE VISIT (OUTPATIENT)
Dept: ORTHOPEDICS | Facility: CLINIC | Age: 22
End: 2022-09-15
Payer: COMMERCIAL

## 2022-09-15 ENCOUNTER — ANCILLARY PROCEDURE (OUTPATIENT)
Dept: GENERAL RADIOLOGY | Facility: CLINIC | Age: 22
End: 2022-09-15
Attending: FAMILY MEDICINE
Payer: COMMERCIAL

## 2022-09-15 ENCOUNTER — HOME INFUSION (PRE-WILLOW HOME INFUSION) (OUTPATIENT)
Dept: PHARMACY | Facility: CLINIC | Age: 22
End: 2022-09-15

## 2022-09-15 VITALS — WEIGHT: 180 LBS | BODY MASS INDEX: 25.1 KG/M2

## 2022-09-15 DIAGNOSIS — M25.579 PAIN IN JOINT, ANKLE AND FOOT: ICD-10-CM

## 2022-09-15 DIAGNOSIS — M79.671 RIGHT FOOT PAIN: Primary | ICD-10-CM

## 2022-09-15 DIAGNOSIS — M25.579 PAIN IN JOINT, ANKLE AND FOOT: Primary | ICD-10-CM

## 2022-09-15 PROCEDURE — 99213 OFFICE O/P EST LOW 20 MIN: CPT | Performed by: FAMILY MEDICINE

## 2022-09-15 PROCEDURE — 73630 X-RAY EXAM OF FOOT: CPT | Mod: RT | Performed by: RADIOLOGY

## 2022-09-15 NOTE — PROGRESS NOTES
[facility]  SPORTS & ORTHOPEDIC CLINIC VISIT     Sep 15, 2022        ASSESSMENT & PLAN    Right foot pain acutely.  No bony injury seen on radiographs.  Suspect soft tissue.    Reviewed imaging and assessment with patient in detail  Rest ice   dicsused boot, crutches, walker  Follow up in two weeks if still painful WB    Len Watkins MD  [facility] SPORTS MEDICINE CLINIC [place]    -----  Chief Complaint   Patient presents with     Right Foot - Pain       SUBJECTIVE  Stephanie Villanueva is a/an 22 year old adult who is seen as a self referral for evaluation of right foot pain. Today, the patient reports that on 9/14/22 she got mad and kicked a metal wired fence. They hit the top of their foot. They were able to WB about 15 minutes after. They report that the pain is now unbearable and aida're unable to bear weight on the right foot. Reports swelling. Pain is located diffusely through the metatarsals. Denies any paresthesias.    The patient is seen by themselves.  The patient is Right handed    Onset: 1 day(s) ago. Patient describes injury as kicking a metal fence  Location of Pain: right foot  Worsened by: weightbearing activities  Better with: Rest  Treatments tried: no treatment tried to date  Associated symptoms: no distal numbness or tingling; denies swelling or warmth    Orthopedic/Surgical history: NO  Social History/Occupation:       REVIEW OF SYSTEMS:    Do you have fever, chills, weight loss? No    Do you have any vision problems? No    Do you have any chest pain or edema? No    Do you have any shortness of breath or wheezing?  No    Do you have stomach problems? No    Do you have any numbness or focal weakness? No    Do you have diabetes? No    Do you have problems with bleeding or clotting? No    Do you have an rashes or other skin lesions? No    OBJECTIVE:  Wt 81.6 kg (180 lb)   BMI 25.10 kg/m       General: Alert, pleasant, no distress  Right foot:  warm, well perfused, SILT throughout     Inspection: Mild swelling over the dorsal foot.  No ecchymosis or deformity     ROM: Full passive range of motion     Strength: Intact though has pain with dorsiflexion and eversion against resistance     Palpation: Significant tenderness over the dorsal metatarsal area and midfoot.  Also having pain over the medial plantar arch.  No tenderness over the heel.     Special Tests: None    RADIOLOGY:    3 view xrays of right foot performed and reviewed independently demonstrating no acute fracture or dislocation.  No significant DJD. See EMR for formal radiology report.

## 2022-09-15 NOTE — LETTER
9/15/2022      RE: Stephanie Villanueva  [address]  [place] [address]     Dear Colleague,    Thank you for referring your patient, Stephanie Villanueva, to the [facility] SPORTS MEDICINE [facility]. Please see a copy of my visit note below.      [facility] SUR[facility]ERY [facility]  SPORTS & ORTHOPEDIC CLINIC VISIT     Sep 15, 2022        ASSESSMENT & PLAN    Right foot pain acutely.  No bony injury seen on radiographs.  Suspect soft tissue.    Reviewed imaging and assessment with patient in detail  Rest ice   dicsused boot, crutches, walker  Follow up in two weeks if still painful WB    Len Watkins MD  [facility] SPORTS MEDICINE [facility]    -----  Chief Complaint   Patient presents with     Right Foot - Pain       SUBJECTIVE  Stephanie Villanueva is a/an 22 year old adult who is seen as a self referral for evaluation of right foot pain. Today, the patient reports that on 9/14/22 she got mad and kicked a metal wired fence. They hit the top of their foot. They were able to WB about 15 minutes after. They report that the pain is now unbearable and aida're unable to bear weight on the right foot. Reports swelling. Pain is located diffusely through the metatarsals. Denies any paresthesias.    The patient is seen by themselves.  The patient is Right handed    Onset: 1 day(s) ago. Patient describes injury as kicking a metal fence  Location of Pain: right foot  Worsened by: weightbearing activities  Better with: Rest  Treatments tried: no treatment tried to date  Associated symptoms: no distal numbness or tingling; denies swelling or warmth    Orthopedic/Surgical history: NO  Social History/Occupation:       REVIEW OF SYSTEMS:    Do you have fever, chills, weight loss? No    Do you have any vision problems? No    Do you have any chest pain or edema? No    Do you have any shortness of breath or wheezing?  No    Do you have stomach problems? No    Do you have any numbness or focal  weakness? No    Do you have diabetes? No    Do you have problems with bleeding or clotting? No    Do you have an rashes or other skin lesions? No    OBJECTIVE:  Wt 81.6 kg (180 lb)   BMI 25.10 kg/m       General: Alert, pleasant, no distress  Right foot: warm, well perfused, SILT throughout     Inspection: Mild swelling over the dorsal foot.  No ecchymosis or deformity     ROM: Full passive range of motion     Strength: Intact though has pain with dorsiflexion and eversion against resistance     Palpation: Significant tenderness over the dorsal metatarsal area and midfoot.  Also having pain over the medial plantar arch.  No tenderness over the heel.     Special Tests: None    RADIOLOGY:    3 view xrays of right foot performed and reviewed independently demonstrating no acute fracture or dislocation.  No significant DJD. See EMR for formal radiology report.       Again, thank you for allowing me to participate in the care of your patient.      Sincerely,    Len Watkins MD

## 2022-09-21 NOTE — PROGRESS NOTES
This is a recent snapshot of the patient's [facility] medical record.  For current drug dose and complete information and questions, call [phone] or In Basket pool, fv home infusion ([id number])  CSN Number:  [id number]

## 2022-09-22 ENCOUNTER — HOME INFUSION (PRE-WILLOW HOME INFUSION) (OUTPATIENT)
Dept: PHARMACY | Facility: CLINIC | Age: 22
End: 2022-09-22

## 2022-09-27 NOTE — PROGRESS NOTES
This is a recent snapshot of the patient's [facility] medical record.  For current drug dose and complete information and questions, call [phone] or In Basket pool, fv home infusion ([id number])  CSN Number:  [id number]

## 2022-10-23 ENCOUNTER — HEALTH MAINTENANCE LETTER (OUTPATIENT)
Age: 22
End: 2022-10-23

## 2022-10-28 NOTE — PROGRESS NOTES
This is a recent snapshot of the patient's [facility] medical record.  For current drug dose and complete information and questions, call [phone] or In Basket pool, fv home infusion ([id number])  CSN Number:  [id number]

## 2022-11-01 NOTE — PROGRESS NOTES
This is a recent snapshot of the patient's [facility] medical record.  For current drug dose and complete information and questions, call [phone] or In Basket pool, fv home infusion ([id number])  CSN Number:  [id number]

## 2022-11-03 NOTE — PROGRESS NOTES
This is a recent snapshot of the patient's [facility] medical record.  For current drug dose and complete information and questions, call [phone] or In Basket pool, fv home infusion ([id number])  CSN Number:  [id number]

## 2022-11-03 NOTE — PROGRESS NOTES
This is a recent snapshot of the patient's [facility] medical record.  For current drug dose and complete information and questions, call [phone] or In Basket pool, fv home infusion ([id number])  CSN Number:  [id number]

## 2022-11-10 NOTE — PROGRESS NOTES
This is a recent snapshot of the patient's [facility] medical record.  For current drug dose and complete information and questions, call [phone] or In Basket pool, fv home infusion ([id number])  CSN Number:  [id number]

## 2022-11-15 NOTE — PROGRESS NOTES
This is a recent snapshot of the patient's [facility] medical record.  For current drug dose and complete information and questions, call [phone] or In Basket pool, fv home infusion ([id number])  CSN Number:  [id number]

## 2022-11-17 NOTE — PROGRESS NOTES
This is a recent snapshot of the patient's [facility] medical record.  For current drug dose and complete information and questions, call [phone] or In Basket pool, fv home infusion ([id number])  CSN Number:  [id number]

## 2022-11-22 NOTE — PROGRESS NOTES
This is a recent snapshot of the patient's [facility] medical record.  For current drug dose and complete information and questions, call [phone] or In Basket pool, fv home infusion ([id number])  CSN Number:  [id number]

## 2022-11-23 NOTE — PROGRESS NOTES
This is a recent snapshot of the patient's [facility] medical record.  For current drug dose and complete information and questions, call [phone] or In Basket pool, fv home infusion ([id number])  CSN Number:  [id number]

## 2022-11-30 NOTE — PROGRESS NOTES
This is a recent snapshot of the patient's [facility] medical record.  For current drug dose and complete information and questions, call [phone] or In Basket pool, fv home infusion ([id number])  CSN Number:  [id number]

## 2022-12-02 NOTE — PROGRESS NOTES
This is a recent snapshot of the patient's [facility] medical record.  For current drug dose and complete information and questions, call [phone] or In Basket pool, fv home infusion ([id number])  CSN Number:  [id number]

## 2023-01-05 NOTE — PROGRESS NOTES
This is a recent snapshot of the patient's [facility] medical record.  For current drug dose and complete information and questions, call [phone] or In Basket pool, fv home infusion ([id number])  CSN Number:  [id number]

## 2023-04-02 ENCOUNTER — HEALTH MAINTENANCE LETTER (OUTPATIENT)
Age: 23
End: 2023-04-02

## 2023-04-17 NOTE — PROGRESS NOTES
This is a recent snapshot of the patient's [facility] medical record.  For current drug dose and complete information and questions, call [phone] or In Basket pool, fv home infusion ([id number])  CSN Number:  [id number]

## 2023-10-27 NOTE — PROGRESS NOTES
"  Chief complaint: Dizziness and syncope    HPI: Ms. Stephanie Villanueva is a 19 year old female with PMH significant for POTS syndrome, complex psychiatric issues (borderline personality disorder, suicidal ideation, anorexia, major depression with psychosis, social anxiety disorder, autism spectrum disorder, panic disorder, dissociative disorder, PTSD). She presents to our clinic to establish case for her diagnosis of POTS (hypovolemic subtype) and to help reduce the frequency of \"syncope.\"    The patient presented to [facility] fibromyalgia and fatigue clinic for evaluation of widespread musculoskeletal pain, profound fatigue and sensory sensitivities over the last 9 months. She underwent extensive laboratory tests including autoimmune antibodies, EEG and tilt testing which was suggestive of POTS syndrome (hypovolemic subtype). Patient was recommended an exercise program. She has seen psychiatry, rheumatology (benign hypermobility syndrome), physical medicine/rehabilitation, physical therapy, and pulmonology (enuresis) for respective issues    On history taking today, she is accompanied by her mother who does the majority of the talking. Stephanie reports a history of depression and fatigue since she was 11. She reports doing relatively well until last year when she ran a half-marathon. She and her mother report after the half marathon she developed bilateral hip issues, along with widespread joint problems and pain, making it difficult to run. Stephanie returned to college for Fall semester, but returned home due to progressive and debilitating depression, fatigue, brain fog, dizziness, and recurrent \"syncope.\" She has chronic dizziness with standing. Her mother states the patient passes out every day, and it has been going on for almost a year. Her mother just wants Stephanie to be \"functional\" again, and wants her to get back to school. Stephanie denies breaking bones or significantly injuring herself during any \"syncopal\" event, but " "is adamant that she passed out and hit her head on the sidewalk just a few days ago causing a \"mild concussion.\"    I asked Stephanie to describe an episode of \"syncope,\" to gain a better understanding of what she means and feels. She describes feeling dizzy, and \"presyncopal\" and eventually her vision turns blurry. She subsequently looses the ability to control her body, but she is still able to hear what's going on around her (most of the time). She describes sometimes she cannot hear anything. In clinic today, we measured orthostatics which were negative. However, during the test, Stephanie had one of her episodes. She described not being able to control her body, but was able to hear what was going on in the room. She reported constant dizziness during the test. She has no particular relieving factors, although she did state during ECT treatments she would feel much higher energy levels after receiving 500cc-1000cc of normal saline. Otherwise, no relief nor improvement with any particular medication that she is on or tried (Fluorinef, desmopressin). She denies exertional chest pains. She has chronic dyspnea with any activity, and chronic multi-site pain. No PND nor orthopnea. Occasional palpitations.  Patient is currently on desvenlafaxine, fludrocortisone 0.1 mg, gabapentin 900 mg TID, and lorazepam 0.5 mg as needed. Denies new medications except florinef.  The patient's risk factor profile is: (-) HTN, (-) diabetes, (-) hyperlipidemia, (-) tobacco use, (-) family Hx CAD.     I have reviewed her EKG in clinic today which shows sinus rhythm with incomplete right bundle branch block, normal NM and QTc intervals.  No preexcitation.  No ST-T wave changes.  Normal axis.    Medications, personal, family, and social history reviewed with patient and revised.    PAST MEDICAL HISTORY:  Past Medical History:   Diagnosis Date     Anxiety october 2013     CONTUSION OF left iliac crest 9/3/2006     Major depression october 2013     " Nocturnal enuresis 10/4/2007    Currently resolved.     Salter-Perdomo Type I fracture of distal fibula with routine healing 1/25/2013     Uncomplicated asthma     sports induced       CURRENT MEDICATIONS:  Current Outpatient Medications   Medication Sig Dispense Refill     cyanocobalamin (VITAMIN B-12) 1000 MCG tablet Take 1,000 mcg by mouth daily       desvenlafaxine (PRISTIQ) 50 MG 24 hr tablet Take 1 tablet (50 mg) by mouth daily 30 tablet 0     fludrocortisone (FLORINEF) 0.1 MG tablet Take 1 tablet (0.1 mg) by mouth daily for 14 days 14 tablet 0     gabapentin (NEURONTIN) 300 MG capsule Take 3 capsules (900 mg) by mouth 3 times daily 270 capsule 0     hydrOXYzine (ATARAX) 25 MG tablet Take 1 tablet (25 mg) by mouth every 4 hours as needed for anxiety 120 tablet 1     ibuprofen (ADVIL/MOTRIN) 200 MG tablet Take 600 mg by mouth every 4 hours as needed for mild pain (for prevention of ECT headache and joint pain)       LORazepam (ATIVAN) 0.5 MG tablet Take 0.5 mg by mouth every 6 hours as needed        order for DME Equipment being ordered: walker 1 Device 0     multivitamin, therapeutic (THERA-VIT) TABS tablet Take 1 tablet by mouth daily         PAST SURGICAL HISTORY:  No past surgical history on file.    ALLERGIES:     Allergies   Allergen Reactions     No Known Drug Allergies        FAMILY HISTORY:  Family History   Problem Relation Age of Onset     Substance Abuse Maternal Grandfather         alcoholism     Depression Maternal Aunt      Substance Abuse Maternal Aunt         alcoholism     Hypothyroidism Maternal Aunt      Anxiety Disorder Brother      Hypothyroidism Maternal Grandmother          SOCIAL HISTORY:  Social History     Tobacco Use     Smoking status: Never Smoker     Smokeless tobacco: Never Used   Substance Use Topics     Alcohol use: Yes     Comment: sometimes     Drug use: Yes     Types: Marijuana       ROS:   Constitutional: No fever, chills, or sweats. Weight stable.   ENT: No visual  "disturbance, ear ache, epistaxis, sore throat.   Cardiovascular: As per HPI.   Respiratory: No cough, hemoptysis.    GI: No nausea, vomiting, hematemesis, melena, or hematochezia.   : No hematuria.   Integument: Negative.   Psychiatric: Negative.   Hematologic:  No easy bruising, no easy bleeding.  Neuro: Negative.   Endocrinology: No significant heat or cold intolerance   Musculoskeletal: No myalgia.    Exam:  Ht 1.778 m (5' 10\")   Wt 68 kg (150 lb)   SpO2 98%   BMI 21.52 kg/m    GENERAL APPEARANCE: alert and no distress  HEENT: no icterus, no central cyanosis  LYMPH/NECK: no adenopathy, no asymmetry, JVP not elevated, no carotid bruits.  RESPIRATORY: lungs clear to auscultation - no rales, rhonchi or wheezes, no use of accessory muscles, no retractions, respirations are unlabored, normal respiratory rate  CARDIOVASCULAR: regular rhythm, normal S1, S2, no S3 or S4 and no murmur, click or rub, precordium quiet with normal PMI.  GI: soft, non tender  EXTREMITIES: peripheral pulses normal, no edema  NEURO: alert, normal speech,and affect  VASC: Radial, dorsalis pedis and posterior tibialis pulses are normal in volumes and symmetric bilaterally.   SKIN: no ecchymoses, no rashes     I have reviewed the labs. [facility] medical records and personally reviewed the imaging below and made my comment in the assessment and plan.    Labs:  CBC RESULTS:   Lab Results   Component Value Date    WBC 6.0 09/09/2019    RBC 4.24 09/09/2019    HGB 12.8 09/09/2019    HCT 37.5 09/09/2019    MCV 88 09/09/2019    MCH 30.2 09/09/2019    MCHC 34.1 09/09/2019    RDW 12.4 09/09/2019     09/09/2019       BMP RESULTS:  Lab Results   Component Value Date     09/09/2019    POTASSIUM 3.9 09/09/2019    CHLORIDE 108 09/09/2019    CO2 29 09/09/2019    ANIONGAP 5 09/09/2019    GLC 85 09/09/2019    BUN 18 09/09/2019    CR 0.69 09/09/2019    GFRESTIMATED >90 09/09/2019    GFRESTBLACK >90 09/09/2019    BRITTANIE 9.2 09/09/2019     Orthostatic Vitals " " 9/16/2019  Date and Time Orthostatic BP Orthostatic Pulse Patient Position BP   Location Cuff Size   09/16/19 1657 118/78 94 Standing Standing for 1 minute Right arm Adult   Regular   09/16/19 1551 115/78 76 Standing Standing immediate Right arm Adult   Regular   09/16/19 1539 112/71 Per pt feeling dizzy 81 Supine Supine for 3 mins   Right arm Adult Regular      Autonomic reflex screen 7/31/2019 [facility]  Conclusion  There is no evidence of autonomic failure. There is evidence of symptomatic orthostatic tachycardia, which can be seen in deconditioning, dehydration, as a constitutional trait, in hyper-adrenergic states (including anxiety), and primary disorders of orthostatic tolerance.    Comments on pseudomotor test: QSART responses were normal at all sites  Comments on Valsalva maneuver: Heart rate response to Valsalva maneuver was normal.  Blood pressure and heart rate response to tilt: Patient was tilted for 10 minutes.  Orthostatic hypotension was not detected.  Heart rate response was excessive.  The patient reported dizziness and other symptoms.    EKG  - sinus arrhythmia, incomplete side right bundle branch block otherwise normal    Assessment and Plan:   Ms. Stephanie Villanueva is a 19 year old female with PMH significant for POTS syndrome, complex psychiatric issues (suicidal ideation, anorexia, major depression with psychosis, social anxiety disorder, autism spectrum disorder, panic disorder, dissociative disorder, PTSD). She presents to our clinic to establish case for her diagnosis of POTS (hypovolemic subtype) and to help reduce the frequency of \"syncope.\"    Stephanie had an episode of \"syncope\" during our clinic visit today during which we were checking orthostatics. Her BP remained stable (115 systolic) and heart rate remained stable increasing modestly from 76 bpm to 90 bpm. Her tilt table testing at [facility] suggests some mild underlying POTS (40 bpm increase over 10 minutes, no BP change). Interestingly her " episode today (and many in the past as described) do not sound like true syncope, as she reports often being able to hear well what's going on during the episodes. Her lack of blood pressure change would also support more of a neurological dissociative picture.  Additionally, she has history of psychogenic seizures. She does endorse occasional episodes of what sound possibly as true syncope, for which the addition of midodrine could be trialed.     #POTS/syncope  - Initiate midodrine 2.5 mg bid for one week, then increase to 5 mg bid   - Stop Fluorinef 0.1 mg  - Salt tablet 2 gr bid for a week, then increase to 4 gr twice daily  - Continue hydration with 2 lt/day    We would like to see her back in 4-6 weeks to see how she is doing with consideration of increasing medications above as indicated (will request TTE if no improvement in symptoms to rule out structural heart disease).    A total of 40 minutes spent face-toface with greater than 50% of the time spent in counseling and coordinating cares of the issues above.     Please donot hesitate to contact me if you have any questions or concerns. Again, thank you for allowing me to participate in the care of your patient.    Le RODRIGUEZ MD  [facility] Division of Cardiology  Pager [phone]      Answers for HPI/ROS submitted by the patient on 9/16/2019   General Symptoms: Yes  Skin Symptoms: Yes  HENT Symptoms: Yes  EYE SYMPTOMS: Yes  HEART SYMPTOMS: Yes  LUNG SYMPTOMS: Yes  INTESTINAL SYMPTOMS: No  URINARY SYMPTOMS: Yes  GYNECOLOGIC SYMPTOMS: No  BREAST SYMPTOMS: No  SKELETAL SYMPTOMS: Yes  BLOOD SYMPTOMS: Yes  NERVOUS SYSTEM SYMPTOMS: Yes  MENTAL HEALTH SYMPTOMS: Yes  Fever: No  Loss of appetite: Yes  Weight loss: No  Weight gain: No  Fatigue: Yes  Night sweats: No  Chills: No  Increased stress: Yes  Excessive hunger: No  Excessive thirst: No  Feeling hot or cold when others believe the temperature is normal: Yes  Loss of height: No  Post-operative  complications: No  Surgical site pain: No  Hallucinations: No  Change in or Loss of Energy: Yes  Hyperactivity: No  Confusion: Yes  Changes in hair: No  Changes in moles/birth marks: No  Itching: No  Rashes: No  Changes in nails: No  Acne: No  Hair in places you don't want it: No  Change in facial hair: No  Warts: Yes  Non-healing sores: No  Scarring: No  Flaking of skin: No  Color changes of hands/feet in cold : Yes  Sun sensitivity: Yes  Skin thickening: No  Ear pain: No  Ear discharge: No  Hearing loss: No  Tinnitus: No  Nosebleeds: No  Congestion: No  Sinus pain: No  Trouble swallowing: No   Voice hoarseness: No  Mouth sores: Yes  Sore throat: Yes  Tooth pain: No  Gum tenderness: No  Bleeding gums: No  Change in taste: No  Change in sense of smell: No  Dry mouth: No  Hearing aid used: No  Neck lump: No  Eye pain: No  Vision loss: Yes  Dry eyes: No  Watery eyes: No  Eye bulging: No  Double vision: No  Flashing of lights: No  Spots: Yes  Floaters: No  Redness: No  Crossed eyes: No  Tunnel Vision: Yes  Yellowing of eyes: No  Eye irritation: No  Cough: No  Sputum or phlegm: No  Coughing up blood: No  Difficulty breating or shortness of breath: Yes  Snoring: No  Wheezing: No  Difficulty breathing on exertion: Yes  Nighttime Cough: No  Difficulty breathing when lying flat: Yes  Chest pain or pressure: Yes  Fast or irregular heartbeat: Yes  Pain in legs with walking: Yes  Trouble breathing while lying down: Yes  Fingers or toes appear blue: No  High blood pressure: No  Low blood pressure: No  Fainting: Yes  Murmurs: No  Pacemaker: No  Varicose veins: No  Edema or swelling: No  Wake up at night with shortness of breath: No  Light-headedness: Yes  Exercise intolerance: Yes  Trouble holding urine or incontinence: Yes  Pain or burning: No  Trouble starting or stopping: No  Increased frequency of urination: No  Blood in urine: No  Decreased frequency of urination: No  Frequent nighttime urination: No  Flank pain:  No  Difficulty emptying bladder: No  Back pain: Yes  Muscle aches: Yes  Neck pain: Yes  Swollen joints: No  Joint pain: Yes  Bone pain: No  Muscle cramps: Yes  Muscle weakness: Yes  Joint stiffness: Yes  Bone fracture: No  Anemia: No  Swollen glands: No  Easy bleeding or bruising: Yes  Trouble with coordination: Yes  Dizziness or trouble with balance: Yes  Fainting or black-out spells: Yes  Memory loss: Yes  Headache: Yes  Seizures: Yes  Speech problems: No  Tingling: Yes  Tremor: No  Weakness: Yes  Difficulty walking: Yes  Paralysis: Yes  Numbness: Yes  Nervous or Anxious: Yes  Depression: Yes  Trouble sleeping: No  Trouble thinking or concentrating: Yes  Mood changes: Yes  Panic attacks: Yes    Answers for HPI/ROS submitted by the patient on 9/16/2019   General Symptoms: Yes  Skin Symptoms: Yes  HENT Symptoms: Yes  EYE SYMPTOMS: Yes  HEART SYMPTOMS: Yes  LUNG SYMPTOMS: Yes  INTESTINAL SYMPTOMS: No  URINARY SYMPTOMS: Yes  GYNECOLOGIC SYMPTOMS: No  BREAST SYMPTOMS: No  SKELETAL SYMPTOMS: Yes  BLOOD SYMPTOMS: Yes  NERVOUS SYSTEM SYMPTOMS: Yes  MENTAL HEALTH SYMPTOMS: Yes  Fever: No  Loss of appetite: Yes  Weight loss: No  Weight gain: No  Fatigue: Yes  Night sweats: No  Chills: No  Increased stress: Yes  Excessive hunger: No  Excessive thirst: No  Feeling hot or cold when others believe the temperature is normal: Yes  Loss of height: No  Post-operative complications: No  Surgical site pain: No  Hallucinations: No  Change in or Loss of Energy: Yes  Hyperactivity: No  Confusion: Yes  Changes in hair: No  Changes in moles/birth marks: No  Itching: No  Rashes: No  Changes in nails: No  Acne: No  Hair in places you don't want it: No  Change in facial hair: No  Warts: Yes  Non-healing sores: No  Scarring: No  Flaking of skin: No  Color changes of hands/feet in cold : Yes  Sun sensitivity: Yes  Skin thickening: No  Ear pain: No  Ear discharge: No  Hearing loss: No  Tinnitus: No  Nosebleeds: No  Congestion: No  Sinus pain:  No  Trouble swallowing: No   Voice hoarseness: No  Mouth sores: Yes  Sore throat: Yes  Tooth pain: No  Gum tenderness: No  Bleeding gums: No  Change in taste: No  Change in sense of smell: No  Dry mouth: No  Hearing aid used: No  Neck lump: No  Eye pain: No  Vision loss: Yes  Dry eyes: No  Watery eyes: No  Eye bulging: No  Double vision: No  Flashing of lights: No  Spots: Yes  Floaters: No  Redness: No  Crossed eyes: No  Tunnel Vision: Yes  Yellowing of eyes: No  Eye irritation: No  Cough: No  Sputum or phlegm: No  Coughing up blood: No  Difficulty breating or shortness of breath: Yes  Snoring: No  Wheezing: No  Difficulty breathing on exertion: Yes  Nighttime Cough: No  Difficulty breathing when lying flat: Yes  Chest pain or pressure: Yes  Fast or irregular heartbeat: Yes  Pain in legs with walking: Yes  Trouble breathing while lying down: Yes  Fingers or toes appear blue: No  High blood pressure: No  Low blood pressure: No  Fainting: Yes  Murmurs: No  Pacemaker: No  Varicose veins: No  Edema or swelling: No  Wake up at night with shortness of breath: No  Light-headedness: Yes  Exercise intolerance: Yes  Trouble holding urine or incontinence: Yes  Pain or burning: No  Trouble starting or stopping: No  Increased frequency of urination: No  Blood in urine: No  Decreased frequency of urination: No  Frequent nighttime urination: No  Flank pain: No  Difficulty emptying bladder: No  Back pain: Yes  Muscle aches: Yes  Neck pain: Yes  Swollen joints: No  Joint pain: Yes  Bone pain: No  Muscle cramps: Yes  Muscle weakness: Yes  Joint stiffness: Yes  Bone fracture: No  Anemia: No  Swollen glands: No  Easy bleeding or bruising: Yes  Trouble with coordination: Yes  Dizziness or trouble with balance: Yes  Fainting or black-out spells: Yes  Memory loss: Yes  Headache: Yes  Seizures: Yes  Speech problems: No  Tingling: Yes  Tremor: No  Weakness: Yes  Difficulty walking: Yes  Paralysis: Yes  Numbness: Yes  Nervous or Anxious:  Yes  Depression: Yes  Trouble sleeping: No  Trouble thinking or concentrating: Yes  Mood changes: Yes  Panic attacks: Yes     yes

## 2024-06-08 ENCOUNTER — HEALTH MAINTENANCE LETTER (OUTPATIENT)
Age: 24
End: 2024-06-08

## 2024-09-05 ENCOUNTER — ENROLLMENT (OUTPATIENT)
Dept: HOME HEALTH SERVICES | Facility: HOME HEALTH | Age: 24
End: 2024-09-05
Payer: COMMERCIAL

## 2024-10-06 ENCOUNTER — HOME INFUSION (OUTPATIENT)
Dept: HOME HEALTH SERVICES | Facility: HOME HEALTH | Age: 24
End: 2024-10-06
Payer: COMMERCIAL

## 2024-10-06 DIAGNOSIS — G90.A POTS (POSTURAL ORTHOSTATIC TACHYCARDIA SYNDROME): Primary | ICD-10-CM

## 2024-10-14 VITALS — WEIGHT: 167.99 LBS | BODY MASS INDEX: 23.43 KG/M2

## 2024-10-14 PROBLEM — G90.A POTS (POSTURAL ORTHOSTATIC TACHYCARDIA SYNDROME): Status: ACTIVE | Noted: 2024-10-14

## 2024-10-14 RX ORDER — WATER 10 ML/10ML
2.2 INJECTION INTRAMUSCULAR; INTRAVENOUS; SUBCUTANEOUS PRN
Qty: 999999 ML | Refills: 0 | Status: ACTIVE | OUTPATIENT
Start: 2024-10-23 | End: 2025-08-16

## 2025-05-15 ENCOUNTER — MEDICAL CORRESPONDENCE (OUTPATIENT)
Dept: HOME HEALTH SERVICES | Facility: HOME HEALTH | Age: 25
End: 2025-05-15
Payer: COMMERCIAL

## 2025-06-15 ENCOUNTER — HEALTH MAINTENANCE LETTER (OUTPATIENT)
Age: 25
End: 2025-06-15

## (undated) RX ORDER — LIDOCAINE HYDROCHLORIDE 20 MG/ML
INJECTION, SOLUTION EPIDURAL; INFILTRATION; INTRACAUDAL; PERINEURAL
Status: DISPENSED
Start: 2019-01-30

## (undated) RX ORDER — LIDOCAINE HYDROCHLORIDE 20 MG/ML
INJECTION, SOLUTION EPIDURAL; INFILTRATION; INTRACAUDAL; PERINEURAL
Status: DISPENSED
Start: 2019-04-17

## (undated) RX ORDER — METHOHEXITAL IN WATER/PF 100MG/10ML
SYRINGE (ML) INTRAVENOUS
Status: DISPENSED
Start: 2019-01-23

## (undated) RX ORDER — KETOROLAC TROMETHAMINE 30 MG/ML
INJECTION, SOLUTION INTRAMUSCULAR; INTRAVENOUS
Status: DISPENSED
Start: 2019-01-25

## (undated) RX ORDER — LIDOCAINE HYDROCHLORIDE 20 MG/ML
INJECTION, SOLUTION EPIDURAL; INFILTRATION; INTRACAUDAL; PERINEURAL
Status: DISPENSED
Start: 2019-04-01

## (undated) RX ORDER — KETOROLAC TROMETHAMINE 30 MG/ML
INJECTION, SOLUTION INTRAMUSCULAR; INTRAVENOUS
Status: DISPENSED
Start: 2019-01-23

## (undated) RX ORDER — GRANISETRON HYDROCHLORIDE 1 MG/ML
INJECTION INTRAVENOUS
Status: DISPENSED
Start: 2019-04-10

## (undated) RX ORDER — GRANISETRON HYDROCHLORIDE 1 MG/ML
INJECTION INTRAVENOUS
Status: DISPENSED
Start: 2019-04-01

## (undated) RX ORDER — KETOROLAC TROMETHAMINE 30 MG/ML
INJECTION, SOLUTION INTRAMUSCULAR; INTRAVENOUS
Status: DISPENSED
Start: 2019-02-01

## (undated) RX ORDER — ACETAMINOPHEN 325 MG/1
TABLET ORAL
Status: DISPENSED
Start: 2019-01-23

## (undated) RX ORDER — LIDOCAINE HYDROCHLORIDE 20 MG/ML
INJECTION, SOLUTION EPIDURAL; INFILTRATION; INTRACAUDAL; PERINEURAL
Status: DISPENSED
Start: 2019-02-04

## (undated) RX ORDER — LIDOCAINE HYDROCHLORIDE 20 MG/ML
INJECTION, SOLUTION EPIDURAL; INFILTRATION; INTRACAUDAL; PERINEURAL
Status: DISPENSED
Start: 2019-02-11

## (undated) RX ORDER — LIDOCAINE HYDROCHLORIDE 20 MG/ML
INJECTION, SOLUTION EPIDURAL; INFILTRATION; INTRACAUDAL; PERINEURAL
Status: DISPENSED
Start: 2019-02-08

## (undated) RX ORDER — KETOROLAC TROMETHAMINE 30 MG/ML
INJECTION, SOLUTION INTRAMUSCULAR; INTRAVENOUS
Status: DISPENSED
Start: 2019-01-28

## (undated) RX ORDER — KETOROLAC TROMETHAMINE 30 MG/ML
INJECTION, SOLUTION INTRAMUSCULAR; INTRAVENOUS
Status: DISPENSED
Start: 2019-01-30

## (undated) RX ORDER — GRANISETRON HYDROCHLORIDE 1 MG/ML
INJECTION INTRAVENOUS
Status: DISPENSED
Start: 2019-02-01

## (undated) RX ORDER — LIDOCAINE HYDROCHLORIDE 20 MG/ML
INJECTION, SOLUTION EPIDURAL; INFILTRATION; INTRACAUDAL; PERINEURAL
Status: DISPENSED
Start: 2019-04-10

## (undated) RX ORDER — GRANISETRON HYDROCHLORIDE 1 MG/ML
INJECTION INTRAVENOUS
Status: DISPENSED
Start: 2019-04-17

## (undated) RX ORDER — GRANISETRON HYDROCHLORIDE 1 MG/ML
INJECTION INTRAVENOUS
Status: DISPENSED
Start: 2019-01-25

## (undated) RX ORDER — GRANISETRON HYDROCHLORIDE 1 MG/ML
INJECTION INTRAVENOUS
Status: DISPENSED
Start: 2019-03-25

## (undated) RX ORDER — GRANISETRON HYDROCHLORIDE 1 MG/ML
INJECTION INTRAVENOUS
Status: DISPENSED
Start: 2019-03-22

## (undated) RX ORDER — KETOROLAC TROMETHAMINE 30 MG/ML
INJECTION, SOLUTION INTRAMUSCULAR; INTRAVENOUS
Status: DISPENSED
Start: 2019-02-06

## (undated) RX ORDER — LIDOCAINE HYDROCHLORIDE 20 MG/ML
INJECTION, SOLUTION EPIDURAL; INFILTRATION; INTRACAUDAL; PERINEURAL
Status: DISPENSED
Start: 2019-03-22

## (undated) RX ORDER — LIDOCAINE HYDROCHLORIDE 20 MG/ML
INJECTION, SOLUTION EPIDURAL; INFILTRATION; INTRACAUDAL; PERINEURAL
Status: DISPENSED
Start: 2019-01-28

## (undated) RX ORDER — LIDOCAINE HYDROCHLORIDE 20 MG/ML
INJECTION, SOLUTION EPIDURAL; INFILTRATION; INTRACAUDAL; PERINEURAL
Status: DISPENSED
Start: 2019-03-25

## (undated) RX ORDER — GRANISETRON HYDROCHLORIDE 1 MG/ML
INJECTION INTRAVENOUS
Status: DISPENSED
Start: 2019-02-11

## (undated) RX ORDER — GRANISETRON HYDROCHLORIDE 1 MG/ML
INJECTION INTRAVENOUS
Status: DISPENSED
Start: 2019-02-04

## (undated) RX ORDER — KETOROLAC TROMETHAMINE 30 MG/ML
INJECTION, SOLUTION INTRAMUSCULAR; INTRAVENOUS
Status: DISPENSED
Start: 2019-02-08

## (undated) RX ORDER — LIDOCAINE HYDROCHLORIDE 20 MG/ML
INJECTION, SOLUTION EPIDURAL; INFILTRATION; INTRACAUDAL; PERINEURAL
Status: DISPENSED
Start: 2019-01-25

## (undated) RX ORDER — LIDOCAINE HYDROCHLORIDE 20 MG/ML
INJECTION, SOLUTION EPIDURAL; INFILTRATION; INTRACAUDAL; PERINEURAL
Status: DISPENSED
Start: 2019-02-01

## (undated) RX ORDER — GRANISETRON HYDROCHLORIDE 1 MG/ML
INJECTION INTRAVENOUS
Status: DISPENSED
Start: 2019-01-28

## (undated) RX ORDER — GRANISETRON HYDROCHLORIDE 1 MG/ML
INJECTION INTRAVENOUS
Status: DISPENSED
Start: 2019-02-06

## (undated) RX ORDER — LIDOCAINE HYDROCHLORIDE 20 MG/ML
INJECTION, SOLUTION EPIDURAL; INFILTRATION; INTRACAUDAL; PERINEURAL
Status: DISPENSED
Start: 2019-02-06

## (undated) RX ORDER — LIDOCAINE HYDROCHLORIDE 20 MG/ML
INJECTION, SOLUTION EPIDURAL; INFILTRATION; INTRACAUDAL; PERINEURAL
Status: DISPENSED
Start: 2019-01-23

## (undated) RX ORDER — KETOROLAC TROMETHAMINE 30 MG/ML
INJECTION, SOLUTION INTRAMUSCULAR; INTRAVENOUS
Status: DISPENSED
Start: 2019-02-04

## (undated) RX ORDER — GRANISETRON HYDROCHLORIDE 1 MG/ML
INJECTION INTRAVENOUS
Status: DISPENSED
Start: 2019-02-08

## (undated) RX ORDER — ONDANSETRON 4 MG/1
TABLET, ORALLY DISINTEGRATING ORAL
Status: DISPENSED
Start: 2019-01-23